# Patient Record
Sex: FEMALE | Race: WHITE | Employment: OTHER | URBAN - METROPOLITAN AREA
[De-identification: names, ages, dates, MRNs, and addresses within clinical notes are randomized per-mention and may not be internally consistent; named-entity substitution may affect disease eponyms.]

---

## 2017-01-12 ENCOUNTER — APPOINTMENT (OUTPATIENT)
Dept: GENERAL RADIOLOGY | Age: 82
DRG: 202 | End: 2017-01-12
Attending: EMERGENCY MEDICINE
Payer: MEDICARE

## 2017-01-12 ENCOUNTER — APPOINTMENT (OUTPATIENT)
Dept: CT IMAGING | Age: 82
DRG: 202 | End: 2017-01-12
Attending: INTERNAL MEDICINE
Payer: MEDICARE

## 2017-01-12 ENCOUNTER — HOSPITAL ENCOUNTER (INPATIENT)
Age: 82
LOS: 3 days | Discharge: HOME | DRG: 202 | End: 2017-01-16
Attending: EMERGENCY MEDICINE | Admitting: INTERNAL MEDICINE
Payer: MEDICARE

## 2017-01-12 DIAGNOSIS — R06.02 SOB (SHORTNESS OF BREATH): Primary | ICD-10-CM

## 2017-01-12 DIAGNOSIS — J98.01 ACUTE BRONCHOSPASM: ICD-10-CM

## 2017-01-12 PROBLEM — J20.9 ACUTE BRONCHITIS: Status: ACTIVE | Noted: 2017-01-12

## 2017-01-12 LAB
ALBUMIN SERPL BCP-MCNC: 3.2 G/DL (ref 3.5–5)
ALBUMIN/GLOB SERPL: 1 {RATIO} (ref 1.1–2.2)
ALP SERPL-CCNC: 90 U/L (ref 45–117)
ALT SERPL-CCNC: 33 U/L (ref 12–78)
ANION GAP BLD CALC-SCNC: 10 MMOL/L (ref 5–15)
AST SERPL W P-5'-P-CCNC: 20 U/L (ref 15–37)
BASOPHILS # BLD AUTO: 0 K/UL (ref 0–0.1)
BASOPHILS # BLD: 0 % (ref 0–1)
BILIRUB SERPL-MCNC: 0.7 MG/DL (ref 0.2–1)
BNP SERPL-MCNC: 85 PG/ML (ref 0–100)
BUN SERPL-MCNC: 26 MG/DL (ref 6–20)
BUN/CREAT SERPL: 23 (ref 12–20)
CALCIUM SERPL-MCNC: 10.1 MG/DL (ref 8.5–10.1)
CHLORIDE SERPL-SCNC: 102 MMOL/L (ref 97–108)
CO2 SERPL-SCNC: 30 MMOL/L (ref 21–32)
CREAT SERPL-MCNC: 1.14 MG/DL (ref 0.55–1.02)
EOSINOPHIL # BLD: 0 K/UL (ref 0–0.4)
EOSINOPHIL NFR BLD: 0 % (ref 0–7)
ERYTHROCYTE [DISTWIDTH] IN BLOOD BY AUTOMATED COUNT: 14.9 % (ref 11.5–14.5)
GLOBULIN SER CALC-MCNC: 3.3 G/DL (ref 2–4)
GLUCOSE SERPL-MCNC: 228 MG/DL (ref 65–100)
HCT VFR BLD AUTO: 39.4 % (ref 35–47)
HGB BLD-MCNC: 12.6 G/DL (ref 11.5–16)
LACTATE SERPL-SCNC: 3.3 MMOL/L (ref 0.4–2)
LYMPHOCYTES # BLD AUTO: 12 % (ref 12–49)
LYMPHOCYTES # BLD: 0.9 K/UL (ref 0.8–3.5)
MCH RBC QN AUTO: 30.1 PG (ref 26–34)
MCHC RBC AUTO-ENTMCNC: 32 G/DL (ref 30–36.5)
MCV RBC AUTO: 94 FL (ref 80–99)
MONOCYTES # BLD: 0.4 K/UL (ref 0–1)
MONOCYTES NFR BLD AUTO: 6 % (ref 5–13)
NEUTS SEG # BLD: 6.3 K/UL (ref 1.8–8)
NEUTS SEG NFR BLD AUTO: 82 % (ref 32–75)
PLATELET # BLD AUTO: 253 K/UL (ref 150–400)
POTASSIUM SERPL-SCNC: 3.5 MMOL/L (ref 3.5–5.1)
PROT SERPL-MCNC: 6.5 G/DL (ref 6.4–8.2)
RBC # BLD AUTO: 4.19 M/UL (ref 3.8–5.2)
SODIUM SERPL-SCNC: 142 MMOL/L (ref 136–145)
TROPONIN I SERPL-MCNC: <0.04 NG/ML
WBC # BLD AUTO: 7.7 K/UL (ref 3.6–11)

## 2017-01-12 PROCEDURE — 99218 HC RM OBSERVATION: CPT

## 2017-01-12 PROCEDURE — 74011636637 HC RX REV CODE- 636/637: Performed by: INTERNAL MEDICINE

## 2017-01-12 PROCEDURE — 84484 ASSAY OF TROPONIN QUANT: CPT | Performed by: EMERGENCY MEDICINE

## 2017-01-12 PROCEDURE — 71250 CT THORAX DX C-: CPT

## 2017-01-12 PROCEDURE — 71020 XR CHEST PA LAT: CPT

## 2017-01-12 PROCEDURE — 87040 BLOOD CULTURE FOR BACTERIA: CPT | Performed by: EMERGENCY MEDICINE

## 2017-01-12 PROCEDURE — 83880 ASSAY OF NATRIURETIC PEPTIDE: CPT | Performed by: EMERGENCY MEDICINE

## 2017-01-12 PROCEDURE — 83605 ASSAY OF LACTIC ACID: CPT | Performed by: EMERGENCY MEDICINE

## 2017-01-12 PROCEDURE — 80053 COMPREHEN METABOLIC PANEL: CPT | Performed by: EMERGENCY MEDICINE

## 2017-01-12 PROCEDURE — 94640 AIRWAY INHALATION TREATMENT: CPT

## 2017-01-12 PROCEDURE — 74011250636 HC RX REV CODE- 250/636: Performed by: EMERGENCY MEDICINE

## 2017-01-12 PROCEDURE — 99285 EMERGENCY DEPT VISIT HI MDM: CPT

## 2017-01-12 PROCEDURE — 77030029684 HC NEB SM VOL KT MONA -A

## 2017-01-12 PROCEDURE — A9270 NON-COVERED ITEM OR SERVICE: HCPCS | Performed by: INTERNAL MEDICINE

## 2017-01-12 PROCEDURE — 85025 COMPLETE CBC W/AUTO DIFF WBC: CPT | Performed by: EMERGENCY MEDICINE

## 2017-01-12 PROCEDURE — 93005 ELECTROCARDIOGRAM TRACING: CPT

## 2017-01-12 PROCEDURE — 74011000250 HC RX REV CODE- 250: Performed by: EMERGENCY MEDICINE

## 2017-01-12 PROCEDURE — 36415 COLL VENOUS BLD VENIPUNCTURE: CPT | Performed by: EMERGENCY MEDICINE

## 2017-01-12 PROCEDURE — 77010033678 HC OXYGEN DAILY

## 2017-01-12 RX ORDER — BISMUTH SUBSALICYLATE 262 MG
1 TABLET,CHEWABLE ORAL DAILY
COMMUNITY
End: 2020-01-01

## 2017-01-12 RX ORDER — ALENDRONATE SODIUM 70 MG/1
70 TABLET ORAL
COMMUNITY
End: 2020-01-01

## 2017-01-12 RX ORDER — ALBUTEROL SULFATE 0.83 MG/ML
2.5 SOLUTION RESPIRATORY (INHALATION)
Status: COMPLETED | OUTPATIENT
Start: 2017-01-12 | End: 2017-01-12

## 2017-01-12 RX ORDER — LEVOFLOXACIN 5 MG/ML
500 INJECTION, SOLUTION INTRAVENOUS
Status: COMPLETED | OUTPATIENT
Start: 2017-01-12 | End: 2017-01-12

## 2017-01-12 RX ORDER — CALCIUM CARBONATE 500(1250)
1 TABLET ORAL 2 TIMES DAILY
COMMUNITY
End: 2019-01-15 | Stop reason: SDUPTHER

## 2017-01-12 RX ORDER — ATORVASTATIN CALCIUM 20 MG/1
20 TABLET, FILM COATED ORAL DAILY
COMMUNITY
End: 2018-10-24

## 2017-01-12 RX ORDER — PREDNISONE 20 MG/1
40 TABLET ORAL
Status: DISCONTINUED | OUTPATIENT
Start: 2017-01-12 | End: 2017-01-15

## 2017-01-12 RX ORDER — ASPIRIN 81 MG/1
81 TABLET ORAL DAILY
COMMUNITY
End: 2018-10-24

## 2017-01-12 RX ADMIN — ALBUTEROL SULFATE 2.5 MG: 2.5 SOLUTION RESPIRATORY (INHALATION) at 19:44

## 2017-01-12 RX ADMIN — LEVOFLOXACIN 500 MG: 5 INJECTION, SOLUTION INTRAVENOUS at 21:07

## 2017-01-12 RX ADMIN — PREDNISONE 40 MG: 20 TABLET ORAL at 21:39

## 2017-01-12 RX ADMIN — SODIUM CHLORIDE 1000 ML: 900 INJECTION, SOLUTION INTRAVENOUS at 21:07

## 2017-01-12 NOTE — IP AVS SNAPSHOT
[address] [address] 
[phone] Patient: Denis Brian MRN: [id number] :1925 You are allergic to the following Allergen Reactions Hydrochlorothiazide Nausea and Vomiting Recent Documentation Height Weight Breastfeeding? BMI OB Status Smoking Status 1.575 m 53.1 kg No 21.4 kg/m2 Postmenopausal Former Smoker Unresulted Labs Order Current Status CULTURE, BLOOD, PAIRED Preliminary result Emergency Contacts Name Discharge Info Relation Home Work Mobile [address] CAREGIVER [3] Child [2] [phone] Rodger Reddy  Other Relative [6] [phone] About your hospitalization You were admitted on:  2017 [facility] on:  2017 Unit phone number:  [phone] Why you were hospitalized Your primary diagnosis was:  Acute Bronchitis Your diagnoses also included:  Bronchitis Providers Seen During Your Hospitalizations Provider Role Specialty Primary office phone Terence Arteaga MD Attending Provider Emergency Medicine [phone] Azul Busch MD Attending Provider Internal Medicine [phone] Jackson Bledsoe MD Attending Provider Internal Medicine [phone] Abbey Lo MD Attending Provider Internal Medicine [phone] Odilon Maharaj MD Attending Provider Internal Medicine [phone] Your Primary Care Physician (PCP) Primary Care Physician Office Phone Office Fax [address] [phone] Follow-up Information Follow up With Details Comments Contact Info Virgilio Reynoso MD In 2 weeks  [address] [address] [phone] Current Discharge Medication List  
  
START taking these medications Dose & Instructions Dispensing Information Comments Morning Noon Evening Bedtime  
 guaiFENesin 100 mg/5 mL liquid Commonly known as:  ROBITUSSIN Your next dose is: Today, Tomorrow Other:  _________ Dose:  200 mg Take 10 mL by mouth three (3) times daily as needed for Cough. Quantity:  1 Bottle Refills:  0  
     
   
   
   
  
 levoFLOXacin 250 mg tablet Commonly known as:  Patrizia [place] Your next dose is: Today, Tomorrow Other:  _________ Dose:  250 mg Take 1 Tab by mouth daily for 3 days. Quantity:  3 Tab Refills:  0  
     
   
   
   
  
 * predniSONE 10 mg tablet Commonly known as:  Johnny Lever Your next dose is: Today, Tomorrow Other:  _________ Dose:  30 mg Take 3 Tabs by mouth daily (with breakfast) for 1 day. Quantity:  3 Tab Refills:  0  
     
   
   
   
  
 * predniSONE 10 mg tablet Commonly known as:  Johnny Lever Your next dose is: Today, Tomorrow Other:  _________ Dose:  20 mg Take 2 Tabs by mouth daily (with breakfast) for 5 days. Quantity:  10 Tab Refills:  0  
     
   
   
   
  
 * Notice: This list has 2 medication(s) that are the same as other medications prescribed for you. Read the directions carefully, and ask your doctor or other care provider to review them with you. CONTINUE these medications which have CHANGED Dose & Instructions Dispensing Information Comments Morning Noon Evening Bedtime  
 verapamil  mg tablet Commonly known as:  CALAN-SR What changed:   
- medication strength 
- how much to take Your next dose is: Today, Tomorrow Other:  _________ Dose:  120 mg Take 1 Tab by mouth daily. Indications: Hypertension Quantity:  30 Tab Refills:  3 CONTINUE these medications which have NOT CHANGED Dose & Instructions Dispensing Information Comments Morning Noon Evening Bedtime AMLODIPINE PO Your next dose is: Today, Tomorrow Other:  _________ Dose:  5 mg Take 5 mg by mouth daily. Refills:  0  
     
   
   
   
  
 aspirin delayed-release 81 mg tablet Your next dose is: Today, Tomorrow Other:  _________ Dose:  81 mg Take 81 mg by mouth daily. Refills:  0  
     
   
   
   
  
 atorvastatin 20 mg tablet Commonly known as:  LIPITOR Your next dose is: Today, Tomorrow Other:  _________ Dose:  20 mg Take 20 mg by mouth daily. Refills:  0  
     
   
   
   
  
 calcium carbonate 500 mg calcium (1,250 mg) tablet Commonly known as:  OS-SIMONE Your next dose is: Today, Tomorrow Other:  _________ Dose:  1 Tab Take 1 Tab by mouth two (2) times a day. Refills:  0  
     
   
   
   
  
 FOSAMAX 70 mg tablet Generic drug:  alendronate Your next dose is: Today, Tomorrow Other:  _________ Dose:  70 mg Take 70 mg by mouth every seven (7) days. Refills:  0  
     
   
   
   
  
 furosemide 20 mg tablet Commonly known as:  LASIX Your next dose is: Today, Tomorrow Other:  _________ Dose:  20 mg Take 20 mg by mouth daily. Refills:  0  
     
   
   
   
  
 lisinopril 40 mg tablet Commonly known as:  Delsie Commander Your next dose is: Today, Tomorrow Other:  _________ Dose:  40 mg Take 40 mg by mouth daily. Refills:  0  
     
   
   
   
  
 multivitamin tablet Commonly known as:  ONE A DAY Your next dose is: Today, Tomorrow Other:  _________ Dose:  1 Tab Take 1 Tab by mouth daily. Refills:  0 Where to Get Your Medications Information on where to get these meds will be given to you by the nurse or doctor. ! Ask your nurse or doctor about these medications  
  guaiFENesin 100 mg/5 mL liquid levoFLOXacin 250 mg tablet  
 predniSONE 10 mg tablet  
 predniSONE 10 mg tablet  
 verapamil  mg tablet Discharge Instructions Discharge Instructions PATIENT ID: Rajni Charlton MRN: [id number] YOB: 1925 DATE OF ADMISSION: 1/12/2017  6:55 PM   
DATE OF DISCHARGE: 1/16/2017 PRIMARY CARE PROVIDER: Shawna Zarate MD  
 
 
DISCHARGING PHYSICIAN: Jesse Nixon MD   
To contact this individual call [phone] and ask the  to page. If unavailable ask to be transferred the Adult Hospitalist Department. DISCHARGE DIAGNOSES : Bronchitis, Acute hypoxic respiratory failure CONSULTATIONS: IP CONSULT TO HOSPITALIST 
 
PROCEDURES/SURGERIES: * No surgery found * PENDING TEST RESULTS:  
At the time of discharge the following test results are still pending: FOLLOW UP APPOINTMENTS:  
Follow-up Information Follow up With Details Comments Contact Info Michelle Matute MD   [address] [place] [address] [address] [address] [address] 
[phone] ADDITIONAL CARE RECOMMENDATIONS:  
 
DIET: Cardiac Diet ACTIVITY: Activity as tolerated WOUND CARE: None EQUIPMENT needed:  
 
 
 
 
DISPOSITION: 
  Home With: 
 OT  PT  Xavier Hartman RN  
  
 x SNF/Inpatient Rehab/LTAC Independent/assisted living Hospice Other: CDMP Checked:  
Yes x Signed:  
Victory Nissen, MD 
1/16/2017 
12:53 PM 
 
Discharge Orders None [employer] Announcement We are excited to announce that we are making your provider's discharge notes available to you in [employer]. You will see these notes when they are completed and signed by the physician that discharged you from your recent hospital stay. If you have any questions or concerns about any information you see in [employer], please call the Health Information Department where you were seen or reach out to your Primary Care Provider for more information about your plan of care. Introducing [facility]! Dionicio Carnes introduces [employer] patient portal. Now you can access parts of your medical record, email your doctor's office, and request medication refills online. 1. In your internet browser, go to [email or web address]. [employer] 2. Click on the First Time User? Click Here link in the Sign In box. You will see the New Member Sign Up page. 3. Enter your [employer] Access Code exactly as it appears below. You will not need to use this code after youve completed the sign-up process. If you do not sign up before the expiration date, you must request a new code. · [employer] Access Code: [email or web address] Expires: 2/28/2017  5:29 PM 
 
4. Enter the last four digits of your Social Security Number (xxxx) and Date of Birth (mm/dd/yyyy) as indicated and click Submit. You will be taken to the next sign-up page. 5. Create a [employer] ID. This will be your [employer] login ID and cannot be changed, so think of one that is secure and easy to remember. 6. Create a [employer] password. You can change your password at any time. 7. Enter your Password Reset Question and Answer. This can be used at a later time if you forget your password. 8. Enter your e-mail address. You will receive e-mail notification when new information is available in [address] 9. Click Sign Up. You can now view and download portions of your medical record. 10. Click the Download Summary menu link to download a portable copy of your medical information. If you have questions, please visit the Frequently Asked Questions section of the [employer] website. Remember, [employer] is NOT to be used for urgent needs. For medical emergencies, dial 911. Now available from your iPhone and Android! General Information Please provide this summary of care documentation to your next provider. Patient Signature:  ____________________________________________________________ Date:  ____________________________________________________________  
  
Laura Bryan Provider Signature:  ____________________________________________________________ Date:  ____________________________________________________________

## 2017-01-12 NOTE — ED TRIAGE NOTES
Triage:  Pt to the ED due to concerns over continued cough, periods of fatigue, and fever. Pt states she is concerned she has pneumonia. Pt denies any SOB or CP. Pt states just feels like she is \"icky poo\". Noted intermittent rattling cough in triage that is occasionally productive.

## 2017-01-12 NOTE — IP AVS SNAPSHOT
Current Discharge Medication List  
  
Take these medications at their scheduled times Dose & Instructions Dispensing Information Comments Morning Noon Evening Bedtime AMLODIPINE PO Your next dose is: Today, Tomorrow Other:  ____________ Dose:  5 mg Take 5 mg by mouth daily. Refills:  0  
     
   
   
   
  
 aspirin delayed-release 81 mg tablet Your next dose is: Today, Tomorrow Other:  ____________ Dose:  81 mg Take 81 mg by mouth daily. Refills:  0  
     
   
   
   
  
 atorvastatin 20 mg tablet Commonly known as:  LIPITOR Your next dose is: Today, Tomorrow Other:  ____________ Dose:  20 mg Take 20 mg by mouth daily. Refills:  0  
     
   
   
   
  
 calcium carbonate 500 mg calcium (1,250 mg) tablet Commonly known as:  OS-SIMONE Your next dose is: Today, Tomorrow Other:  ____________ Dose:  1 Tab Take 1 Tab by mouth two (2) times a day. Refills:  0  
     
   
   
   
  
 FOSAMAX 70 mg tablet Generic drug:  alendronate Your next dose is: Today, Tomorrow Other:  ____________ Dose:  70 mg Take 70 mg by mouth every seven (7) days. Refills:  0  
     
   
   
   
  
 furosemide 20 mg tablet Commonly known as:  LASIX Your next dose is: Today, Tomorrow Other:  ____________ Dose:  20 mg Take 20 mg by mouth daily. Refills:  0  
     
   
   
   
  
 levoFLOXacin 250 mg tablet Commonly known as:  Thersia Bring Your next dose is: Today, Tomorrow Other:  ____________ Dose:  250 mg Take 1 Tab by mouth daily for 3 days. Quantity:  3 Tab Refills:  0  
     
   
   
   
  
 lisinopril 40 mg tablet Commonly known as:  Nona Needy Your next dose is: Today, Tomorrow Other:  ____________ Dose:  40 mg Take 40 mg by mouth daily. Refills:  0 multivitamin tablet Commonly known as:  ONE A DAY Your next dose is: Today, Tomorrow Other:  ____________ Dose:  1 Tab Take 1 Tab by mouth daily. Refills:  0  
     
   
   
   
  
 * predniSONE 10 mg tablet Commonly known as:  Shade Ok Your next dose is: Today, Tomorrow Other:  ____________ Dose:  30 mg Take 3 Tabs by mouth daily (with breakfast) for 1 day. Quantity:  3 Tab Refills:  0  
     
   
   
   
  
 * predniSONE 10 mg tablet Commonly known as:  Shade Ok Your next dose is: Today, Tomorrow Other:  ____________ Dose:  20 mg Take 2 Tabs by mouth daily (with breakfast) for 5 days. Quantity:  10 Tab Refills:  0  
     
   
   
   
  
 verapamil  mg tablet Commonly known as:  CALAN-SR Your next dose is: Today, Tomorrow Other:  ____________ Dose:  120 mg Take 1 Tab by mouth daily. Indications: Hypertension Quantity:  30 Tab Refills:  3  
     
   
   
   
  
 * Notice: This list has 2 medication(s) that are the same as other medications prescribed for you. Read the directions carefully, and ask your doctor or other care provider to review them with you. Take these medications as needed Dose & Instructions Dispensing Information Comments Morning Noon Evening Bedtime  
 guaiFENesin 100 mg/5 mL liquid Commonly known as:  ROBITUSSIN Your next dose is: Today, Tomorrow Other:  ____________ Dose:  200 mg Take 10 mL by mouth three (3) times daily as needed for Cough. Quantity:  1 Bottle Refills:  0 Where to Get Your Medications Information about where to get these medications is not yet available ! Ask your nurse or doctor about these medications  
  guaiFENesin 100 mg/5 mL liquid  
 levoFLOXacin 250 mg tablet  
 predniSONE 10 mg tablet  
 predniSONE 10 mg tablet  
 verapamil  mg tablet

## 2017-01-13 PROBLEM — J40 BRONCHITIS: Status: ACTIVE | Noted: 2017-01-13

## 2017-01-13 LAB
ALBUMIN SERPL BCP-MCNC: 2.8 G/DL (ref 3.5–5)
ALBUMIN/GLOB SERPL: 0.8 {RATIO} (ref 1.1–2.2)
ALP SERPL-CCNC: 81 U/L (ref 45–117)
ALT SERPL-CCNC: 29 U/L (ref 12–78)
ANION GAP BLD CALC-SCNC: 9 MMOL/L (ref 5–15)
AST SERPL W P-5'-P-CCNC: 18 U/L (ref 15–37)
ATRIAL RATE: 70 BPM
BASOPHILS # BLD AUTO: 0 K/UL (ref 0–0.1)
BASOPHILS # BLD: 0 % (ref 0–1)
BILIRUB SERPL-MCNC: 0.5 MG/DL (ref 0.2–1)
BUN SERPL-MCNC: 25 MG/DL (ref 6–20)
BUN/CREAT SERPL: 29 (ref 12–20)
CALCIUM SERPL-MCNC: 9.4 MG/DL (ref 8.5–10.1)
CALCULATED P AXIS, ECG09: 3 DEGREES
CALCULATED R AXIS, ECG10: -7 DEGREES
CALCULATED T AXIS, ECG11: 58 DEGREES
CHLORIDE SERPL-SCNC: 103 MMOL/L (ref 97–108)
CO2 SERPL-SCNC: 28 MMOL/L (ref 21–32)
CREAT SERPL-MCNC: 0.87 MG/DL (ref 0.55–1.02)
DIAGNOSIS, 93000: NORMAL
DIFFERENTIAL METHOD BLD: ABNORMAL
EOSINOPHIL # BLD: 0 K/UL (ref 0–0.4)
EOSINOPHIL NFR BLD: 0 % (ref 0–7)
ERYTHROCYTE [DISTWIDTH] IN BLOOD BY AUTOMATED COUNT: 14.6 % (ref 11.5–14.5)
EST. AVERAGE GLUCOSE BLD GHB EST-MCNC: 128 MG/DL
GLOBULIN SER CALC-MCNC: 3.3 G/DL (ref 2–4)
GLUCOSE SERPL-MCNC: 175 MG/DL (ref 65–100)
HBA1C MFR BLD: 6.1 % (ref 4.2–6.3)
HCT VFR BLD AUTO: 35.9 % (ref 35–47)
HGB BLD-MCNC: 11.4 G/DL (ref 11.5–16)
LACTATE SERPL-SCNC: 1.4 MMOL/L (ref 0.4–2)
LYMPHOCYTES # BLD AUTO: 13 % (ref 12–49)
LYMPHOCYTES # BLD: 0.6 K/UL (ref 0.8–3.5)
MAGNESIUM SERPL-MCNC: 2.2 MG/DL (ref 1.6–2.4)
MCH RBC QN AUTO: 30.2 PG (ref 26–34)
MCHC RBC AUTO-ENTMCNC: 31.8 G/DL (ref 30–36.5)
MCV RBC AUTO: 95 FL (ref 80–99)
MONOCYTES # BLD: 0 K/UL (ref 0–1)
MONOCYTES NFR BLD AUTO: 0 % (ref 5–13)
NEUTS SEG # BLD: 4.3 K/UL (ref 1.8–8)
NEUTS SEG NFR BLD AUTO: 87 % (ref 32–75)
P-R INTERVAL, ECG05: 128 MS
PHOSPHATE SERPL-MCNC: 3.7 MG/DL (ref 2.6–4.7)
PLATELET # BLD AUTO: 219 K/UL (ref 150–400)
PLATELET COMMENTS,PCOM: ABNORMAL
POTASSIUM SERPL-SCNC: 3.4 MMOL/L (ref 3.5–5.1)
PROT SERPL-MCNC: 6.1 G/DL (ref 6.4–8.2)
Q-T INTERVAL, ECG07: 400 MS
QRS DURATION, ECG06: 88 MS
QTC CALCULATION (BEZET), ECG08: 432 MS
RBC # BLD AUTO: 3.78 M/UL (ref 3.8–5.2)
RBC MORPH BLD: ABNORMAL
RBC MORPH BLD: ABNORMAL
SODIUM SERPL-SCNC: 140 MMOL/L (ref 136–145)
VENTRICULAR RATE, ECG03: 70 BPM
WBC # BLD AUTO: 4.9 K/UL (ref 3.6–11)

## 2017-01-13 PROCEDURE — A9270 NON-COVERED ITEM OR SERVICE: HCPCS | Performed by: INTERNAL MEDICINE

## 2017-01-13 PROCEDURE — 85025 COMPLETE CBC W/AUTO DIFF WBC: CPT | Performed by: INTERNAL MEDICINE

## 2017-01-13 PROCEDURE — 74011250637 HC RX REV CODE- 250/637: Performed by: NURSE PRACTITIONER

## 2017-01-13 PROCEDURE — 99218 HC RM OBSERVATION: CPT

## 2017-01-13 PROCEDURE — 65660000000 HC RM CCU STEPDOWN

## 2017-01-13 PROCEDURE — 77030027138 HC INCENT SPIROMETER -A

## 2017-01-13 PROCEDURE — 74011250636 HC RX REV CODE- 250/636: Performed by: INTERNAL MEDICINE

## 2017-01-13 PROCEDURE — 94640 AIRWAY INHALATION TREATMENT: CPT

## 2017-01-13 PROCEDURE — 83735 ASSAY OF MAGNESIUM: CPT | Performed by: INTERNAL MEDICINE

## 2017-01-13 PROCEDURE — 80053 COMPREHEN METABOLIC PANEL: CPT | Performed by: INTERNAL MEDICINE

## 2017-01-13 PROCEDURE — 36415 COLL VENOUS BLD VENIPUNCTURE: CPT | Performed by: INTERNAL MEDICINE

## 2017-01-13 PROCEDURE — 74011000250 HC RX REV CODE- 250: Performed by: INTERNAL MEDICINE

## 2017-01-13 PROCEDURE — 74011250636 HC RX REV CODE- 250/636: Performed by: NURSE PRACTITIONER

## 2017-01-13 PROCEDURE — 74011636637 HC RX REV CODE- 636/637: Performed by: INTERNAL MEDICINE

## 2017-01-13 PROCEDURE — 74011250637 HC RX REV CODE- 250/637: Performed by: INTERNAL MEDICINE

## 2017-01-13 PROCEDURE — 83605 ASSAY OF LACTIC ACID: CPT | Performed by: INTERNAL MEDICINE

## 2017-01-13 PROCEDURE — 84100 ASSAY OF PHOSPHORUS: CPT | Performed by: INTERNAL MEDICINE

## 2017-01-13 PROCEDURE — 83036 HEMOGLOBIN GLYCOSYLATED A1C: CPT | Performed by: INTERNAL MEDICINE

## 2017-01-13 RX ORDER — LEVOFLOXACIN 5 MG/ML
250 INJECTION, SOLUTION INTRAVENOUS EVERY 24 HOURS
Status: DISCONTINUED | OUTPATIENT
Start: 2017-01-13 | End: 2017-01-16 | Stop reason: HOSPADM

## 2017-01-13 RX ORDER — LISINOPRIL 20 MG/1
40 TABLET ORAL DAILY
Status: DISCONTINUED | OUTPATIENT
Start: 2017-01-13 | End: 2017-01-16 | Stop reason: HOSPADM

## 2017-01-13 RX ORDER — AMLODIPINE BESYLATE 5 MG/1
5 TABLET ORAL DAILY
Status: DISCONTINUED | OUTPATIENT
Start: 2017-01-13 | End: 2017-01-16 | Stop reason: HOSPADM

## 2017-01-13 RX ORDER — SODIUM CHLORIDE 0.9 % (FLUSH) 0.9 %
5-10 SYRINGE (ML) INJECTION AS NEEDED
Status: DISCONTINUED | OUTPATIENT
Start: 2017-01-13 | End: 2017-01-16 | Stop reason: HOSPADM

## 2017-01-13 RX ORDER — VERAPAMIL HYDROCHLORIDE 180 MG/1
90 TABLET, EXTENDED RELEASE ORAL DAILY
Status: DISCONTINUED | OUTPATIENT
Start: 2017-01-14 | End: 2017-01-14

## 2017-01-13 RX ORDER — CALCIUM CARBONATE 500(1250)
500 TABLET ORAL 2 TIMES DAILY
Status: DISCONTINUED | OUTPATIENT
Start: 2017-01-13 | End: 2017-01-16 | Stop reason: HOSPADM

## 2017-01-13 RX ORDER — ATORVASTATIN CALCIUM 20 MG/1
20 TABLET, FILM COATED ORAL DAILY
Status: DISCONTINUED | OUTPATIENT
Start: 2017-01-13 | End: 2017-01-16 | Stop reason: HOSPADM

## 2017-01-13 RX ORDER — ASPIRIN 81 MG/1
81 TABLET ORAL DAILY
Status: DISCONTINUED | OUTPATIENT
Start: 2017-01-13 | End: 2017-01-16 | Stop reason: HOSPADM

## 2017-01-13 RX ORDER — LEVOFLOXACIN 5 MG/ML
500 INJECTION, SOLUTION INTRAVENOUS EVERY 24 HOURS
Status: DISCONTINUED | OUTPATIENT
Start: 2017-01-13 | End: 2017-01-13 | Stop reason: DRUGHIGH

## 2017-01-13 RX ORDER — POTASSIUM CHLORIDE 750 MG/1
20 TABLET, FILM COATED, EXTENDED RELEASE ORAL
Status: COMPLETED | OUTPATIENT
Start: 2017-01-13 | End: 2017-01-13

## 2017-01-13 RX ORDER — SODIUM CHLORIDE 9 MG/ML
50 INJECTION, SOLUTION INTRAVENOUS CONTINUOUS
Status: DISCONTINUED | OUTPATIENT
Start: 2017-01-13 | End: 2017-01-16 | Stop reason: HOSPADM

## 2017-01-13 RX ORDER — SODIUM CHLORIDE 0.9 % (FLUSH) 0.9 %
5-10 SYRINGE (ML) INJECTION EVERY 8 HOURS
Status: DISCONTINUED | OUTPATIENT
Start: 2017-01-13 | End: 2017-01-16 | Stop reason: HOSPADM

## 2017-01-13 RX ORDER — VERAPAMIL HYDROCHLORIDE 120 MG/1
180 TABLET, FILM COATED, EXTENDED RELEASE ORAL DAILY
Status: DISCONTINUED | OUTPATIENT
Start: 2017-01-13 | End: 2017-01-13

## 2017-01-13 RX ORDER — ENOXAPARIN SODIUM 100 MG/ML
30 INJECTION SUBCUTANEOUS EVERY 24 HOURS
Status: DISCONTINUED | OUTPATIENT
Start: 2017-01-13 | End: 2017-01-16 | Stop reason: HOSPADM

## 2017-01-13 RX ORDER — ALBUTEROL SULFATE 0.83 MG/ML
2.5 SOLUTION RESPIRATORY (INHALATION)
Status: DISCONTINUED | OUTPATIENT
Start: 2017-01-13 | End: 2017-01-16 | Stop reason: HOSPADM

## 2017-01-13 RX ORDER — FUROSEMIDE 20 MG/1
20 TABLET ORAL DAILY
Status: DISCONTINUED | OUTPATIENT
Start: 2017-01-13 | End: 2017-01-16 | Stop reason: HOSPADM

## 2017-01-13 RX ADMIN — CALCIUM CARBONATE 500 MG: 1250 TABLET ORAL at 08:46

## 2017-01-13 RX ADMIN — ATORVASTATIN CALCIUM 20 MG: 20 TABLET, FILM COATED ORAL at 08:46

## 2017-01-13 RX ADMIN — AMLODIPINE BESYLATE 5 MG: 5 TABLET ORAL at 08:47

## 2017-01-13 RX ADMIN — ALBUTEROL SULFATE 1 DOSE: 2.5 SOLUTION RESPIRATORY (INHALATION) at 19:49

## 2017-01-13 RX ADMIN — LEVOFLOXACIN 250 MG: 5 INJECTION, SOLUTION INTRAVENOUS at 20:40

## 2017-01-13 RX ADMIN — ASPIRIN 81 MG: 81 TABLET, COATED ORAL at 08:46

## 2017-01-13 RX ADMIN — PREDNISONE 40 MG: 20 TABLET ORAL at 08:47

## 2017-01-13 RX ADMIN — ALBUTEROL SULFATE 1 DOSE: 2.5 SOLUTION RESPIRATORY (INHALATION) at 08:10

## 2017-01-13 RX ADMIN — LISINOPRIL 40 MG: 20 TABLET ORAL at 08:47

## 2017-01-13 RX ADMIN — GUAIFENESIN 600 MG: 600 TABLET, EXTENDED RELEASE ORAL at 08:47

## 2017-01-13 RX ADMIN — CALCIUM CARBONATE 500 MG: 1250 TABLET ORAL at 18:53

## 2017-01-13 RX ADMIN — FUROSEMIDE 20 MG: 20 TABLET ORAL at 08:47

## 2017-01-13 RX ADMIN — ALBUTEROL SULFATE 1 DOSE: 2.5 SOLUTION RESPIRATORY (INHALATION) at 15:13

## 2017-01-13 RX ADMIN — ALBUTEROL SULFATE 1 DOSE: 2.5 SOLUTION RESPIRATORY (INHALATION) at 03:09

## 2017-01-13 RX ADMIN — ENOXAPARIN SODIUM 30 MG: 30 INJECTION SUBCUTANEOUS at 18:55

## 2017-01-13 RX ADMIN — Medication 10 ML: at 18:58

## 2017-01-13 RX ADMIN — VERAPAMIL HYDROCHLORIDE 180 MG: 120 TABLET, FILM COATED, EXTENDED RELEASE ORAL at 08:45

## 2017-01-13 RX ADMIN — GUAIFENESIN 600 MG: 600 TABLET, EXTENDED RELEASE ORAL at 18:53

## 2017-01-13 RX ADMIN — POTASSIUM CHLORIDE 20 MEQ: 750 TABLET, FILM COATED, EXTENDED RELEASE ORAL at 13:18

## 2017-01-13 RX ADMIN — ALBUTEROL SULFATE 1 DOSE: 2.5 SOLUTION RESPIRATORY (INHALATION) at 12:25

## 2017-01-13 NOTE — PROGRESS NOTES
Hospitalist Progress Note          Johan Shahid M.D. Cedric: [phone]  Call physician on-call through the  7pm-7am        Date of visit:  1/13/2017    Patient seen and examined by me independently. Patient reports she has had \"bronchitis\" for the past 3 weeks and \"have not been able to shake it off\". Patient has a wet cough, but unable to bring anything up. O2 on admission was 90%. Persistent low grade fever. Advanced age and increased risk of complications, such as pneumonia. Patient failed outpatient treatment and needs inpatient care, requiring hospital stay of 2 overnights or more. Change admission to Inpatient status. Discussed with patient and NP.        Johan Shahid MD

## 2017-01-13 NOTE — ROUTINE PROCESS
TRANSFER - OUT REPORT:    Verbal report given to JORGE Harmon(name) on [facility] Merritt  being transferred to [facility](unit) for routine progression of care       Report consisted of patients Situation, Background, Assessment and   Recommendations(SBAR). Information from the following report(s) SBAR, ED Summary, Intake/Output, MAR and Recent Results was reviewed with the receiving nurse. Lines:   Peripheral IV 01/12/17 Right Antecubital (Active)   Site Assessment Clean, dry, & intact 1/12/2017  7:00 PM   Phlebitis Assessment 0 1/12/2017  7:00 PM   Infiltration Assessment 0 1/12/2017  7:00 PM   Dressing Status Clean, dry, & intact 1/12/2017  7:00 PM   Dressing Type Transparent 1/12/2017  7:00 PM   Hub Color/Line Status Blue;Capped;Flushed;Patent 1/12/2017  7:00 PM   Action Taken Blood drawn 1/12/2017  7:00 PM       Peripheral IV 01/12/17 Left Antecubital (Active)   Site Assessment Clean, dry, & intact 1/12/2017  7:01 PM   Phlebitis Assessment 0 1/12/2017  7:01 PM   Infiltration Assessment 0 1/12/2017  7:01 PM   Dressing Status Clean, dry, & intact 1/12/2017  7:01 PM   Dressing Type Transparent 1/12/2017  7:01 PM   Hub Color/Line Status Pink;Capped;Flushed;Patent 1/12/2017  7:01 PM   Action Taken Blood drawn 1/12/2017  7:01 PM        Opportunity for questions and clarification was provided.       Patient transported with:   Bettery

## 2017-01-13 NOTE — PROGRESS NOTES
Primary Nurse Pool Jimenes RN and [facility] JORGE MARTÍNEZ performed a dual skin assessment on this patient No impairment noted  Mohit score is 22. PATIENT has blanchable area to sacrum poa  From sitting in chair too long ,protective cream in use instructed to adam from side to side.

## 2017-01-13 NOTE — ED NOTES
Pt resting on stretcher. Pt remains on monitor x3. VSS. Family at the bedside. Will continue to monitor closely.

## 2017-01-13 NOTE — PROGRESS NOTES
TRANSFER - IN REPORT:    Verbal report received from [facility] RN(name) on General Merritt  being received from ED(unit) for routine progression of care      Report consisted of patients Situation, Background, Assessment and   Recommendations(SBAR). Information from the following report(s) SBAR, Kardex, ED Summary, [facility] and Recent Results was reviewed with the receiving nurse. Opportunity for questions and clarification was provided. Assessment completed upon patients arrival to unit and care assumed.

## 2017-01-13 NOTE — PROGRESS NOTES
Day #1 of Levaquin  Indication:  bronchitis  Current regimen:  500 mg q24h  Abx regimen:  Levaquin  ID Following ?: NO  Frequency of BMP?: once  Recent Labs      17   1853   WBC  7.7   CREA  1.14*   BUN  26*     Est CrCl: 25 ml/min  Temp (24hrs), Av.5 °F (36.9 °C), Min:98.4 °F (36.9 °C), Max:98.6 °F (37 °C)    Cultures: blood      Plan: Change to Levaquin 500 mg x1 then 250 mg q24h

## 2017-01-13 NOTE — PROGRESS NOTES
Hospitalist Progress Note  Yoshi Burris NP  Office: [phone]  Cell: [phone]      Date of Service:  2017  NAME:  Vivienne Najera  :  1925  MRN:  [id number]      Admission Summary:   Ms. Mode Sands is a 79 yo female who was admitted with severe cough and SOB. Recent treatment for bronchitis as an OP with no improvement. Admitted for oxygen and IV antibiotics. Interval history / Subjective:   Still with very wet cough and low grade fever. She failed OP management. Oxygen saturations improving and feeling better but can't bring up anything. Called by RN regarding pauses on remote tele but she is asymptomatic. Assessment & Plan:     Bronchitis (POA)  - Failed OP oral antibiotics and responding to IV antibiotics  - Add Mucinex and nebs to loosen secretions  - Wean oxygen as tolerated  - Imaging negative for PNA, including CT scan   - Continue steroids    Pauses on telemetry  - Reviewed ECG readings throughout day and pauses from 1 - 1.6 seconds although patient has been asymptomatic from all of them  - Will decrease Verapamil in half and monitor throughout day. If still happening or becomes symptomatic will ask Cardiology to evaluate. Hypokalemia  - Replaced PO on     Elevated lactic acid  - Initially 3.3 -> down to 1.4 with IVF combined with improvement in renal function    Mild acute kidney injury (POA)  - Cr 1.14 on admit down to 0.87 with hydration  - Encourage PO intake    Hypertension  - Continue Amlodipine and Lisinopril    Code status: Full  DVT prophylaxis: Add Lovenox    Care Plan discussed with: Patient, RN, Dr. Trevor Perez on   Disposition: Lives at .  Hopefully home in 1-2 more days     Hospital Problems  Date Reviewed: 2017          Codes Class Noted POA    Bronchitis ICD-10-CM: J40  ICD-9-CM: 498  2017 Unknown        * (Principal)Acute bronchitis ICD-10-CM: J20.9  ICD-9-CM: 466.0 1/12/2017 Yes                Review of Systems:   Denied fevers/chills, SOB ok, no palpitations or CP, no abdominal pain       Vital Signs:    Last 24hrs VS reviewed since prior progress note. Most recent are:  Visit Vitals    /66 (BP 1 Location: Left arm, BP Patient Position: At rest;Sitting)    Pulse 78    Temp 98.2 °F (36.8 °C)    Resp 16    Ht 5' 2\" (1.575 m)    Wt 53.1 kg (117 lb)    SpO2 95%    Breastfeeding No    BMI 21.4 kg/m2       No intake or output data in the 24 hours ending 01/13/17 1813     Physical Examination:             Constitutional:  No acute distress, cooperative, pleasant    ENT:  Oral mucous moist, oropharynx benign. Neck supple   Resp:  Overall with some scattered rhonchi and decreased to bases but mostly with upper airway congestion. No accessory muscle use   CV:  Regular rhythm, normal rate, no murmurs, gallops, rubs. Pauses on telemetry noted as above    GI:  Soft, non distended, non tender. normoactive bowel sounds, no hepatosplenomegaly     Musculoskeletal:  No edema, warm, 2+ pulses throughout    Neurologic:  Moves all extremities. AAOx3, no focal deficits and clear speech     Psych:  Good insight, Not anxious nor agitated.   Skin:  Good turgor, no rashes or ulcers       Data Review:    Review and/or order of clinical lab test  Review and/or order of tests in the radiology section of CPT  Review and/or order of tests in the medicine section of CPT      Labs:     Recent Labs      01/13/17   0345 01/12/17   1853   WBC  4.9  7.7   HGB  11.4*  12.6   HCT  35.9  39.4   PLT  219  253     Recent Labs      01/13/17   0345  01/12/17   1853   NA  140  142   K  3.4*  3.5   CL  103  102   CO2  28  30   BUN  25*  26*   CREA  0.87  1.14*   GLU  175*  228*   CA  9.4  10.1   MG  2.2   --    PHOS  3.7   --      Recent Labs      01/13/17   0345  01/12/17   1853   SGOT  18  20   ALT  29  33   AP  81  90   TBILI  0.5  0.7   TP  6.1*  6.5   ALB  2.8*  3.2*   GLOB  3.3  3.3     No results for input(s): INR, PTP, APTT in the last 72 hours. No lab exists for component: INREXT   No results for input(s): FE, TIBC, PSAT, FERR in the last 72 hours. No results found for: FOL, RBCF   No results for input(s): PH, PCO2, PO2 in the last 72 hours.   Recent Labs      01/12/17   1853   TROIQ  <0.04     Lab Results   Component Value Date/Time    Cholesterol, total 168 05/21/2010 03:00 AM    HDL Cholesterol 53 05/21/2010 03:00 AM    LDL, calculated 95.4 05/21/2010 03:00 AM    Triglyceride 98 05/21/2010 03:00 AM    CHOL/HDL Ratio 3.2 05/21/2010 03:00 AM     Lab Results   Component Value Date/Time    Glucose (POC) 104 05/22/2010 08:06 AM    Glucose (POC) 120 05/21/2010 10:53 PM    Glucose (POC) 122 05/21/2010 05:05 PM     Lab Results   Component Value Date/Time    Color YELLOW/STRAW 12/03/2016 09:25 AM    Appearance CLEAR 12/03/2016 09:25 AM    Specific gravity 1.017 12/03/2016 09:25 AM    pH (UA) 7.0 12/03/2016 09:25 AM    Protein NEGATIVE  12/03/2016 09:25 AM    Glucose NEGATIVE  12/03/2016 09:25 AM    Ketone TRACE 12/03/2016 09:25 AM    Bilirubin NEGATIVE  12/03/2016 09:25 AM    Urobilinogen 1.0 12/03/2016 09:25 AM    Nitrites NEGATIVE  12/03/2016 09:25 AM    Leukocyte Esterase SMALL 12/03/2016 09:25 AM    Epithelial cells FEW 12/03/2016 09:25 AM    Bacteria NEGATIVE  12/03/2016 09:25 AM    WBC 5-10 12/03/2016 09:25 AM    RBC 0-5 12/03/2016 09:25 AM         Medications Reviewed:     Current Facility-Administered Medications   Medication Dose Route Frequency    amLODIPine (NORVASC) tablet 5 mg  5 mg Oral DAILY    aspirin delayed-release tablet 81 mg  81 mg Oral DAILY    atorvastatin (LIPITOR) tablet 20 mg  20 mg Oral DAILY    calcium carbonate (OS-SIMONE) tablet 500 mg [elemental]  500 mg Oral BID    furosemide (LASIX) tablet 20 mg  20 mg Oral DAILY    lisinopril (PRINIVIL, ZESTRIL) tablet 40 mg  40 mg Oral DAILY    guaiFENesin SR (MUCINEX) tablet 600 mg  600 mg Oral BID    sodium chloride (NS) flush 5-10 mL  5-10 mL IntraVENous Q8H    sodium chloride (NS) flush 5-10 mL  5-10 mL IntraVENous PRN    albuterol 5mg / ipratropium 0.5mg neb solution  1 Dose Nebulization Q4H RT    albuterol (PROVENTIL VENTOLIN) nebulizer solution 2.5 mg  2.5 mg Nebulization Q2H PRN    levoFLOXacin (LEVAQUIN) 250 mg in D5W IVPB  250 mg IntraVENous Q24H    0.9% sodium chloride infusion  50 mL/hr IntraVENous CONTINUOUS    [START ON 1/14/2017] verapamil ER (CALAN-SR) tablet 90 mg  90 mg Oral DAILY    predniSONE (DELTASONE) tablet 40 mg  40 mg Oral DAILY WITH BREAKFAST     ______________________________________________________________________  EXPECTED LENGTH OF STAY: - - -  ACTUAL LENGTH OF STAY:          0                 Anamika Abdalla NP

## 2017-01-13 NOTE — ED PROVIDER NOTES
HPI Comments: 80 y.o. female with past medical history significant for HTN, hypercholesterolemia, TIA and stroke who presents from [facility] for evaluation of cough. Pt states that she first developed a bronchitis about 3 weeks ago and was initially prescribed a 1 week course of a z-pack, in addition to depo-medrol. Pt says that despite this treatment, she is still continuing to experience a cough, fatigue and intermittent low grade fever. Pt was seen in the clinic earlier tonight and was diagnosed with pnuemonia and instructed to come to the [facility] [place] [facility]. There are no other acute medical concerns at this time. PCP: Christoph Booker MD    Note written by Michelle Hansen. Dany Montoya, as dictated by Cathy Hill MD 7:14 PM        The history is provided by the patient and a relative (daughter). Past Medical History:   Diagnosis Date    HTN (hypertension)     Hypercholesteremia     Osteoporosis     Stroke ([address]      CVA, TIA    TIA (transient ischemic attack)     Vertebral fracture        Past Surgical History:   Procedure Laterality Date    Hx hip replacement  2009     left    Hx orthopaedic           History reviewed. No pertinent family history. Social History     Social History    Marital status:      Spouse name: N/A    Number of children: N/A    Years of education: N/A     Occupational History    Not on file. Social History Main Topics    Smoking status: Former Smoker     Years: 40.00     Quit date: 11/7/1986    Smokeless tobacco: Not on file    Alcohol use Yes    Drug use: No    Sexual activity: Not on file     Other Topics Concern    Not on file     Social History Narrative         ALLERGIES: Hydrochlorothiazide    Review of Systems   Constitutional: Positive for fatigue and fever. Negative for appetite change and chills. HENT: Negative for rhinorrhea, sore throat and trouble swallowing. Eyes: Negative for photophobia.    Respiratory: Positive for cough. Negative for shortness of breath. Cardiovascular: Negative for chest pain and palpitations. Gastrointestinal: Negative for abdominal pain, nausea and vomiting. Genitourinary: Negative for dysuria, frequency and hematuria. Musculoskeletal: Negative for arthralgias. Neurological: Negative for dizziness, syncope and weakness. Psychiatric/Behavioral: Negative for behavioral problems. The patient is not nervous/anxious. All other systems reviewed and are negative. Vitals:    01/12/17 1829   BP: 138/70   Pulse: 68   Resp: 22   Temp: 98.6 °F (37 °C)   SpO2: 92%   Weight: 53.1 kg (117 lb)   Height: 5' 2\" (1.575 m)            Physical Exam   Constitutional: She appears well-developed and well-nourished. HENT:   Head: Normocephalic and atraumatic. Mouth/Throat: Oropharynx is clear and moist.   Eyes: EOM are normal. Pupils are equal, round, and reactive to light. Neck: Normal range of motion. Neck supple. Cardiovascular: Normal rate, regular rhythm, normal heart sounds and intact distal pulses. Exam reveals no gallop and no friction rub. No murmur heard. Pulmonary/Chest: She has wheezes (scattered expiratory). She has rales (basilar). Abdominal: Soft. There is no tenderness. There is no rebound. Musculoskeletal: Normal range of motion. She exhibits no tenderness. Kyphotic posture   Neurological: She is alert. No cranial nerve deficit. Motor; symmetric   Skin: No erythema. Psychiatric: She has a normal mood and affect. Her behavior is normal.   Nursing note and vitals reviewed. Note written by Willian Ruiz. Lynda Pringle, as dictated by Nilda Whatley MD 7:14 PM      [facility]  ED Course       Procedures    ED EKG interpretation:  Rhythm: normal sinus rhythm; and regular . Rate (approx.): 70; Axis: normal; P wave: normal; QRS interval: normal ; ST/T wave: normal; in  Lead: ; Other findings: . This EKG was interpreted by Nilda Whatley MD,ED Provider.  7:01 PM  .

## 2017-01-13 NOTE — H&P
Admission History and Physical      NAME:  Reny Granger   :   1925   MRN:  [id number]     PCP:  Julio Shields MD     Date/Time:  2017         Subjective:     CHIEF COMPLAINT: Cough and chest congestion for 2 weeks . HISTORY OF PRESENT ILLNESS:     Ms. Mile Whitman is a 80 y.o.  female who is admitted with acute bronchitis . Ms. Mile Whitman sent to the Emergency Department today by her PCP after she had  A CXR and was told she had pneumonia . Her repeat CXR here showed no acute finding . Per her daughter , she has been treated for bronchitis with ZPAK and Medrol Rusty and she finished them a week ago . She is not getting any better so she went and saw her PCP , her Oxygen level was 90% , she was placed on Oxygen via nasal canula . She complain of cough and chest congestion and not much coming out , and when she coughs it is yellowish color , associated symptoms is low grade fever of  . She denies SOB , denies wheezing , denies nausea or vomiting , denies CP or abdominal pain , and denies dysuria or frequency . She is a former smoker . she denies heart failure , and her daughter said she is on Lasix for HTN . Denies leg edema . In the ER , she was given a dose of Levaquin and nebulizer treatment . Also IVF as her lactic acid is 3.3 . Past Medical History   Diagnosis Date    HTN (hypertension)     Hypercholesteremia     Osteoporosis     Stroke ([facility] [address]      CVA, TIA    TIA (transient ischemic attack)     Vertebral fracture         Past Surgical History   Procedure Laterality Date    Hx hip replacement  2009     left    Hx orthopaedic         Social History   Substance Use Topics    Smoking status: Former Smoker     Years: 40.00     Quit date: 1986    Smokeless tobacco: Not on file    Alcohol use Yes        Family history : Brother had Cancer ?colon .     Allergies   Allergen Reactions    Hydrochlorothiazide Nausea and Vomiting        Prior to Admission medications Medication Sig Start Date End Date Taking? Authorizing Provider   calcium carbonate (OS-SIMONE) 500 mg calcium (1,250 mg) tablet Take 1 Tab by mouth two (2) times a day. Yes Historical Provider   alendronate (FOSAMAX) 70 mg tablet Take 70 mg by mouth every seven (7) days. Yes Historical Provider   atorvastatin (LIPITOR) 20 mg tablet Take 20 mg by mouth daily. Yes Historical Provider   aspirin delayed-release 81 mg tablet Take 81 mg by mouth daily. Yes Historical Provider   multivitamin (ONE A DAY) tablet Take 1 Tab by mouth daily. Yes Historical Provider   furosemide (LASIX) 20 mg tablet Take 20 mg by mouth daily. Yes Thee Caba MD   verapamil SR (CALAN-SR) 180 mg CR tablet Take 180 mg by mouth daily. Yes Thee Caba MD   lisinopril (PRINIVIL, ZESTRIL) 40 mg tablet Take 40 mg by mouth daily. Yes Thee Caba MD   AMLODIPINE BESYLATE (AMLODIPINE PO) Take 5 mg by mouth daily. Yes Thee Caba MD         Review of Systems:    Pertinent Review of Systems as mentioned in HPI, All other Systems reviewed and were Negative.             Objective:      VITALS:    Vital signs reviewed; most recent are:    Visit Vitals    /48    Pulse 68    Temp 98.6 °F (37 °C)    Resp 13    Ht 5' 2\" (1.575 m)    Wt 53.1 kg (117 lb)    SpO2 97%    BMI 21.4 kg/m2     SpO2 Readings from Last 6 Encounters:   01/12/17 97%   12/03/16 94%   11/30/16 94%   05/14/13 96%   11/07/11 94%    O2 Flow Rate (L/min): 2 l/min   No intake or output data in the 24 hours ending 01/12/17 2112         Exam:     Physical Exam:    Gen:  Well-developed, well-nourished, in no acute distress   HEENT:  Pink conjunctivae, PERRL, hearing intact to voice, moist mucous membranes  Neck:  Supple, No JVD  Resp:  No accessory muscle use,  Diffuse bilat insp  Rales and exp rhonchi   Card:  No murmurs, normal S1, S2 without thrills, bruits or peripheral edema  Abd:  Soft, non-tender, non-distended, normoactive bowel sounds are present  Musc:  No cyanosis or clubbing  Skin:  No rashes , skin turgor is good  Neuro:  Cranial nerves are grossly intact, no focal motor weakness, follows  Commands appropriately  Psych:  Good insight, oriented and alert       Labs:    Recent Labs      01/12/17   1853   WBC  7.7   HGB  12.6   HCT  39.4   PLT  253     Recent Labs      01/12/17   1853   NA  142   K  3.5   CL  102   CO2  30   GLU  228*   BUN  26*   CREA  1.14*   CA  10.1   ALB  3.2*   TBILI  0.7   SGOT  20   ALT  33     Lab Results   Component Value Date/Time    Glucose (POC) 104 05/22/2010 08:06 AM    Glucose (POC) 120 05/21/2010 10:53 PM     No results for input(s): PH, PCO2, PO2, HCO3, FIO2 in the last 72 hours. No results for input(s): INR in the last 72 hours. No lab exists for component: INREXT, INREXT           Assessment/Plan:       - Acute bronchitis :     -OBS bed      -PRN O2     -Scheduled Bronchodilators      -Levaquin      -PO Prednisone      -Mucinex      -CT Chest to r/o Pneumonia given the finding on exam and negative CXR    -Mild Hyperglycemia :No H/O DM     -Check A1C    -Elevated Lactic acid Level 3.3 :    -Repeat after the IV fluid given     -HTN :    -Continue home meds .  Monitor        Total time spent with patient: 48 [address] [facility] discussed with: Patient /Daughter     Discussed:  Care Plan      Probable Disposition:  Home w/Family           ___________________________________________________    Attending Physician: Jamie Baeza MD

## 2017-01-13 NOTE — PROGRESS NOTES
Admission Medication Reconciliation:    Information obtained from: Patient and daughter    Significant PMH/Disease States:   Past Medical History   Diagnosis Date    HTN (hypertension)     Hypercholesteremia     Osteoporosis     [address] [address]      CVA, TIA    TIA (transient ischemic attack)     Vertebral fracture        Chief Complaint for this Admission:  cough, fever    Allergies:  Hydrochlorothiazide    Prior to Admission Medications:   Prior to Admission Medications   Prescriptions Last Dose Informant Patient Reported? Taking? AMLODIPINE BESYLATE (AMLODIPINE PO) 1/12/2017 at Unknown time  Yes Yes   Sig: Take 5 mg by mouth daily. alendronate (FOSAMAX) 70 mg tablet 1/9/2017  Yes Yes   Sig: Take 70 mg by mouth every seven (7) days. aspirin delayed-release 81 mg tablet 1/12/2017 at Unknown time  Yes Yes   Sig: Take 81 mg by mouth daily. atorvastatin (LIPITOR) 20 mg tablet 1/12/2017 at Unknown time  Yes Yes   Sig: Take 20 mg by mouth daily. calcium carbonate (OS-SIMONE) 500 mg calcium (1,250 mg) tablet 1/12/2017 at Unknown time  Yes Yes   Sig: Take 1 Tab by mouth two (2) times a day. furosemide (LASIX) 20 mg tablet 1/12/2017 at Unknown time  Yes Yes   Sig: Take 20 mg by mouth daily. lisinopril (PRINIVIL, ZESTRIL) 40 mg tablet 1/12/2017 at Unknown time  Yes Yes   Sig: Take 40 mg by mouth daily. multivitamin (ONE A DAY) tablet 1/12/2017 at Unknown time  Yes Yes   Sig: Take 1 Tab by mouth daily. verapamil SR (CALAN-SR) 180 mg CR tablet 1/12/2017 at Unknown time  Yes Yes   Sig: Take 180 mg by mouth daily. Facility-Administered Medications: None         Comments/Recommendations: Reviewed medications with patient and daughter. Added: Atorvastatin, ASA, MVI, Calcium, Fosamax.   Removed: Percocet, Tramadol, Simvastatin, Lidocaine, Hydrocodone    Ivon Staley, EfraD

## 2017-01-14 LAB
ANION GAP BLD CALC-SCNC: 6 MMOL/L (ref 5–15)
BUN SERPL-MCNC: 30 MG/DL (ref 6–20)
BUN/CREAT SERPL: 38 (ref 12–20)
CALCIUM SERPL-MCNC: 8.6 MG/DL (ref 8.5–10.1)
CHLORIDE SERPL-SCNC: 112 MMOL/L (ref 97–108)
CO2 SERPL-SCNC: 29 MMOL/L (ref 21–32)
CREAT SERPL-MCNC: 0.78 MG/DL (ref 0.55–1.02)
GLUCOSE SERPL-MCNC: 135 MG/DL (ref 65–100)
MAGNESIUM SERPL-MCNC: 2.3 MG/DL (ref 1.6–2.4)
POTASSIUM SERPL-SCNC: 4.4 MMOL/L (ref 3.5–5.1)
SODIUM SERPL-SCNC: 147 MMOL/L (ref 136–145)

## 2017-01-14 PROCEDURE — 74011636637 HC RX REV CODE- 636/637: Performed by: INTERNAL MEDICINE

## 2017-01-14 PROCEDURE — 74011250636 HC RX REV CODE- 250/636: Performed by: INTERNAL MEDICINE

## 2017-01-14 PROCEDURE — 74011000250 HC RX REV CODE- 250: Performed by: INTERNAL MEDICINE

## 2017-01-14 PROCEDURE — 80048 BASIC METABOLIC PNL TOTAL CA: CPT | Performed by: NURSE PRACTITIONER

## 2017-01-14 PROCEDURE — 65660000000 HC RM CCU STEPDOWN

## 2017-01-14 PROCEDURE — 36415 COLL VENOUS BLD VENIPUNCTURE: CPT | Performed by: NURSE PRACTITIONER

## 2017-01-14 PROCEDURE — 94640 AIRWAY INHALATION TREATMENT: CPT

## 2017-01-14 PROCEDURE — 74011250637 HC RX REV CODE- 250/637: Performed by: INTERNAL MEDICINE

## 2017-01-14 PROCEDURE — 74011250636 HC RX REV CODE- 250/636: Performed by: NURSE PRACTITIONER

## 2017-01-14 PROCEDURE — 83735 ASSAY OF MAGNESIUM: CPT | Performed by: STUDENT IN AN ORGANIZED HEALTH CARE EDUCATION/TRAINING PROGRAM

## 2017-01-14 RX ADMIN — CALCIUM CARBONATE 500 MG: 1250 TABLET ORAL at 17:55

## 2017-01-14 RX ADMIN — ENOXAPARIN SODIUM 30 MG: 30 INJECTION SUBCUTANEOUS at 18:01

## 2017-01-14 RX ADMIN — ALBUTEROL SULFATE 1 DOSE: 2.5 SOLUTION RESPIRATORY (INHALATION) at 03:33

## 2017-01-14 RX ADMIN — ALBUTEROL SULFATE 1 DOSE: 2.5 SOLUTION RESPIRATORY (INHALATION) at 20:07

## 2017-01-14 RX ADMIN — GUAIFENESIN 600 MG: 600 TABLET, EXTENDED RELEASE ORAL at 10:30

## 2017-01-14 RX ADMIN — LISINOPRIL 40 MG: 20 TABLET ORAL at 10:29

## 2017-01-14 RX ADMIN — ASPIRIN 81 MG: 81 TABLET, COATED ORAL at 10:30

## 2017-01-14 RX ADMIN — LEVOFLOXACIN 250 MG: 5 INJECTION, SOLUTION INTRAVENOUS at 21:00

## 2017-01-14 RX ADMIN — ALBUTEROL SULFATE 1 DOSE: 2.5 SOLUTION RESPIRATORY (INHALATION) at 15:18

## 2017-01-14 RX ADMIN — CALCIUM CARBONATE 500 MG: 1250 TABLET ORAL at 10:30

## 2017-01-14 RX ADMIN — GUAIFENESIN 600 MG: 600 TABLET, EXTENDED RELEASE ORAL at 17:55

## 2017-01-14 RX ADMIN — Medication 10 ML: at 22:00

## 2017-01-14 RX ADMIN — PREDNISONE 40 MG: 20 TABLET ORAL at 10:35

## 2017-01-14 RX ADMIN — ALBUTEROL SULFATE 1 DOSE: 2.5 SOLUTION RESPIRATORY (INHALATION) at 09:20

## 2017-01-14 RX ADMIN — Medication 10 ML: at 14:00

## 2017-01-14 RX ADMIN — ATORVASTATIN CALCIUM 20 MG: 20 TABLET, FILM COATED ORAL at 10:30

## 2017-01-14 RX ADMIN — AMLODIPINE BESYLATE 5 MG: 5 TABLET ORAL at 10:29

## 2017-01-14 RX ADMIN — ALBUTEROL SULFATE 1 DOSE: 2.5 SOLUTION RESPIRATORY (INHALATION) at 11:01

## 2017-01-14 RX ADMIN — FUROSEMIDE 20 MG: 20 TABLET ORAL at 10:29

## 2017-01-14 RX ADMIN — SODIUM CHLORIDE 50 ML/HR: 900 INJECTION, SOLUTION INTRAVENOUS at 05:58

## 2017-01-14 NOTE — PROGRESS NOTES
Bedside shift change report given to Angelia Hutchinson RN (oncoming nurse) by Haven Wolf RN (offgoing nurse). Report included the following information SBAR, Kardex and Recent Results.

## 2017-01-14 NOTE — PROGRESS NOTES
Spoke with [facility], who reported that patient was having pauses in her HR, the longest being 1.62 sec. Informed Dr. Bar Anna.

## 2017-01-14 NOTE — PROGRESS NOTES
0900: RN and Dr. Jennifer Laureano look over tele script sent up reporting heart pauses. Dr. Jennifer Laureano D/C medication     940: Per verbal Dr. Jennifer Laureano order to stop the 1 L nasal and try room air. 1140: Reported via phone to Dr. Jennifer Laureano that pt SPO2  Has been at 91-92% room air. Pt does not look distress, RR 15-17, pt reports not feeling out of breath. Dr. Jennifer Laureano stated back that the pt O2 level was fine.

## 2017-01-14 NOTE — PROGRESS NOTES
Hospitalist Progress Note  Fariba Colon MD  Office: [phone]  Cell: [phone]      Date of Service:  2017  NAME:  Mariia Carreno  :  1925  MRN:  [id number]      Admission Summary:   Ms. Germaine Hoffmann is a 81 yo female who was admitted with severe cough and SOB. Recent treatment for bronchitis as an OP with no improvement. Admitted for oxygen and IV antibiotics. Interval history / Subjective:    She feels a little better with improvement in her breathing. She wants to get home in the next day or so. Her cough is still productive and she feels like her congestion is improving slowly. Assessment & Plan:     1. Acute hypoxic respiratory failure secondary to bronchitis (POA)   - seemingly failed outpatient antibiotic therapy, also possibly viral bronchitis, though seems to be improving on IV levofloxacin   - wean O2 as tolerated   - CXR / CT chest negative for pneumonia   - continue empiric coverage with levofloxacin   - supportive measures with guaifenesin    - start to taper prednisone    2. Sinus pause   - she remains asypmtomatic, though reported with occasional ~1.5 - 2 second pauses despite reducing dose of verapamil   - will discontinue verapamil    3. Hypokalemia   - Replaced PO on     4. Elevated lactic acid   - Initially 3.3, now normalized, suspect secondary to hypovolemia, possibly from bronchodilator therapy    5. ELIZABETH (POA)   - Cr 1.14 on admit down to 0.87 with hydration   - encourage PO intake    6. Hypertension   - continue Amlodipine and Lisinopril    Code status: FULL  DVT prophylaxis: Lovenox    Care Plan discussed with: Patient, RN  Disposition: Lives at [facility].  Hopefully home tomorrow     Hospital Problems  Date Reviewed: 2017          Codes Class Noted POA    Bronchitis ICD-10-CM: J40  ICD-9-CM: 769  2017 Unknown        * (Principal)Acute bronchitis ICD-10-CM: J20.9  ICD-9-CM: 466.0 1/12/2017 Yes                Review of Systems:   Denied fevers/chills, SOB ok, no palpitations or CP, no abdominal pain       Vital Signs:    Last 24hrs VS reviewed since prior progress note. Most recent are:  Visit Vitals    /86 (BP 1 Location: Right arm, BP Patient Position: At rest)    Pulse 85    Temp 98.1 °F (36.7 °C)    Resp 17    Ht 5' 2\" (1.575 m)    Wt 53.1 kg (117 lb)    SpO2 93%    Breastfeeding No    BMI 21.4 kg/m2       No intake or output data in the 24 hours ending 01/14/17 1427     Physical Examination:             Constitutional:  No acute distress, cooperative, pleasant    ENT:  Oral mucous moist, oropharynx benign. Neck supple   Resp: Diminished anteriorly with soft rhonchi in upper lobes, otherwise clear   CV:  Regular rhythm, normal rate, no murmurs, gallops, rubs. GI:  Soft, non distended, non tender. normoactive bowel sounds, no hepatosplenomegaly     Musculoskeletal:  No edema, warm, 2+ pulses throughout    Neurologic:  Moves all extremities. AAOx3, no focal deficits and clear speech     Psych:  Good insight, Not anxious nor agitated.   Skin:  Good turgor, no rashes or ulcers       Data Review:    Review and/or order of clinical lab test  Review and/or order of tests in the radiology section of CPT  Review and/or order of tests in the medicine section of CPT      Labs:     Recent Labs      01/13/17   0345 01/12/17   1853   WBC  4.9  7.7   HGB  11.4*  12.6   HCT  35.9  39.4   PLT  219  253     Recent Labs      01/14/17   0200  01/13/17   0345  01/12/17   1853   NA  147*  140  142   K  4.4  3.4*  3.5   CL  112*  103  102   CO2  29  28  30   BUN  30*  25*  26*   CREA  0.78  0.87  1.14*   GLU  135*  175*  228*   CA  8.6  9.4  10.1   MG  2.3  2.2   --    PHOS   --   3.7   --      Recent Labs      01/13/17   0345  01/12/17   1853   SGOT  18  20   ALT  29  33   AP  81  90   TBILI  0.5  0.7   TP  6.1*  6.5   ALB  2.8*  3.2*   GLOB  3.3  3.3     No results for input(s): INR, PTP, APTT in the last 72 hours. No lab exists for component: INREXT, INREXT   No results for input(s): FE, TIBC, PSAT, FERR in the last 72 hours. No results found for: FOL, RBCF   No results for input(s): PH, PCO2, PO2 in the last 72 hours.   Recent Labs      01/12/17   1853   TROIQ  <0.04     Lab Results   Component Value Date/Time    Cholesterol, total 168 05/21/2010 03:00 AM    HDL Cholesterol 53 05/21/2010 03:00 AM    LDL, calculated 95.4 05/21/2010 03:00 AM    Triglyceride 98 05/21/2010 03:00 AM    CHOL/HDL Ratio 3.2 05/21/2010 03:00 AM     Lab Results   Component Value Date/Time    Glucose (POC) 104 05/22/2010 08:06 AM    Glucose (POC) 120 05/21/2010 10:53 PM    Glucose (POC) 122 05/21/2010 05:05 PM     Lab Results   Component Value Date/Time    Color YELLOW/STRAW 12/03/2016 09:25 AM    Appearance CLEAR 12/03/2016 09:25 AM    Specific gravity 1.017 12/03/2016 09:25 AM    pH (UA) 7.0 12/03/2016 09:25 AM    Protein NEGATIVE  12/03/2016 09:25 AM    Glucose NEGATIVE  12/03/2016 09:25 AM    Ketone TRACE 12/03/2016 09:25 AM    Bilirubin NEGATIVE  12/03/2016 09:25 AM    Urobilinogen 1.0 12/03/2016 09:25 AM    Nitrites NEGATIVE  12/03/2016 09:25 AM    Leukocyte Esterase SMALL 12/03/2016 09:25 AM    Epithelial cells FEW 12/03/2016 09:25 AM    Bacteria NEGATIVE  12/03/2016 09:25 AM    WBC 5-10 12/03/2016 09:25 AM    RBC 0-5 12/03/2016 09:25 AM         Medications Reviewed:     Current Facility-Administered Medications   Medication Dose Route Frequency    amLODIPine (NORVASC) tablet 5 mg  5 mg Oral DAILY    aspirin delayed-release tablet 81 mg  81 mg Oral DAILY    atorvastatin (LIPITOR) tablet 20 mg  20 mg Oral DAILY    calcium carbonate (OS-SIMONE) tablet 500 mg [elemental]  500 mg Oral BID    furosemide (LASIX) tablet 20 mg  20 mg Oral DAILY    lisinopril (PRINIVIL, ZESTRIL) tablet 40 mg  40 mg Oral DAILY    guaiFENesin SR (MUCINEX) tablet 600 mg  600 mg Oral BID    sodium chloride (NS) flush 5-10 mL  5-10 mL IntraVENous Q8H    sodium chloride (NS) flush 5-10 mL  5-10 mL IntraVENous PRN    albuterol 5mg / ipratropium 0.5mg neb solution  1 Dose Nebulization Q4H RT    albuterol (PROVENTIL VENTOLIN) nebulizer solution 2.5 mg  2.5 mg Nebulization Q2H PRN    levoFLOXacin (LEVAQUIN) 250 mg in D5W IVPB  250 mg IntraVENous Q24H    0.9% sodium chloride infusion  50 mL/hr IntraVENous CONTINUOUS    enoxaparin (LOVENOX) injection 30 mg  30 mg SubCUTAneous Q24H    predniSONE (DELTASONE) tablet 40 mg  40 mg Oral DAILY WITH BREAKFAST     ______________________________________________________________________  EXPECTED LENGTH OF STAY: - - -  ACTUAL LENGTH OF STAY:          [address], [place]

## 2017-01-14 NOTE — PROGRESS NOTES
Bedside and Verbal shift change report given to [employer], RN (oncoming nurse) by Jania Lucero RN (offgoing nurse). Report included the following information SBAR, Kardex, Procedure Summary, Intake/Output, MAR and Recent Results.

## 2017-01-15 LAB
ANION GAP BLD CALC-SCNC: 7 MMOL/L (ref 5–15)
BASOPHILS # BLD AUTO: 0 K/UL (ref 0–0.1)
BASOPHILS # BLD: 0 % (ref 0–1)
BUN SERPL-MCNC: 22 MG/DL (ref 6–20)
BUN/CREAT SERPL: 30 (ref 12–20)
CALCIUM SERPL-MCNC: 8.3 MG/DL (ref 8.5–10.1)
CHLORIDE SERPL-SCNC: 110 MMOL/L (ref 97–108)
CO2 SERPL-SCNC: 28 MMOL/L (ref 21–32)
CREAT SERPL-MCNC: 0.73 MG/DL (ref 0.55–1.02)
EOSINOPHIL # BLD: 0 K/UL (ref 0–0.4)
EOSINOPHIL NFR BLD: 0 % (ref 0–7)
ERYTHROCYTE [DISTWIDTH] IN BLOOD BY AUTOMATED COUNT: 14.7 % (ref 11.5–14.5)
GLUCOSE SERPL-MCNC: 119 MG/DL (ref 65–100)
HCT VFR BLD AUTO: 34.3 % (ref 35–47)
HGB BLD-MCNC: 10.9 G/DL (ref 11.5–16)
LYMPHOCYTES # BLD AUTO: 11 % (ref 12–49)
LYMPHOCYTES # BLD: 1 K/UL (ref 0.8–3.5)
MCH RBC QN AUTO: 30.2 PG (ref 26–34)
MCHC RBC AUTO-ENTMCNC: 31.8 G/DL (ref 30–36.5)
MCV RBC AUTO: 95 FL (ref 80–99)
MONOCYTES # BLD: 0.4 K/UL (ref 0–1)
MONOCYTES NFR BLD AUTO: 5 % (ref 5–13)
NEUTS SEG # BLD: 8.2 K/UL (ref 1.8–8)
NEUTS SEG NFR BLD AUTO: 84 % (ref 32–75)
PLATELET # BLD AUTO: 229 K/UL (ref 150–400)
POTASSIUM SERPL-SCNC: 3.7 MMOL/L (ref 3.5–5.1)
RBC # BLD AUTO: 3.61 M/UL (ref 3.8–5.2)
SODIUM SERPL-SCNC: 145 MMOL/L (ref 136–145)
WBC # BLD AUTO: 9.6 K/UL (ref 3.6–11)

## 2017-01-15 PROCEDURE — 74011250636 HC RX REV CODE- 250/636: Performed by: INTERNAL MEDICINE

## 2017-01-15 PROCEDURE — 74011250636 HC RX REV CODE- 250/636: Performed by: NURSE PRACTITIONER

## 2017-01-15 PROCEDURE — 85025 COMPLETE CBC W/AUTO DIFF WBC: CPT | Performed by: STUDENT IN AN ORGANIZED HEALTH CARE EDUCATION/TRAINING PROGRAM

## 2017-01-15 PROCEDURE — 74011000250 HC RX REV CODE- 250: Performed by: INTERNAL MEDICINE

## 2017-01-15 PROCEDURE — 74011250637 HC RX REV CODE- 250/637: Performed by: INTERNAL MEDICINE

## 2017-01-15 PROCEDURE — 74011636637 HC RX REV CODE- 636/637: Performed by: INTERNAL MEDICINE

## 2017-01-15 PROCEDURE — 36415 COLL VENOUS BLD VENIPUNCTURE: CPT | Performed by: STUDENT IN AN ORGANIZED HEALTH CARE EDUCATION/TRAINING PROGRAM

## 2017-01-15 PROCEDURE — 94640 AIRWAY INHALATION TREATMENT: CPT

## 2017-01-15 PROCEDURE — 80048 BASIC METABOLIC PNL TOTAL CA: CPT | Performed by: STUDENT IN AN ORGANIZED HEALTH CARE EDUCATION/TRAINING PROGRAM

## 2017-01-15 PROCEDURE — 65660000000 HC RM CCU STEPDOWN

## 2017-01-15 RX ORDER — IPRATROPIUM BROMIDE AND ALBUTEROL SULFATE 2.5; .5 MG/3ML; MG/3ML
3 SOLUTION RESPIRATORY (INHALATION)
Status: DISCONTINUED | OUTPATIENT
Start: 2017-01-16 | End: 2017-01-16 | Stop reason: HOSPADM

## 2017-01-15 RX ADMIN — ALBUTEROL SULFATE 1 DOSE: 2.5 SOLUTION RESPIRATORY (INHALATION) at 09:25

## 2017-01-15 RX ADMIN — GUAIFENESIN 600 MG: 600 TABLET, EXTENDED RELEASE ORAL at 18:37

## 2017-01-15 RX ADMIN — ALBUTEROL SULFATE 1 DOSE: 2.5 SOLUTION RESPIRATORY (INHALATION) at 00:14

## 2017-01-15 RX ADMIN — Medication 10 ML: at 14:00

## 2017-01-15 RX ADMIN — ATORVASTATIN CALCIUM 20 MG: 20 TABLET, FILM COATED ORAL at 10:00

## 2017-01-15 RX ADMIN — Medication 10 ML: at 06:00

## 2017-01-15 RX ADMIN — LISINOPRIL 40 MG: 20 TABLET ORAL at 10:00

## 2017-01-15 RX ADMIN — Medication 10 ML: at 21:15

## 2017-01-15 RX ADMIN — ASPIRIN 81 MG: 81 TABLET, COATED ORAL at 10:00

## 2017-01-15 RX ADMIN — CALCIUM CARBONATE 500 MG: 1250 TABLET ORAL at 18:37

## 2017-01-15 RX ADMIN — PREDNISONE 40 MG: 20 TABLET ORAL at 10:00

## 2017-01-15 RX ADMIN — CALCIUM CARBONATE 500 MG: 1250 TABLET ORAL at 10:00

## 2017-01-15 RX ADMIN — AMLODIPINE BESYLATE 5 MG: 5 TABLET ORAL at 10:00

## 2017-01-15 RX ADMIN — ALBUTEROL SULFATE 1 DOSE: 2.5 SOLUTION RESPIRATORY (INHALATION) at 03:03

## 2017-01-15 RX ADMIN — LEVOFLOXACIN 250 MG: 5 INJECTION, SOLUTION INTRAVENOUS at 21:15

## 2017-01-15 RX ADMIN — SODIUM CHLORIDE 50 ML/HR: 900 INJECTION, SOLUTION INTRAVENOUS at 02:31

## 2017-01-15 RX ADMIN — ALBUTEROL SULFATE 1 DOSE: 2.5 SOLUTION RESPIRATORY (INHALATION) at 16:16

## 2017-01-15 RX ADMIN — FUROSEMIDE 20 MG: 20 TABLET ORAL at 10:01

## 2017-01-15 RX ADMIN — ALBUTEROL SULFATE 1 DOSE: 2.5 SOLUTION RESPIRATORY (INHALATION) at 19:58

## 2017-01-15 RX ADMIN — GUAIFENESIN 600 MG: 600 TABLET, EXTENDED RELEASE ORAL at 10:00

## 2017-01-15 RX ADMIN — SODIUM CHLORIDE 50 ML/HR: 900 INJECTION, SOLUTION INTRAVENOUS at 23:25

## 2017-01-15 RX ADMIN — ENOXAPARIN SODIUM 30 MG: 30 INJECTION SUBCUTANEOUS at 18:37

## 2017-01-15 NOTE — PROGRESS NOTES
Bedside shift change report given to [employer] (oncoming nurse) by Matilda Moore (offgoing nurse). Report included the following information SBAR, Kardex, Intake/Output, MAR and Recent Results.

## 2017-01-15 NOTE — PROGRESS NOTES
Hospitalist Progress Note  Leon Kat MD  Office: [phone]  Cell: [phone]      Date of Service:  1/15/2017  NAME:  Chirag Fisher  :  1925  MRN:  [id number]      Admission Summary:   Ms. Shaunna Mederos is a 79 yo female who was admitted with severe cough and SOB. Recent treatment for bronchitis as an OP with no improvement. Admitted for oxygen and IV antibiotics. Interval history / Subjective:   Breathing much improved. Barely having any cough. No chest pain. Assessment & Plan:     1. Acute hypoxic respiratory failure secondary to bronchitis (POA)   - seemingly failed outpatient antibiotic therapy, also possibly viral bronchitis, though seems to be improving on IV levofloxacin   - wean O2 as tolerated   - CXR / CT chest negative for pneumonia   - continue empiric coverage with levofloxacin   - supportive measures with guaifenesin    - start to taper prednisone, reduced to 30 mg daily now    2. Sinus pause   - she remains asypmtomatic, though reported with occasional ~1.5 - 2 second pauses despite reducing dose of verapamil   - discontinued verapamil    3. Hypokalemia   - Replaced PO on     4. Elevated lactic acid   - Initially 3.3, now normalized, suspect secondary to hypovolemia, possibly from bronchodilator therapy    5. ELIZABETH (POA)   - Cr 1.14 on admit down to 0.87 with hydration   - encourage PO intake    6. Hypertension   - continue Amlodipine and Lisinopril    Code status: FULL  DVT prophylaxis: Lovenox    Care Plan discussed with: Patient, RN  Disposition: Lives at [facility], likely discharge back Monday.      Hospital Problems  Date Reviewed: 2017          Codes Class Noted POA    Bronchitis ICD-10-CM: J40  ICD-9-CM: 978  2017 Unknown        * (Principal)Acute bronchitis ICD-10-CM: J20.9  ICD-9-CM: 466.0  2017 Yes                Review of Systems:   Denied fevers/chills, SOB ok, no palpitations or CP, no abdominal pain       Vital Signs:    Last 24hrs VS reviewed since prior progress note. Most recent are:  Visit Vitals    /70 (BP 1 Location: Left arm, BP Patient Position: At rest)    Pulse 85    Temp 98.3 °F (36.8 °C)    Resp 16    Ht 5' 2\" (1.575 m)    Wt 53.1 kg (117 lb)    SpO2 94%    Breastfeeding No    BMI 21.4 kg/m2       No intake or output data in the 24 hours ending 01/15/17 1435     Physical Examination:             Constitutional:  No acute distress, cooperative, pleasant    ENT:  Oral mucous moist, oropharynx benign. Neck supple   Resp: Still scattered rhonchi with soft transmitted expiratory wheeze likely from upper airways   CV:  Regular rhythm, normal rate, no murmurs, gallops, rubs. GI:  Soft, non distended, non tender. normoactive bowel sounds, no hepatosplenomegaly     Musculoskeletal:  No edema, warm, 2+ pulses throughout    Neurologic:  Moves all extremities. AAOx3, no focal deficits and clear speech     Psych:  Good insight, Not anxious nor agitated. Skin:  Good turgor, no rashes or ulcers       Data Review:    Review and/or order of clinical lab test  Review and/or order of tests in the radiology section of CPT  Review and/or order of tests in the medicine section of CPT      Labs:     Recent Labs      01/15/17   0235  01/13/17   0345   WBC  9.6  4.9   HGB  10.9*  11.4*   HCT  34.3*  35.9   PLT  229  219     Recent Labs      01/15/17   0235  01/14/17   0200  01/13/17   0345   NA  145  147*  140   K  3.7  4.4  3.4*   CL  110*  112*  103   CO2  28  29  28   BUN  22*  30*  25*   CREA  0.73  0.78  0.87   GLU  119*  135*  175*   CA  8.3*  8.6  9.4   MG   --   2.3  2.2   PHOS   --    --   3.7     Recent Labs      01/13/17   0345  01/12/17   1853   SGOT  18  20   ALT  29  33   AP  81  90   TBILI  0.5  0.7   TP  6.1*  6.5   ALB  2.8*  3.2*   GLOB  3.3  3.3     No results for input(s): INR, PTP, APTT in the last 72 hours.     No lab exists for component: INREXT, INREXT   No results for input(s): FE, TIBC, PSAT, FERR in the last 72 hours. No results found for: FOL, RBCF   No results for input(s): PH, PCO2, PO2 in the last 72 hours.   Recent Labs      01/12/17   1853   TROIQ  <0.04     Lab Results   Component Value Date/Time    Cholesterol, total 168 05/21/2010 03:00 AM    HDL Cholesterol 53 05/21/2010 03:00 AM    LDL, calculated 95.4 05/21/2010 03:00 AM    Triglyceride 98 05/21/2010 03:00 AM    CHOL/HDL Ratio 3.2 05/21/2010 03:00 AM     Lab Results   Component Value Date/Time    Glucose (POC) 104 05/22/2010 08:06 AM    Glucose (POC) 120 05/21/2010 10:53 PM    Glucose (POC) 122 05/21/2010 05:05 PM     Lab Results   Component Value Date/Time    Color YELLOW/STRAW 12/03/2016 09:25 AM    Appearance CLEAR 12/03/2016 09:25 AM    Specific gravity 1.017 12/03/2016 09:25 AM    pH (UA) 7.0 12/03/2016 09:25 AM    Protein NEGATIVE  12/03/2016 09:25 AM    Glucose NEGATIVE  12/03/2016 09:25 AM    Ketone TRACE 12/03/2016 09:25 AM    Bilirubin NEGATIVE  12/03/2016 09:25 AM    Urobilinogen 1.0 12/03/2016 09:25 AM    Nitrites NEGATIVE  12/03/2016 09:25 AM    Leukocyte Esterase SMALL 12/03/2016 09:25 AM    Epithelial cells FEW 12/03/2016 09:25 AM    Bacteria NEGATIVE  12/03/2016 09:25 AM    WBC 5-10 12/03/2016 09:25 AM    RBC 0-5 12/03/2016 09:25 AM         Medications Reviewed:     Current Facility-Administered Medications   Medication Dose Route Frequency    [START ON 1/16/2017] predniSONE (DELTASONE) tablet 30 mg  30 mg Oral DAILY WITH BREAKFAST    amLODIPine (NORVASC) tablet 5 mg  5 mg Oral DAILY    aspirin delayed-release tablet 81 mg  81 mg Oral DAILY    atorvastatin (LIPITOR) tablet 20 mg  20 mg Oral DAILY    calcium carbonate (OS-SIMONE) tablet 500 mg [elemental]  500 mg Oral BID    furosemide (LASIX) tablet 20 mg  20 mg Oral DAILY    lisinopril (PRINIVIL, ZESTRIL) tablet 40 mg  40 mg Oral DAILY    guaiFENesin SR (MUCINEX) tablet 600 mg  600 mg Oral BID    sodium chloride (NS) flush 5-10 mL 5-10 mL IntraVENous Q8H    sodium chloride (NS) flush 5-10 mL  5-10 mL IntraVENous PRN    albuterol 5mg / ipratropium 0.5mg neb solution  1 Dose Nebulization Q4H RT    albuterol (PROVENTIL VENTOLIN) nebulizer solution 2.5 mg  2.5 mg Nebulization Q2H PRN    levoFLOXacin (LEVAQUIN) 250 mg in D5W IVPB  250 mg IntraVENous Q24H    0.9% sodium chloride infusion  50 mL/hr IntraVENous CONTINUOUS    enoxaparin (LOVENOX) injection 30 mg  30 mg SubCUTAneous Q24H     ______________________________________________________________________  EXPECTED LENGTH OF STAY: - - -  ACTUAL LENGTH OF STAY:          2                 Geannie Gitelman, MD

## 2017-01-15 NOTE — PROGRESS NOTES
Bedside shift change report given to Levi Heath RN (oncoming nurse) by Arash Colindres RN (offgoing nurse). Report included the following information SBAR, Kardex and Recent Results.

## 2017-01-16 VITALS
HEIGHT: 62 IN | TEMPERATURE: 98.3 F | SYSTOLIC BLOOD PRESSURE: 166 MMHG | RESPIRATION RATE: 16 BRPM | HEART RATE: 92 BPM | OXYGEN SATURATION: 95 % | DIASTOLIC BLOOD PRESSURE: 83 MMHG | WEIGHT: 117 LBS | BODY MASS INDEX: 21.53 KG/M2

## 2017-01-16 PROCEDURE — 74011000250 HC RX REV CODE- 250: Performed by: INTERNAL MEDICINE

## 2017-01-16 PROCEDURE — 74011636637 HC RX REV CODE- 636/637: Performed by: STUDENT IN AN ORGANIZED HEALTH CARE EDUCATION/TRAINING PROGRAM

## 2017-01-16 PROCEDURE — 94640 AIRWAY INHALATION TREATMENT: CPT

## 2017-01-16 PROCEDURE — 74011250637 HC RX REV CODE- 250/637: Performed by: INTERNAL MEDICINE

## 2017-01-16 RX ORDER — PREDNISONE 10 MG/1
20 TABLET ORAL
Qty: 10 TAB | Refills: 0 | Status: SHIPPED | OUTPATIENT
Start: 2017-01-16 | End: 2017-01-21

## 2017-01-16 RX ORDER — PREDNISONE 10 MG/1
30 TABLET ORAL
Qty: 3 TAB | Refills: 0 | Status: SHIPPED | OUTPATIENT
Start: 2017-01-16 | End: 2017-01-17

## 2017-01-16 RX ORDER — GUAIFENESIN 100 MG/5ML
200 SOLUTION ORAL
Qty: 1 BOTTLE | Refills: 0 | Status: SHIPPED | OUTPATIENT
Start: 2017-01-16 | End: 2017-03-07

## 2017-01-16 RX ORDER — VERAPAMIL HYDROCHLORIDE 120 MG/1
120 TABLET, FILM COATED, EXTENDED RELEASE ORAL DAILY
Qty: 30 TAB | Refills: 3 | Status: SHIPPED | OUTPATIENT
Start: 2017-01-16 | End: 2017-03-12

## 2017-01-16 RX ORDER — LEVOFLOXACIN 250 MG/1
250 TABLET ORAL DAILY
Qty: 3 TAB | Refills: 0 | Status: SHIPPED | OUTPATIENT
Start: 2017-01-16 | End: 2017-01-19

## 2017-01-16 RX ORDER — VERAPAMIL HYDROCHLORIDE 180 MG/1
120 TABLET, EXTENDED RELEASE ORAL DAILY
Qty: 30 TAB | Refills: 3 | Status: SHIPPED | OUTPATIENT
Start: 2017-01-16 | End: 2017-01-16

## 2017-01-16 RX ADMIN — PREDNISONE 30 MG: 10 TABLET ORAL at 08:53

## 2017-01-16 RX ADMIN — CALCIUM CARBONATE 500 MG: 1250 TABLET ORAL at 08:53

## 2017-01-16 RX ADMIN — AMLODIPINE BESYLATE 5 MG: 5 TABLET ORAL at 08:53

## 2017-01-16 RX ADMIN — ATORVASTATIN CALCIUM 20 MG: 20 TABLET, FILM COATED ORAL at 08:53

## 2017-01-16 RX ADMIN — GUAIFENESIN 600 MG: 600 TABLET, EXTENDED RELEASE ORAL at 08:53

## 2017-01-16 RX ADMIN — Medication 10 ML: at 13:31

## 2017-01-16 RX ADMIN — ASPIRIN 81 MG: 81 TABLET, COATED ORAL at 08:53

## 2017-01-16 RX ADMIN — IPRATROPIUM BROMIDE AND ALBUTEROL SULFATE 3 ML: .5; 3 SOLUTION RESPIRATORY (INHALATION) at 01:58

## 2017-01-16 RX ADMIN — FUROSEMIDE 20 MG: 20 TABLET ORAL at 08:53

## 2017-01-16 RX ADMIN — LISINOPRIL 40 MG: 20 TABLET ORAL at 08:53

## 2017-01-16 RX ADMIN — IPRATROPIUM BROMIDE AND ALBUTEROL SULFATE 3 ML: .5; 3 SOLUTION RESPIRATORY (INHALATION) at 11:02

## 2017-01-16 NOTE — PROGRESS NOTES
Problem: Discharge Planning  Goal: *Discharge to safe environment  Outcome: Progressing Towards Goal  Return to [place]

## 2017-01-16 NOTE — PROGRESS NOTES
Care Management Interventions  PCP Verified by CM: Yes (Dr. Shaylee Andersen)  Palliative Care Consult (Criteria: CHF and RRAT>21): No  Reason for No Palliative Care Consult: Other (see comment) (no order/no needs)  Mode of Transport at Discharge: Other (see comment) (TBD)  Transition of Care Consult (CM Consult): Discharge Planning  Discharge Durable Medical Equipment: No  Physical Therapy Consult: No  Occupational Therapy Consult: No  Speech Therapy Consult: No  Current Support Network: Assisted Living ([address] [facility] [place])  Confirm Follow Up Transport: Family  Plan discussed with Pt/Family/Caregiver: Yes  Discharge Location  Discharge Placement: Assisted Living    Chart reviewed. The patient is from [place] [address] and will return when medically stable. CRM will follow.  HEIDY

## 2017-01-16 NOTE — PROGRESS NOTES
Bedside and Verbal shift change report given to Karri Rivera RN (oncoming nurse) by Nanci Cheatham RN (offgoing nurse). Report included the following information SBAR and Kardex.

## 2017-01-16 NOTE — DISCHARGE SUMMARY
Discharge Summary       PATIENT ID: Chema Bruner  MRN: [id number]   YOB: 1925    DATE OF ADMISSION: 1/12/2017  6:55 PM    DATE OF DISCHARGE: 1/16/2017   PRIMARY CARE PROVIDER: Josh Brito MD       DISCHARGING PHYSICIAN: Theodora Patel MD    To contact this individual call [phone] and ask the  to page. If unavailable ask to be transferred the Adult Hospitalist Department. CONSULTATIONS: IP CONSULT TO HOSPITALIST    PROCEDURES/SURGERIES: * No surgery found *    ADMITTING [address] [place] [address] COURSE:     Ms. Faraz Motley is a 81 yo female who was admitted with severe cough and SOB. Recent treatment for bronchitis as an OP with no improvement. Admitted for oxygen and IV antibiotics. 1/16/17    No acute overnight events. She says cough is improving. No fever or chills. No new complaints. DISCHARGE DIAGNOSES / PLAN:      1. Acute hypoxic respiratory failure secondary to bronchitis (POA)  - seemingly failed outpatient antibiotic therapy, also possibly viral bronchitis, though seems to be improving on IV levofloxacin  - wean O2 as tolerated  - CXR / CT chest negative for pneumonia  - continue empiric coverage with levofloxacin  - supportive measures with guaifenesin   - start to taper prednisone, reduced to 30 mg daily now     2. Sinus pause  - she remains asypmtomatic, though reported with occasional ~1.5 - 2 second pauses despite reducing dose of verapamil  - discontinued verapamil  - resolved. Resume home verapamil at a reduced dose. Follow up with PCP     3. Hypokalemia  - Replaced PO on 1/13   resolved    4. Elevated lactic acid  - Initially 3.3, now normalized, suspect secondary to hypovolemia, possibly from bronchodilator therapy     5. ELIZABETH (POA)  - Cr 1.14 on admit down to 0.87 with hydration  - encourage PO intake     6. Hypertension:   - On Lisinopril, amlodipine. BP still somewhat elevated. Resume home verapamil at 120 mg PO daily.   Follow up with PCP     Code status: FULL  DVT prophylaxis: Lovenox     Care Plan discussed with: Patient, RN  Disposition: Lives at [facility].             PENDING TEST RESULTS:   At the time of discharge the following test results are still pending:     FOLLOW UP APPOINTMENTS:    Follow-up Information     Follow up With Details Comments Elly Day,[address], [place]   [address] [place]  [phone]             ADDITIONAL CARE RECOMMENDATIONS:    DIET: Cardiac Diet    ACTIVITY: Activity as tolerated    WOUND CARE: none    EQUIPMENT needed:       DISCHARGE MEDICATIONS:  Current Discharge Medication List      START taking these medications    Details   levoFLOXacin (LEVAQUIN) 250 mg tablet Take 1 Tab by mouth daily for 3 days. Qty: 3 Tab, Refills: 0      !! predniSONE (DELTASONE) 10 mg tablet Take 3 Tabs by mouth daily (with breakfast) for 1 day. Qty: 3 Tab, Refills: 0      !! predniSONE (DELTASONE) 10 mg tablet Take 2 Tabs by mouth daily (with breakfast) for 5 days. Qty: 10 Tab, Refills: 0      guaiFENesin (ROBITUSSIN) 100 mg/5 mL liquid Take 10 mL by mouth three (3) times daily as needed for Cough. Qty: 1 Bottle, Refills: 0       !! - Potential duplicate medications found. Please discuss with provider. CONTINUE these medications which have CHANGED    Details   verapamil ER (CALAN-SR) 180 mg CR tablet Take 0.5 Tabs by mouth daily. Qty: 30 Tab, Refills: 3         CONTINUE these medications which have NOT CHANGED    Details   calcium carbonate (OS-SIMONE) 500 mg calcium (1,250 mg) tablet Take 1 Tab by mouth two (2) times a day. alendronate (FOSAMAX) 70 mg tablet Take 70 mg by mouth every seven (7) days. atorvastatin (LIPITOR) 20 mg tablet Take 20 mg by mouth daily. aspirin delayed-release 81 mg tablet Take 81 mg by mouth daily. multivitamin (ONE A DAY) tablet Take 1 Tab by mouth daily. furosemide (LASIX) 20 mg tablet Take 20 mg by mouth daily.       lisinopril (PRINIVIL, ZESTRIL) 40 mg tablet Take 40 mg by mouth daily. AMLODIPINE BESYLATE (AMLODIPINE PO) Take 5 mg by mouth daily. NOTIFY YOUR PHYSICIAN FOR ANY OF THE FOLLOWING:   Fever over 101 degrees for 24 hours. Chest pain, shortness of breath, fever, chills, nausea, vomiting, diarrhea, change in mentation, falling, weakness, bleeding. Severe pain or pain not relieved by medications. Or, any other signs or symptoms that you may have questions about.     DISPOSITION:  x  Home With:   OT  PT  HH  RN       Long term SNF/Inpatient Rehab   x Independent/assisted living    Hospice    Other:       PATIENT CONDITION AT DISCHARGE:     Functional status    Poor     Deconditioned    x Independent      Cognition   x  Lucid     Forgetful     Dementia      Catheters/lines (plus indication)    Horn     PICC     PEG    x None      Code status   x  Full code     DNR      PHYSICAL EXAMINATION AT DISCHARGE:   Refer to Progress Note      CHRONIC MEDICAL DIAGNOSES:  Problem List as of 1/16/2017  Date Reviewed: 1/12/2017          Codes Class Noted - Resolved    Bronchitis ICD-10-CM: J40  ICD-9-CM: 013  1/13/2017 - Present        * (Principal)Acute bronchitis ICD-10-CM: J20.9  ICD-9-CM: 466.0  1/12/2017 - Present              Greater than 30 minutes were spent with the patient on counseling and coordination of care    Signed:   Shoshana De Oliveira MD  1/16/2017  12:56 PM

## 2017-01-16 NOTE — PROGRESS NOTES
Bedside shift change report given to Sherman (oncoming nurse) by Shira Ledezma (offgoing nurse). Report included the following information SBAR, Kardex, Intake/Output, MAR and Recent Results.

## 2017-01-16 NOTE — PROGRESS NOTES
I have reviewed discharge instructions with the patient. The patient verbalized understanding. Patient leaving via car with daughter. Patient leaving with discharge instructions and scripts.     Patient leaving via wheelchair with volunteers

## 2017-01-16 NOTE — DISCHARGE INSTRUCTIONS
Discharge Instructions       PATIENT ID: Leilani Leiva  MRN: [id number]   YOB: 1925    DATE OF ADMISSION: 1/12/2017  6:55 PM    DATE OF DISCHARGE: 1/16/2017    PRIMARY CARE PROVIDER: Miguel Montero MD       DISCHARGING PHYSICIAN: Saeed Licona MD    To contact this individual call [phone] and ask the  to page. If unavailable ask to be transferred the Adult Hospitalist Department. DISCHARGE DIAGNOSES : Bronchitis, Acute hypoxic respiratory failure    CONSULTATIONS: IP CONSULT TO HOSPITALIST    PROCEDURES/SURGERIES: * No surgery found *    PENDING TEST RESULTS:   At the time of discharge the following test results are still pending:     FOLLOW UP APPOINTMENTS:   Follow-up Information     Follow up With Details Comments [address], [place]   [address] [place] [address] [address] [facility]  [phone]             ADDITIONAL CARE RECOMMENDATIONS:     DIET: Cardiac Diet    ACTIVITY: Activity as tolerated    WOUND CARE: None    EQUIPMENT needed:       DISCHARGE MEDICATIONS:   See Medication Reconciliation Form    · It is important that you take the medication exactly as they are prescribed. · Keep your medication in the bottles provided by the pharmacist and keep a list of the medication names, dosages, and times to be taken in your wallet. · Do not take other medications without consulting your doctor. NOTIFY YOUR PHYSICIAN FOR ANY OF THE FOLLOWING:   Fever over 101 degrees for 24 hours. Chest pain, shortness of breath, fever, chills, nausea, vomiting, diarrhea, change in mentation, falling, weakness, bleeding. Severe pain or pain not relieved by medications. Or, any other signs or symptoms that you may have questions about.       DISPOSITION:    Home With:   OT  PT  HH  RN      x SNF/Inpatient Rehab/LTAC    Independent/assisted living    Hospice    Other:     CDMP Checked:   Yes x       Signed:   Saeed Licona MD  1/16/2017  12:53 PM

## 2017-01-17 LAB
BACTERIA SPEC CULT: NORMAL
SERVICE CMNT-IMP: NORMAL

## 2017-03-07 ENCOUNTER — HOSPITAL ENCOUNTER (INPATIENT)
Age: 82
LOS: 5 days | Discharge: NURSING, REHAB OR LONG-TERM CARE | DRG: 175 | End: 2017-03-12
Attending: EMERGENCY MEDICINE | Admitting: STUDENT IN AN ORGANIZED HEALTH CARE EDUCATION/TRAINING PROGRAM
Payer: MEDICARE

## 2017-03-07 ENCOUNTER — APPOINTMENT (OUTPATIENT)
Dept: ULTRASOUND IMAGING | Age: 82
DRG: 175 | End: 2017-03-07
Attending: EMERGENCY MEDICINE
Payer: MEDICARE

## 2017-03-07 ENCOUNTER — APPOINTMENT (OUTPATIENT)
Dept: CT IMAGING | Age: 82
DRG: 175 | End: 2017-03-07
Attending: EMERGENCY MEDICINE
Payer: MEDICARE

## 2017-03-07 DIAGNOSIS — K83.8 DILATED CBD, ACQUIRED: ICD-10-CM

## 2017-03-07 DIAGNOSIS — R07.9 CHEST PAIN, UNSPECIFIED TYPE: Primary | ICD-10-CM

## 2017-03-07 DIAGNOSIS — I26.99 OTHER PULMONARY EMBOLISM WITHOUT ACUTE COR PULMONALE, UNSPECIFIED CHRONICITY (HCC): ICD-10-CM

## 2017-03-07 PROBLEM — N20.0 NEPHROLITHIASIS: Status: ACTIVE | Noted: 2017-03-07

## 2017-03-07 PROBLEM — K81.9 CHOLECYSTITIS: Status: ACTIVE | Noted: 2017-03-07

## 2017-03-07 LAB
ALBUMIN SERPL BCP-MCNC: 3.4 G/DL (ref 3.5–5)
ALBUMIN/GLOB SERPL: 0.9 {RATIO} (ref 1.1–2.2)
ALP SERPL-CCNC: 85 U/L (ref 45–117)
ALT SERPL-CCNC: 35 U/L (ref 12–78)
ANION GAP BLD CALC-SCNC: 8 MMOL/L (ref 5–15)
APPEARANCE UR: CLEAR
AST SERPL W P-5'-P-CCNC: 29 U/L (ref 15–37)
ATRIAL RATE: 80 BPM
BASOPHILS # BLD AUTO: 0 K/UL (ref 0–0.1)
BASOPHILS # BLD: 0 % (ref 0–1)
BILIRUB SERPL-MCNC: 0.5 MG/DL (ref 0.2–1)
BILIRUB UR QL: NEGATIVE
BUN SERPL-MCNC: 11 MG/DL (ref 6–20)
BUN/CREAT SERPL: 9 (ref 12–20)
CALCIUM SERPL-MCNC: 8.7 MG/DL (ref 8.5–10.1)
CALCULATED P AXIS, ECG09: 32 DEGREES
CALCULATED R AXIS, ECG10: -20 DEGREES
CALCULATED T AXIS, ECG11: 39 DEGREES
CHLORIDE SERPL-SCNC: 95 MMOL/L (ref 97–108)
CK MB CFR SERPL CALC: NORMAL % (ref 0–2.5)
CK MB SERPL-MCNC: <1 NG/ML (ref 5–25)
CK SERPL-CCNC: 41 U/L (ref 26–192)
CO2 SERPL-SCNC: 33 MMOL/L (ref 21–32)
COLOR UR: ABNORMAL
CREAT SERPL-MCNC: 1.2 MG/DL (ref 0.55–1.02)
D DIMER PPP FEU-MCNC: 2.58 MG/L FEU (ref 0–0.65)
DIAGNOSIS, 93000: NORMAL
DIFFERENTIAL METHOD BLD: ABNORMAL
EOSINOPHIL # BLD: 0 K/UL (ref 0–0.4)
EOSINOPHIL NFR BLD: 0 % (ref 0–7)
ERYTHROCYTE [DISTWIDTH] IN BLOOD BY AUTOMATED COUNT: 13.5 % (ref 11.5–14.5)
GLOBULIN SER CALC-MCNC: 3.6 G/DL (ref 2–4)
GLUCOSE SERPL-MCNC: 103 MG/DL (ref 65–100)
GLUCOSE UR STRIP.AUTO-MCNC: NEGATIVE MG/DL
HCT VFR BLD AUTO: 38.9 % (ref 35–47)
HGB BLD-MCNC: 12.5 G/DL (ref 11.5–16)
HGB UR QL STRIP: NEGATIVE
KETONES UR QL STRIP.AUTO: 15 MG/DL
LEUKOCYTE ESTERASE UR QL STRIP.AUTO: NEGATIVE
LIPASE SERPL-CCNC: 507 U/L (ref 73–393)
LYMPHOCYTES # BLD AUTO: 10 % (ref 12–49)
LYMPHOCYTES # BLD: 0.7 K/UL (ref 0.8–3.5)
MCH RBC QN AUTO: 30.4 PG (ref 26–34)
MCHC RBC AUTO-ENTMCNC: 32.1 G/DL (ref 30–36.5)
MCV RBC AUTO: 94.6 FL (ref 80–99)
MONOCYTES # BLD: 0.5 K/UL (ref 0–1)
MONOCYTES NFR BLD AUTO: 7 % (ref 5–13)
NEUTS SEG # BLD: 5.9 K/UL (ref 1.8–8)
NEUTS SEG NFR BLD AUTO: 83 % (ref 32–75)
NITRITE UR QL STRIP.AUTO: NEGATIVE
P-R INTERVAL, ECG05: 140 MS
PH UR STRIP: 7 [PH] (ref 5–8)
PLATELET # BLD AUTO: 266 K/UL (ref 150–400)
POTASSIUM SERPL-SCNC: 3.8 MMOL/L (ref 3.5–5.1)
PROT SERPL-MCNC: 7 G/DL (ref 6.4–8.2)
PROT UR STRIP-MCNC: NEGATIVE MG/DL
Q-T INTERVAL, ECG07: 362 MS
QRS DURATION, ECG06: 84 MS
QTC CALCULATION (BEZET), ECG08: 417 MS
RBC # BLD AUTO: 4.11 M/UL (ref 3.8–5.2)
RBC MORPH BLD: ABNORMAL
SODIUM SERPL-SCNC: 136 MMOL/L (ref 136–145)
SP GR UR REFRACTOMETRY: 1 (ref 1–1.03)
TROPONIN I SERPL-MCNC: <0.04 NG/ML
TROPONIN I SERPL-MCNC: <0.04 NG/ML
UROBILINOGEN UR QL STRIP.AUTO: 0.2 EU/DL (ref 0.2–1)
VENTRICULAR RATE, ECG03: 80 BPM
WBC # BLD AUTO: 7.1 K/UL (ref 3.6–11)

## 2017-03-07 PROCEDURE — 71275 CT ANGIOGRAPHY CHEST: CPT

## 2017-03-07 PROCEDURE — 93005 ELECTROCARDIOGRAM TRACING: CPT

## 2017-03-07 PROCEDURE — 80053 COMPREHEN METABOLIC PANEL: CPT | Performed by: EMERGENCY MEDICINE

## 2017-03-07 PROCEDURE — 85025 COMPLETE CBC W/AUTO DIFF WBC: CPT | Performed by: EMERGENCY MEDICINE

## 2017-03-07 PROCEDURE — 36415 COLL VENOUS BLD VENIPUNCTURE: CPT | Performed by: STUDENT IN AN ORGANIZED HEALTH CARE EDUCATION/TRAINING PROGRAM

## 2017-03-07 PROCEDURE — 74011636320 HC RX REV CODE- 636/320: Performed by: EMERGENCY MEDICINE

## 2017-03-07 PROCEDURE — 83690 ASSAY OF LIPASE: CPT | Performed by: EMERGENCY MEDICINE

## 2017-03-07 PROCEDURE — 82550 ASSAY OF CK (CPK): CPT | Performed by: EMERGENCY MEDICINE

## 2017-03-07 PROCEDURE — 85379 FIBRIN DEGRADATION QUANT: CPT | Performed by: EMERGENCY MEDICINE

## 2017-03-07 PROCEDURE — 84484 ASSAY OF TROPONIN QUANT: CPT | Performed by: EMERGENCY MEDICINE

## 2017-03-07 PROCEDURE — 74011250636 HC RX REV CODE- 250/636: Performed by: STUDENT IN AN ORGANIZED HEALTH CARE EDUCATION/TRAINING PROGRAM

## 2017-03-07 PROCEDURE — 65660000000 HC RM CCU STEPDOWN

## 2017-03-07 PROCEDURE — 74011000258 HC RX REV CODE- 258: Performed by: EMERGENCY MEDICINE

## 2017-03-07 PROCEDURE — 76700 US EXAM ABDOM COMPLETE: CPT

## 2017-03-07 PROCEDURE — 74177 CT ABD & PELVIS W/CONTRAST: CPT

## 2017-03-07 PROCEDURE — 99285 EMERGENCY DEPT VISIT HI MDM: CPT

## 2017-03-07 PROCEDURE — 81003 URINALYSIS AUTO W/O SCOPE: CPT | Performed by: EMERGENCY MEDICINE

## 2017-03-07 RX ORDER — SODIUM CHLORIDE 0.9 % (FLUSH) 0.9 %
5-10 SYRINGE (ML) INJECTION EVERY 8 HOURS
Status: DISCONTINUED | OUTPATIENT
Start: 2017-03-07 | End: 2017-03-12 | Stop reason: HOSPADM

## 2017-03-07 RX ORDER — AMLODIPINE BESYLATE 5 MG/1
5 TABLET ORAL DAILY
Status: DISCONTINUED | OUTPATIENT
Start: 2017-03-08 | End: 2017-03-09

## 2017-03-07 RX ORDER — SODIUM CHLORIDE 0.9 % (FLUSH) 0.9 %
10 SYRINGE (ML) INJECTION
Status: COMPLETED | OUTPATIENT
Start: 2017-03-07 | End: 2017-03-07

## 2017-03-07 RX ORDER — CALCIUM CARBONATE 500(1250)
500 TABLET ORAL 2 TIMES DAILY WITH MEALS
Status: DISCONTINUED | OUTPATIENT
Start: 2017-03-07 | End: 2017-03-12 | Stop reason: HOSPADM

## 2017-03-07 RX ORDER — VERAPAMIL HYDROCHLORIDE 120 MG/1
120 TABLET, FILM COATED, EXTENDED RELEASE ORAL DAILY
Status: CANCELLED | OUTPATIENT
Start: 2017-03-07

## 2017-03-07 RX ORDER — TAMSULOSIN HYDROCHLORIDE 0.4 MG/1
0.4 CAPSULE ORAL DAILY
Status: DISCONTINUED | OUTPATIENT
Start: 2017-03-08 | End: 2017-03-12 | Stop reason: HOSPADM

## 2017-03-07 RX ORDER — ENOXAPARIN SODIUM 100 MG/ML
50 INJECTION SUBCUTANEOUS EVERY 24 HOURS
Status: DISCONTINUED | OUTPATIENT
Start: 2017-03-08 | End: 2017-03-08

## 2017-03-07 RX ORDER — NITROGLYCERIN 0.4 MG/1
0.4 TABLET SUBLINGUAL
Status: DISCONTINUED | OUTPATIENT
Start: 2017-03-07 | End: 2017-03-12 | Stop reason: HOSPADM

## 2017-03-07 RX ORDER — SODIUM CHLORIDE 0.9 % (FLUSH) 0.9 %
5-10 SYRINGE (ML) INJECTION AS NEEDED
Status: DISCONTINUED | OUTPATIENT
Start: 2017-03-07 | End: 2017-03-12 | Stop reason: HOSPADM

## 2017-03-07 RX ORDER — ENOXAPARIN SODIUM 100 MG/ML
1 INJECTION SUBCUTANEOUS EVERY 12 HOURS
Status: DISCONTINUED | OUTPATIENT
Start: 2017-03-07 | End: 2017-03-07 | Stop reason: DRUGHIGH

## 2017-03-07 RX ORDER — MORPHINE SULFATE 2 MG/ML
2 INJECTION, SOLUTION INTRAMUSCULAR; INTRAVENOUS
Status: DISCONTINUED | OUTPATIENT
Start: 2017-03-07 | End: 2017-03-11

## 2017-03-07 RX ORDER — ENOXAPARIN SODIUM 100 MG/ML
40 INJECTION SUBCUTANEOUS EVERY 24 HOURS
Status: DISCONTINUED | OUTPATIENT
Start: 2017-03-07 | End: 2017-03-07 | Stop reason: DRUGHIGH

## 2017-03-07 RX ORDER — SODIUM CHLORIDE 9 MG/ML
50 INJECTION, SOLUTION INTRAVENOUS CONTINUOUS
Status: DISCONTINUED | OUTPATIENT
Start: 2017-03-07 | End: 2017-03-12

## 2017-03-07 RX ORDER — ONDANSETRON 2 MG/ML
4 INJECTION INTRAMUSCULAR; INTRAVENOUS
Status: DISCONTINUED | OUTPATIENT
Start: 2017-03-07 | End: 2017-03-12 | Stop reason: HOSPADM

## 2017-03-07 RX ORDER — ENOXAPARIN SODIUM 100 MG/ML
30 INJECTION SUBCUTANEOUS EVERY 24 HOURS
Status: DISCONTINUED | OUTPATIENT
Start: 2017-03-07 | End: 2017-03-07

## 2017-03-07 RX ORDER — HYDROCODONE BITARTRATE AND ACETAMINOPHEN 5; 325 MG/1; MG/1
1 TABLET ORAL
Status: DISCONTINUED | OUTPATIENT
Start: 2017-03-07 | End: 2017-03-12

## 2017-03-07 RX ORDER — ATORVASTATIN CALCIUM 20 MG/1
20 TABLET, FILM COATED ORAL DAILY
Status: DISCONTINUED | OUTPATIENT
Start: 2017-03-08 | End: 2017-03-12 | Stop reason: HOSPADM

## 2017-03-07 RX ORDER — ASPIRIN 81 MG/1
81 TABLET ORAL DAILY
Status: DISCONTINUED | OUTPATIENT
Start: 2017-03-08 | End: 2017-03-12 | Stop reason: HOSPADM

## 2017-03-07 RX ORDER — HYDROCODONE BITARTRATE AND ACETAMINOPHEN 5; 325 MG/1; MG/1
1 TABLET ORAL
COMMUNITY
End: 2017-03-12

## 2017-03-07 RX ORDER — CHLORZOXAZONE 500 MG/1
500 TABLET ORAL
COMMUNITY
End: 2018-10-24

## 2017-03-07 RX ADMIN — SODIUM CHLORIDE 100 ML/HR: 900 INJECTION, SOLUTION INTRAVENOUS at 23:09

## 2017-03-07 RX ADMIN — Medication 10 ML: at 11:35

## 2017-03-07 RX ADMIN — Medication 2 MG: at 12:51

## 2017-03-07 RX ADMIN — IOPAMIDOL 100 ML: 755 INJECTION, SOLUTION INTRAVENOUS at 11:35

## 2017-03-07 RX ADMIN — ENOXAPARIN SODIUM 30 MG: 30 INJECTION SUBCUTANEOUS at 12:55

## 2017-03-07 RX ADMIN — SODIUM CHLORIDE 100 ML: 900 INJECTION, SOLUTION INTRAVENOUS at 11:35

## 2017-03-07 RX ADMIN — SODIUM CHLORIDE 100 ML/HR: 900 INJECTION, SOLUTION INTRAVENOUS at 12:51

## 2017-03-07 RX ADMIN — Medication 10 ML: at 14:00

## 2017-03-07 NOTE — PROGRESS NOTES
Lovenox Daily Monitoring  Indication: DVT Prophylaxis  Recent Labs      03/07/17   0755  03/07/17   0711   HGB  12.5   --    PLT  266   --    CREA   --   1.20*   Date of last CBC: 3/07  Current Weight: 54.4 kg  Est. CrCl = 21.9 ml/min  Current Dose: 40 mg subcutaneously every 24 hours.   Plan: Change to 30 mg subcutaneously every 24hrs for CrCl <30 mL/min

## 2017-03-07 NOTE — CONSULTS
General Surgery ER Consultation    Admit Date: 3/7/2017  Reason for Consultation: abd pain    HPI:  Dorita Ferreira is a 80 y.o. female who presented the ED w/ c/o LUQ abd pain that started a few days ago. She had this same pain but in the RUQ in December which resolved. In January she was in the hospital w/ pneumonia and said she has some pain at that time. She denies n/v/d. Has a good appetite. No f/c/s. She is a fair historian and has her daughter at the bedside for further info. Ultrasound  IMPRESSION: Sludge in the gallbladder with nonspecific gallbladder wall  thickening. Common bile duct dilatation and intrahepatic biliary dilatation is  concerning for biliary obstruction. Numerous nonobstructing left renal stones  again demonstrated.         Patient Active Problem List    Diagnosis Date Noted    Chest pain 03/07/2017    Cholecystitis 03/07/2017    Nephrolithiasis 03/07/2017    Bronchitis 01/13/2017    Acute bronchitis 01/12/2017     Past Medical History:   Diagnosis Date    HTN (hypertension)     Hypercholesteremia     Osteoporosis     Stroke ([address]     CVA, TIA    TIA (transient ischemic attack)     Vertebral fracture       Past Surgical History:   Procedure Laterality Date    HX HIP REPLACEMENT  2009    left    HX ORTHOPAEDIC        Social History   Substance Use Topics    Smoking status: Former Smoker     Years: 40.00     Quit date: 11/7/1986    Smokeless tobacco: Not on file    Alcohol use Yes      History reviewed. No pertinent family history. Prior to Admission medications    Medication Sig Start Date End Date Taking? Authorizing Provider   HYDROcodone-acetaminophen (NORCO) 5-325 mg per tablet Take 1 Tab by mouth every four (4) hours as needed for Pain. Yes Historical Provider   chlorzoxazone (PARAFON FORTE) 500 mg tablet Take 500 mg by mouth three (3) times daily as needed for Muscle Spasm(s).    Yes Historical Provider   verapamil ER (CALAN-SR) 120 mg tablet Take 1 Tab by mouth daily. Indications: Hypertension 1/16/17  Yes Swati Atkins MD   calcium carbonate (OS-SIMONE) 500 mg calcium (1,250 mg) tablet Take 1 Tab by mouth two (2) times a day. Yes Historical Provider   atorvastatin (LIPITOR) 20 mg tablet Take 20 mg by mouth daily. Yes Historical Provider   aspirin delayed-release 81 mg tablet Take 81 mg by mouth daily. Yes Historical Provider   multivitamin (ONE A DAY) tablet Take 1 Tab by mouth daily. Yes Historical Provider   furosemide (LASIX) 20 mg tablet Take 20 mg by mouth daily. Yes Thee Caba MD   lisinopril (PRINIVIL, ZESTRIL) 40 mg tablet Take 40 mg by mouth daily. Yes Thee Caba MD   AMLODIPINE BESYLATE (AMLODIPINE PO) Take 5 mg by mouth daily. Yes Thee Caba MD   alendronate (FOSAMAX) 70 mg tablet Take 70 mg by mouth every seven (7) days. Historical Provider     Allergies   Allergen Reactions    Hydrochlorothiazide Nausea and Vomiting          Subjective:     Review of Systems:    A comprehensive review of systems was negative except for that written in the History of Present Illness. Objective:     Blood pressure 135/57, pulse 75, temperature 98.2 °F (36.8 °C), resp. rate 19, height 5' (1.524 m), weight 120 lb (54.4 kg), SpO2 98 %.   Recent Results (from the past 24 hour(s))   EKG, 12 LEAD, INITIAL    Collection Time: 03/07/17  7:05 AM   Result Value Ref Range    Ventricular Rate 80 BPM    Atrial Rate 80 BPM    P-R Interval 140 ms    QRS Duration 84 ms    Q-T Interval 362 ms    QTC Calculation (Bezet) 417 ms    Calculated P Axis 32 degrees    Calculated R Axis -20 degrees    Calculated T Axis 39 degrees    Diagnosis       Sinus rhythm with occasional premature ventricular complexes  When compared with ECG of 12-JAN-2017 18:42,  premature ventricular complexes are now present  Confirmed by Alida Velásquez M.D., Ralf Ford ([id number]) on 3/7/2017 4:29:88 AM     METABOLIC PANEL, COMPREHENSIVE    Collection Time: 03/07/17  7:11 AM   Result Value Ref Range    Sodium 136 136 - 145 mmol/L    Potassium 3.8 3.5 - 5.1 mmol/L    Chloride 95 (L) 97 - 108 mmol/L    CO2 33 (H) 21 - 32 mmol/L    Anion gap 8 5 - 15 mmol/L    Glucose 103 (H) 65 - 100 mg/dL    BUN 11 6 - 20 MG/DL    Creatinine 1.20 (H) 0.55 - 1.02 MG/DL    BUN/Creatinine ratio 9 (L) 12 - 20      GFR est AA 51 (L) >60 ml/min/1.73m2    GFR est non-AA 42 (L) >60 ml/min/1.73m2    Calcium 8.7 8.5 - 10.1 MG/DL    Bilirubin, total 0.5 0.2 - 1.0 MG/DL    ALT (SGPT) 35 12 - 78 U/L    AST (SGOT) 29 15 - 37 U/L    Alk. phosphatase 85 45 - 117 U/L    Protein, total 7.0 6.4 - 8.2 g/dL    Albumin 3.4 (L) 3.5 - 5.0 g/dL    Globulin 3.6 2.0 - 4.0 g/dL    A-G Ratio 0.9 (L) 1.1 - 2.2     TROPONIN I    Collection Time: 03/07/17  7:11 AM   Result Value Ref Range    Troponin-I, Qt. <0.04 <0.05 ng/mL   CK W/ CKMB & INDEX    Collection Time: 03/07/17  7:11 AM   Result Value Ref Range    CK 41 26 - 192 U/L    CK - MB <1.0 <3.6 NG/ML    CK-MB Index Cannot be calulated 0 - 2.5     D DIMER    Collection Time: 03/07/17  7:11 AM   Result Value Ref Range    D-dimer 2.58 (H) 0.00 - 0.65 mg/L FEU   LIPASE    Collection Time: 03/07/17  7:11 AM   Result Value Ref Range    Lipase 507 (H) 73 - 393 U/L   CBC WITH AUTOMATED DIFF    Collection Time: 03/07/17  7:55 AM   Result Value Ref Range    WBC 7.1 3.6 - 11.0 K/uL    RBC 4.11 3.80 - 5.20 M/uL    HGB 12.5 11.5 - 16.0 g/dL    HCT 38.9 35.0 - 47.0 %    MCV 94.6 80.0 - 99.0 FL    MCH 30.4 26.0 - 34.0 PG    MCHC 32.1 30.0 - 36.5 g/dL    RDW 13.5 11.5 - 14.5 %    PLATELET 298 049 - 237 K/uL    NEUTROPHILS 83 (H) 32 - 75 %    LYMPHOCYTES 10 (L) 12 - 49 %    MONOCYTES 7 5 - 13 %    EOSINOPHILS 0 0 - 7 %    BASOPHILS 0 0 - 1 %    ABS. NEUTROPHILS 5.9 1.8 - 8.0 K/UL    ABS. LYMPHOCYTES 0.7 (L) 0.8 - 3.5 K/UL    ABS. MONOCYTES 0.5 0.0 - 1.0 K/UL    ABS. EOSINOPHILS 0.0 0.0 - 0.4 K/UL    ABS.  BASOPHILS 0.0 0.0 - 0.1 K/UL    DF SMEAR SCANNED      RBC COMMENTS JIMBO CELLS  2+         _____________________  Physical Exam: General:  Alert, cooperative, no distress, appears stated age. Eyes:   Sclera clear. Throat: Lips, mucosa, and tongue normal.   Neck: Supple, symmetrical, trachea midline. Lungs:   Clear to auscultation bilaterally. Heart:  Regular rate and rhythm. Abdomen:   Soft, tender in LUQ, Bowel sounds normal. No masses,  No organomegaly. Extremities: Extremities normal, atraumatic, no cyanosis or edema. Skin: Skin color, texture, turgor normal. No rashes or lesions. Assessment:   Principal Problem:    Chest pain (3/7/2017)    Active Problems:    Cholecystitis (3/7/2017)      Nephrolithiasis (3/7/2017)            Plan:     Being admitted to medicine  GI to see re: MRCP  For CT to assess kidney stones, L kidney  Dr Jaki Johnston to see  No need for immediate cholecystectomy    Total time spent with patient: 25 minutes. Signed By: Manfred Severs) Stef Engle NP     March 7, 2017        Pt seen and examined   Agree with above  Has had previous episodes of right sided pain but today has LUQ pain. No right sided pain today  Usually able to tolerate a regular diet    Gen- Alert in NAD  Lungs- CTA  H- RRR   Abd- S/nt/ND mild Left abdominal pain no Right sided pain. Us noted    Given her exam I don't believe she has acute cholecystitis. Should undergo MRCP. May need ERCP and eventual lap Rachelle.

## 2017-03-07 NOTE — IP AVS SNAPSHOT
Current Discharge Medication List  
  
Take these medications at their scheduled times Dose & Instructions Dispensing Information Comments Morning Noon Evening Bedtime AMLODIPINE PO Your next dose is: Today, Tomorrow Other:  ____________ Dose:  5 mg Take 5 mg by mouth daily. Refills:  0  
     
   
   
   
  
 apixaban 5 mg tablet Commonly known as:  Elvie J Carlos Your next dose is: Today, Tomorrow Other:  ____________ Dose:  10 mg Take 2 Tabs by mouth every twelve (12) hours. 10 mg twice daily for 7 days and then 5 mg twice daily. Quantity:  14 Tab Refills:  0  
     
   
   
   
  
 aspirin delayed-release 81 mg tablet Your next dose is: Today, Tomorrow Other:  ____________ Dose:  81 mg Take 81 mg by mouth daily. Refills:  0  
     
   
   
   
  
 atorvastatin 20 mg tablet Commonly known as:  LIPITOR Your next dose is: Today, Tomorrow Other:  ____________ Dose:  20 mg Take 20 mg by mouth daily. Refills:  0  
     
   
   
   
  
 calcium carbonate 500 mg calcium (1,250 mg) tablet Commonly known as:  OS-SIMONE Your next dose is: Today, Tomorrow Other:  ____________ Dose:  1 Tab Take 1 Tab by mouth two (2) times a day. Refills:  0  
     
   
   
   
  
 FOSAMAX 70 mg tablet Generic drug:  alendronate Your next dose is: Today, Tomorrow Other:  ____________ Dose:  70 mg Take 70 mg by mouth every seven (7) days. Refills:  0  
     
   
   
   
  
 furosemide 20 mg tablet Commonly known as:  LASIX Your next dose is: Today, Tomorrow Other:  ____________ Dose:  20 mg Take 20 mg by mouth daily. Refills:  0  
     
   
   
   
  
 metoprolol tartrate 25 mg tablet Commonly known as:  LOPRESSOR Your next dose is: Today, Tomorrow Other:  ____________  Dose:  12.5 mg  
 Take 0.5 Tabs by mouth every twelve (12) hours for 60 doses. Quantity:  30 Tab Refills:  0  
     
   
   
   
  
 multivitamin tablet Commonly known as:  ONE A DAY Your next dose is: Today, Tomorrow Other:  ____________ Dose:  1 Tab Take 1 Tab by mouth daily. Refills:  0  
     
   
   
   
  
 tamsulosin 0.4 mg capsule Commonly known as:  FLOMAX Your next dose is: Today, Tomorrow Other:  ____________ Dose:  0.4 mg Take 1 Cap by mouth daily. Quantity:  60 Cap Refills:  0 Take these medications as needed Dose & Instructions Dispensing Information Comments Morning Noon Evening Bedtime  
 acetaminophen 325 mg tablet Commonly known as:  TYLENOL Your next dose is: Today, Tomorrow Other:  ____________ Dose:  650 mg Take 2 Tabs by mouth every six (6) hours as needed for up to 60 doses. Quantity:  100 Tab Refills:  0  
     
   
   
   
  
 chlorzoxazone 500 mg tablet Commonly known as:  Mitchel Bergman Your next dose is: Today, Tomorrow Other:  ____________ Dose:  500 mg Take 500 mg by mouth three (3) times daily as needed for Muscle Spasm(s). Refills:  0  
     
   
   
   
  
 oxyCODONE IR 5 mg immediate release tablet Commonly known as:  Arabella Lackey Your next dose is: Today, Tomorrow Other:  ____________ Dose:  2.5 mg Take 0.5 Tabs by mouth every four (4) hours as needed. Max Daily Amount: 15 mg. Quantity:  6 Tab Refills:  0 Where to Get Your Medications Information about where to get these medications is not yet available ! Ask your nurse or doctor about these medications  
  acetaminophen 325 mg tablet  
 apixaban 5 mg tablet  
 metoprolol tartrate 25 mg tablet  
 oxyCODONE IR 5 mg immediate release tablet  
 tamsulosin 0.4 mg capsule

## 2017-03-07 NOTE — PROGRESS NOTES
Admission Medication Reconciliation:    Information obtained from: Patient and daughter, medication list from home     Significant PMH/Disease States:   Past Medical History:   Diagnosis Date    HTN (hypertension)     Hypercholesteremia     Osteoporosis     Stroke ([address]     CVA, TIA    TIA (transient ischemic attack)     Vertebral fracture        Chief Complaint for this Admission:  Flank pain, abdominal pain     Allergies:  Hydrochlorothiazide    Prior to Admission Medications:   Prior to Admission Medications   Prescriptions Last Dose Informant Patient Reported? Taking? AMLODIPINE BESYLATE (AMLODIPINE PO) 3/6/2017 at Unknown time  Yes Yes   Sig: Take 5 mg by mouth daily. HYDROcodone-acetaminophen (NORCO) 5-325 mg per tablet 3/6/2017 at Unknown time  Yes Yes   Sig: Take 1 Tab by mouth every four (4) hours as needed for Pain. alendronate (FOSAMAX) 70 mg tablet 2/28/2017  Yes No   Sig: Take 70 mg by mouth every seven (7) days. aspirin delayed-release 81 mg tablet 3/6/2017 at Unknown time  Yes Yes   Sig: Take 81 mg by mouth daily. atorvastatin (LIPITOR) 20 mg tablet 3/6/2017 at Unknown time  Yes Yes   Sig: Take 20 mg by mouth daily. calcium carbonate (OS-SIMONE) 500 mg calcium (1,250 mg) tablet 3/6/2017 at Unknown time  Yes Yes   Sig: Take 1 Tab by mouth two (2) times a day. chlorzoxazone (PARAFON FORTE) 500 mg tablet 3/6/2017 at Unknown time  Yes Yes   Sig: Take 500 mg by mouth three (3) times daily as needed for Muscle Spasm(s). furosemide (LASIX) 20 mg tablet 3/6/2017 at Unknown time  Yes Yes   Sig: Take 20 mg by mouth daily. lisinopril (PRINIVIL, ZESTRIL) 40 mg tablet 3/6/2017 at Unknown time  Yes Yes   Sig: Take 40 mg by mouth daily. multivitamin (ONE A DAY) tablet 3/6/2017 at Unknown time  Yes Yes   Sig: Take 1 Tab by mouth daily. verapamil ER (CALAN-SR) 120 mg tablet 3/6/2017 at Unknown time  No Yes   Sig: Take 1 Tab by mouth daily.  Indications: Hypertension      Facility-Administered Medications: None         Comments/Recommendations: Reviewed medication list with patient and daughter. Medication list and medications brought from home.     Removed-guaifenesin    Added-Hydrocodone and Chlorzoxazone which has been taken recently prn for pain     Efra GundersonD

## 2017-03-07 NOTE — WOUND CARE
WOCN Note:   New consult placed by RN for sacral/coccyx assessment; Chart reviewed prior to assessment;     Assessment:   Patient is alert, verbal, patient experienced discomfort with repositioning; Patient repositioned with Lois Blankenship RN from suipne to bedside commode and back to supine on advance bed with heels floated; Pt tolerated assessment and procedure well. Able to palpate DP pulses bilaterally, feet warm, pink with good capillary refill;     Bilateral heel skin intact and without erythema. Wound Assessment: present on admission. 1. Sacral/bilateral buttock/gluteal cleft area 12cm x 7cm x 0.0cm that is blanchable pink;     Skin Care/Prevention Recommendations:  1. Continue continence checks;    2. Reposition patient approximately every 2 hours. 3. Assess/protect skin in contact with medical devices. Keep skin moisturized. 4. Float Heels with pillow under calves. 5. Continue on advance bed for pressure redistribution;   6.  Ensure that patient is repositioning in chair shifting weight approximately every 15 minutes and standing up every hour;       Discussed above assessment and recommendations with Lois Blankenship (JORGE) and Dr. Alpa Gilbert;    Georgena Ormond, JORGE, BSN, [facility]  Certified Wound, Ostomy, Continence Nurse  office [phone]  pager [phone]  Devan Ewing, [facility]

## 2017-03-07 NOTE — PROGRESS NOTES
CM met with patient and her daughter. Patient came to ER by car with her daughter due to the last 2 days patient has not been out of bed due to pain in her upper abdomin and back on left side. When test were run they saw a thickening of gallbladder with sludge and kidney stones. Patient is normally active at [facility] she goes down stairs from her 4th floor studio apartment for meals and activities and she gets assistance with a bath 3 days a week. She has not done any of this for the past 2 days. She has had her meals delivered to her room and has refused her baths. Staying in bed because not moving is the only time she isn't hurting. Patient has been at [facility] for past 4 yrs. She was at [facility] in Jan[facility]. She last saw her PCP 2-3 weeks ago. I called the office and spoke with Judi to make them aware of patients admission. Patient has not had any falls in the past 3 months. Patient has an advanced directive and POA which is her daughter Jin Gary [phone] Senia Carmichael, [phone] W [phone]. Daughter had copies with her and they were given to  to scan into chart. Henri Meade RN CRM  Care Management Interventions  PCP Verified by CM: Yes ([address] [place] [address])  Last Visit to PCP: 02/20/17  Mode of Transport at Discharge: Other (see comment) (Car)  Transition of Care Consult (CM Consult): Discharge Planning (Independent living [facility])  MyChart Signup: No  Discharge Durable Medical Equipment: No (Patient uses a walker)  Physical Therapy Consult: No  Occupational Therapy Consult: No  Speech Therapy Consult: No  Current Support Network:  Other (Independent living [facility] with bath assistance)  Confirm Follow Up Transport: Family (Daughter)  Plan discussed with Pt/Family/Caregiver: Yes (Patient and daughter)  Discharge Location  Discharge Placement: Home ([facility] living [facility])

## 2017-03-07 NOTE — DISCHARGE INSTRUCTIONS
Call and make follow-up appointment with Dr Howie Shipman in 6 months regarding kidney stones - [facility] [phone]      DISCHARGE SUMMARY from Nurse    The following personal items are in your possession at time of discharge:    Dental Appliances: Partials, With patient  Visual Aid: Glasses     Home Medications: None  Jewelry: Ring, Sent home (with daughter)  Clothing: Sent home  Other Valuables: None             PATIENT INSTRUCTIONS:    After general anesthesia or intravenous sedation, for 24 hours or while taking prescription Narcotics:  · Limit your activities  · Do not drive and operate hazardous machinery  · Do not make important personal or business decisions  · Do  not drink alcoholic beverages  · If you have not urinated within 8 hours after discharge, please contact your surgeon on call. Report the following to your surgeon:  · Excessive pain, swelling, redness or odor of or around the surgical area  · Temperature over 100.5  · Nausea and vomiting lasting longer than 4 hours or if unable to take medications  · Any signs of decreased circulation or nerve impairment to extremity: change in color, persistent  numbness, tingling, coldness or increase pain  · Any questions          These are general instructions for a healthy lifestyle:    No smoking/ No tobacco products/ Avoid exposure to second hand smoke    Surgeon General's Warning:  Quitting smoking now greatly reduces serious risk to your health. Obesity, smoking, and sedentary lifestyle greatly increases your risk for illness    A healthy diet, regular physical exercise & weight monitoring are important for maintaining a healthy lifestyle    You may be retaining fluid if you have a history of heart failure or if you experience any of the following symptoms:  Weight gain of 3 pounds or more overnight or 5 pounds in a week, increased swelling in our hands or feet or shortness of breath while lying flat in bed.   Please call your doctor as soon as you notice any of these symptoms; do not wait until your next office visit. Recognize signs and symptoms of STROKE:    F-face looks uneven    A-arms unable to move or move unevenly    S-speech slurred or non-existent    T-time-call 911 as soon as signs and symptoms begin-DO NOT go       Back to bed or wait to see if you get better-TIME IS BRAIN. Warning Signs of HEART ATTACK     Call 911 if you have these symptoms:   Chest discomfort. Most heart attacks involve discomfort in the center of the chest that lasts more than a few minutes, or that goes away and comes back. It can feel like uncomfortable pressure, squeezing, fullness, or pain.  Discomfort in other areas of the upper body. Symptoms can include pain or discomfort in one or both arms, the back, neck, jaw, or stomach.  Shortness of breath with or without chest discomfort.  Other signs may include breaking out in a cold sweat, nausea, or lightheadedness. Don't wait more than five minutes to call 911 - MINUTES MATTER! Fast action can save your life. Calling 911 is almost always the fastest way to get lifesaving treatment. Emergency Medical Services staff can begin treatment when they arrive -- up to an hour sooner than if someone gets to the hospital by car. The discharge information has been reviewed with the patient. The patient verbalized understanding. Discharge medications reviewed with the patient and appropriate educational materials and side effects teaching were provided. Cree Activation    Thank you for requesting access to Cree. Please follow the instructions below to securely access and download your online medical record. Cree allows you to send messages to your doctor, view your test results, renew your prescriptions, schedule appointments, and more. How Do I Sign Up? 1. In your internet browser, go to [email or web address]  2. Click on the First Time User? Click Here link in the Sign In box.  You will be redirect to the New Member Sign Up page. 3. Enter your SnipSnap Access Code exactly as it appears below. You will not need to use this code after youve completed the sign-up process. If you do not sign up before the expiration date, you must request a new code. SnipSnap Access Code: [email or web address]  Expires: 2017  8:36 AM (This is the date your SnipSnap access code will )    4. Enter the last four digits of your Social Security Number (xxxx) and Date of Birth (mm/dd/yyyy) as indicated and click Submit. You will be taken to the next sign-up page. 5. Create a [employer] ID. This will be your SnipSnap login ID and cannot be changed, so think of one that is secure and easy to remember. 6. Create a SnipSnap password. You can change your password at any time. 7. Enter your Password Reset Question and Answer. This can be used at a later time if you forget your password. 8. Enter your e-mail address. You will receive e-mail notification when new information is available in [address] 9. Click Sign Up. You can now view and download portions of your medical record. 10. Click the Download Summary menu link to download a portable copy of your medical information. Additional Information    If you have questions, please visit the Frequently Asked Questions section of the SnipSnap website at [email or web address] Remember, SnipSnap is NOT to be used for urgent needs. For medical emergencies, dial 911.

## 2017-03-07 NOTE — PROGRESS NOTES
Primary Nurse Kvng Stanford and Erlinda Rueda RN performed a dual skin assessment on this patient No impairment noted  Mohit score is 19

## 2017-03-07 NOTE — CONSULTS
Urology Consult    Patient: Katarina Maciel MRN: [id number]  SSN: [id number]    YOB: 1925  Age: 80 y.o. Sex: female          Date of Consultation:  March 7, 2017  Requesting Physician: Master Horn MD  Reason for Consultation:    Pre-existing [facility] Patient:   Physician:               History of Present Illness:  Patient is a 80 y.o. female admitted 3/7/2017 to the hospital for Chest pain. We are consulted due to kidney stones. Pt presents with 4 d c/o abdominal pain. Mostly LUQ with some in the epigastric region and RUQ. US showed multiple L kidney stones but no hydro. Now just s/p CT which I reviewed: multiple L kidney stones vs upper pole staghorn. Unchanged since oldest CT in system from 2010. No hydro or ureteral stone. Denies F/C. No voiding symptoms. Problem: kidney stones  Location: left kidney  Character: non-obstructing, asymptomatic  Duration: at least 7 years  Severity: moderate      Past Medical History: Allergies   Allergen Reactions    Hydrochlorothiazide Nausea and Vomiting      Prior to Admission medications    Medication Sig Start Date End Date Taking? Authorizing Provider   HYDROcodone-acetaminophen (NORCO) 5-325 mg per tablet Take 1 Tab by mouth every four (4) hours as needed for Pain. Yes Historical Provider   chlorzoxazone (PARAFON FORTE) 500 mg tablet Take 500 mg by mouth three (3) times daily as needed for Muscle Spasm(s). Yes Historical Provider   verapamil ER (CALAN-SR) 120 mg tablet Take 1 Tab by mouth daily. Indications: Hypertension 1/16/17  Yes Blanka Muse MD   calcium carbonate (OS-SIMONE) 500 mg calcium (1,250 mg) tablet Take 1 Tab by mouth two (2) times a day. Yes Historical Provider   atorvastatin (LIPITOR) 20 mg tablet Take 20 mg by mouth daily. Yes Historical Provider   aspirin delayed-release 81 mg tablet Take 81 mg by mouth daily. Yes Historical Provider   multivitamin (ONE A DAY) tablet Take 1 Tab by mouth daily.    Yes Historical Provider   furosemide (LASIX) 20 mg tablet Take 20 mg by mouth daily. Yes Phys Other, MD   lisinopril (PRINIVIL, ZESTRIL) 40 mg tablet Take 40 mg by mouth daily. Yes Phys Other, MD   AMLODIPINE BESYLATE (AMLODIPINE PO) Take 5 mg by mouth daily. Yes Phys Other, MD   alendronate (FOSAMAX) 70 mg tablet Take 70 mg by mouth every seven (7) days. Historical Provider      PMHx:  has a past medical history of HTN (hypertension); Hypercholesteremia; Osteoporosis; Stroke [facility]); TIA (transient ischemic attack); and Vertebral fracture. PSurgHx:  has a past surgical history that includes hip replacement () and orthopaedic. PSocHx:  reports that she quit smoking about 30 years ago. She quit after 40.00 years of use. She does not have any smokeless tobacco history on file. She reports that she drinks alcohol. She reports that she does not use illicit drugs. ROS:  Admission ROS by Hollie Collazo MD from 3/7/2017 were reviewed with the patient and changes (other than per HPI) include: none. All 12 systems were reviewed and were otherwise negative. Physical Exam:            Vitals[de-identified]    Temp (24hrs), Av.8 °F (36.6 °C), Min:97.4 °F (36.3 °C), Max:98.2 °F (36.8 °C)   Blood pressure 134/54, pulse 75, temperature 97.4 °F (36.3 °C), resp. rate 18, height 5' (1.524 m), weight 54.4 kg (120 lb), SpO2 98 %.              I&O's:        General Elderly, frail, NAD   Conjunctiva/Lids Normal without gross defects   Pupil/Iris Pupils equal, round, reactive   External Ears/Nose No lesions or deformities   Hearing  Grossly intact   Neck Supple without masses   Thyroid No nodules, masses, tenderness, or enlargement   Respiratory Effort Breathing easily   Chest Palpation No tactile fremitus   Abdominal Aorta No enlargement or bruits   Lower extremity No edema   Abdomen / Flank Soft, reports tenderness in the eipgastric region, no R/G, no masses, no CVA tenderness   Liver / Spleen No organomegaly   Hernia  No ventral hernia   Lymph No adenopathy   Digits/ Nails No clubbing, cyanosis, petechiae   Skin Inspection Warm and dry   Skin Palpation No subcutaneous nodularity   Senesation Grossly without deficits   Judgement / Insight intact   Mood / Affect normal     Lab Results   Component Value Date/Time    WBC 7.1 03/07/2017 07:55 AM    HCT 38.9 03/07/2017 07:55 AM    PLATELET 649 65/48/3290 07:55 AM    Sodium 136 03/07/2017 07:11 AM    Potassium 3.8 03/07/2017 07:11 AM    Chloride 95 03/07/2017 07:11 AM    CO2 33 03/07/2017 07:11 AM    BUN 11 03/07/2017 07:11 AM    Creatinine 1.20 03/07/2017 07:11 AM    Glucose 103 03/07/2017 07:11 AM    Calcium 8.7 03/07/2017 07:11 AM    Magnesium 2.3 01/14/2017 02:00 AM    INR 1.0 11/07/2011 08:05 AM       UA:   Lab Results   Component Value Date/Time    Color YELLOW/STRAW 12/03/2016 09:25 AM    Appearance CLEAR 12/03/2016 09:25 AM    Specific gravity 1.017 12/03/2016 09:25 AM    pH (UA) 7.0 12/03/2016 09:25 AM    Protein NEGATIVE  12/03/2016 09:25 AM    Glucose NEGATIVE  12/03/2016 09:25 AM    Ketone TRACE 12/03/2016 09:25 AM    Bilirubin NEGATIVE  12/03/2016 09:25 AM    Urobilinogen 1.0 12/03/2016 09:25 AM    Nitrites NEGATIVE  12/03/2016 09:25 AM    Leukocyte Esterase SMALL 12/03/2016 09:25 AM    Epithelial cells FEW 12/03/2016 09:25 AM    Bacteria NEGATIVE  12/03/2016 09:25 AM    WBC 5-10 12/03/2016 09:25 AM    RBC 0-5 12/03/2016 09:25 AM       Cultures:   Xrays:     Assessment/Plan:   Non-obstructing left renal stones. Stable since at least 2010. No evidence that the stones are playing any role in her pain. Unless things change I would not recommend any intervention for the stones. Follow-up with me as outpatient. Contact info added and discussed with her daughter.      Signed By: Dennis Lopez MD  - March 7, 2017

## 2017-03-07 NOTE — ED TRIAGE NOTES
TRIAGE NOTE: Patient arrived from home with daughter from [address] [place] [address] with c/o LEFT upper abdomen pain since saturday. Patient denies urinary symptoms. Denies N/V/D.

## 2017-03-07 NOTE — PROGRESS NOTES
Pharmacy renal dose adjustment:    Dose was changed to 1 mg/kg q12h for PE  Adjusted to 50 mg q24h for CrCl <30 ml/min per renal dose policy  Timed for 7am tomorrow as patient received dose this afternoon

## 2017-03-07 NOTE — ED PROVIDER NOTES
HPI Comments: 80 y.o. female with past medical history significant for hypertension, hypercholesteremia, CVA/TIA, and multiple compression fractions who presents, accompanied by daughter, with chief complaint of left breast pain. Patient was seen here for right flank/back pain a couple months ago and was admitted, treated for pneumonia. Last week patient experienced right-sided chest/breast pain, unrelieved by hydrocodone and muscle relaxants. Patient complains of left-sided breat pain for the past 3 days that is worse with movement. Patient states she came to ED today because pain was worse. Patient's pain is not reproducible with palpation. Daughter states she tried to take patient to PCP yesterday but patient refused to go. Patient denies nausea, vomiting, diarrhea, constpation, cough, congestion, urinary symptoms, back pain, abdominal pain. There are no other acute medical concerns at this time. Social hx: Former smoker  PCP: Gunnar Wooten MD    Note written by Nj Lackey. Renata Riccoon, as dictated by Armaan Zambrano MD 7:14 AM    7:31 AM  Armaan Zambrano MD reviewed electronic medical record system for any past medical records that were available that may contribute to the patient's current condition. 12/3/2016 patient was seen in ED for right flank/backpain. She was noted to have multiple thoracic compression deformities. Patient was admitted mid January for hypoxic respiratory failure secondary to bronchitis. CT of chest at that time was negative. History of sinus pause, hypokalemia. The history is provided by the patient and a relative. Past Medical History:   Diagnosis Date    HTN (hypertension)     Hypercholesteremia     Osteoporosis     Stroke ([address]     CVA, TIA    TIA (transient ischemic attack)     Vertebral fracture        Past Surgical History:   Procedure Laterality Date    HX HIP REPLACEMENT  2009    left    HX ORTHOPAEDIC           History reviewed.  No pertinent family history. Social History     Social History    Marital status:      Spouse name: N/A    Number of children: N/A    Years of education: N/A     Occupational History    Not on file. Social History Main Topics    Smoking status: Former Smoker     Years: 40.00     Quit date: 11/7/1986    Smokeless tobacco: Not on file    Alcohol use Yes    Drug use: No    Sexual activity: Not on file     Other Topics Concern    Not on file     Social History Narrative         ALLERGIES: Hydrochlorothiazide    Review of Systems   Constitutional: Negative for activity change, appetite change and fatigue. HENT: Negative for ear pain, facial swelling, sore throat and trouble swallowing. Eyes: Negative for pain, discharge and visual disturbance. Respiratory: Negative for chest tightness, shortness of breath and wheezing. Cardiovascular: Negative for chest pain and palpitations. Gastrointestinal: Negative for abdominal pain, blood in stool, nausea and vomiting. Genitourinary: Negative for difficulty urinating, flank pain and hematuria. Musculoskeletal: Negative for arthralgias, joint swelling, myalgias and neck pain. +left breast pain   Skin: Negative for color change and rash. Neurological: Negative for dizziness, weakness, numbness and headaches. Hematological: Negative for adenopathy. Does not bruise/bleed easily. Psychiatric/Behavioral: Negative for behavioral problems, confusion and sleep disturbance. All other systems reviewed and are negative. Vitals:    03/07/17 0655 03/07/17 0715   BP: 148/85 145/70   Pulse: 83 81   Resp: 20 26   Temp: 98.2 °F (36.8 °C)    SpO2: 93% 91%   Weight: 54.4 kg (120 lb)    Height: 5' (1.524 m)             Physical Exam   Constitutional: She is oriented to person, place, and time. She appears well-developed and well-nourished. No distress. Patient is in no distress as long as she is laying completely still.  With movement, she notices the pain, which today is in the left anterior lower chest and ? Upper abdomen. VS as noted   HENT:   Head: Normocephalic and atraumatic. Nose: Nose normal.   Mouth/Throat: Oropharynx is clear and moist.   Eyes: Conjunctivae and EOM are normal. Pupils are equal, round, and reactive to light. No scleral icterus. Neck: Normal range of motion. Neck supple. No JVD present. No tracheal deviation present. No thyromegaly present. No carotid bruits noted. Cardiovascular: Normal rate, regular rhythm, normal heart sounds and intact distal pulses. Exam reveals no gallop and no friction rub. No murmur heard. Pulmonary/Chest: Effort normal and breath sounds normal. No respiratory distress. She has no wheezes. She has no rales. She exhibits no tenderness. Abdominal: Soft. Bowel sounds are normal. She exhibits no distension and no mass. There is no tenderness. There is no rebound and no guarding. No guarding or masses are noted. There is no chest wall tenderness noted. The only reproduction of pain is with physical movement of the patient on the stretcher. Musculoskeletal: Normal range of motion. She exhibits no edema or tenderness. Lymphadenopathy:     She has no cervical adenopathy. Neurological: She is alert and oriented to person, place, and time. She has normal reflexes. No cranial nerve deficit. Coordination normal.   Skin: Skin is warm and dry. No rash noted. No erythema. Psychiatric: Her behavior is normal. Judgment and thought content normal.   Flat affect   Nursing note and vitals reviewed. Note written by Lorena Rodriguez.  Marielena Eaton, as dictated by Janice Saini MD 7:15 AM       MDM  Number of Diagnoses or Management Options     Amount and/or Complexity of Data Reviewed  Clinical lab tests: ordered and reviewed  Tests in the radiology section of CPT®: ordered and reviewed  Decide to obtain previous medical records or to obtain history from someone other than the patient: yes  Review and summarize past medical records: yes  Discuss the patient with other providers: yes  Independent visualization of images, tracings, or specimens: yes    Risk of Complications, Morbidity, and/or Mortality  Presenting problems: high  Diagnostic procedures: high  Management options: high    Patient Progress  Patient progress: stable    ED Course       Procedures      ED EKG interpretation:  Rhythm: normal sinus rhythm with occasional PVC; Rate (approx.): 80; Axis: left axis deviation; Normal intervals; Poor r wave progression; ST/T wave: non-specific changes; Note written by Chetna Jones. Christ Salgado, as dictated by Angelia Mi MD 7:15 AM    8:03 AM  Lipase 507 - will order US to evaluate for gallbladder disease, and to evaluate the upper abdomen, kidneys  . 9:54 AM  US shows sludge, CBD dilatation at 11.5 MM., and intrahepatic biliary dilatation, as well as numerous non-obstructing left renal stones. The CBD obstructive pattern tapers down at the area of the pancrease, which is not well visualized. Consults placed for hospitalist, general surgery, urology, and GI. It is noted by the tech that with US of the kidneys, there was definitive tenderness over the area of the left kidney which seemed to reproduce some of the patient's pain. A ct of the chest and abdomen are ordered. The patient's D Dimer is noted to be >2. The abdominal CT is to further evaluate the upper abdomen and kidneys'. The CT fails to demonstrate any significant acute intraabdominal findings. There are multiple large stones in the left kidney as previously noted without hydro or perinephric stranding noted. The chest CT demonstrates either ? Tiny pulmonary emboli. Some pleural effusion is also noted. CONSULT NOTE:  10:17 AM Angelia Mi MD spoke with Dr. Sayra Bran MD, Consult for Hospitalist.  Discussed available diagnostic tests and clinical findings. He is in agreement with care plans as outlined.   Dr. Celio Stewart will admit patient. CT abdomen/pelvix with contrast and CTA chest ordered. CONSULT NOTE:  10:30 AM Sandra Garces MD spoke with Dr. Mark Mccarty, Consult for Urology. Discussed available diagnostic tests and clinical findings. He is in agreement with care plans as outlined. CONSULT NOTE:  10:44 AM Sandra Garces MD spoke with Arthur Kelly NP, Consult for Surgery. Discussed available diagnostic tests and clinical findings. She is in agreement with care plans as outlined. Surgery will evaluate patient. CONSULT NOTE:  10:54 AM Sandra Garces MD spoke with Dr. Zachariah Lundy, Consult for Gastroenterology. Discussed available diagnostic tests and clinical findings. He is in agreement with care plans as outlined. Dr. Zachariah Lundy will see patient in consult. By the time the CT was completed, the patient had been seen by the hospitalist and was being admitted. He will manage the possible pulmonary emboli reported in the CT.

## 2017-03-07 NOTE — ROUTINE PROCESS
TRANSFER - OUT REPORT:    Verbal report given to Zaida Chu (name) on Irineo Dubose  being transferred to [facility] (unit) for routine progression of care       Report consisted of patients Situation, Background, Assessment and   Recommendations(SBAR). Information from the following report(s) ED Summary was reviewed with the receiving nurse. Lines:   Peripheral IV 03/07/17 Right Antecubital (Active)   Site Assessment Clean, dry, & intact 3/7/2017  7:15 AM   Phlebitis Assessment 0 3/7/2017  7:15 AM   Infiltration Assessment 0 3/7/2017  7:15 AM   Dressing Status Clean, dry, & intact 3/7/2017  7:15 AM   Dressing Type Transparent 3/7/2017  7:15 AM   Hub Color/Line Status Pink 3/7/2017  7:15 AM   Action Taken Catheter retaped 3/7/2017  7:15 AM        Opportunity for questions and clarification was provided.       Patient transported with:   [facility]

## 2017-03-07 NOTE — H&P
History & Physical    Date of admission: 3/7/2017    Patient name: Dilia Kaiser  MRN: [id number]  YOB: 1925  Age: 80 y.o. Primary care provider:  ABRIL Jade     Source of Information: patient, medical records and patient's daughter at bedside                                  Chief complaint: left chest pain x 2 -3 days    History of present illness  Dilia Kaiser is a 80 y.o. female with history of prior CVA/TIA, HTN, DLD, who presents with intermittent left chest pain x 2 - 3 days. Patient had recently been admitted from 1/12 - 1/16/17 with acute hypoxic respiratory failure secondary to acute bronchitis and had reported feeling well in her usual state of health. She lives at [facility] and states that she had developed sudden, sharp left chest pain ~ 3 days ago, which would last only a few seconds. It was worse when she get up and moved around and improved when she was laying completely still. She denies any associated shortness of breath, PND, orthopnea, weight changes with this pain. It does not particularly worsen when she takes a deep breath. She had otherwise been up and active most of the day, even attending chair exercise classes at her assisted living. No recent changes in medications. No reported dysuria or hematuria. She had reported prior to her admission in January that she had been seen in the ER a few times with right-sided upper quadrant abdominal pain / right chest pain, where work-up had largely been negative (she had not undergone abdominal ultrasound on any of those visits per her report). There had been some associated nausea, but no vomiting or diarrhea. She otherwise denies fevers, does report some occasional chills, no sick contacts. No other acute concerns raised.     Review of systems  A comprehensive review of systems was negative except for that written in the History of Present Illness. The remainder of the review of systems was reviewed and is noncontributory. Past Medical History:   Diagnosis Date    HTN (hypertension)     Hypercholesteremia     Osteoporosis     Stroke ([address]     CVA, TIA    TIA (transient ischemic attack)     Vertebral fracture       Past Surgical History:   Procedure Laterality Date    HX HIP REPLACEMENT  2009    left    HX ORTHOPAEDIC       Prior to Admission medications    Medication Sig Start Date End Date Taking? Authorizing Provider   HYDROcodone-acetaminophen (NORCO) 5-325 mg per tablet Take 1 Tab by mouth every four (4) hours as needed for Pain. Yes Historical Provider   chlorzoxazone (PARAFON FORTE) 500 mg tablet Take 500 mg by mouth three (3) times daily as needed for Muscle Spasm(s). Yes Historical Provider   verapamil ER (CALAN-SR) 120 mg tablet Take 1 Tab by mouth daily. Indications: Hypertension 1/16/17  Yes Reinaldo Boo MD   calcium carbonate (OS-SIMONE) 500 mg calcium (1,250 mg) tablet Take 1 Tab by mouth two (2) times a day. Yes Historical Provider   atorvastatin (LIPITOR) 20 mg tablet Take 20 mg by mouth daily. Yes Historical Provider   aspirin delayed-release 81 mg tablet Take 81 mg by mouth daily. Yes Historical Provider   multivitamin (ONE A DAY) tablet Take 1 Tab by mouth daily. Yes Historical Provider   furosemide (LASIX) 20 mg tablet Take 20 mg by mouth daily. Yes Thee Caba MD   lisinopril (PRINIVIL, ZESTRIL) 40 mg tablet Take 40 mg by mouth daily. Yes Thee Caba MD   AMLODIPINE BESYLATE (AMLODIPINE PO) Take 5 mg by mouth daily. Yes Thee Caba MD   alendronate (FOSAMAX) 70 mg tablet Take 70 mg by mouth every seven (7) days. Historical Provider     Allergies   Allergen Reactions    Hydrochlorothiazide Nausea and Vomiting      History reviewed. No pertinent family history. Family history reviewed and non-contributory.      Social history  Patient resides    Independently      With family care x  Assisted living      SNF    Ambulates    Independently      With cane     x  Assisted walker         Alcohol history   x  None     Social     Chronic   Smoking history    None   x  Former smoker     Current smoker     History   Smoking Status    Former Smoker    Years: 40.00    Quit date: 11/7/1986   Smokeless Tobacco    Not on file       Code status    Full code   x  DNR/DNI        Code status discussed with the patient/caregivers. DNR        Physical Examination   Visit Vitals    /87 (BP 1 Location: Left arm, BP Patient Position: At rest)    Pulse 76    Temp 98.2 °F (36.8 °C)    Resp 20    Ht 5' (1.524 m)    Wt 54.4 kg (120 lb)    SpO2 98%    BMI 23.44 kg/m2      O2 Flow Rate (L/min): 2 l/min   O2 Device: Nasal cannula    General:  Alert, cooperative, no distress   Head:  Normocephalic, without obvious abnormality, atraumatic   Eyes:  Conjunctivae/corneas clear. PERRL, EOMs intact   E/N/M/T: Nares normal. Septum midline.  No nasal drainage or sinus tenderness  Lips, mucosa, and tongue normal   Teeth and gums normal  Clear oropharynx   Neck: Normal appearance and movements, symmetrical, trachea midline  No palpable adenopathy  No thyroid enlargement, tenderness or nodules  No carotid bruit   Normal JVP   Lungs:   Symmetrical chest expansion and respiratory effort  Clear to auscultation bilaterally   Chest wall:  No tenderness or deformity   Heart:  Regular rhythm   Sounds normal; no murmur, click, rub or gallop   Abdomen:   Soft, mild left upper quadrant tenderness to palpation without rebound or guarding  Bowel sounds normal  No masses or hepatosplenomegaly  No hernias present   Back: Left flank tenderness to percussion   Extremities: Extremities normal, atraumatic  No cyanosis or edema  No DVT signs   Pulses 2+ and symmetric all extremities   Skin: No rashes or ulcers   Musculo-      skeletal: Gait not tested  Normal symmetry, ROM, strength and tone   Neuro: Normal cranial nerves  Normal reflexes and sensation   Psych: Alert, oriented x3  Normal affect, judgement and insight         Data Review    24 Hour Results:  Recent Results (from the past 24 hour(s))   EKG, 12 LEAD, INITIAL    Collection Time: 03/07/17  7:05 AM   Result Value Ref Range    Ventricular Rate 80 BPM    Atrial Rate 80 BPM    P-R Interval 140 ms    QRS Duration 84 ms    Q-T Interval 362 ms    QTC Calculation (Bezet) 417 ms    Calculated P Axis 32 degrees    Calculated R Axis -20 degrees    Calculated T Axis 39 degrees    Diagnosis       Sinus rhythm with occasional premature ventricular complexes  When compared with ECG of 12-JAN-2017 18:42,  premature ventricular complexes are now present  Confirmed by Tierney Bowens M.D., [facility] ([id number]) on 3/7/2017 8:17:62 AM     METABOLIC PANEL, COMPREHENSIVE    Collection Time: 03/07/17  7:11 AM   Result Value Ref Range    Sodium 136 136 - 145 mmol/L    Potassium 3.8 3.5 - 5.1 mmol/L    Chloride 95 (L) 97 - 108 mmol/L    CO2 33 (H) 21 - 32 mmol/L    Anion gap 8 5 - 15 mmol/L    Glucose 103 (H) 65 - 100 mg/dL    BUN 11 6 - 20 MG/DL    Creatinine 1.20 (H) 0.55 - 1.02 MG/DL    BUN/Creatinine ratio 9 (L) 12 - 20      GFR est AA 51 (L) >60 ml/min/1.73m2    GFR est non-AA 42 (L) >60 ml/min/1.73m2    Calcium 8.7 8.5 - 10.1 MG/DL    Bilirubin, total 0.5 0.2 - 1.0 MG/DL    ALT (SGPT) 35 12 - 78 U/L    AST (SGOT) 29 15 - 37 U/L    Alk.  phosphatase 85 45 - 117 U/L    Protein, total 7.0 6.4 - 8.2 g/dL    Albumin 3.4 (L) 3.5 - 5.0 g/dL    Globulin 3.6 2.0 - 4.0 g/dL    A-G Ratio 0.9 (L) 1.1 - 2.2     TROPONIN I    Collection Time: 03/07/17  7:11 AM   Result Value Ref Range    Troponin-I, Qt. <0.04 <0.05 ng/mL   CK W/ CKMB & INDEX    Collection Time: 03/07/17  7:11 AM   Result Value Ref Range    CK 41 26 - 192 U/L    CK - MB <1.0 <3.6 NG/ML    CK-MB Index Cannot be calulated 0 - 2.5     D DIMER    Collection Time: 03/07/17  7:11 AM   Result Value Ref Range    D-dimer 2.58 (H) 0.00 - 0.65 mg/L FEU   LIPASE    Collection Time: 03/07/17  7:11 AM   Result Value Ref Range    Lipase 507 (H) 73 - 393 U/L   CBC WITH AUTOMATED DIFF    Collection Time: 03/07/17  7:55 AM   Result Value Ref Range    WBC 7.1 3.6 - 11.0 K/uL    RBC 4.11 3.80 - 5.20 M/uL    HGB 12.5 11.5 - 16.0 g/dL    HCT 38.9 35.0 - 47.0 %    MCV 94.6 80.0 - 99.0 FL    MCH 30.4 26.0 - 34.0 PG    MCHC 32.1 30.0 - 36.5 g/dL    RDW 13.5 11.5 - 14.5 %    PLATELET 249 858 - 956 K/uL    NEUTROPHILS 83 (H) 32 - 75 %    LYMPHOCYTES 10 (L) 12 - 49 %    MONOCYTES 7 5 - 13 %    EOSINOPHILS 0 0 - 7 %    BASOPHILS 0 0 - 1 %    ABS. NEUTROPHILS 5.9 1.8 - 8.0 K/UL    ABS. LYMPHOCYTES 0.7 (L) 0.8 - 3.5 K/UL    ABS. MONOCYTES 0.5 0.0 - 1.0 K/UL    ABS. EOSINOPHILS 0.0 0.0 - 0.4 K/UL    ABS. BASOPHILS 0.0 0.0 - 0.1 K/UL    DF SMEAR SCANNED      RBC COMMENTS JIMBO CELLS  2+         Recent Labs      03/07/17   0755   WBC  7.1   HGB  12.5   HCT  38.9   PLT  266     Recent Labs      03/07/17   0711   NA  136   K  3.8   CL  95*   CO2  33*   GLU  103*   BUN  11   CREA  1.20*   CA  8.7   ALB  3.4*   SGOT  29   ALT  35       Imaging    CTA chest pending    CTA Abd/Pelvis pending          Abd U/S 3/7 - Sludge in the gallbladder with nonspecific gallbladder wall thickening. Common bile duct dilatation and intrahepatic biliary dilatation is concerning for biliary obstruction. Numerous nonobstructing left renal stones  again demonstrated. Assessment and Plan   Principal Problem:    Chest pain (3/7/2017)    Active Problems:    Cholecystitis (3/7/2017)      Nephrolithiasis (3/7/2017)        Assess: 80year old female with atypical left chest pain, possibly secondary to left nephrolithiasis (though non-obstructing) vs pulmonary embolus vs musculoskeletal / costochondritis    1.  Chest pain   - atypical, unlikely cardiac in etiology, possibly pulmonary embolism vs referred pain from left nephrolithiasis   - repeat troponins   - telemetry monitoring   - resume home aspirin, atorvastatin - she was previously on verapamil, but had sinus pauses on her last admission and this was discontinued   - holding home lisinopril in setting of ELIZABETH   - prn nitrostat   - awaiting CTA chest   - pain control with Norco, morphine     2. ELIZABETH   - baseline Cr is 0.8, her Cr is elevated to 1.2 today, likely hypovolemic   - IV fluid hydration     3. Left nephrolithiasis   - Abd U/S as above, non-obstructing nephrolithiasis   - CT Abd/Pelvis ordered   - IV fluids   - medical expulsive therapy with Flomax   - urology consulted   - strain urine     4. CBD dilatation, possible cholecystitis vs choledocholithiasis   - lipase mildly elevated to 507   - NPO x meds   - pain control as above   - GI / general surgery consulted   - ? MRCP     5. Acute hypoxic respiratory failure   - as above     6. HTN   - resume home amlodipine     7. Acute metabolic alkalosis   - suspect this is contraction alkalosis given low Cl-, treat with IV fluids, repeat BMP in AM    8. Prior TIA/CVA   - no apparent residual deficits, continue aspirin / atorvastatin    Diet: NPO, may advance to clear liquid, then cardiac diet if tolerated and no procedures planned today  Activity: out of bed with assistance  DVT prophylaxis: Lovenox  Isolation precautions: none  Consultations: GI, general surgery, urology, PT/OT  Anticipated disposition: requires inpatient care currently         Signed by: Abbey Lo MD     March 7, 2017 at 1:43 PM     Time spent on admission > 55 minutes    Addendum:   Reviewed CTA chest, possible small pulmonary emboli on left vs incomplete filling by contrast, given her clinical constellation of symptoms, would err towards treating pulmonary embolism.   Started patient on Lovenox for now, awaiting MRCP results, if she requires subsequent ERCP, this Lovenox may be held fransisco-procedurally and then she may be initiated on coumadin vs NOAC within appropriate interval.

## 2017-03-07 NOTE — PROGRESS NOTES
Full H&P to follow. Patient seen and examined at bedside in ED. She comes in with left chest pain x 2-3 days, intermittent, worse with movement, improved with lying still/flat. Noted slight hypoxia (O2 sat 88% on room air) with elevated D-dimer, incidentally discovered gallbladder sludge, CBD dilatation, elevated lipase and left nephrolithiasis. 1.  Chest pain   - atypical, unlikely cardiac in etiology, possibly pulmonary embolism vs referred pain from left nephrolithiasis   - repeat troponins   - telemetry monitoring   - resume home aspirin, atorvastatin - she was previously on verapamil, but had sinus pauses on her last admission and this was discontinued   - holding home lisinopril in setting of ELIZABETH   - prn nitrostat   - check CTA chest   - pain control with Norco, morphine    2. ELIZABETH   - baseline Cr is 0.8, her Cr is elevated to 1.2 today, likely hypovolemic   - IV fluid hydration    3. Left nephrolithiasis   - CT Abd/Pelvis ordered   - IV fluids   - medical expulsive therapy with Flomax   - urology consulted   - strain urine    4. CBD dilatation, possible cholecystitis vs choledocholithiasis   - NPO x meds   - pain control as above   - GI / general surgery consulted    5. Acute hypoxic respiratory failure   - as above    6. HTN   - resume home amlodipine    7.   Acute metabolic alkalosis   - suspect this is contraction alkalosis given low Cl-, treat with IV fluids, repeat BMP in AM

## 2017-03-07 NOTE — IP AVS SNAPSHOT
[address] 
[phone] Patient: Chema Bruner MRN: [id number] :[id number] You are allergic to the following Allergen Reactions Hydrochlorothiazide Nausea and Vomiting Recent Documentation Height Weight BMI OB Status Smoking Status 1.524 m 56.5 kg 24.33 kg/m2 Postmenopausal Former Smoker Emergency Contacts Name Discharge Info Relation Home Work Mobile [address] CAREGIVER [3] Child [2] [phone] Rodger Reddy  Other Relative [6] [phone] About your hospitalization You were admitted on:  2017 You last received care in the:  [place] [address] You were discharged on:  2017 Unit phone number:  [phone] Why you were hospitalized Your primary diagnosis was:  Chest Pain Your diagnoses also included:  Cholecystitis, Nephrolithiasis Providers Seen During Your Hospitalizations Provider Role Specialty Primary office phone Janice Saini MD Attending Provider Emergency Medicine [phone] Marlin Morin MD Attending Provider Internal Medicine [phone] Gwyn Ponce MD Attending Provider Internal Medicine [phone] Radha Simpson MD Attending Provider Internal Medicine [phone] Glendy White MD Attending Provider Internal Medicine [phone] Your Primary Care Physician (PCP) Primary Care Physician Office Phone Office Fax 1215 Shekhar Sharif, [address] [place] [address] [phone] Follow-up Information Follow up With Details Comments Contact Info ABRIL Vicente Call in 1 day call to schedule appointment for hospital discharge followup and blood thinner medication, apixaban [address] [place] [address] [phone]  Mariya Funes MD Call in 1 day Call to schedule appointment with  [facility] 
[phone] Current Discharge Medication List  
  
START taking these medications Dose & Instructions Dispensing Information Comments Morning Noon Evening Bedtime  
 acetaminophen 325 mg tablet Commonly known as:  TYLENOL Your next dose is: Today, Tomorrow Other:  _________ Dose:  650 mg Take 2 Tabs by mouth every six (6) hours as needed for up to 60 doses. Quantity:  100 Tab Refills:  0  
     
   
   
   
  
 apixaban 5 mg tablet Commonly known as:  James Melissa Your next dose is: Today, Tomorrow Other:  _________ Dose:  10 mg Take 2 Tabs by mouth every twelve (12) hours. 10 mg twice daily for 7 days and then 5 mg twice daily. Quantity:  14 Tab Refills:  0  
     
   
   
   
  
 metoprolol tartrate 25 mg tablet Commonly known as:  LOPRESSOR Your next dose is: Today, Tomorrow Other:  _________ Dose:  12.5 mg Take 0.5 Tabs by mouth every twelve (12) hours for 60 doses. Quantity:  30 Tab Refills:  0  
     
   
   
   
  
 oxyCODONE IR 5 mg immediate release tablet Commonly known as:  Boni Rajesh Your next dose is: Today, Tomorrow Other:  _________ Dose:  2.5 mg Take 0.5 Tabs by mouth every four (4) hours as needed. Max Daily Amount: 15 mg. Quantity:  6 Tab Refills:  0  
     
   
   
   
  
 tamsulosin 0.4 mg capsule Commonly known as:  FLOMAX Your next dose is: Today, Tomorrow Other:  _________ Dose:  0.4 mg Take 1 Cap by mouth daily. Quantity:  60 Cap Refills:  0 CONTINUE these medications which have NOT CHANGED Dose & Instructions Dispensing Information Comments Morning Noon Evening Bedtime AMLODIPINE PO Your next dose is: Today, Tomorrow Other:  _________ Dose:  5 mg Take 5 mg by mouth daily. Refills:  0  
     
   
   
   
  
 aspirin delayed-release 81 mg tablet Your next dose is: Today, Tomorrow Other:  _________ Dose:  81 mg Take 81 mg by mouth daily. Refills:  0  
     
   
   
   
  
 atorvastatin 20 mg tablet Commonly known as:  LIPITOR Your next dose is: Today, Tomorrow Other:  _________ Dose:  20 mg Take 20 mg by mouth daily. Refills:  0  
     
   
   
   
  
 calcium carbonate 500 mg calcium (1,250 mg) tablet Commonly known as:  OS-SIMONE Your next dose is: Today, Tomorrow Other:  _________ Dose:  1 Tab Take 1 Tab by mouth two (2) times a day. Refills:  0  
     
   
   
   
  
 chlorzoxazone 500 mg tablet Commonly known as:  Rensselaermichelle Arriaza Your next dose is: Today, Tomorrow Other:  _________ Dose:  500 mg Take 500 mg by mouth three (3) times daily as needed for Muscle Spasm(s). Refills:  0  
     
   
   
   
  
 FOSAMAX 70 mg tablet Generic drug:  alendronate Your next dose is: Today, Tomorrow Other:  _________ Dose:  70 mg Take 70 mg by mouth every seven (7) days. Refills:  0  
     
   
   
   
  
 furosemide 20 mg tablet Commonly known as:  LASIX Your next dose is: Today, Tomorrow Other:  _________ Dose:  20 mg Take 20 mg by mouth daily. Refills:  0  
     
   
   
   
  
 multivitamin tablet Commonly known as:  ONE A DAY Your next dose is: Today, Tomorrow Other:  _________ Dose:  1 Tab Take 1 Tab by mouth daily. Refills:  0 STOP taking these medications HYDROcodone-acetaminophen 5-325 mg per tablet Commonly known as:  NORCO  
   
  
 lisinopril 40 mg tablet Commonly known as:  PRINIVIL, ZESTRIL  
   
  
 verapamil  mg tablet Commonly known as:  CALAN-SR Where to Get Your Medications Information on where to get these meds will be given to you by the nurse or doctor. ! Ask your nurse or doctor about these medications  
  acetaminophen 325 mg tablet  
 apixaban 5 mg tablet  
 metoprolol tartrate 25 mg tablet  
 oxyCODONE IR 5 mg immediate release tablet  
 tamsulosin 0.4 mg capsule Discharge Instructions Call and make follow-up appointment with Dr Antonio Lopez in 6 months regarding kidney stones - [facility] [phone] DISCHARGE SUMMARY from Nurse The following personal items are in your possession at time of discharge: 
 
Dental Appliances: Partials, With patient Visual Aid: Glasses Home Medications: None Jewelry: Ring, Sent home (with daughter) Clothing: Sent home Other Valuables: None PATIENT INSTRUCTIONS: 
 
After general anesthesia or intravenous sedation, for 24 hours or while taking prescription Narcotics: · Limit your activities · Do not drive and operate hazardous machinery · Do not make important personal or business decisions · Do  not drink alcoholic beverages · If you have not urinated within 8 hours after discharge, please contact your surgeon on call. Report the following to your surgeon: 
· Excessive pain, swelling, redness or odor of or around the surgical area · Temperature over 100.5 · Nausea and vomiting lasting longer than 4 hours or if unable to take medications · Any signs of decreased circulation or nerve impairment to extremity: change in color, persistent  numbness, tingling, coldness or increase pain · Any questions These are general instructions for a healthy lifestyle: No smoking/ No tobacco products/ Avoid exposure to second hand smoke Surgeon General's Warning:  Quitting smoking now greatly reduces serious risk to your health. Obesity, smoking, and sedentary lifestyle greatly increases your risk for illness A healthy diet, regular physical exercise & weight monitoring are important for maintaining a healthy lifestyle You may be retaining fluid if you have a history of heart failure or if you experience any of the following symptoms:  Weight gain of 3 pounds or more overnight or 5 pounds in a week, increased swelling in our hands or feet or shortness of breath while lying flat in bed. Please call your doctor as soon as you notice any of these symptoms; do not wait until your next office visit. Recognize signs and symptoms of STROKE: 
 
F-face looks uneven A-arms unable to move or move unevenly S-speech slurred or non-existent T-time-call 911 as soon as signs and symptoms begin-DO NOT go Back to bed or wait to see if you get better-TIME IS BRAIN. Warning Signs of HEART ATTACK Call 911 if you have these symptoms: 
? Chest discomfort. Most heart attacks involve discomfort in the center of the chest that lasts more than a few minutes, or that goes away and comes back. It can feel like uncomfortable pressure, squeezing, fullness, or pain. ? Discomfort in other areas of the upper body. Symptoms can include pain or discomfort in one or both arms, the back, neck, jaw, or stomach. ? Shortness of breath with or without chest discomfort. ? Other signs may include breaking out in a cold sweat, nausea, or lightheadedness. Don't wait more than five minutes to call [address]! Fast action can save your life. Calling 911 is almost always the fastest way to get lifesaving treatment. Emergency Medical Services staff can begin treatment when they arrive  up to an hour sooner than if someone gets to the hospital by car. The discharge information has been reviewed with the patient. The patient verbalized understanding. Discharge medications reviewed with the patient and appropriate educational materials and side effects teaching were provided. MEDOPt Activation Thank you for requesting access to [employer].  Please follow the instructions below to securely access and download your online medical record. Mobi-Moto allows you to send messages to your doctor, view your test results, renew your prescriptions, schedule appointments, and more. How Do I Sign Up? 1. In your internet browser, go to [email or web address] 
2. Click on the First Time User? Click Here link in the Sign In box. You will be redirect to the New Member Sign Up page. 3. Enter your Mobi-Moto Access Code exactly as it appears below. You will not need to use this code after youve completed the sign-up process. If you do not sign up before the expiration date, you must request a new code. Mobi-Moto Access Code: [email or web address] Expires: 2017  8:36 AM (This is the date your Mobi-Moto access code will ) 4. Enter the last four digits of your Social Security Number (xxxx) and Date of Birth (mm/dd/yyyy) as indicated and click Submit. You will be taken to the next sign-up page. 5. Create a Mobi-Moto ID. This will be your Mobi-Moto login ID and cannot be changed, so think of one that is secure and easy to remember. 6. Create a Mobi-Moto password. You can change your password at any time. 7. Enter your Password Reset Question and Answer. This can be used at a later time if you forget your password. 8. Enter your e-mail address. You will receive e-mail notification when new information is available in [address] 9. Click Sign Up. You can now view and download portions of your medical record. 10. Click the Download Summary menu link to download a portable copy of your medical information. Additional Information If you have questions, please visit the Frequently Asked Questions section of the Mobi-Moto website at [email or web address] Remember, Mobi-Moto is NOT to be used for urgent needs. For medical emergencies, dial 911. Discharge Orders None [email or web address] Announcement We are excited to announce that we are making your provider's discharge notes available to you in SuperSonic Imagine. You will see these notes when they are completed and signed by the physician that discharged you from your recent hospital stay. If you have any questions or concerns about any information you see in SuperSonic Imagine, please call the Health Information Department where you were seen or reach out to your Primary Care Provider for more information about your plan of care. Introducing [facility]! Smith Pickard introduces SuperSonic Imagine patient portal. Now you can access parts of your medical record, email your doctor's office, and request medication refills online. 1. In your internet browser, go to [email or web address] 2. Click on the First Time User? Click Here link in the Sign In box. You will see the New Member Sign Up page. 3. Enter your SuperSonic Imagine Access Code exactly as it appears below. You will not need to use this code after youve completed the sign-up process. If you do not sign up before the expiration date, you must request a new code. · SuperSonic Imagine Access Code: [email or web address] Expires: 6/5/2017  8:36 AM 
 
4. Enter the last four digits of your Social Security Number (xxxx) and Date of Birth (mm/dd/yyyy) as indicated and click Submit. You will be taken to the next sign-up page. 5. Create a SuperSonic Imagine ID. This will be your SuperSonic Imagine login ID and cannot be changed, so think of one that is secure and easy to remember. 6. Create a SuperSonic Imagine password. You can change your password at any time. 7. Enter your Password Reset Question and Answer. This can be used at a later time if you forget your password. 8. Enter your e-mail address. You will receive e-mail notification when new information is available in [address] 9. Click Sign Up. You can now view and download portions of your medical record.  
10. Click the Download Summary menu link to download a portable copy of your medical information. If you have questions, please visit the Frequently Asked Questions section of the Budget[employer] website. Remember, MyChart is NOT to be used for urgent needs. For medical emergencies, dial 911. Now available from your iPhone and Android! General Information Please provide this summary of care documentation to your next provider. Patient Signature:  ____________________________________________________________ Date:  ____________________________________________________________  
  
Luquillo Shove Provider Signature:  ____________________________________________________________ Date:  ____________________________________________________________

## 2017-03-08 ENCOUNTER — APPOINTMENT (OUTPATIENT)
Dept: MRI IMAGING | Age: 82
DRG: 175 | End: 2017-03-08
Attending: STUDENT IN AN ORGANIZED HEALTH CARE EDUCATION/TRAINING PROGRAM
Payer: MEDICARE

## 2017-03-08 LAB
ALBUMIN SERPL BCP-MCNC: 3.1 G/DL (ref 3.5–5)
ALBUMIN/GLOB SERPL: 1.1 {RATIO} (ref 1.1–2.2)
ALP SERPL-CCNC: 75 U/L (ref 45–117)
ALT SERPL-CCNC: 17 U/L (ref 12–78)
ANION GAP BLD CALC-SCNC: 7 MMOL/L (ref 5–15)
AST SERPL W P-5'-P-CCNC: 14 U/L (ref 15–37)
ATRIAL RATE: 72 BPM
BASOPHILS # BLD AUTO: 0 K/UL (ref 0–0.1)
BASOPHILS # BLD: 0 % (ref 0–1)
BILIRUB SERPL-MCNC: 0.5 MG/DL (ref 0.2–1)
BUN SERPL-MCNC: 12 MG/DL (ref 6–20)
BUN/CREAT SERPL: 21 (ref 12–20)
CALCIUM SERPL-MCNC: 8.7 MG/DL (ref 8.5–10.1)
CALCULATED P AXIS, ECG09: 30 DEGREES
CALCULATED R AXIS, ECG10: -19 DEGREES
CALCULATED T AXIS, ECG11: 28 DEGREES
CHLORIDE SERPL-SCNC: 108 MMOL/L (ref 97–108)
CO2 SERPL-SCNC: 27 MMOL/L (ref 21–32)
CREAT SERPL-MCNC: 0.57 MG/DL (ref 0.55–1.02)
DIAGNOSIS, 93000: NORMAL
EOSINOPHIL # BLD: 0 K/UL (ref 0–0.4)
EOSINOPHIL NFR BLD: 0 % (ref 0–7)
ERYTHROCYTE [DISTWIDTH] IN BLOOD BY AUTOMATED COUNT: 13.5 % (ref 11.5–14.5)
GLOBULIN SER CALC-MCNC: 2.8 G/DL (ref 2–4)
GLUCOSE BLD STRIP.AUTO-MCNC: 81 MG/DL (ref 65–100)
GLUCOSE SERPL-MCNC: 90 MG/DL (ref 65–100)
HCT VFR BLD AUTO: 37 % (ref 35–47)
HGB BLD-MCNC: 12 G/DL (ref 11.5–16)
LIPASE SERPL-CCNC: 226 U/L (ref 73–393)
LYMPHOCYTES # BLD AUTO: 18 % (ref 12–49)
LYMPHOCYTES # BLD: 1 K/UL (ref 0.8–3.5)
MCH RBC QN AUTO: 29.9 PG (ref 26–34)
MCHC RBC AUTO-ENTMCNC: 32.4 G/DL (ref 30–36.5)
MCV RBC AUTO: 92 FL (ref 80–99)
MONOCYTES # BLD: 0.3 K/UL (ref 0–1)
MONOCYTES NFR BLD AUTO: 6 % (ref 5–13)
NEUTS SEG # BLD: 4.2 K/UL (ref 1.8–8)
NEUTS SEG NFR BLD AUTO: 76 % (ref 32–75)
P-R INTERVAL, ECG05: 148 MS
PLATELET # BLD AUTO: 267 K/UL (ref 150–400)
POTASSIUM SERPL-SCNC: 3.6 MMOL/L (ref 3.5–5.1)
PROT SERPL-MCNC: 5.9 G/DL (ref 6.4–8.2)
Q-T INTERVAL, ECG07: 376 MS
QRS DURATION, ECG06: 90 MS
QTC CALCULATION (BEZET), ECG08: 411 MS
RBC # BLD AUTO: 4.02 M/UL (ref 3.8–5.2)
SERVICE CMNT-IMP: NORMAL
SODIUM SERPL-SCNC: 142 MMOL/L (ref 136–145)
TROPONIN I SERPL-MCNC: <0.04 NG/ML
VENTRICULAR RATE, ECG03: 72 BPM
WBC # BLD AUTO: 5.6 K/UL (ref 3.6–11)

## 2017-03-08 PROCEDURE — 65660000000 HC RM CCU STEPDOWN

## 2017-03-08 PROCEDURE — 82962 GLUCOSE BLOOD TEST: CPT

## 2017-03-08 PROCEDURE — 85025 COMPLETE CBC W/AUTO DIFF WBC: CPT | Performed by: STUDENT IN AN ORGANIZED HEALTH CARE EDUCATION/TRAINING PROGRAM

## 2017-03-08 PROCEDURE — 84484 ASSAY OF TROPONIN QUANT: CPT | Performed by: STUDENT IN AN ORGANIZED HEALTH CARE EDUCATION/TRAINING PROGRAM

## 2017-03-08 PROCEDURE — 83690 ASSAY OF LIPASE: CPT | Performed by: STUDENT IN AN ORGANIZED HEALTH CARE EDUCATION/TRAINING PROGRAM

## 2017-03-08 PROCEDURE — 80053 COMPREHEN METABOLIC PANEL: CPT | Performed by: STUDENT IN AN ORGANIZED HEALTH CARE EDUCATION/TRAINING PROGRAM

## 2017-03-08 PROCEDURE — 74011250636 HC RX REV CODE- 250/636: Performed by: STUDENT IN AN ORGANIZED HEALTH CARE EDUCATION/TRAINING PROGRAM

## 2017-03-08 PROCEDURE — 77010033678 HC OXYGEN DAILY

## 2017-03-08 PROCEDURE — 74011250636 HC RX REV CODE- 250/636: Performed by: INTERNAL MEDICINE

## 2017-03-08 PROCEDURE — 74011250637 HC RX REV CODE- 250/637: Performed by: STUDENT IN AN ORGANIZED HEALTH CARE EDUCATION/TRAINING PROGRAM

## 2017-03-08 PROCEDURE — 36415 COLL VENOUS BLD VENIPUNCTURE: CPT | Performed by: STUDENT IN AN ORGANIZED HEALTH CARE EDUCATION/TRAINING PROGRAM

## 2017-03-08 RX ORDER — LEVOFLOXACIN 5 MG/ML
250 INJECTION, SOLUTION INTRAVENOUS EVERY 24 HOURS
Status: DISCONTINUED | OUTPATIENT
Start: 2017-03-09 | End: 2017-03-08

## 2017-03-08 RX ORDER — ENOXAPARIN SODIUM 100 MG/ML
50 INJECTION SUBCUTANEOUS EVERY 12 HOURS
Status: DISCONTINUED | OUTPATIENT
Start: 2017-03-08 | End: 2017-03-12

## 2017-03-08 RX ORDER — LEVOFLOXACIN 5 MG/ML
500 INJECTION, SOLUTION INTRAVENOUS
Status: COMPLETED | OUTPATIENT
Start: 2017-03-08 | End: 2017-03-08

## 2017-03-08 RX ADMIN — LEVOFLOXACIN 500 MG: 5 INJECTION, SOLUTION INTRAVENOUS at 13:06

## 2017-03-08 RX ADMIN — Medication 10 ML: at 14:00

## 2017-03-08 RX ADMIN — Medication 2 MG: at 03:42

## 2017-03-08 RX ADMIN — ENOXAPARIN SODIUM 50 MG: 60 INJECTION SUBCUTANEOUS at 22:16

## 2017-03-08 RX ADMIN — ASPIRIN 81 MG: 81 TABLET, COATED ORAL at 10:15

## 2017-03-08 RX ADMIN — CALCIUM CARBONATE 500 MG: 1250 TABLET ORAL at 10:15

## 2017-03-08 RX ADMIN — AMLODIPINE BESYLATE 5 MG: 5 TABLET ORAL at 10:15

## 2017-03-08 RX ADMIN — CALCIUM CARBONATE 500 MG: 1250 TABLET ORAL at 17:02

## 2017-03-08 RX ADMIN — TAMSULOSIN HYDROCHLORIDE 0.4 MG: 0.4 CAPSULE ORAL at 10:15

## 2017-03-08 RX ADMIN — ATORVASTATIN CALCIUM 20 MG: 20 TABLET, FILM COATED ORAL at 10:15

## 2017-03-08 RX ADMIN — ENOXAPARIN SODIUM 50 MG: 60 INJECTION SUBCUTANEOUS at 10:15

## 2017-03-08 RX ADMIN — Medication 2 MG: at 11:59

## 2017-03-08 NOTE — PROGRESS NOTES
Day #1 of Levaquin   Indication:  pneumonia (CAP)  Current regimen: 500 mg IV q24 hr     ID Following ?: NO  Frequency of BMP?: last BMP 3/8  Recent Labs      17   0349  17   0755  17   0711   WBC  5.6  7.1   --    CREA  0.57   --   1.20*   BUN  12   --   11     Est CrCl: <50  ml/min;     Temp (24hrs), Av.2 °F (36.8 °C), Min:98.1 °F (36.7 °C), Max:98.4 °F (36.9 °C)    Cultures:   none    Plan: Change to levaquin 500 mg day 1, then 250 mg IV q24 hr

## 2017-03-08 NOTE — PROGRESS NOTES
[address]  [address], [address]    GI PROGRESS NOTE    NAME: Zak Osborne   :  1925   MRN:  [id number]       Subjective:   Ongoing pain on left side. LFT's normal. Awaiting MRCP. Review of Systems    Constitutional: negative fever, negative chills, negative weight loss  Eyes:   negative visual changes  ENT:   negative sore throat, tongue or lip swelling  Respiratory:  negative cough, negative dyspnea  Cards:  negative for chest pain, palpitations, lower extremity edema  GI:   See HPI  :  negative for frequency, dysuria  Integument:  negative for rash and pruritus  Heme:  negative for easy bruising and gum/nose bleeding  Musculoskel: negative for myalgias,  back pain and muscle weakness  Neuro: negative for headaches, dizziness, vertigo  Psych:  negative for feelings of anxiety, depression         Objective:     VITALS:   Last 24hrs VS reviewed since prior progress note. Most recent are:  Visit Vitals    /81 (BP 1 Location: Right arm, BP Patient Position: At rest)    Pulse 72    Temp 98.4 °F (36.9 °C)    Resp 18    Ht 5' (1.524 m)    Wt 54.4 kg (120 lb)    SpO2 100%    BMI 23.44 kg/m2       Intake/Output Summary (Last 24 hours) at 17 1157  Last data filed at 17 0445   Gross per 24 hour   Intake              930 ml   Output             1000 ml   Net              -70 ml     PHYSICAL EXAM:  General: WD, WN. Alert, cooperative, no acute distress    HEENT: NC, Atraumatic. Anicteric sclerae.   Abdomen: Soft, Non distended, Non tender.  +Bowel sounds, no HSM    Lab Data Reviewed:   Recent Labs      17   0349  17   0755   WBC  5.6  7.1   HGB  12.0  12.5   HCT  37.0  38.9   PLT  267  266     Recent Labs      17   0349  17   0711   NA  142  136   K  3.6  3.8   CL  108  95*   CO2  27  33*   BUN  12  11   CREA  0.57  1.20*   GLU  90  103*   CA  8.7  8.7     Recent Labs      17   0349  17   0711   SGOT  14*  29 AP  75  85   TP  5.9*  7.0   ALB  3.1*  3.4*   GLOB  2.8  3.6   LPSE  226  507*       ________________________________________________________________________       Assessment:   · Dilated CBD  · Distended gallbladder  · Normal LFT's  · Lipase elevation, but less than 3 X ULN  · Left-sided abdominal pain  · Based on radiology review, these appear to be chronic findings     Plan:   · Follow up MRCP result     Signed By: Aashish Cramer.  Fabiola Varma MD     3/8/2017  11:57 AM

## 2017-03-08 NOTE — PROGRESS NOTES
Reviewed medical chart; met with the patient at the bedside along with the patient's daughter, Venkat Zuñiga. Note prior assessment by colleague Anamika Cleveland RN/CRM. Patient lives at [facility] ([address], [place], [address] [place] [address], [address][phone]). She uses a walker to ambulate, but has recently been experiencing pain upon moving. Patient has had home health at [facility] in the past for physical therapy. Patient is not permitted to work with therapy today, but this  will await PT/OT recommendations once she is stable to ambulate. Care Management will continue to follow her disposition.    ARNULFO Claros

## 2017-03-08 NOTE — CONSULTS
[address] [facility], [address] [facility]       GASTROENTEROLOGY CONSULTATION NOTE        NAME:  Vivienne Najera   :   1925   MRN:   [id number]           Consult Date: 3/7/2017 9:53 PM      History of Present Illness:  Patient is a 80 y.o. who is seen in consultation at the request of Dr. Lawanda Cao for dilated CBD. She has multiple medical problems including prior stroke and hypertension. She presents with a 2-3 day history of chest pain. She was recently admitted for acute respiratory failure secondary to acute bronchitis. Three days ago, she developed acute onset left-sided abdominal and chest pain at her residence. She reports pain with any movement. She previously had RUQ abdominal pain in January, for which she was evaluated in ED. Abd U/S 3/7 - Sludge in the gallbladder with nonspecific gallbladder wall thickening. Common bile duct dilatation and intrahepatic biliary dilatation is concerning for biliary obstruction. Numerous nonobstructing left renal stones  again demonstrated. CTA chest and abdomen 3/7 shows small PE's (possible) and dilated CBD at 12 mm. GB distended. Surgery consulted. PMH:  Past Medical History:   Diagnosis Date    HTN (hypertension)     Hypercholesteremia     Osteoporosis     Stroke ([address]     CVA, TIA    TIA (transient ischemic attack)     Vertebral fracture        PSH:  Past Surgical History:   Procedure Laterality Date    HX HIP REPLACEMENT  2009    left    HX ORTHOPAEDIC         Allergies: Allergies   Allergen Reactions    Hydrochlorothiazide Nausea and Vomiting       Home Medications:  Prior to Admission Medications   Prescriptions Last Dose Informant Patient Reported? Taking? AMLODIPINE BESYLATE (AMLODIPINE PO) 3/6/2017 at Unknown time  Yes Yes   Sig: Take 5 mg by mouth daily.    HYDROcodone-acetaminophen (NORCO) 5-325 mg per tablet 3/6/2017 at Unknown time  Yes Yes   Sig: Take 1 Tab by mouth every four (4) hours as needed for Pain. alendronate (FOSAMAX) 70 mg tablet 2/28/2017  Yes No   Sig: Take 70 mg by mouth every seven (7) days. aspirin delayed-release 81 mg tablet 3/6/2017 at Unknown time  Yes Yes   Sig: Take 81 mg by mouth daily. atorvastatin (LIPITOR) 20 mg tablet 3/6/2017 at Unknown time  Yes Yes   Sig: Take 20 mg by mouth daily. calcium carbonate (OS-SIMONE) 500 mg calcium (1,250 mg) tablet 3/6/2017 at Unknown time  Yes Yes   Sig: Take 1 Tab by mouth two (2) times a day. chlorzoxazone (PARAFON FORTE) 500 mg tablet 3/6/2017 at Unknown time  Yes Yes   Sig: Take 500 mg by mouth three (3) times daily as needed for Muscle Spasm(s). furosemide (LASIX) 20 mg tablet 3/6/2017 at Unknown time  Yes Yes   Sig: Take 20 mg by mouth daily. lisinopril (PRINIVIL, ZESTRIL) 40 mg tablet 3/6/2017 at Unknown time  Yes Yes   Sig: Take 40 mg by mouth daily. multivitamin (ONE A DAY) tablet 3/6/2017 at Unknown time  Yes Yes   Sig: Take 1 Tab by mouth daily. verapamil ER (CALAN-SR) 120 mg tablet 3/6/2017 at Unknown time  No Yes   Sig: Take 1 Tab by mouth daily.  Indications: Hypertension      Facility-Administered Medications: None       Hospital Medications:  Current Facility-Administered Medications   Medication Dose Route Frequency    [START ON 3/8/2017] amLODIPine (NORVASC) tablet 5 mg  5 mg Oral DAILY    [START ON 3/8/2017] aspirin delayed-release tablet 81 mg  81 mg Oral DAILY    [START ON 3/8/2017] atorvastatin (LIPITOR) tablet 20 mg  20 mg Oral DAILY    calcium carbonate (OS-SIMONE) tablet 500 mg [elemental]  500 mg Oral BID WITH MEALS    HYDROcodone-acetaminophen (NORCO) 5-325 mg per tablet 1 Tab  1 Tab Oral Q4H PRN    sodium chloride (NS) flush 5-10 mL  5-10 mL IntraVENous Q8H    sodium chloride (NS) flush 5-10 mL  5-10 mL IntraVENous PRN    nitroglycerin (NITROSTAT) tablet 0.4 mg  0.4 mg SubLINGual Q5MIN PRN    morphine injection 2 mg  2 mg IntraVENous Q4H PRN    ondansetron (ZOFRAN) injection 4 mg  4 mg IntraVENous Q4H PRN    0.9% sodium chloride infusion  100 mL/hr IntraVENous CONTINUOUS    [START ON 3/8/2017] tamsulosin (FLOMAX) capsule 0.4 mg  0.4 mg Oral DAILY    [START ON 3/8/2017] enoxaparin (LOVENOX) injection 50 mg  50 mg SubCUTAneous Q24H       Social History:  Social History   Substance Use Topics    Smoking status: Former Smoker     Years: 40.00     Quit date: 11/7/1986    Smokeless tobacco: Not on file    Alcohol use Yes       Family History:  History reviewed. No pertinent family history. Review of Systems:  Constitutional: negative fever, negative chills, negative weight loss  Eyes:   negative visual changes  ENT:   negative sore throat, tongue or lip swelling  Respiratory:  negative cough, negative dyspnea  Cards:  negative for chest pain, palpitations, lower extremity edema  GI:   See HPI  :  negative for frequency, dysuria  Integument:  negative for rash and pruritus  Heme:  negative for easy bruising and gum/nose bleeding  Musculoskel: negative for myalgias,  back pain and muscle weakness  Neuro: negative for headaches, dizziness, vertigo  Psych:  negative for feelings of anxiety, depression     Objective:   Patient Vitals for the past 8 hrs:   BP Temp Pulse Resp SpO2   03/07/17 2055 153/84 98.2 °F (36.8 °C) 68 18 98 %   03/07/17 1511 146/71 98.1 °F (36.7 °C) 76 18 98 %        03/06 0701 - 03/07 1900  In: 581.7 [I.V.:581.7]  Out: -     PHYSICAL EXAM:  General: WD, WN. Alert, cooperative, no acute distress    HEENT: NC, Atraumatic. PERRLA, EOMI. Anicteric sclerae. Lungs:  CTA Bilaterally. No Wheezing/Rhonchi/Rales. Heart:  Regular  rhythm,  No murmur (), No Rubs, No Gallops  Abdomen: Soft, Non distended, TTP left side  +Bowel sounds, no HSM  Extremities: No c/c/e  Neurologic:  CN 2-12 gi, Alert and oriented X 3. No acute neurological distress   Psych:   Good insight. Not anxious nor agitated.      Data Review     Recent Labs      03/07/17   0755   WBC  7.1   HGB  12.5   HCT  38.9 PLT  266     Recent Labs      03/07/17 0711   NA  136   K  3.8   CL  95*   CO2  33*   BUN  11   CREA  1.20*   GLU  103*   CA  8.7     Recent Labs      03/07/17   0711   SGOT  29   AP  85   TP  7.0   ALB  3.4*   GLOB  3.6   LPSE  507*     No results for input(s): INR, PTP, APTT in the last 72 hours. No lab exists for component: INREXT    Radiology Review    As above       Assessment:   · Dilated CBD  · Distended gallbladder  · Normal LFT's  · Lipase elevation, but less than 3 X ULN  · Left-sided abdominal pain  · Based on radiology review, these appear to be chronic findings     Plan:   · No acute indication for ERCP  · Monitor LFT's  · MRCP  · Will follow     Signed By: Judson Newton.  Rohini Neff MD     3/7/2017  9:53 PM

## 2017-03-08 NOTE — PROGRESS NOTES
Spiritual Care Partner Volunteer visited patient in [place] on 3.8. 17. Documented by:  LAURA Tate

## 2017-03-08 NOTE — PROGRESS NOTES
Physical Therapy Note  3/8/2017    Orders acknowledged, chart reviewed. Noted pt admitted with small PEs, Lovenox dosage given this morning at 1015. Spoke with MD, Dr. Gurpreet Gomez, who reports to hold therapy evaluation for 24 hours. Will hold and follow-up tomorrow as medically stable.     Arnulfo Lima, PT, DPT

## 2017-03-08 NOTE — PROGRESS NOTES
Subjective  Hungry  Still complaining of LUQ pain  No RUQ pain  She is awaiting MRI     Temp:  [97.4 °F (36.3 °C)-98.4 °F (36.9 °C)]   Pulse (Heart Rate):  [68-83]   BP: (121-195)/(54-87)   Resp Rate:  [16-26]   O2 Sat (%):  [88 %-100 %]   Weight:  [120 lb (54.4 kg)]      03/06 1901 - 03/08 0700  In: 930 [I.V.:930]  Out: 1000 [Urine:1000]      Objective  Gen- alert in NAD  Lungs-CTA  H- RRR  Abd-s/nt/nd    Recent Results (from the past 24 hour(s))   TROPONIN I    Collection Time: 03/07/17  3:30 PM   Result Value Ref Range    Troponin-I, Qt. <0.04 <0.05 ng/mL   EKG, 12 LEAD, INITIAL    Collection Time: 03/07/17  3:50 PM   Result Value Ref Range    Ventricular Rate 72 BPM    Atrial Rate 72 BPM    P-R Interval 148 ms    QRS Duration 90 ms    Q-T Interval 376 ms    QTC Calculation (Bezet) 411 ms    Calculated P Axis 30 degrees    Calculated R Axis -19 degrees    Calculated T Axis 28 degrees    Diagnosis       Normal sinus rhythm  Normal ECG  When compared with ECG of 07-MAR-2017 07:05,  premature ventricular complexes are no longer present  Confirmed by Cherelle Arita M.D., [facility] ([id number]) on 3/8/2017 6:47:56 AM     TROPONIN I    Collection Time: 03/08/17  3:49 AM   Result Value Ref Range    Troponin-I, Qt. <0.04 <0.05 ng/mL   CBC WITH AUTOMATED DIFF    Collection Time: 03/08/17  3:49 AM   Result Value Ref Range    WBC 5.6 3.6 - 11.0 K/uL    RBC 4.02 3.80 - 5.20 M/uL    HGB 12.0 11.5 - 16.0 g/dL    HCT 37.0 35.0 - 47.0 %    MCV 92.0 80.0 - 99.0 FL    MCH 29.9 26.0 - 34.0 PG    MCHC 32.4 30.0 - 36.5 g/dL    RDW 13.5 11.5 - 14.5 %    PLATELET 653 694 - 891 K/uL    NEUTROPHILS 76 (H) 32 - 75 %    LYMPHOCYTES 18 12 - 49 %    MONOCYTES 6 5 - 13 %    EOSINOPHILS 0 0 - 7 %    BASOPHILS 0 0 - 1 %    ABS. NEUTROPHILS 4.2 1.8 - 8.0 K/UL    ABS. LYMPHOCYTES 1.0 0.8 - 3.5 K/UL    ABS. MONOCYTES 0.3 0.0 - 1.0 K/UL    ABS. EOSINOPHILS 0.0 0.0 - 0.4 K/UL    ABS.  BASOPHILS 0.0 0.0 - 0.1 K/UL   LIPASE    Collection Time: 03/08/17  3:49 AM Result Value Ref Range    Lipase 226 73 - 043 U/L   METABOLIC PANEL, COMPREHENSIVE    Collection Time: 03/08/17  3:49 AM   Result Value Ref Range    Sodium 142 136 - 145 mmol/L    Potassium 3.6 3.5 - 5.1 mmol/L    Chloride 108 97 - 108 mmol/L    CO2 27 21 - 32 mmol/L    Anion gap 7 5 - 15 mmol/L    Glucose 90 65 - 100 mg/dL    BUN 12 6 - 20 MG/DL    Creatinine 0.57 0.55 - 1.02 MG/DL    BUN/Creatinine ratio 21 (H) 12 - 20      GFR est AA >60 >60 ml/min/1.73m2    GFR est non-AA >60 >60 ml/min/1.73m2    Calcium 8.7 8.5 - 10.1 MG/DL    Bilirubin, total 0.5 0.2 - 1.0 MG/DL    ALT (SGPT) 17 12 - 78 U/L    AST (SGOT) 14 (L) 15 - 37 U/L    Alk. phosphatase 75 45 - 117 U/L    Protein, total 5.9 (L) 6.4 - 8.2 g/dL    Albumin 3.1 (L) 3.5 - 5.0 g/dL    Globulin 2.8 2.0 - 4.0 g/dL    A-G Ratio 1.1 1.1 - 2.2           Principal Problem:    Chest pain (3/7/2017)    Active Problems:    Cholecystitis (3/7/2017)      Nephrolithiasis (3/7/2017)          Assessment & Plan  Awaiting MRCP   Her exam and symptoms  Are  not consistent  With acute cholecystitis. I suspect her GB thickening may be old from her \"attack \" in December.

## 2017-03-08 NOTE — PROGRESS NOTES
Occupational Therapy Note  3/8/2017    Orders acknowledged, chart reviewed. Noted pt admitted with small PEs, Lovenox dosage given this morning at 1015. PT spoke with MD, Dr. Aysha Sepulveda, who reports to hold therapy evaluation for 24 hours. Will hold and follow-up tomorrow as medically stable.     Hazel Greenwood, OTR/L

## 2017-03-08 NOTE — PROGRESS NOTES
Hospitalist Progress Note  Mitzi Briggs MD  Office: [phone]        Date of Service:  3/8/2017  NAME:  Juana Solis  :  1925  MRN:  [id number]      Admission Summary:   Juana Solis is a 80 y.o. female with history of prior CVA/TIA, HTN, DLD, who presents with intermittent left chest pain x 2 - 3 days. Patient had recently been admitted from  - 17 with acute hypoxic respiratory failure secondary to acute bronchitis and had reported feeling well in her usual state of health. She lives at [facility] and states that she had developed sudden, sharp left chest pain ~ 3 days ago, which would last only a few seconds. It was worse when she get up and moved around and improved when she was laying completely still. She denies any associated shortness of breath, PND, orthopnea, weight changes with this pain. It does not particularly worsen when she takes a deep breath. She had otherwise been up and active most of the day, even attending chair exercise classes at her assisted living. No recent changes in medications. No reported dysuria or hematuria.     She had reported prior to her admission in January that she had been seen in the ER a few times with right-sided upper quadrant abdominal pain / right chest pain, where work-up had largely been negative (she had not undergone abdominal ultrasound on any of those visits per her report). There had been some associated nausea, but no vomiting or diarrhea.     She otherwise denies fevers, does report some occasional chills, no sick contacts.       Interval history / Subjective:   Patient resting today, sleeping a lot, pain is better managed. Discussed at length with daughter at bedside, who is a nurse. Assessment & Plan:     1.  Chest pain  - atypical, unlikely cardiac in etiology, possibly pulmonary embolism vs referred pain from left nephrolithiasis  - repeat troponins  - telemetry monitoring  - resume home aspirin, atorvastatin - she was previously on verapamil, but had sinus pauses on her last admission and this was discontinued  - holding home lisinopril in setting of ELIZABETH  - prn nitrostat  - CTA chest:   · Tiny acute pulmonary emboli versus technically limited opacification of a  couple left lung segmental pulmonary arteries (series 3, image 66), 47). No acute large central pulmonary embolus. · Right lower lobe airspace disease with possible underlying consolidation  versus atelectasis. Left lower lobe subsegmental atelectasis. No other  significant lung abnormality. The central airways are patent. - pain control with Norco, morphine  - continue Lovenox;  - hold off on ABx, as patient is afebrile, has no leukocytosis and recently admitted for bronchitis/pneumonia and completed the course of Levaquin.      2. ELIZABETH  - baseline Cr is 0.8, her Cr is elevated to 1.2 today, likely hypovolemic  - IV fluid hydration      3. Left nephrolithiasis  - Abd U/S as above, non-obstructing nephrolithiasis  - CT Abd/Pelvis ordered  - IV fluids  - medical expulsive therapy with Flomax  - urology consulted  - strain urine      4. CBD dilatation, possible cholecystitis vs choledocholithiasis  - lipase mildly elevated to 507  - NPO x meds  - pain control as above  - GI / general surgery consulted  - 3/8: MRCP today, pending.      5. Acute hypoxic respiratory failure: resolved. - as above      6. HTN  - resume home amlodipine      7. Acute metabolic alkalosis  - suspect this is contraction alkalosis given low Cl-, treat with IV fluids, repeat BMP in AM     8.  Prior TIA/CVA  - no apparent residual deficits, continue aspirin / atorvastatin     Code status: DNR  DVT prophylaxis: Lovenox    Care Plan discussed with: Patient/Family and Nurse  Disposition: TBD     Hospital Problems  Date Reviewed: 3/7/2017          Codes Class Noted POA    * (Principal)Chest pain ICD-10-CM: R07.9  ICD-9-CM: 786.50  3/7/2017 Yes        Cholecystitis ICD-10-CM: K81.9  ICD-9-CM: 575.10  3/7/2017 Yes        Nephrolithiasis ICD-10-CM: N20.0  ICD-9-CM: 592.0  3/7/2017 Yes                Review of Systems:   Review of systems not obtained due to patient factors. Vital Signs:    Last 24hrs VS reviewed since prior progress note. Most recent are:  Visit Vitals    /81 (BP 1 Location: Right arm, BP Patient Position: At rest)    Pulse 72    Temp 98.4 °F (36.9 °C)    Resp 18    Ht 5' (1.524 m)    Wt 54.4 kg (120 lb)    SpO2 100%    BMI 23.44 kg/m2         Intake/Output Summary (Last 24 hours) at 03/08/17 1446  Last data filed at 03/08/17 0445   Gross per 24 hour   Intake              930 ml   Output             1000 ml   Net              -70 ml        Physical Examination:             Constitutional:  No acute distress, cooperative, pleasant    ENT:  Oral mucous moist, oropharynx benign. Neck supple,    Resp:  CTA bilaterally. Coarse breath sounds; No wheezing/rhonchi/rales. No accessory muscle use   CV:  Regular rhythm, normal rate, no murmurs, gallops, rubs    GI:  Soft, non distended, non tender. normoactive bowel sounds     Musculoskeletal:  No edema, warm;    Neurologic:  Moves all extremities.   AAOx3;           Data Review:    Review and/or order of clinical lab test  Review and/or order of tests in the radiology section of CPT  Review and/or order of tests in the medicine section of CPT      Labs:     Recent Labs      03/08/17   0349  03/07/17   0755   WBC  5.6  7.1   HGB  12.0  12.5   HCT  37.0  38.9   PLT  267  266     Recent Labs      03/08/17   0349  03/07/17   0711   NA  142  136   K  3.6  3.8   CL  108  95*   CO2  27  33*   BUN  12  11   CREA  0.57  1.20*   GLU  90  103*   CA  8.7  8.7     Recent Labs      03/08/17   0349  03/07/17   0711   SGOT  14*  29   ALT  17  35   AP  75  85   TBILI  0.5  0.5   TP  5.9*  7.0   ALB  3.1*  3.4*   GLOB  2.8  3.6   LPSE  226  507*     No results for input(s): INR, PTP, APTT in the last 72 hours. No lab exists for component: INREXT   No results for input(s): FE, TIBC, PSAT, FERR in the last 72 hours. No results found for: FOL, RBCF   No results for input(s): PH, PCO2, PO2 in the last 72 hours.   Recent Labs      03/08/17   0349  03/07/17   1530  03/07/17   0711   CPK   --    --   41   CKNDX   --    --   Cannot be calulated   TROIQ  <0.04  <0.04  <0.04     Lab Results   Component Value Date/Time    Cholesterol, total 168 05/21/2010 03:00 AM    HDL Cholesterol 53 05/21/2010 03:00 AM    LDL, calculated 95.4 05/21/2010 03:00 AM    Triglyceride 98 05/21/2010 03:00 AM    CHOL/HDL Ratio 3.2 05/21/2010 03:00 AM     Lab Results   Component Value Date/Time    Glucose (POC) 104 05/22/2010 08:06 AM    Glucose (POC) 120 05/21/2010 10:53 PM    Glucose (POC) 122 05/21/2010 05:05 PM     Lab Results   Component Value Date/Time    Color YELLOW/STRAW 03/07/2017 01:54 PM    Appearance CLEAR 03/07/2017 01:54 PM    Specific gravity 1.005 03/07/2017 01:54 PM    Specific gravity 1.017 12/03/2016 09:25 AM    pH (UA) 7.0 03/07/2017 01:54 PM    Protein NEGATIVE  03/07/2017 01:54 PM    Glucose NEGATIVE  03/07/2017 01:54 PM    Ketone 15 03/07/2017 01:54 PM    Bilirubin NEGATIVE  03/07/2017 01:54 PM    Urobilinogen 0.2 03/07/2017 01:54 PM    Nitrites NEGATIVE  03/07/2017 01:54 PM    Leukocyte Esterase NEGATIVE  03/07/2017 01:54 PM    Epithelial cells FEW 12/03/2016 09:25 AM    Bacteria NEGATIVE  12/03/2016 09:25 AM    WBC 5-10 12/03/2016 09:25 AM    RBC 0-5 12/03/2016 09:25 AM         Medications Reviewed:     Current Facility-Administered Medications   Medication Dose Route Frequency    enoxaparin (LOVENOX) injection 50 mg  50 mg SubCUTAneous Q12H    amLODIPine (NORVASC) tablet 5 mg  5 mg Oral DAILY    aspirin delayed-release tablet 81 mg  81 mg Oral DAILY    atorvastatin (LIPITOR) tablet 20 mg  20 mg Oral DAILY    calcium carbonate (OS-SIMONE) tablet 500 mg [elemental]  500 mg Oral BID WITH MEALS    HYDROcodone-acetaminophen (NORCO) 5-325 mg per tablet 1 Tab  1 Tab Oral Q4H PRN    sodium chloride (NS) flush 5-10 mL  5-10 mL IntraVENous Q8H    sodium chloride (NS) flush 5-10 mL  5-10 mL IntraVENous PRN    nitroglycerin (NITROSTAT) tablet 0.4 mg  0.4 mg SubLINGual Q5MIN PRN    morphine injection 2 mg  2 mg IntraVENous Q4H PRN    ondansetron (ZOFRAN) injection 4 mg  4 mg IntraVENous Q4H PRN    0.9% sodium chloride infusion  100 mL/hr IntraVENous CONTINUOUS    tamsulosin (FLOMAX) capsule 0.4 mg  0.4 mg Oral DAILY     ______________________________________________________________________  EXPECTED LENGTH OF STAY: 3d 0h  ACTUAL LENGTH OF STAY:          1                 Mai Lopez MD

## 2017-03-08 NOTE — PROGRESS NOTES
Lovenox Daily Monitoring  Indication: PE  Recent Labs      03/08/17   0349  03/07/17   0755  03/07/17   0711   HGB  12.0  12.5   --    PLT  267  266   --    CREA  0.57   --   1.20*   Date of last CBC: 3/08  Current Weight: 54.4 kg  Est. CrCl = 39 ml/min (using SCr 0.8 mg/dL 2/2 age)  Current Dose: 50 mg subcutaneously every 24 hours. Plan: Renal function improved.   Change to 50 mg subcutaneously Q 12hrs for CrCl >30 mL/min

## 2017-03-09 LAB
ALBUMIN SERPL BCP-MCNC: 3.1 G/DL (ref 3.5–5)
ALBUMIN/GLOB SERPL: 1.1 {RATIO} (ref 1.1–2.2)
ALP SERPL-CCNC: 81 U/L (ref 45–117)
ALT SERPL-CCNC: 17 U/L (ref 12–78)
ANION GAP BLD CALC-SCNC: 6 MMOL/L (ref 5–15)
AST SERPL W P-5'-P-CCNC: 16 U/L (ref 15–37)
BASOPHILS # BLD AUTO: 0 K/UL (ref 0–0.1)
BASOPHILS # BLD: 0 % (ref 0–1)
BILIRUB DIRECT SERPL-MCNC: 0.2 MG/DL (ref 0–0.2)
BILIRUB SERPL-MCNC: 0.7 MG/DL (ref 0.2–1)
BUN SERPL-MCNC: 9 MG/DL (ref 6–20)
BUN/CREAT SERPL: 18 (ref 12–20)
CALCIUM SERPL-MCNC: 8.4 MG/DL (ref 8.5–10.1)
CHLORIDE SERPL-SCNC: 107 MMOL/L (ref 97–108)
CO2 SERPL-SCNC: 27 MMOL/L (ref 21–32)
CREAT SERPL-MCNC: 0.49 MG/DL (ref 0.55–1.02)
EOSINOPHIL # BLD: 0 K/UL (ref 0–0.4)
EOSINOPHIL NFR BLD: 1 % (ref 0–7)
ERYTHROCYTE [DISTWIDTH] IN BLOOD BY AUTOMATED COUNT: 13.1 % (ref 11.5–14.5)
GLOBULIN SER CALC-MCNC: 2.7 G/DL (ref 2–4)
GLUCOSE SERPL-MCNC: 82 MG/DL (ref 65–100)
HCT VFR BLD AUTO: 36.3 % (ref 35–47)
HGB BLD-MCNC: 12 G/DL (ref 11.5–16)
LYMPHOCYTES # BLD AUTO: 15 % (ref 12–49)
LYMPHOCYTES # BLD: 0.8 K/UL (ref 0.8–3.5)
MAGNESIUM SERPL-MCNC: 1.8 MG/DL (ref 1.6–2.4)
MCH RBC QN AUTO: 30 PG (ref 26–34)
MCHC RBC AUTO-ENTMCNC: 33.1 G/DL (ref 30–36.5)
MCV RBC AUTO: 90.8 FL (ref 80–99)
MONOCYTES # BLD: 0.5 K/UL (ref 0–1)
MONOCYTES NFR BLD AUTO: 9 % (ref 5–13)
NEUTS SEG # BLD: 3.9 K/UL (ref 1.8–8)
NEUTS SEG NFR BLD AUTO: 75 % (ref 32–75)
PHOSPHATE SERPL-MCNC: 2.3 MG/DL (ref 2.6–4.7)
PLATELET # BLD AUTO: 249 K/UL (ref 150–400)
POTASSIUM SERPL-SCNC: 3.4 MMOL/L (ref 3.5–5.1)
PROT SERPL-MCNC: 5.8 G/DL (ref 6.4–8.2)
RBC # BLD AUTO: 4 M/UL (ref 3.8–5.2)
SODIUM SERPL-SCNC: 140 MMOL/L (ref 136–145)
WBC # BLD AUTO: 5.2 K/UL (ref 3.6–11)

## 2017-03-09 PROCEDURE — 80076 HEPATIC FUNCTION PANEL: CPT | Performed by: INTERNAL MEDICINE

## 2017-03-09 PROCEDURE — 74011250636 HC RX REV CODE- 250/636: Performed by: HOSPITALIST

## 2017-03-09 PROCEDURE — 74011250636 HC RX REV CODE- 250/636: Performed by: STUDENT IN AN ORGANIZED HEALTH CARE EDUCATION/TRAINING PROGRAM

## 2017-03-09 PROCEDURE — 74011250637 HC RX REV CODE- 250/637: Performed by: HOSPITALIST

## 2017-03-09 PROCEDURE — 83735 ASSAY OF MAGNESIUM: CPT | Performed by: INTERNAL MEDICINE

## 2017-03-09 PROCEDURE — 84100 ASSAY OF PHOSPHORUS: CPT | Performed by: INTERNAL MEDICINE

## 2017-03-09 PROCEDURE — 36415 COLL VENOUS BLD VENIPUNCTURE: CPT | Performed by: INTERNAL MEDICINE

## 2017-03-09 PROCEDURE — 80048 BASIC METABOLIC PNL TOTAL CA: CPT | Performed by: INTERNAL MEDICINE

## 2017-03-09 PROCEDURE — 74011000250 HC RX REV CODE- 250: Performed by: HOSPITALIST

## 2017-03-09 PROCEDURE — 85025 COMPLETE CBC W/AUTO DIFF WBC: CPT | Performed by: INTERNAL MEDICINE

## 2017-03-09 PROCEDURE — 74011250636 HC RX REV CODE- 250/636: Performed by: INTERNAL MEDICINE

## 2017-03-09 PROCEDURE — 74011250637 HC RX REV CODE- 250/637: Performed by: STUDENT IN AN ORGANIZED HEALTH CARE EDUCATION/TRAINING PROGRAM

## 2017-03-09 PROCEDURE — 65660000000 HC RM CCU STEPDOWN

## 2017-03-09 RX ORDER — AMLODIPINE BESYLATE 5 MG/1
5 TABLET ORAL ONCE
Status: COMPLETED | OUTPATIENT
Start: 2017-03-09 | End: 2017-03-09

## 2017-03-09 RX ORDER — AMLODIPINE BESYLATE 5 MG/1
10 TABLET ORAL DAILY
Status: DISCONTINUED | OUTPATIENT
Start: 2017-03-10 | End: 2017-03-12 | Stop reason: HOSPADM

## 2017-03-09 RX ORDER — CLONIDINE HYDROCHLORIDE 0.1 MG/1
0.1 TABLET ORAL
Status: DISCONTINUED | OUTPATIENT
Start: 2017-03-09 | End: 2017-03-12 | Stop reason: HOSPADM

## 2017-03-09 RX ORDER — HALOPERIDOL 5 MG/ML
2 INJECTION INTRAMUSCULAR
Status: COMPLETED | OUTPATIENT
Start: 2017-03-09 | End: 2017-03-10

## 2017-03-09 RX ADMIN — NITROGLYCERIN 1 INCH: 20 OINTMENT TOPICAL at 11:00

## 2017-03-09 RX ADMIN — Medication 10 ML: at 21:48

## 2017-03-09 RX ADMIN — ENOXAPARIN SODIUM 50 MG: 60 INJECTION SUBCUTANEOUS at 21:47

## 2017-03-09 RX ADMIN — AMLODIPINE BESYLATE 5 MG: 5 TABLET ORAL at 11:00

## 2017-03-09 RX ADMIN — SODIUM CHLORIDE 50 ML/HR: 900 INJECTION, SOLUTION INTRAVENOUS at 06:37

## 2017-03-09 RX ADMIN — TAMSULOSIN HYDROCHLORIDE 0.4 MG: 0.4 CAPSULE ORAL at 08:35

## 2017-03-09 RX ADMIN — ENOXAPARIN SODIUM 50 MG: 60 INJECTION SUBCUTANEOUS at 10:00

## 2017-03-09 RX ADMIN — AMLODIPINE BESYLATE 5 MG: 5 TABLET ORAL at 08:36

## 2017-03-09 RX ADMIN — ATORVASTATIN CALCIUM 20 MG: 20 TABLET, FILM COATED ORAL at 08:35

## 2017-03-09 RX ADMIN — CALCIUM CARBONATE 500 MG: 1250 TABLET ORAL at 17:57

## 2017-03-09 RX ADMIN — POTASSIUM PHOSPHATE, MONOBASIC AND POTASSIUM PHOSPHATE, DIBASIC: 224; 236 INJECTION, SOLUTION INTRAVENOUS at 08:36

## 2017-03-09 RX ADMIN — CALCIUM CARBONATE 500 MG: 1250 TABLET ORAL at 08:35

## 2017-03-09 RX ADMIN — ASPIRIN 81 MG: 81 TABLET, COATED ORAL at 08:36

## 2017-03-09 NOTE — PROGRESS NOTES
Occupational Therapy Note  3/9/2017    Chart reviewed. Noted pt with small PEs and last BP in vitals floowsheet 201/79. RN reported BP medication administered and paged attending MD. Will hold OT evaluation until cleared by attending MD for activity today in setting of HTN and PEs. Will follow-up later as able and appropriate.     Hazel Pitt, OTR/L

## 2017-03-09 NOTE — PROGRESS NOTES
Hospitalist Progress Note  Alejandrina Nolasco MD  Office: [phone]        Date of Service:  3/9/2017  NAME:  Jana Neville  :  1925  MRN:  [id number]      Admission Summary:   Jana Neville is a 80 y.o. female with history of prior CVA/TIA, HTN, DLD, who presents with intermittent left chest pain x 2 - 3 days and right-sided upper quadrant abdominal pain / right chest pain.     Interval history / Subjective:   Ms. Ellie Lu is feeling fine. She still has left sided chest pain with deep breathing. No RUQ pain, nausea. Assessment & Plan:     Chest pain (POA) - atypical, likely pulmonary embolism. Cardiac, GI and nephro seem less likely   - CTA chest 3/7 with tiny acute pulmonary emboli versus technically limited opacification of a  couple left lung segmental pulmonary arteries. Left lower lobe atelectasis vs consolidation   - ECG with normal sinus rhythm  - Troponin negative  - Resumed home aspirin, atorvastatin  - prn nitrostat  - pain control with [place], morphine    Pulmonary embolism (POA) - stable  - CTA as above  - Continue Lovenox; discharge home on eliquis for 3-6 months      CBD dilatation, possible cholecystitis vs choledocholithiasis  - US abdomen 3/7 with sludge and GB wall thickening, CBD distended to 11.5mm, pancrease normal  - CT abdomen/pelvis 3/7 with mild intrahepatic biliary ductal dilatation with CBD 12 mm proximally then tapers to relatively normal caliber towards the ampulla. Unchanged from . No calcified gallstone.  Slightly prominent pancreatic duct  - MRCP pending  - Lipase mildly elevated to 507  - NPO  - IV fluids  - Pain control and antiemetics  - GI / general surgery consulted    HTN (chronic) - BP elevated, likely being driven by pain  - Increase home amlodipine  - Nitropaste PRN  - Pain control    Left nephrolithiasis (chronic) - this is unlikely to be causing any problem  - Abd U/S 3/7 shows numerous nonobstructing left renal stones  - CT abdomen/pelvis 3/7 with several nonobstructing left renal calculi with the largest measuring approximately 11 mm. No hydronephrosis or hydroureter. No perinephric inflammatory change. - IV fluids  - Continue Flomax  - Urology consulted     Prior TIA/CVA - no apparent residual deficits, continue aspirin / atorvastatin    ELIZABETH (POA) - pre-renal, resolved with IV fluids    Acute metabolic alkalosis (POA) - suspect this is contraction alkalosis given low Cl-, treat with IV fluids and resolved    Code status: DNR  DVT prophylaxis: Lovenox  Lived at : [facility] discussed with: Patient/Family and Nurse  Disposition: TBD     Hospital Problems  Date Reviewed: 3/7/2017          Codes Class Noted POA    * (Principal)Chest pain ICD-10-CM: R07.9  ICD-9-CM: 786.50  3/7/2017 Yes        Cholecystitis ICD-10-CM: K81.9  ICD-9-CM: 575.10  3/7/2017 Yes        Nephrolithiasis ICD-10-CM: N20.0  ICD-9-CM: 592.0  3/7/2017 Yes                Review of Systems:   Review of systems not obtained due to patient factors. Vital Signs:    Last 24hrs VS reviewed since prior progress note. Most recent are:  Visit Vitals    BP (!) 201/79 (BP 1 Location: Right arm, BP Patient Position: At rest)    Pulse 79    Temp 98.8 °F (37.1 °C)    Resp 18    Ht 5' (1.524 m)    Wt 54.4 kg (120 lb)    SpO2 93%    BMI 23.44 kg/m2         Intake/Output Summary (Last 24 hours) at 03/09/17 0947  Last data filed at 03/09/17 1990   Gross per 24 hour   Intake                0 ml   Output             1500 ml   Net            -1500 ml        Physical Examination:             Constitutional:  No acute distress, cooperative, pleasant    ENT:  Oral mucous moist, oropharynx benign. Neck supple,    Resp:  CTA bilaterally. Coarse breath sounds; No wheezing/rhonchi/rales. No accessory muscle use   CV:  Regular rhythm, normal rate, no murmurs, gallops, rubs    GI:  Soft, non distended, non tender. normoactive bowel sounds. NO RUQ pain    Musculoskeletal:  :  No edema, warm; No CVA tenderness    Neurologic:  Moves all extremities. AAOx2;           Data Review:    Review and/or order of clinical lab test  Review and/or order of tests in the radiology section of CPT  Review and/or order of tests in the medicine section of [facility]      Labs:     Recent Labs      03/09/17 0349 03/08/17   0349   WBC  5.2  5.6   HGB  12.0  12.0   HCT  36.3  37.0   PLT  249  267     Recent Labs      03/09/17 0349 03/08/17 0349 03/07/17   0711   NA  140  142  136   K  3.4*  3.6  3.8   CL  107  108  95*   CO2  27  27  33*   BUN  9  12  11   CREA  0.49*  0.57  1.20*   GLU  82  90  103*   CA  8.4*  8.7  8.7   MG  1.8   --    --    PHOS  2.3*   --    --      Recent Labs      03/08/17 0349 03/07/17   0711   SGOT  14*  29   ALT  17  35   AP  75  85   TBILI  0.5  0.5   TP  5.9*  7.0   ALB  3.1*  3.4*   GLOB  2.8  3.6   LPSE  226  507*     No results for input(s): INR, PTP, APTT in the last 72 hours. No lab exists for component: INREXT, INREXT   No results for input(s): FE, TIBC, PSAT, FERR in the last 72 hours. No results found for: FOL, RBCF   No results for input(s): PH, PCO2, PO2 in the last 72 hours.   Recent Labs      03/08/17   0349 03/07/17   1530  03/07/17   0711   CPK   --    --   39   CKNDX   --    --   Cannot be calulated   TROIQ  <0.04  <0.04  <0.04     Lab Results   Component Value Date/Time    Cholesterol, total 168 05/21/2010 03:00 AM    HDL Cholesterol 53 05/21/2010 03:00 AM    LDL, calculated 95.4 05/21/2010 03:00 AM    Triglyceride 98 05/21/2010 03:00 AM    CHOL/HDL Ratio 3.2 05/21/2010 03:00 AM     Lab Results   Component Value Date/Time    Glucose (POC) 81 03/08/2017 09:18 PM    Glucose (POC) 104 05/22/2010 08:06 AM    Glucose (POC) 120 05/21/2010 10:53 PM    Glucose (POC) 122 05/21/2010 05:05 PM     Lab Results   Component Value Date/Time    Color YELLOW/STRAW 03/07/2017 01:54 PM    Appearance CLEAR 03/07/2017 01:54 PM    Specific gravity 1.005 03/07/2017 01:54 PM    Specific gravity 1.017 12/03/2016 09:25 AM    pH (UA) 7.0 03/07/2017 01:54 PM    Protein NEGATIVE  03/07/2017 01:54 PM    Glucose NEGATIVE  03/07/2017 01:54 PM    Ketone 15 03/07/2017 01:54 PM    Bilirubin NEGATIVE  03/07/2017 01:54 PM    Urobilinogen 0.2 03/07/2017 01:54 PM    Nitrites NEGATIVE  03/07/2017 01:54 PM    Leukocyte Esterase NEGATIVE  03/07/2017 01:54 PM    Epithelial cells FEW 12/03/2016 09:25 AM    Bacteria NEGATIVE  12/03/2016 09:25 AM    WBC 5-10 12/03/2016 09:25 AM    RBC 0-5 12/03/2016 09:25 AM         Medications Reviewed:     Current Facility-Administered Medications   Medication Dose Route Frequency    potassium phosphate 30 mmol in 0.9% sodium chloride 500 mL infusion   IntraVENous ONCE    enoxaparin (LOVENOX) injection 50 mg  50 mg SubCUTAneous Q12H    amLODIPine (NORVASC) tablet 5 mg  5 mg Oral DAILY    aspirin delayed-release tablet 81 mg  81 mg Oral DAILY    atorvastatin (LIPITOR) tablet 20 mg  20 mg Oral DAILY    calcium carbonate (OS-SIMONE) tablet 500 mg [elemental]  500 mg Oral BID WITH MEALS    HYDROcodone-acetaminophen (NORCO) 5-325 mg per tablet 1 Tab  1 Tab Oral Q4H PRN    sodium chloride (NS) flush 5-10 mL  5-10 mL IntraVENous Q8H    sodium chloride (NS) flush 5-10 mL  5-10 mL IntraVENous PRN    nitroglycerin (NITROSTAT) tablet 0.4 mg  0.4 mg SubLINGual Q5MIN PRN    morphine injection 2 mg  2 mg IntraVENous Q4H PRN    ondansetron (ZOFRAN) injection 4 mg  4 mg IntraVENous Q4H PRN    0.9% sodium chloride infusion  50 mL/hr IntraVENous CONTINUOUS    tamsulosin (FLOMAX) capsule 0.4 mg  0.4 mg Oral DAILY     ______________________________________________________________________  EXPECTED LENGTH OF STAY: 3d 0h  ACTUAL LENGTH OF STAY:          2                 Cristina Love MD

## 2017-03-09 NOTE — PROGRESS NOTES
Physical Therapy  3.9.17    Chart reviewed. Note patient with small PEs and last BP in vitals flowsheet 201/79. RN reported she had administered BP medication and paged attending MD. Will hold PT evaluation until cleared by attending MD for activity today in setting of HTN and PEs. F/u later as able/appropriate for PT evaluation. Thank you.     Azra Rust, PT, DPT

## 2017-03-09 NOTE — PROGRESS NOTES
Subjective  Pt refused MRCP last night as it was going to be done at 2130. Tolerating clears  Still has LUQ pain   No RUQ pain    Temp:  [97.4 °F (36.3 °C)-99 °F (37.2 °C)]   Pulse (Heart Rate):  [68-83]   BP: (121-201)/(54-87)   Resp Rate:  [16-26]   O2 Sat (%):  [88 %-100 %]   Weight:  [120 lb (54.4 kg)]      03/07 1901 - 03/09 0700  In: 348.3 [I.V.:348.3]  Out: 2900 [Urine:2900]      Objective  Gen- Alert in NAD  Lungs- CTA  H-RRR  Abd- S/ mild LUQ abdominal pain. Recent Results (from the past 24 hour(s))   GLUCOSE, POC    Collection Time: 03/08/17  9:18 PM   Result Value Ref Range    Glucose (POC) 81 65 - 100 mg/dL    Performed by Nicolas Hayden    MAGNESIUM    Collection Time: 03/09/17  3:49 AM   Result Value Ref Range    Magnesium 1.8 1.6 - 2.4 mg/dL   PHOSPHORUS    Collection Time: 03/09/17  3:49 AM   Result Value Ref Range    Phosphorus 2.3 (L) 2.6 - 4.7 MG/DL   METABOLIC PANEL, BASIC    Collection Time: 03/09/17  3:49 AM   Result Value Ref Range    Sodium 140 136 - 145 mmol/L    Potassium 3.4 (L) 3.5 - 5.1 mmol/L    Chloride 107 97 - 108 mmol/L    CO2 27 21 - 32 mmol/L    Anion gap 6 5 - 15 mmol/L    Glucose 82 65 - 100 mg/dL    BUN 9 6 - 20 MG/DL    Creatinine 0.49 (L) 0.55 - 1.02 MG/DL    BUN/Creatinine ratio 18 12 - 20      GFR est AA >60 >60 ml/min/1.73m2    GFR est non-AA >60 >60 ml/min/1.73m2    Calcium 8.4 (L) 8.5 - 10.1 MG/DL   CBC WITH AUTOMATED DIFF    Collection Time: 03/09/17  3:49 AM   Result Value Ref Range    WBC 5.2 3.6 - 11.0 K/uL    RBC 4.00 3.80 - 5.20 M/uL    HGB 12.0 11.5 - 16.0 g/dL    HCT 36.3 35.0 - 47.0 %    MCV 90.8 80.0 - 99.0 FL    MCH 30.0 26.0 - 34.0 PG    MCHC 33.1 30.0 - 36.5 g/dL    RDW 13.1 11.5 - 14.5 %    PLATELET 343 631 - 760 K/uL    NEUTROPHILS 75 32 - 75 %    LYMPHOCYTES 15 12 - 49 %    MONOCYTES 9 5 - 13 %    EOSINOPHILS 1 0 - 7 %    BASOPHILS 0 0 - 1 %    ABS. NEUTROPHILS 3.9 1.8 - 8.0 K/UL    ABS. LYMPHOCYTES 0.8 0.8 - 3.5 K/UL    ABS.  MONOCYTES 0.5 0.0 - 1.0 K/UL    ABS. EOSINOPHILS 0.0 0.0 - 0.4 K/UL    ABS. BASOPHILS 0.0 0.0 - 0.1 K/UL   HEPATIC FUNCTION PANEL    Collection Time: 03/09/17 11:06 AM   Result Value Ref Range    Protein, total 5.8 (L) 6.4 - 8.2 g/dL    Albumin 3.1 (L) 3.5 - 5.0 g/dL    Globulin 2.7 2.0 - 4.0 g/dL    A-G Ratio 1.1 1.1 - 2.2      Bilirubin, total 0.7 0.2 - 1.0 MG/DL    Bilirubin, direct 0.2 0.0 - 0.2 MG/DL    Alk. phosphatase 81 45 - 117 U/L    AST (SGOT) 16 15 - 37 U/L    ALT (SGPT) 17 12 - 78 U/L         Principal Problem:    Chest pain (3/7/2017)    Active Problems:    Cholecystitis (3/7/2017)      Nephrolithiasis (3/7/2017)          Assessment & Plan  Still awaiting MRCP  Continue to doubt cholecystitis as source.   Will follow

## 2017-03-09 NOTE — PROGRESS NOTES
Duyen Thrasher 272  174 [facility], [address]       GI PROGRESS NOTE  Deniz Maher, [facility]  [phone] office  [phone] NP in-hospital cell phone M-F until 4:30  After 5pm or on weekends, please call  for physician on call      NAME: Denis Brian   :  1925   MRN:  [id number]       Subjective:     Still with left upper quadrant pain. Pt refused MRCP last night because of the time. Objective:     VITALS:   Last 24hrs VS reviewed since prior progress note. Most recent are:  Visit Vitals    /64    Pulse 75    Temp 98.4 °F (36.9 °C)    Resp 16    Ht 5' (1.524 m)    Wt 54.4 kg (120 lb)    SpO2 93%    BMI 23.44 kg/m2       PHYSICAL EXAM:  General: Cooperative, no acute distress, daughter at bedside   Neurologic:  Alert and oriented X 3. HEENT: EOMI. Lungs:  CTA bilaterally. No wheezing  Heart:  S1 S2, regular rhythm, no murmur   Abdomen: Soft, non distended, non tender. +Bowel sounds  Extremities: No edema  Psych:   Not anxious or agitated.     Lab Data Reviewed:     Recent Results (from the past 24 hour(s))   GLUCOSE, POC    Collection Time: 17  9:18 PM   Result Value Ref Range    Glucose (POC) 81 65 - 100 mg/dL    Performed by Shwetha Rome    MAGNESIUM    Collection Time: 17  3:49 AM   Result Value Ref Range    Magnesium 1.8 1.6 - 2.4 mg/dL   PHOSPHORUS    Collection Time: 17  3:49 AM   Result Value Ref Range    Phosphorus 2.3 (L) 2.6 - 4.7 MG/DL   METABOLIC PANEL, BASIC    Collection Time: 17  3:49 AM   Result Value Ref Range    Sodium 140 136 - 145 mmol/L    Potassium 3.4 (L) 3.5 - 5.1 mmol/L    Chloride 107 97 - 108 mmol/L    CO2 27 21 - 32 mmol/L    Anion gap 6 5 - 15 mmol/L    Glucose 82 65 - 100 mg/dL    BUN 9 6 - 20 MG/DL    Creatinine 0.49 (L) 0.55 - 1.02 MG/DL    BUN/Creatinine ratio 18 12 - 20      GFR est AA >60 >60 ml/min/1.73m2    GFR est non-AA >60 >60 ml/min/1.73m2    Calcium 8.4 (L) 8.5 - 10.1 MG/DL   CBC WITH AUTOMATED DIFF    Collection Time: 03/09/17  3:49 AM   Result Value Ref Range    WBC 5.2 3.6 - 11.0 K/uL    RBC 4.00 3.80 - 5.20 M/uL    HGB 12.0 11.5 - 16.0 g/dL    HCT 36.3 35.0 - 47.0 %    MCV 90.8 80.0 - 99.0 FL    MCH 30.0 26.0 - 34.0 PG    MCHC 33.1 30.0 - 36.5 g/dL    RDW 13.1 11.5 - 14.5 %    PLATELET 311 587 - 442 K/uL    NEUTROPHILS 75 32 - 75 %    LYMPHOCYTES 15 12 - 49 %    MONOCYTES 9 5 - 13 %    EOSINOPHILS 1 0 - 7 %    BASOPHILS 0 0 - 1 %    ABS. NEUTROPHILS 3.9 1.8 - 8.0 K/UL    ABS. LYMPHOCYTES 0.8 0.8 - 3.5 K/UL    ABS. MONOCYTES 0.5 0.0 - 1.0 K/UL    ABS. EOSINOPHILS 0.0 0.0 - 0.4 K/UL    ABS. BASOPHILS 0.0 0.0 - 0.1 K/UL   HEPATIC FUNCTION PANEL    Collection Time: 03/09/17 11:06 AM   Result Value Ref Range    Protein, total 5.8 (L) 6.4 - 8.2 g/dL    Albumin 3.1 (L) 3.5 - 5.0 g/dL    Globulin 2.7 2.0 - 4.0 g/dL    A-G Ratio 1.1 1.1 - 2.2      Bilirubin, total 0.7 0.2 - 1.0 MG/DL    Bilirubin, direct 0.2 0.0 - 0.2 MG/DL    Alk. phosphatase 81 45 - 117 U/L    AST (SGOT) 16 15 - 37 U/L    ALT (SGPT) 17 12 - 78 U/L            Assessment:   · Left-sided abdominal pain: pt refused MRCP last night because it was 21:15. Educated pt this is not abnormal in inpatient setting and this is the next step. Patient Active Problem List   Diagnosis Code    Acute bronchitis J20.9    Bronchitis J40    Chest pain R07.9    Cholecystitis K81.9    Nephrolithiasis N20.0     Plan:   · Await MRCP results. Pt states she will go next availability. · Repeat LFTs and lipase in AM     Signed By: Dio Guzman NP     3/9/2017  2:53 PM                 GI Attending: Agree with above. MRCP pending. I will be out of town tomorrow. My colleagues will follow in my absence. Please call with questions. Chidi Salguero MD

## 2017-03-09 NOTE — ROUTINE PROCESS
Bedside and Verbal shift change report given to [facility] (oncoming nurse) by Александр Valverde RN (offgoing nurse). Report included the following information SBAR, Kardex, Intake/Output, MAR, Accordion and Recent Results.

## 2017-03-10 ENCOUNTER — APPOINTMENT (OUTPATIENT)
Dept: MRI IMAGING | Age: 82
DRG: 175 | End: 2017-03-10
Attending: STUDENT IN AN ORGANIZED HEALTH CARE EDUCATION/TRAINING PROGRAM
Payer: MEDICARE

## 2017-03-10 LAB
ALBUMIN SERPL BCP-MCNC: 2.8 G/DL (ref 3.5–5)
ALBUMIN/GLOB SERPL: 1.1 {RATIO} (ref 1.1–2.2)
ALP SERPL-CCNC: 75 U/L (ref 45–117)
ALT SERPL-CCNC: 14 U/L (ref 12–78)
ANION GAP BLD CALC-SCNC: 6 MMOL/L (ref 5–15)
AST SERPL W P-5'-P-CCNC: 12 U/L (ref 15–37)
BILIRUB SERPL-MCNC: 0.6 MG/DL (ref 0.2–1)
BUN SERPL-MCNC: 5 MG/DL (ref 6–20)
BUN/CREAT SERPL: 9 (ref 12–20)
CALCIUM SERPL-MCNC: 8.9 MG/DL (ref 8.5–10.1)
CHLORIDE SERPL-SCNC: 108 MMOL/L (ref 97–108)
CO2 SERPL-SCNC: 27 MMOL/L (ref 21–32)
CREAT SERPL-MCNC: 0.57 MG/DL (ref 0.55–1.02)
ERYTHROCYTE [DISTWIDTH] IN BLOOD BY AUTOMATED COUNT: 13.3 % (ref 11.5–14.5)
GLOBULIN SER CALC-MCNC: 2.6 G/DL (ref 2–4)
GLUCOSE SERPL-MCNC: 99 MG/DL (ref 65–100)
HCT VFR BLD AUTO: 36.1 % (ref 35–47)
HGB BLD-MCNC: 11.8 G/DL (ref 11.5–16)
MCH RBC QN AUTO: 29.6 PG (ref 26–34)
MCHC RBC AUTO-ENTMCNC: 32.7 G/DL (ref 30–36.5)
MCV RBC AUTO: 90.5 FL (ref 80–99)
PLATELET # BLD AUTO: 236 K/UL (ref 150–400)
POTASSIUM SERPL-SCNC: 3.3 MMOL/L (ref 3.5–5.1)
PROT SERPL-MCNC: 5.4 G/DL (ref 6.4–8.2)
RBC # BLD AUTO: 3.99 M/UL (ref 3.8–5.2)
SODIUM SERPL-SCNC: 141 MMOL/L (ref 136–145)
WBC # BLD AUTO: 4.1 K/UL (ref 3.6–11)

## 2017-03-10 PROCEDURE — 74011250636 HC RX REV CODE- 250/636: Performed by: INTERNAL MEDICINE

## 2017-03-10 PROCEDURE — 36415 COLL VENOUS BLD VENIPUNCTURE: CPT | Performed by: HOSPITALIST

## 2017-03-10 PROCEDURE — G8978 MOBILITY CURRENT STATUS: HCPCS

## 2017-03-10 PROCEDURE — G8987 SELF CARE CURRENT STATUS: HCPCS

## 2017-03-10 PROCEDURE — 74011250636 HC RX REV CODE- 250/636: Performed by: HOSPITALIST

## 2017-03-10 PROCEDURE — G8979 MOBILITY GOAL STATUS: HCPCS

## 2017-03-10 PROCEDURE — 80053 COMPREHEN METABOLIC PANEL: CPT | Performed by: HOSPITALIST

## 2017-03-10 PROCEDURE — 74011250636 HC RX REV CODE- 250/636: Performed by: STUDENT IN AN ORGANIZED HEALTH CARE EDUCATION/TRAINING PROGRAM

## 2017-03-10 PROCEDURE — 97165 OT EVAL LOW COMPLEX 30 MIN: CPT

## 2017-03-10 PROCEDURE — 74181 MRI ABDOMEN W/O CONTRAST: CPT

## 2017-03-10 PROCEDURE — G8988 SELF CARE GOAL STATUS: HCPCS

## 2017-03-10 PROCEDURE — 97161 PT EVAL LOW COMPLEX 20 MIN: CPT

## 2017-03-10 PROCEDURE — 74011250637 HC RX REV CODE- 250/637: Performed by: HOSPITALIST

## 2017-03-10 PROCEDURE — 74011250637 HC RX REV CODE- 250/637: Performed by: STUDENT IN AN ORGANIZED HEALTH CARE EDUCATION/TRAINING PROGRAM

## 2017-03-10 PROCEDURE — 65660000000 HC RM CCU STEPDOWN

## 2017-03-10 PROCEDURE — 85027 COMPLETE CBC AUTOMATED: CPT | Performed by: HOSPITALIST

## 2017-03-10 RX ORDER — KETOROLAC TROMETHAMINE 30 MG/ML
15 INJECTION, SOLUTION INTRAMUSCULAR; INTRAVENOUS
Status: DISCONTINUED | OUTPATIENT
Start: 2017-03-10 | End: 2017-03-12

## 2017-03-10 RX ORDER — POTASSIUM CHLORIDE 750 MG/1
40 TABLET, FILM COATED, EXTENDED RELEASE ORAL EVERY 4 HOURS
Status: COMPLETED | OUTPATIENT
Start: 2017-03-10 | End: 2017-03-10

## 2017-03-10 RX ADMIN — HALOPERIDOL LACTATE 2 MG: 5 INJECTION, SOLUTION INTRAMUSCULAR at 07:20

## 2017-03-10 RX ADMIN — KETOROLAC TROMETHAMINE 15 MG: 30 INJECTION, SOLUTION INTRAMUSCULAR at 11:53

## 2017-03-10 RX ADMIN — Medication 10 ML: at 22:36

## 2017-03-10 RX ADMIN — ENOXAPARIN SODIUM 50 MG: 60 INJECTION SUBCUTANEOUS at 09:24

## 2017-03-10 RX ADMIN — ASPIRIN 81 MG: 81 TABLET, COATED ORAL at 09:24

## 2017-03-10 RX ADMIN — Medication 10 ML: at 06:00

## 2017-03-10 RX ADMIN — ENOXAPARIN SODIUM 50 MG: 60 INJECTION SUBCUTANEOUS at 22:36

## 2017-03-10 RX ADMIN — CALCIUM CARBONATE 500 MG: 1250 TABLET ORAL at 09:24

## 2017-03-10 RX ADMIN — AMLODIPINE BESYLATE 10 MG: 5 TABLET ORAL at 09:23

## 2017-03-10 RX ADMIN — ATORVASTATIN CALCIUM 20 MG: 20 TABLET, FILM COATED ORAL at 09:24

## 2017-03-10 RX ADMIN — TAMSULOSIN HYDROCHLORIDE 0.4 MG: 0.4 CAPSULE ORAL at 09:24

## 2017-03-10 RX ADMIN — Medication 5 ML: at 14:00

## 2017-03-10 RX ADMIN — POTASSIUM CHLORIDE 40 MEQ: 750 TABLET, FILM COATED, EXTENDED RELEASE ORAL at 09:23

## 2017-03-10 RX ADMIN — CALCIUM CARBONATE 500 MG: 1250 TABLET ORAL at 17:36

## 2017-03-10 RX ADMIN — POTASSIUM CHLORIDE 40 MEQ: 750 TABLET, FILM COATED, EXTENDED RELEASE ORAL at 11:53

## 2017-03-10 RX ADMIN — SODIUM CHLORIDE 50 ML/HR: 900 INJECTION, SOLUTION INTRAVENOUS at 14:32

## 2017-03-10 RX ADMIN — HYDROCODONE BITARTRATE AND ACETAMINOPHEN 1 TABLET: 5; 325 TABLET ORAL at 17:36

## 2017-03-10 RX ADMIN — HYDROCODONE BITARTRATE AND ACETAMINOPHEN 1 TABLET: 5; 325 TABLET ORAL at 09:24

## 2017-03-10 NOTE — PROGRESS NOTES
[address]  [address], [address]       GI PROGRESS NOTE  Anabell Queen, [facility]  [phone] office  [phone] NP in-hospital cell phone M-F until 4:30  After 5pm or on weekends, please call  for physician on call      NAME: Trevon De La Fuente   :  1925   MRN:  [id number]       Subjective:     Pain under left chest/LUQ. \"It's fine if I'm still. \" Advancing diet today. Objective:     VITALS:   Last 24hrs VS reviewed since prior progress note. Most recent are:  Visit Vitals    /69 (BP 1 Location: Right arm, BP Patient Position: At rest)    Pulse 71    Temp 97.1 °F (36.2 °C)    Resp 16    Ht 5' (1.524 m)    Wt 54.4 kg (120 lb)    SpO2 92%    BMI 23.44 kg/m2       PHYSICAL EXAM:  General: Cooperative, no acute distress, daughter at bedside   Neurologic:  Alert and oriented X 3. HEENT: EOMI. Lungs:  CTA bilaterally. No wheezing  Heart:  S1 S2, regular rhythm, no murmur   Abdomen: Soft, non distended, non tender. +Bowel sounds  Extremities: No edema  Psych:   Not anxious or agitated.     Lab Data Reviewed:     Recent Results (from the past 24 hour(s))   CBC W/O DIFF    Collection Time: 03/10/17  5:15 AM   Result Value Ref Range    WBC 4.1 3.6 - 11.0 K/uL    RBC 3.99 3.80 - 5.20 M/uL    HGB 11.8 11.5 - 16.0 g/dL    HCT 36.1 35.0 - 47.0 %    MCV 90.5 80.0 - 99.0 FL    MCH 29.6 26.0 - 34.0 PG    MCHC 32.7 30.0 - 36.5 g/dL    RDW 13.3 11.5 - 14.5 %    PLATELET 219 510 - 417 K/uL   METABOLIC PANEL, COMPREHENSIVE    Collection Time: 03/10/17  5:15 AM   Result Value Ref Range    Sodium 141 136 - 145 mmol/L    Potassium 3.3 (L) 3.5 - 5.1 mmol/L    Chloride 108 97 - 108 mmol/L    CO2 27 21 - 32 mmol/L    Anion gap 6 5 - 15 mmol/L    Glucose 99 65 - 100 mg/dL    BUN 5 (L) 6 - 20 MG/DL    Creatinine 0.57 0.55 - 1.02 MG/DL    BUN/Creatinine ratio 9 (L) 12 - 20      GFR est AA >60 >60 ml/min/1.73m2    GFR est non-AA >60 >60 ml/min/1.73m2    Calcium 8.9 8.5 - 10.1 MG/DL    Bilirubin, total 0.6 0.2 - 1.0 MG/DL    ALT (SGPT) 14 12 - 78 U/L    AST (SGOT) 12 (L) 15 - 37 U/L    Alk. phosphatase 75 45 - 117 U/L    Protein, total 5.4 (L) 6.4 - 8.2 g/dL    Albumin 2.8 (L) 3.5 - 5.0 g/dL    Globulin 2.6 2.0 - 4.0 g/dL    A-G Ratio 1.1 1.1 - 2.2              Assessment:   · Left-sided abdominal pain: MRCP this AM at 8:15. LFTs remain unremarkable     Patient Active Problem List   Diagnosis Code    Acute bronchitis J20.9    Bronchitis J40    Chest pain R07.9    Cholecystitis K81.9    Nephrolithiasis N20.0     Plan:   · Await MRCP results. · Following     Signed By: Bertin Hathaway. Catarina Murray NP     3/10/2017  2:53 PM           I have examined the patient. I have reviewed the chart and agree with the documentation recorded by the NP, including the assessment, treatment plan, and disposition.       Reviewed MRCP with radiologist, it was normal  Suspect muscular pain  Will see as needed this weekend    Jesenia Root MD

## 2017-03-10 NOTE — PROGRESS NOTES
Problem: Mobility Impaired (Adult and Pediatric)  Goal: *Acute Goals and Plan of Care (Insert Text)  Physical Therapy Goals  Initiated 3/10/2017  1. Patient will move from supine to sit and sit to supine , scoot up and down and roll side to side in bed with modified independence within 7 day(s). 2. Patient will transfer from bed to chair and chair to bed with modified independence using the least restrictive device within 7 day(s). 3. Patient will perform sit to stand with modified independence within 7 day(s). 4. Patient will ambulate with modified independence for 150 feet with the least restrictive device within 7 day(s). PHYSICAL THERAPY EVALUATION  Patient: Sabine Charlton (44 y.o. female)  Date: 3/10/2017  Primary Diagnosis: Chest pain        Precautions:          ASSESSMENT :  Based on the objective data described below, the patient presents with decreased LE strength and activity tolerance compared to baseline following admission for chest pain. PTA patient lived in [address] [place] [address] at [facility]. Patient ambulated with RW and has no history of falls. Overall requires CGA/min A for all aspects of mobility including ambulation. 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 Family and patient feel she is close to baseline. Will follow 3 x week. Recommend SNF at discharge     Over the weekend recommend patient OOB for all meals, ambulate to restroom with RW and nursing assistance. Patient and family aware and in agreement. Will follow up next week. Patient will benefit from skilled intervention to address the above impairments.   Patients rehabilitation potential is considered to be Good  Factors which may influence rehabilitation potential include:   [X]         None noted  [ ]         Mental ability/status  [ ]         Medical condition  [ ]         Home/family situation and support systems  [ ]         Safety awareness  [ ]         Pain tolerance/management  [ ]         Other: PLAN :  Recommendations and Planned Interventions:  [X]           Bed Mobility Training             [X]    Neuromuscular Re-Education  [X]           Transfer Training                   [ ]    Orthotic/Prosthetic Training  [X]           Gait Training                         [ ]    Modalities  [X]           Therapeutic Exercises           [ ]    Edema Management/Control  [X]           Therapeutic Activities            [X]    Patient and Family Training/Education  [ ]           Other (comment):     Frequency/Duration: Patient will be followed by physical therapy  3 times a week to address goals. Discharge Recommendations: SNF  Further Equipment Recommendations for Discharge: Owns all       SUBJECTIVE:   Patient stated I don't want to get up but I know you want to see me walk.       OBJECTIVE DATA SUMMARY:   HISTORY:    Past Medical History:   Diagnosis Date    HTN (hypertension)      Hypercholesteremia      Osteoporosis      Stroke ([facility] [address]       CVA, TIA    TIA (transient ischemic attack)      Vertebral fracture       Past Surgical History:   Procedure Laterality Date    HX HIP REPLACEMENT   2009     left    HX ORTHOPAEDIC         Prior Level of Function/Home Situation: mod I with RW, no falls  Personal factors and/or comorbidities impacting plan of care:      Home Situation  Home Environment: [place] [address] Name: Kim James (help with shower 3 x week)  # Steps to Enter: 0  One/Two Story Residence: One story (facility has elevator)  Living Alone: Yes (at assisted living facility)  Support Systems: Assisted living, Child(sanya), Family member(s)  Patient Expects to be Discharged to[de-identified] Assisted living  Current DME Used/Available at Home: Bernice Price     EXAMINATION/PRESENTATION/DECISION MAKING:   Critical Behavior:  Neurologic State: Alert  Orientation Level: Oriented X4        Hearing:   Auditory  Auditory Impairment: None  Skin:    Edema:   Range Of Motion:  AROM: Within functional limits PROM: Within functional limits           Strength:    Strength: Generally decreased, functional                    Tone & Sensation:                                  Coordination:     Vision:      Functional Mobility:  Bed Mobility:     Supine to Sit: Contact guard assistance  Sit to Supine: Minimum assistance     Transfers:  Sit to Stand: Contact guard assistance  Stand to Sit: Contact guard assistance  Stand Pivot Transfers: Contact guard assistance                    Balance:   Sitting: Intact  Standing: Intact; With support  Ambulation/Gait Training:  Distance (ft): 50 Feet (ft)  Assistive Device: Walker, rolling  Ambulation - Level of Assistance: Contact guard assistance        Gait Abnormalities: Decreased step clearance (R toe drag from previous CVA)           Stance: Left decreased  Speed/Nyasia: Pace decreased (<100 feet/min)  Step Length: Left shortened;Right shortened                                           Stairs: Therapeutic Exercises:         Functional Measure:  Tinetti test:      Sitting Balance: 1  Arises: 1  Attempts to Rise: 2  Immediate Standing Balance: 1  Standing Balance: 1  Nudged: 0  Eyes Closed: 0  Turn 360 Degrees - Continuous/Discontinuous: 0  Turn 360 Degrees - Steady/Unsteady: 0  Sitting Down: 1  Balance Score: 7  Indication of Gait: 1  R Step Length/Height: 1  L Step Length/Height: 1  R Foot Clearance: 1  L Foot Clearance: 1  Step Symmetry: 1  Step Continuity: 1  Path: 1  Trunk: 1  Walking Time: 0  Gait Score: 9  Total Score: 16         Tinetti Test and G-code impairment scale:  Percentage of Impairment CH     0%    CI     1-19% CJ     20-39% CK     40-59% CL     60-79% CM     80-99% CN      100%   Tinetti  Score 0-28 28 23-27 17-22 12-16 6-11 1-5 0          Tinetti Tool Score Risk of Falls  <19 = High Fall Risk  19-24 = Moderate Fall Risk  25-28 = Low Fall Risk  Tinetti ME. Performance-Oriented Assessment of Mobility Problems in Elderly Patients. [facility]; [id number]. (Scoring Description: PT Bulletin Feb. 10, 1993)     Older adults: Linda Mayra et al, 2009; n = 1000 [facility] elderly evaluated with ABC, DALE, ADL, and IADL)  · Mean DALE score for males aged 69-68 years = 26.21(3.40)  · Mean DALE score for females age 69-68 years = 25.16(4.30)  · Mean DALE score for males over 80 years = 23.29(6.02)  · Mean DALE score for females over 80 years = 17.20(8.32)            G codes: In compliance with CMSs Claims Based Outcome Reporting, the following G-code set was chosen for this patient based on their primary functional limitation being treated: The outcome measure chosen to determine the severity of the functional limitation was the Tinetti with a score of 16/28 which was correlated with the impairment scale. · Mobility - Walking and Moving Around:               - CURRENT STATUS:    CK - 40%-59% impaired, limited or restricted               - GOAL STATUS:           CJ - 20%-39% impaired, limited or restricted               - D/C STATUS:                       ---------------To be determined---------------      Physical Therapy Evaluation Charge Determination   History Examination Presentation Decision-Making   MEDIUM  Complexity : 1-2 comorbidities / personal factors will impact the outcome/ POC  LOW Complexity : 1-2 Standardized tests and measures addressing body structure, function, activity limitation and / or participation in recreation  LOW Complexity : Stable, uncomplicated  Other outcome measures Tinetti  HIGH       Based on the above components, the patient evaluation is determined to be of the following complexity level: LOW      Pain:  Pain Scale 1: Numeric (0 - 10)  Pain Intensity 1: 8  Pain Location 1: Abdomen  Pain Orientation 1: Left  Pain Description 1: Aching; Sharp  Pain Intervention(s) 1: Medication (see MAR)  Activity Tolerance:   Good  Please refer to the flowsheet for vital signs taken during this treatment.   After treatment: [ ]         Patient left in no apparent distress sitting up in chair  [X]         Patient left in no apparent distress in bed  [X]         Call bell left within reach  [X]         Nursing notified  [X]         Caregiver present  [ ]         Bed alarm activated      COMMUNICATION/EDUCATION:   The patients plan of care was discussed with: Registered Nurse.  [X]         Fall prevention education was provided and the patient/caregiver indicated understanding. [X]         Patient/family have participated as able in goal setting and plan of care. [X]         Patient/family agree to work toward stated goals and plan of care. [ ]         Patient understands intent and goals of therapy, but is neutral about his/her participation. [ ]         Patient is unable to participate in goal setting and plan of care.      Thank you for this referral.  Rosalina Santillan, PT   Time Calculation: 12 mins

## 2017-03-10 NOTE — PROGRESS NOTES
Bedside shift change report given to [facility] SHANNON (oncoming nurse) by Poly Dennis (offgoing nurse). Report included the following information SBAR.

## 2017-03-10 NOTE — PROGRESS NOTES
Daily Progress Note  [facility] at [address] Date: 3/7/2017    Subjective:     Last 24 hrs: c/o mild pain below left breast.  Denies RUQ pain. Had MRI this morning, radiology read pending. She is excited about eating solid food today. Objective:     Blood pressure 160/80, pulse 84, temperature 98.3 °F (36.8 °C), resp. rate 18, height 5' (1.524 m), weight 54.4 kg (120 lb), SpO2 92 %. Temp (24hrs), Av.4 °F (36.9 °C), Min:98.3 °F (36.8 °C), Max:98.4 °F (36.9 °C)      _____________________  Physical Exam:     Alert and Oriented, lying in bed, in no acute distress. Cardiovascular: RRR  Lungs:CTAB   Abdomen: + BS, soft, NT.        Assessment:   Principal Problem:    Chest pain (3/7/2017)    Active Problems:    Cholecystitis (3/7/2017)      Nephrolithiasis (3/7/2017)            Plan:     F/U abdominal MRI results  Following        [facility], [facility] at [place] [address], [address] [place] [address]  [phone]    Data Review:    Recent Labs      03/10/17   0515  17   0349  17   0349   WBC  4.1  5.2  5.6   HGB  11.8  12.0  12.0   HCT  36.1  36.3  37.0   PLT  236  249  267     Recent Labs      03/10/17   0515  17   1106  17   0349  17   0349   NA  141   --   140  142   K  3.3*   --   3.4*  3.6   CL  108   --   107  108   CO2  27   --   27  27   GLU  99   --   82  90   BUN  5*   --   9  12   CREA  0.57   --   0.49*  0.57   CA  8.9   --   8.4*  8.7   MG   --    --   1.8   --    PHOS   --    --   2.3*   --    ALB  2.8*  3.1*   --   3.1*   TBILI  0.6  0.7   --   0.5   SGOT  12*  16   --   14*   ALT  14  17   --   17     Recent Labs      17   0349   LPSE  226           ______________________  Medications:    Current Facility-Administered Medications   Medication Dose Route Frequency    potassium chloride SR (KLOR-CON 10) tablet 40 mEq  40 mEq Oral Q4H    ketorolac (TORADOL) injection 15 mg  15 mg IntraVENous Q6H PRN    amLODIPine (NORVASC) tablet 10 mg  10 mg Oral DAILY    nitroglycerin (NITROBID) 2 % ointment 1 Inch  1 Inch Topical Q6H PRN    cloNIDine HCl (CATAPRES) tablet 0.1 mg  0.1 mg Oral Q6H PRN    enoxaparin (LOVENOX) injection 50 mg  50 mg SubCUTAneous Q12H    aspirin delayed-release tablet 81 mg  81 mg Oral DAILY    atorvastatin (LIPITOR) tablet 20 mg  20 mg Oral DAILY    calcium carbonate (OS-SIMONE) tablet 500 mg [elemental]  500 mg Oral BID WITH MEALS    HYDROcodone-acetaminophen (NORCO) 5-325 mg per tablet 1 Tab  1 Tab Oral Q4H PRN    sodium chloride (NS) flush 5-10 mL  5-10 mL IntraVENous Q8H    sodium chloride (NS) flush 5-10 mL  5-10 mL IntraVENous PRN    nitroglycerin (NITROSTAT) tablet 0.4 mg  0.4 mg SubLINGual Q5MIN PRN    morphine injection 2 mg  2 mg IntraVENous Q4H PRN    ondansetron (ZOFRAN) injection 4 mg  4 mg IntraVENous Q4H PRN    0.9% sodium chloride infusion  50 mL/hr IntraVENous CONTINUOUS    tamsulosin (FLOMAX) capsule 0.4 mg  0.4 mg Oral DAILY

## 2017-03-10 NOTE — ROUTINE PROCESS
Bedside and Verbal shift change report given to Jhony (oncoming nurse) by Jocy Garcia (offgoing nurse). Report included the following information SBAR, Kardex, Intake/Output, MAR, Accordion and Recent Results.

## 2017-03-10 NOTE — PROGRESS NOTES
3/10/17, 3:02PM - Reviewed medical chart; met with the patient at the bedside. Patient and her daughter asked this  if a rehabilitation facility would be a possibility. Explained the options - inpatient rehabilitation hospital vs. skilled nursing facility and provided lists. They chose to send referrals to [facility]. Referrals sent via [facility] - awaiting responses. Care Management will continue to follow her disposition. ARNULFO Darling    3/10/17, 4:08PM [facility] The [address] [place] [address] can also accept the patient into care this weekend (into a private room). Spoke with Bhavesh Carolina at [facility] who indicated that they do not accept weekend admissions. The patient's daughter plans to visit [facility]. The patient's daughter, Yannick August, explained that she is tied up tomorrow with a family event. She further explained that the patient may not be able to get in and out of her SUV; it was decided that she would benefit from a wheelchair van. They have no preference of wheelchair [employer]. The patient's granddaughter, Rosa Huff - LEXY#[phone] will be available on Saturday to assist with plans as the patient's daughter is not available on Saturday. Care Management will continue to follow his disposition.    ARNULFO Darling

## 2017-03-10 NOTE — PROGRESS NOTES
Hospitalist Progress Note  Wei Son MD  Office: [phone]        Date of Service:  3/10/2017  NAME:  Chirag Fisher  :  1925  MRN:  [id number]      Admission Summary:   Chirag Fisher is a 80 y.o. female with history of prior CVA/TIA, HTN, DLD, who presents with intermittent left chest pain x 2 - 3 days and right-sided upper quadrant abdominal pain / right chest pain.     Interval history / Subjective:   Ms. Shaunna Mederos is still having pain in her lower left chest with movement and deep breathing. No RUQ pain, nausea. Assessment & Plan:     Chest pain (POA) - atypical, left-sided, likely pulmonary embolism. Cardiac, GI and nephro seem less likely   - CTA chest 3/7 with tiny acute pulmonary emboli versus technically limited opacification of a  couple left lung segmental pulmonary arteries. Left lower lobe atelectasis vs consolidation   - ECG with normal sinus rhythm  - Troponin negative  - Resumed home aspirin, atorvastatin  - prn nitrostat  - pain control with Norco, morphine, toradol    Pulmonary embolism (POA) - stable  - CTA as above  - Continue Lovenox; discharge home on eliquis for 3-6 months      CBD dilatation, possible cholecystitis vs choledocholithiasis  - US abdomen 3/7 with sludge and GB wall thickening, CBD distended to 11.5mm, pancrease normal  - CT abdomen/pelvis 3/7 with mild intrahepatic biliary ductal dilatation with CBD 12 mm proximally then tapers to relatively normal caliber towards the ampulla. Unchanged from . No calcified gallstone.  Slightly prominent pancreatic duct  - MRCP done, read pending  - Lipase mildly elevated to 507, normalized  - NPO  - IV fluids  - Pain control and antiemetics  - GI / general surgery consulted    HTN (chronic) - BP better controlled but stillelevated, likely being driven by pain  - Increased home amlodipine  - Nitropaste, clonidine PRN  - Pain control    Left nephrolithiasis (chronic) - this is unlikely to be causing any problem  - Abd U/S 3/7 shows numerous nonobstructing left renal stones  - CT abdomen/pelvis 3/7 with several nonobstructing left renal calculi with the largest measuring approximately 11 mm. No hydronephrosis or hydroureter. No perinephric inflammatory change. - IV fluids  - Continue Flomax  - Urology consulted     Prior TIA/CVA - no apparent residual deficits, continue aspirin / atorvastatin    ELIZABETH (POA) - pre-renal, resolved with IV fluids    Acute metabolic alkalosis (POA) - suspect this is contraction alkalosis given low Cl-, treat with IV fluids and resolved    Code status: DNR  DVT prophylaxis: Lovenox  Lived at : [facility] discussed with: Patient/Family and Nurse  Disposition: TBD     Hospital Problems  Date Reviewed: 3/7/2017          Codes Class Noted POA    * (Principal)Chest pain ICD-10-CM: R07.9  ICD-9-CM: 786.50  3/7/2017 Yes        Cholecystitis ICD-10-CM: K81.9  ICD-9-CM: 575.10  3/7/2017 Yes        Nephrolithiasis ICD-10-CM: N20.0  ICD-9-CM: 592.0  3/7/2017 Yes                Review of Systems:   Review of systems not obtained due to patient factors. Vital Signs:    Last 24hrs VS reviewed since prior progress note. Most recent are:  Visit Vitals    /80 (BP 1 Location: Right arm, BP Patient Position: At rest)    Pulse 84    Temp 98.3 °F (36.8 °C)    Resp 18    Ht 5' (1.524 m)    Wt 54.4 kg (120 lb)    SpO2 92%    BMI 23.44 kg/m2         Intake/Output Summary (Last 24 hours) at 03/10/17 0930  Last data filed at 03/10/17 0832   Gross per 24 hour   Intake                0 ml   Output             1901 ml   Net            -1901 ml        Physical Examination:             Constitutional:  No acute distress, cooperative, pleasant    ENT:  Oral mucous moist, oropharynx benign. Neck supple,    Resp:  CTA bilaterally. Coarse breath sounds; No wheezing/rhonchi/rales.  No accessory muscle use   CV:  Regular rhythm, normal rate, no murmurs, gallops, rubs    GI:  Soft, non distended, non tender. normoactive bowel sounds. NO RUQ pain    Musculoskeletal:  :  No edema, warm; No CVA tenderness    Neurologic:  Moves all extremities. AAOx2;           Data Review:    Review and/or order of clinical lab test  Review and/or order of tests in the radiology section of CPT  Review and/or order of tests in the medicine section of [facility]      Labs:     Recent Labs      03/10/17   0515  03/09/17   0349   WBC  4.1  5.2   HGB  11.8  12.0   HCT  36.1  36.3   PLT  236  249     Recent Labs      03/10/17   0515  03/09/17   0349  03/08/17   0349   NA  141  140  142   K  3.3*  3.4*  3.6   CL  108  107  108   CO2  27  27  27   BUN  5*  9  12   CREA  0.57  0.49*  0.57   GLU  99  82  90   CA  8.9  8.4*  8.7   MG   --   1.8   --    PHOS   --   2.3*   --      Recent Labs      03/10/17   0515  03/09/17   1106  03/08/17   0349   SGOT  12*  16  14*   ALT  14  17  17   AP  75  81  75   TBILI  0.6  0.7  0.5   TP  5.4*  5.8*  5.9*   ALB  2.8*  3.1*  3.1*   GLOB  2.6  2.7  2.8   LPSE   --    --   226     No results for input(s): INR, PTP, APTT in the last 72 hours. No lab exists for component: INREXT, INREXT   No results for input(s): FE, TIBC, PSAT, FERR in the last 72 hours. No results found for: FOL, RBCF   No results for input(s): PH, PCO2, PO2 in the last 72 hours.   Recent Labs      03/08/17   0349  03/07/17   1530   TROIQ  <0.04  <0.04     Lab Results   Component Value Date/Time    Cholesterol, total 168 05/21/2010 03:00 AM    HDL Cholesterol 53 05/21/2010 03:00 AM    LDL, calculated 95.4 05/21/2010 03:00 AM    Triglyceride 98 05/21/2010 03:00 AM    CHOL/HDL Ratio 3.2 05/21/2010 03:00 AM     Lab Results   Component Value Date/Time    Glucose (POC) 81 03/08/2017 09:18 PM    Glucose (POC) 104 05/22/2010 08:06 AM    Glucose (POC) 120 05/21/2010 10:53 PM    Glucose (POC) 122 05/21/2010 05:05 PM     Lab Results   Component Value Date/Time    Color YELLOW/STRAW 03/07/2017 01:54 PM    Appearance CLEAR 03/07/2017 01:54 PM    Specific gravity 1.005 03/07/2017 01:54 PM    Specific gravity 1.017 12/03/2016 09:25 AM    pH (UA) 7.0 03/07/2017 01:54 PM    Protein NEGATIVE  03/07/2017 01:54 PM    Glucose NEGATIVE  03/07/2017 01:54 PM    Ketone 15 03/07/2017 01:54 PM    Bilirubin NEGATIVE  03/07/2017 01:54 PM    Urobilinogen 0.2 03/07/2017 01:54 PM    Nitrites NEGATIVE  03/07/2017 01:54 PM    Leukocyte Esterase NEGATIVE  03/07/2017 01:54 PM    Epithelial cells FEW 12/03/2016 09:25 AM    Bacteria NEGATIVE  12/03/2016 09:25 AM    WBC 5-10 12/03/2016 09:25 AM    RBC 0-5 12/03/2016 09:25 AM         Medications Reviewed:     Current Facility-Administered Medications   Medication Dose Route Frequency    potassium chloride SR (KLOR-CON 10) tablet 40 mEq  40 mEq Oral Q4H    ketorolac (TORADOL) injection 15 mg  15 mg IntraVENous Q6H PRN    amLODIPine (NORVASC) tablet 10 mg  10 mg Oral DAILY    nitroglycerin (NITROBID) 2 % ointment 1 Inch  1 Inch Topical Q6H PRN    cloNIDine HCl (CATAPRES) tablet 0.1 mg  0.1 mg Oral Q6H PRN    enoxaparin (LOVENOX) injection 50 mg  50 mg SubCUTAneous Q12H    aspirin delayed-release tablet 81 mg  81 mg Oral DAILY    atorvastatin (LIPITOR) tablet 20 mg  20 mg Oral DAILY    calcium carbonate (OS-SIMONE) tablet 500 mg [elemental]  500 mg Oral BID WITH MEALS    HYDROcodone-acetaminophen (NORCO) 5-325 mg per tablet 1 Tab  1 Tab Oral Q4H PRN    sodium chloride (NS) flush 5-10 mL  5-10 mL IntraVENous Q8H    sodium chloride (NS) flush 5-10 mL  5-10 mL IntraVENous PRN    nitroglycerin (NITROSTAT) tablet 0.4 mg  0.4 mg SubLINGual Q5MIN PRN    morphine injection 2 mg  2 mg IntraVENous Q4H PRN    ondansetron (ZOFRAN) injection 4 mg  4 mg IntraVENous Q4H PRN    0.9% sodium chloride infusion  50 mL/hr IntraVENous CONTINUOUS    tamsulosin (FLOMAX) capsule 0.4 mg  0.4 mg Oral DAILY ______________________________________________________________________  EXPECTED LENGTH OF STAY: 3d 0h  ACTUAL LENGTH OF STAY:          3                 Jaylin Mendoza MD

## 2017-03-10 NOTE — PROGRESS NOTES
PT Note:    Orders received and acknowledged. Chart reviewed and initiated evaluation and mobility with patient. RN entered room and needing to place IV and provide pain medication. Requested PT return to complete evaluation. Social history obtained as follows: At baseline patient ambulates with RW and no history of falls. Lives at [facility], she requires assistance with showering only. Will continue to follow.     Time Spent:  8 minutes

## 2017-03-11 PROCEDURE — 74011250636 HC RX REV CODE- 250/636: Performed by: HOSPITALIST

## 2017-03-11 PROCEDURE — 74011250637 HC RX REV CODE- 250/637: Performed by: STUDENT IN AN ORGANIZED HEALTH CARE EDUCATION/TRAINING PROGRAM

## 2017-03-11 PROCEDURE — 74011250637 HC RX REV CODE- 250/637: Performed by: HOSPITALIST

## 2017-03-11 PROCEDURE — 65660000000 HC RM CCU STEPDOWN

## 2017-03-11 PROCEDURE — 74011250636 HC RX REV CODE- 250/636: Performed by: STUDENT IN AN ORGANIZED HEALTH CARE EDUCATION/TRAINING PROGRAM

## 2017-03-11 PROCEDURE — 74011250637 HC RX REV CODE- 250/637: Performed by: INTERNAL MEDICINE

## 2017-03-11 PROCEDURE — 74011250636 HC RX REV CODE- 250/636: Performed by: INTERNAL MEDICINE

## 2017-03-11 RX ORDER — POTASSIUM CHLORIDE 750 MG/1
20 TABLET, FILM COATED, EXTENDED RELEASE ORAL
Status: COMPLETED | OUTPATIENT
Start: 2017-03-11 | End: 2017-03-11

## 2017-03-11 RX ORDER — OXYCODONE HYDROCHLORIDE 5 MG/1
2.5 TABLET ORAL
Status: DISCONTINUED | OUTPATIENT
Start: 2017-03-11 | End: 2017-03-12

## 2017-03-11 RX ORDER — METOPROLOL TARTRATE 25 MG/1
12.5 TABLET, FILM COATED ORAL EVERY 12 HOURS
Status: DISCONTINUED | OUTPATIENT
Start: 2017-03-11 | End: 2017-03-12 | Stop reason: HOSPADM

## 2017-03-11 RX ADMIN — KETOROLAC TROMETHAMINE 15 MG: 30 INJECTION, SOLUTION INTRAMUSCULAR at 12:15

## 2017-03-11 RX ADMIN — Medication 10 ML: at 11:59

## 2017-03-11 RX ADMIN — POTASSIUM CHLORIDE 20 MEQ: 750 TABLET, FILM COATED, EXTENDED RELEASE ORAL at 11:59

## 2017-03-11 RX ADMIN — METOPROLOL TARTRATE 12.5 MG: 25 TABLET ORAL at 21:43

## 2017-03-11 RX ADMIN — ENOXAPARIN SODIUM 50 MG: 60 INJECTION SUBCUTANEOUS at 21:42

## 2017-03-11 RX ADMIN — ENOXAPARIN SODIUM 50 MG: 60 INJECTION SUBCUTANEOUS at 09:19

## 2017-03-11 RX ADMIN — ATORVASTATIN CALCIUM 20 MG: 20 TABLET, FILM COATED ORAL at 09:18

## 2017-03-11 RX ADMIN — AMLODIPINE BESYLATE 10 MG: 5 TABLET ORAL at 09:19

## 2017-03-11 RX ADMIN — METOPROLOL TARTRATE 12.5 MG: 25 TABLET ORAL at 11:59

## 2017-03-11 RX ADMIN — HYDROCODONE BITARTRATE AND ACETAMINOPHEN 1 TABLET: 5; 325 TABLET ORAL at 09:19

## 2017-03-11 RX ADMIN — TAMSULOSIN HYDROCHLORIDE 0.4 MG: 0.4 CAPSULE ORAL at 09:18

## 2017-03-11 RX ADMIN — CALCIUM CARBONATE 500 MG: 1250 TABLET ORAL at 09:18

## 2017-03-11 RX ADMIN — SODIUM CHLORIDE 50 ML/HR: 900 INJECTION, SOLUTION INTRAVENOUS at 09:30

## 2017-03-11 RX ADMIN — CALCIUM CARBONATE 500 MG: 1250 TABLET ORAL at 17:07

## 2017-03-11 RX ADMIN — Medication 10 ML: at 06:00

## 2017-03-11 RX ADMIN — ASPIRIN 81 MG: 81 TABLET, COATED ORAL at 09:19

## 2017-03-11 NOTE — PROGRESS NOTES
Hospitalist Progress Note          Date of Service:  3/11/2017  NAME:  Nicol Rivers  :  1925  MRN:  [id number]      Admission Summary:   Nicol Rivers is a 80 y.o. female with history of prior CVA/TIA, HTN, DLD, who presents with intermittent left chest pain x 2 - 3 days and right-sided upper quadrant abdominal pain / right chest pain.     Interval history / Subjective:   No complaints today, still waiting on daughter regarding where she wants pt to be placed     Assessment & Plan:     Chest pain (POA) - atypical, left-sided, likely pulmonary embolism. Cardiac, GI and nephro seem less likely   - CTA chest 3/7 with tiny acute pulmonary emboli versus technically limited opacification of a  couple left lung segmental pulmonary arteries. Left lower lobe atelectasis vs consolidation   - ECG with normal sinus rhythm  - Troponin negative  - Resumed home aspirin, atorvastatin  - prn nitrostat  - pain control      Pulmonary embolism (POA) - stable  CTA as above  Continue Lovenox; discharge home on eliquis for 3-6 months      CBD dilatation, possible cholecystitis vs choledocholithiasis  US abdomen 3/7 with sludge and GB wall thickening, CBD distended to 11.5mm, pancrease normal  CT abdomen/pelvis 3/7 with mild intrahepatic biliary ductal dilatation with CBD 12 mm proximally then tapers to relatively normal caliber towards the ampulla. Unchanged from . No calcified gallstone. Slightly prominent pancreatic duct  MRCP done   Lipase mildly elevated to 507, normalized  GI / general surgery consulted    HTN (chronic)   Adjusted meds as needed during hospital stay. Manage pain control first before adjustments of bp meds.     Left nephrolithiasis (chronic) - this is unlikely to be causing any problem  Abd U/S 3/7 shows numerous nonobstructing left renal stones  CT abdomen/pelvis 3/7 with several nonobstructing left renal calculi with the largest measuring approximately 11 mm. No hydronephrosis or hydroureter. No perinephric inflammatory change.   -Continue Flomax  Urology consulted     Prior TIA/CVA - no apparent residual deficits, continue aspirin / atorvastatin    ELIZABETH (POA) - pre-renal, resolved with IV fluids    Acute metabolic alkalosis (POA) - suspect this is contraction alkalosis given low Cl-, treat with IV fluids and resolved    Code status: DNR  DVT prophylaxis: Lovenox  L[facility] at : [facility]:  to followup with patient's daughter where she wants the patient to go       Hospital Problems  Date Reviewed: 3/7/2017          Codes Class Noted POA    * (Principal)Chest pain ICD-10-CM: R07.9  ICD-9-CM: 786.50  3/7/2017 Yes        Cholecystitis ICD-10-CM: K81.9  ICD-9-CM: 575.10  3/7/2017 Yes        Nephrolithiasis ICD-10-CM: N20.0  ICD-9-CM: 592.0  3/7/2017 Yes                Review of Systems:   Review of systems not obtained due to patient factors. Vital Signs:    Last 24hrs VS reviewed since prior progress note. Most recent are:  Visit Vitals    /77 (BP 1 Location: Left arm, BP Patient Position: At rest)    Pulse 87    Temp 98.5 °F (36.9 °C)    Resp 18    Ht 5' (1.524 m)    Wt 54.4 kg (120 lb)    SpO2 93%    BMI 23.44 kg/m2         Intake/Output Summary (Last 24 hours) at 03/11/17 1138  Last data filed at 03/11/17 1136   Gross per 24 hour   Intake           5767.5 ml   Output             2051 ml   Net           3716.5 ml        Physical Examination:             Constitutional:  No acute distress, cooperative, pleasant    ENT:  Oral mucous moist, oropharynx benign. Neck supple,    Resp:  CTA bilaterally. Coarse breath sounds; No wheezing/rhonchi/rales. No accessory muscle use   CV:  Regular rhythm, normal rate, no murmurs, gallops, rubs    GI:  Soft, non distended, non tender. normoactive bowel sounds. Musculoskeletal:  :  No edema, warm; No CVA tenderness    Neurologic:  Moves all extremities.   Not oriented           Data Review:       Labs:     Recent Labs      03/10/17   0515  03/09/17   0349   WBC  4.1  5.2   HGB  11.8  12.0   HCT  36.1  36.3   PLT  236  249     Recent Labs      03/10/17   0515  03/09/17   0349   NA  141  140   K  3.3*  3.4*   CL  108  107   CO2  27  27   BUN  5*  9   CREA  0.57  0.49*   GLU  99  82   CA  8.9  8.4*   MG   --   1.8   PHOS   --   2.3*     Recent Labs      03/10/17   0515  03/09/17   1106   SGOT  12*  16   ALT  14  17   AP  75  81   TBILI  0.6  0.7   TP  5.4*  5.8*   ALB  2.8*  3.1*   GLOB  2.6  2.7       Lab Results   Component Value Date/Time    Cholesterol, total 168 05/21/2010 03:00 AM    HDL Cholesterol 53 05/21/2010 03:00 AM    LDL, calculated 95.4 05/21/2010 03:00 AM    Triglyceride 98 05/21/2010 03:00 AM    CHOL/HDL Ratio 3.2 05/21/2010 03:00 AM     Lab Results   Component Value Date/Time    Glucose (POC) 81 03/08/2017 09:18 PM    Glucose (POC) 104 05/22/2010 08:06 AM    Glucose (POC) 120 05/21/2010 10:53 PM    Glucose (POC) 122 05/21/2010 05:05 PM     Lab Results   Component Value Date/Time    Color YELLOW/STRAW 03/07/2017 01:54 PM    Appearance CLEAR 03/07/2017 01:54 PM    Specific gravity 1.005 03/07/2017 01:54 PM    Specific gravity 1.017 12/03/2016 09:25 AM    pH (UA) 7.0 03/07/2017 01:54 PM    Protein NEGATIVE  03/07/2017 01:54 PM    Glucose NEGATIVE  03/07/2017 01:54 PM    Ketone 15 03/07/2017 01:54 PM    Bilirubin NEGATIVE  03/07/2017 01:54 PM    Urobilinogen 0.2 03/07/2017 01:54 PM    Nitrites NEGATIVE  03/07/2017 01:54 PM    Leukocyte Esterase NEGATIVE  03/07/2017 01:54 PM    Epithelial cells FEW 12/03/2016 09:25 AM    Bacteria NEGATIVE  12/03/2016 09:25 AM    WBC 5-10 12/03/2016 09:25 AM    RBC 0-5 12/03/2016 09:25 AM                    Keenan Muñiz MD

## 2017-03-11 NOTE — PROGRESS NOTES
General Surgery Daily Progress Note    Admit Date: 3/7/2017  Post-Operative Day: * No surgery found * from * No surgery found *     Subjective:     Last 24 hrs: Pt cont to c/o LUQ pain right below breast -   MRCP results noted - Mild extrahepatic biliary ductal dilation. No choledocholithiasis  LFTs and t bili remain nl    Objective:     Blood pressure 154/69, pulse 76, temperature 97.9 °F (36.6 °C), resp. rate 20, height 5' (1.524 m), weight 120 lb (54.4 kg), SpO2 91 %. Temp (24hrs), Av.9 °F (36.6 °C), Min:97.1 °F (36.2 °C), Max:98.4 °F (36.9 °C)      _____________________  Physical Exam:     Alert and Oriented, x3, in no acute distress. Cardiovascular: RRR, no peripheral edema  Lungs:CTAB   Abdomen: soft, nl BS, pain below L breast - ? Rib pain      Assessment:   Principal Problem:    Chest pain (3/7/2017)    Active Problems:    Cholecystitis (3/7/2017)      Nephrolithiasis (3/7/2017)            Plan:     Nl MRCP   ? Rib or muscular pain  No surgical needs  Will sign off - available if needed    Data Review:    Recent Labs      03/10/17   0515  17   0349   WBC  4.1  5.2   HGB  11.8  12.0   HCT  36.1  36.3   PLT  236  249     Recent Labs      03/10/17   0515  17   1106  17   0349   NA  141   --   140   K  3.3*   --   3.4*   CL  108   --   107   CO2  27   --   27   GLU  99   --   82   BUN  5*   --   9   CREA  0.57   --   0.49*   CA  8.9   --   8.4*   MG   --    --   1.8   PHOS   --    --   2.3*   ALB  2.8*  3.1*   --    SGOT  12*  16   --    ALT  14  17   --      No results for input(s): AML, LPSE in the last 72 hours.         ______________________  Medications:    Current Facility-Administered Medications   Medication Dose Route Frequency    ketorolac (TORADOL) injection 15 mg  15 mg IntraVENous Q6H PRN    amLODIPine (NORVASC) tablet 10 mg  10 mg Oral DAILY    nitroglycerin (NITROBID) 2 % ointment 1 Inch  1 Inch Topical Q6H PRN    cloNIDine HCl (CATAPRES) tablet 0.1 mg  0.1 mg Oral Q6H PRN    enoxaparin (LOVENOX) injection 50 mg  50 mg SubCUTAneous Q12H    aspirin delayed-release tablet 81 mg  81 mg Oral DAILY    atorvastatin (LIPITOR) tablet 20 mg  20 mg Oral DAILY    calcium carbonate (OS-SIMONE) tablet 500 mg [elemental]  500 mg Oral BID WITH MEALS    HYDROcodone-acetaminophen (NORCO) 5-325 mg per tablet 1 Tab  1 Tab Oral Q4H PRN    sodium chloride (NS) flush 5-10 mL  5-10 mL IntraVENous Q8H    sodium chloride (NS) flush 5-10 mL  5-10 mL IntraVENous PRN    nitroglycerin (NITROSTAT) tablet 0.4 mg  0.4 mg SubLINGual Q5MIN PRN    morphine injection 2 mg  2 mg IntraVENous Q4H PRN    ondansetron (ZOFRAN) injection 4 mg  4 mg IntraVENous Q4H PRN    0.9% sodium chloride infusion  50 mL/hr IntraVENous CONTINUOUS    tamsulosin (FLOMAX) capsule 0.4 mg  0.4 mg Oral DAILY

## 2017-03-11 NOTE — PROGRESS NOTES
Bedside and Verbal shift change report given to Dean Edward (oncoming nurse) by Earlene Zimmerman (offgoing nurse). Report included the following information SBAR, Kardex, ED Summary, MAR, Recent Results and Cardiac Rhythm NSR.

## 2017-03-12 VITALS
RESPIRATION RATE: 20 BRPM | TEMPERATURE: 98.4 F | WEIGHT: 124.56 LBS | OXYGEN SATURATION: 93 % | HEIGHT: 60 IN | SYSTOLIC BLOOD PRESSURE: 135 MMHG | DIASTOLIC BLOOD PRESSURE: 64 MMHG | HEART RATE: 60 BPM | BODY MASS INDEX: 24.45 KG/M2

## 2017-03-12 PROBLEM — N20.0 NEPHROLITHIASIS: Status: RESOLVED | Noted: 2017-03-07 | Resolved: 2017-03-12

## 2017-03-12 PROBLEM — R07.9 CHEST PAIN: Status: RESOLVED | Noted: 2017-03-07 | Resolved: 2017-03-12

## 2017-03-12 PROBLEM — K81.9 CHOLECYSTITIS: Status: RESOLVED | Noted: 2017-03-07 | Resolved: 2017-03-12

## 2017-03-12 PROCEDURE — 74011250637 HC RX REV CODE- 250/637: Performed by: INTERNAL MEDICINE

## 2017-03-12 PROCEDURE — 74011250636 HC RX REV CODE- 250/636: Performed by: STUDENT IN AN ORGANIZED HEALTH CARE EDUCATION/TRAINING PROGRAM

## 2017-03-12 PROCEDURE — 74011250636 HC RX REV CODE- 250/636: Performed by: INTERNAL MEDICINE

## 2017-03-12 PROCEDURE — 74011250637 HC RX REV CODE- 250/637: Performed by: HOSPITALIST

## 2017-03-12 PROCEDURE — 74011250637 HC RX REV CODE- 250/637: Performed by: STUDENT IN AN ORGANIZED HEALTH CARE EDUCATION/TRAINING PROGRAM

## 2017-03-12 RX ORDER — TAMSULOSIN HYDROCHLORIDE 0.4 MG/1
0.4 CAPSULE ORAL DAILY
Qty: 60 CAP | Refills: 0 | Status: SHIPPED | OUTPATIENT
Start: 2017-03-12 | End: 2018-10-24

## 2017-03-12 RX ORDER — METOPROLOL TARTRATE 25 MG/1
12.5 TABLET, FILM COATED ORAL EVERY 12 HOURS
Qty: 30 TAB | Refills: 0 | Status: SHIPPED | OUTPATIENT
Start: 2017-03-12 | End: 2017-04-11

## 2017-03-12 RX ORDER — ACETAMINOPHEN 325 MG/1
650 TABLET ORAL
Qty: 100 TAB | Refills: 0 | Status: SHIPPED | OUTPATIENT
Start: 2017-03-12 | End: 2018-10-24

## 2017-03-12 RX ORDER — OXYCODONE HYDROCHLORIDE 5 MG/1
2.5 TABLET ORAL
Status: DISCONTINUED | OUTPATIENT
Start: 2017-03-12 | End: 2017-03-12 | Stop reason: HOSPADM

## 2017-03-12 RX ORDER — OXYCODONE HYDROCHLORIDE 5 MG/1
2.5 TABLET ORAL
Qty: 6 TAB | Refills: 0 | Status: SHIPPED | OUTPATIENT
Start: 2017-03-12 | End: 2018-10-24

## 2017-03-12 RX ORDER — OXYCODONE AND ACETAMINOPHEN 5; 325 MG/1; MG/1
1 TABLET ORAL
Status: DISCONTINUED | OUTPATIENT
Start: 2017-03-12 | End: 2017-03-12

## 2017-03-12 RX ORDER — ACETAMINOPHEN 325 MG/1
650 TABLET ORAL
Status: DISCONTINUED | OUTPATIENT
Start: 2017-03-12 | End: 2017-03-12 | Stop reason: HOSPADM

## 2017-03-12 RX ORDER — HYDRALAZINE HYDROCHLORIDE 25 MG/1
25 TABLET, FILM COATED ORAL
Status: DISCONTINUED | OUTPATIENT
Start: 2017-03-12 | End: 2017-03-12 | Stop reason: HOSPADM

## 2017-03-12 RX ADMIN — Medication 10 ML: at 14:00

## 2017-03-12 RX ADMIN — ASPIRIN 81 MG: 81 TABLET, COATED ORAL at 08:15

## 2017-03-12 RX ADMIN — SODIUM CHLORIDE 50 ML/HR: 900 INJECTION, SOLUTION INTRAVENOUS at 01:32

## 2017-03-12 RX ADMIN — CALCIUM CARBONATE 500 MG: 1250 TABLET ORAL at 08:15

## 2017-03-12 RX ADMIN — ENOXAPARIN SODIUM 50 MG: 60 INJECTION SUBCUTANEOUS at 09:57

## 2017-03-12 RX ADMIN — OXYCODONE HYDROCHLORIDE 2.5 MG: 5 TABLET ORAL at 12:58

## 2017-03-12 RX ADMIN — AMLODIPINE BESYLATE 10 MG: 5 TABLET ORAL at 08:15

## 2017-03-12 RX ADMIN — METOPROLOL TARTRATE 12.5 MG: 25 TABLET ORAL at 08:15

## 2017-03-12 RX ADMIN — ATORVASTATIN CALCIUM 20 MG: 20 TABLET, FILM COATED ORAL at 08:15

## 2017-03-12 RX ADMIN — TAMSULOSIN HYDROCHLORIDE 0.4 MG: 0.4 CAPSULE ORAL at 08:15

## 2017-03-12 RX ADMIN — HYDROCODONE BITARTRATE AND ACETAMINOPHEN 1 TABLET: 5; 325 TABLET ORAL at 08:04

## 2017-03-12 NOTE — PROGRESS NOTES
CM called by Maryann Musa RN on floor to say that patient is being discharged today. She also, stated that family wanted  [facility] services for transport. First I called [facility] and spoke with Lilian Cisneros [phone] and he stated they are expecting patient today. He would like for nurse to fax [phone] discharge instructions, Mar, and Kardex. Then call report to [phone] and ask for nurse who will be getting Ms. Jama Myers. I called Ms. Reddy and talked with her about  [facility] and when I called they did not have any wheel chair van services on Sunday. I could get stretcher transport for $240. We discussed this and I told her that [facility] would assist patient to her [facility] and then the facility will assist her getting patient into their facility. Maryann Musa RN will let them know of change in transportation when she calls report.   Ms. Emily Green will be here to  patient and take her to [facility] between 2-3pm.  Lupe Tim RN CRM

## 2017-03-12 NOTE — PROGRESS NOTES
Hospital to SNF SBAR Handoff - Brunildarickey Cortesprem                                                                        80 y.o.   female    [address]   Room: [address] [place] [address]    [facility] [address] [id number]  Unit Phone# :  [phone]      [address]az[address] [facility] [address]  Dept: [phone]  Loc: [phone]                    SITUATION     Admitted:  3/7/2017         Attending Provider:  Ave Orellana MD       Consultations:  IP CONSULT TO HOSPITALIST  IP CONSULT TO GENERAL SURGERY  IP CONSULT TO UROLOGY  IP CONSULT TO Nicki [phone]    PCP:  Kaitlin Santos, [address] [place] [address]    Treatment Team: Attending Provider: Ave Orellana MD; Consulting Provider: Meron Hilton MD; Consulting Provider: Héctor Borges MD; Consulting Provider: Laura [facility] Fazal Maguire NP; Consulting Provider: Yuliana Copeland MD; Consulting Provider: Lena Ly MD; Care Manager: ARNULFO Escobar; Utilization Review: Susanne Amaya RN; Nurse Practitioner: Delfino eLe.  Audra Madera NP; Consulting Provider: Sarina Eisenmenger, MD; Consulting Provider: Christin Wood MD    Admitting Dx:  Chest pain       Principal Problem: Chest pain    * No surgery found * of      BY: * Surgery not found *             ON: * No surgery found *                  Code Status: DNR                Advance Directives:   Advance Care Planning 3/7/2017   Patient's Healthcare Decision Maker is: Legal Next of Cassius 69   Primary Decision Maker Name (No Data)   Primary Decision Maker Phone Number (No Data)   Primary Decision Maker Relationship to Patient -   Confirm Advance Directive Yes, on file   Does the patient have other document types Power of     (Send w/patient)   Verified       Isolation:  There are currently no Active Isolations       MDRO: No current active infections    Pain Medications given:  oxycodone 2.5 mg    Last dose: 3/12/2017 at  1258    Special Equipment needed: no  Type of equipment:    (Not currently on dialysis)       BACKGROUND     Allergies: Allergies   Allergen Reactions    Hydrochlorothiazide Nausea and Vomiting       Past Medical History:   Diagnosis Date    HTN (hypertension)     Hypercholesteremia     Osteoporosis     Stroke ([address]     CVA, TIA    TIA (transient ischemic attack)     Vertebral fracture        Past Surgical History:   Procedure Laterality Date    HX HIP REPLACEMENT  2009    left    HX ORTHOPAEDIC         Prescriptions Prior to Admission   Medication Sig    HYDROcodone-acetaminophen (NORCO) 5-325 mg per tablet Take 1 Tab by mouth every four (4) hours as needed for Pain.  chlorzoxazone (PARAFON FORTE) 500 mg tablet Take 500 mg by mouth three (3) times daily as needed for Muscle Spasm(s).  verapamil ER (CALAN-SR) 120 mg tablet Take 1 Tab by mouth daily. Indications: Hypertension    calcium carbonate (OS-SIMONE) 500 mg calcium (1,250 mg) tablet Take 1 Tab by mouth two (2) times a day.  atorvastatin (LIPITOR) 20 mg tablet Take 20 mg by mouth daily.  aspirin delayed-release 81 mg tablet Take 81 mg by mouth daily.  multivitamin (ONE A DAY) tablet Take 1 Tab by mouth daily.  furosemide (LASIX) 20 mg tablet Take 20 mg by mouth daily.  lisinopril (PRINIVIL, ZESTRIL) 40 mg tablet Take 40 mg by mouth daily.  AMLODIPINE BESYLATE (AMLODIPINE PO) Take 5 mg by mouth daily.  alendronate (FOSAMAX) 70 mg tablet Take 70 mg by mouth every seven (7) days. Hard scripts included in transfer packet yes    Vaccinations: There is no immunization history on file for this patient. Readmission Risks:    Known Risks: n/a        The Charlson CoMorbitiy Index tool is an evidenced based tool that has more automatic generated information. The tool looks at many different items such as the age of the patient, how many times they were admitted in the last calendar year, current length of stay in the hospital and their diagnosis.  All of these items are pulled automatically from information documented in the chart from various places and will generate a score that predicts whether a patient is at low (less than 13), medium (13-20) or high (21 or greater) risk of being readmitted.         ASSESSMENT                Temp: 98.4 °F (36.9 °C) (03/12/17 1215) Pulse (Heart Rate): 60 (03/12/17 1215)     Resp Rate: 20 (03/12/17 1215)           BP: 135/64 (03/12/17 1215)     O2 Sat (%): 93 % (03/12/17 1215)     Weight: 56.5 kg (124 lb 9 oz)    Height: 5' (152.4 cm) (03/07/17 0655)       If above not within 1 hour of discharge:    BP:_____  P:____  R:____ T:_____ O2 Sat: ___%  O2: ______    Active Orders   Diet    DIET GI LITE (POST SURGICAL)         Orientation: oriented to time, place, person and situation     Active Behaviors: None                                   Active Lines/Drains:  (Peg Tube / Horn / CL or S/L?): no    Urinary Status: Voiding     Last BM: Last Bowel Movement Date: 03/11/17     Skin Integrity: Intact             Mobility: Slightly limited   Weight Bearing Status: WBAT (Weight Bearing as Tolerated)      Gait Training  Assistive Device: Walker, rolling  Ambulation - Level of Assistance: Contact guard assistance  Distance (ft): 50 Feet (ft)         Lab Results   Component Value Date/Time    Glucose 99 03/10/2017 05:15 AM    Hemoglobin A1c 6.1 01/13/2017 03:45 AM    INR 1.0 11/07/2011 08:05 AM    HGB 11.8 03/10/2017 05:15 AM    HGB 12.0 03/09/2017 03:49 AM        RECOMMENDATION     See After Visit Summary (AVS) for:  · Discharge instructions  · After [address]   · Special equipment needed (entered pre-discharge by Care Management)  · Medication Reconciliation    · Follow up Appointment(s)         Report given/sent by:  Cezar Banegas                    Verbal report given to: Trini Marvin  [facility]XED to:  [facility]         Estimated discharge time:  3/12/2017 at [phone]

## 2017-03-12 NOTE — DISCHARGE SUMMARY
Discharge Summary       PATIENT ID: Trevon De La Fuente  MRN: [id number]   YOB: 1925    DATE OF ADMISSION: 3/7/2017  6:50 AM    DATE OF DISCHARGE: 3/12/2017  PRIMARY CARE PROVIDER: ABRIL Fox     ATTENDING PHYSICIAN: Kj Santillan MD  DISCHARGING PROVIDER: Sharon Narvaez MD    To contact this individual call [phone] and ask the  to page. If unavailable ask to be transferred the Adult Hospitalist Department. CONSULTATIONS: IP CONSULT TO HOSPITALIST  IP CONSULT TO GENERAL SURGERY  IP CONSULT TO UROLOGY  IP CONSULT TO GASTROENTEROLOGY    ADMITTING [address] [facility] COURSE:   Admission Summary:   Trevon De La Fuente is a 80 y.o. female with history of prior CVA/TIA, HTN, DLD, who presents with intermittent left chest pain x 2 - 3 days and right-sided upper quadrant abdominal pain / right chest pain.          Assessment & Plan:      Atypical chest pain (POA) - atypical, left-sided, likely pulmonary embolism. Cardiac, GI and nephro seem less likely, CTA report noted and may contribute/cause the symptoms as well as musculoskeletal.  - CTA chest 3/7 with tiny acute pulmonary emboli versus technically limited opacification of a  couple left lung segmental pulmonary arteries. Left lower lobe atelectasis vs consolidation.  -Was recommended   - ECG with normal sinus rhythm  - Troponin negative  - Resumed home aspirin, atorvastatin     Pulmonary embolism (POA) - stable  CTA as above  Given lovenox in hospital and discharge on eliquis for 3-6 months. Recommend repeat CT as outpatient due to patients new apixaban medication.      CBD dilatation  US abdomen 3/7 with sludge and GB wall thickening, CBD distended to 11.5mm, pancrease normal  CT abdomen/pelvis 3/7 with mild intrahepatic biliary ductal dilatation with CBD 12 mm proximally then tapers to relatively normal caliber towards the ampulla. Unchanged from 1/12. No calcified gallstone.  Slightly prominent pancreatic duct  MRCP done Lipase mildly elevated to 507, normalized  GI / general surgery consulted--no intervention warranted     HTN (chronic)   Adjusted meds as needed during hospital stay. Manage pain control first before adjustments of bp meds.     Left nephrolithiasis (chronic) - this is unlikely to be causing any problem  Abd U/S 3/7 shows numerous nonobstructing left renal stones  CT abdomen/pelvis 3/7 with several nonobstructing left renal calculi with the largest measuring approximately 11 mm. No hydronephrosis or hydroureter. No perinephric inflammatory change.   -Continue Flomax  Urology consulted with recs to followup with Dr. Delaney Sunshine at [facility] as outpatient      Prior TIA/CVA - no apparent residual deficits, continue aspirin / atorvastatin     ELIZABETH (POA) - pre-renal, resolved with IV fluids     Acute metabolic alkalosis (POA) - suspect this is contraction alkalosis given low Cl-, treat with IV fluids and resolved              FOLLOW UP APPOINTMENTS:    Follow-up Information     Follow up With Details Comments Contact Info    ABRIL Fernandez Call in 1 day call to schedule appointment for hospital discharge followup and blood thinner medication, apixaban [address]  [place] [address]      Calvin Shook MD Call in 1 day Call to schedule appointment with Dr. Reyes [place] [phone]               DIET: low fat, heart healthy    ACTIVITY: as per rehab         DISCHARGE MEDICATIONS:      Current Discharge Medication List      START taking these medications    Details   acetaminophen (TYLENOL) 325 mg tablet Take 2 Tabs by mouth every six (6) hours as needed for up to 60 doses. Qty: 100 Tab, Refills: 0      metoprolol tartrate (LOPRESSOR) 25 mg tablet Take 0.5 Tabs by mouth every twelve (12) hours for 60 doses. Qty: 30 Tab, Refills: 0      oxyCODONE IR (ROXICODONE) 5 mg immediate release tablet Take 0.5 Tabs by mouth every four (4) hours as needed. Max Daily Amount: 15 mg.  Qty: 6 Tab, Refills: 0      tamsulosin (FLOMAX) 0.4 mg capsule Take 1 Cap by mouth daily. Qty: 60 Cap, Refills: 0      apixaban (ELIQUIS) 5 mg tablet Take 2 Tabs by mouth every twelve (12) hours. 10 mg twice daily for 7 days and then 5 mg twice daily. Qty: 14 Tab, Refills: 0         CONTINUE these medications which have NOT CHANGED    Details   chlorzoxazone (PARAFON FORTE) 500 mg tablet Take 500 mg by mouth three (3) times daily as needed for Muscle Spasm(s). calcium carbonate (OS-SIMONE) 500 mg calcium (1,250 mg) tablet Take 1 Tab by mouth two (2) times a day. atorvastatin (LIPITOR) 20 mg tablet Take 20 mg by mouth daily. aspirin delayed-release 81 mg tablet Take 81 mg by mouth daily. multivitamin (ONE A DAY) tablet Take 1 Tab by mouth daily. furosemide (LASIX) 20 mg tablet Take 20 mg by mouth daily. AMLODIPINE BESYLATE (AMLODIPINE PO) Take 5 mg by mouth daily. alendronate (FOSAMAX) 70 mg tablet Take 70 mg by mouth every seven (7) days. STOP taking these medications       HYDROcodone-acetaminophen (NORCO) 5-325 mg per tablet Comments:   Reason for Stopping:         verapamil ER (CALAN-SR) 120 mg tablet Comments:   Reason for Stopping:         lisinopril (PRINIVIL, ZESTRIL) 40 mg tablet Comments:   Reason for Stopping:                 NOTIFY YOUR PHYSICIAN FOR ANY OF THE FOLLOWING:   Fever over 101 degrees for 24 hours. Chest pain, shortness of breath, fever, chills, nausea, vomiting, diarrhea, change in mentation, falling, weakness, bleeding. Severe pain or pain not relieved by medications. Or, any other signs or symptoms that you may have questions about.     DISPOSITION:    Home With:   OT  PT  HH  RN      x Long term SNF/Inpatient Rehab    Independent/assisted living    Hospice    Other:       PATIENT CONDITION AT DISCHARGE:     Functional status    Poor    x Deconditioned     Independent      Catheters/lines (plus indication)    Horn PICC     PEG    x None      Code status     Full code    x DNR      PHYSICAL EXAMINATION AT DISCHARGE:  Refer to progress note      CHRONIC MEDICAL DIAGNOSES:  Nephrolithiasis  Bronchitis  Atypical chest pain          Signed:   Rogerio Copeland MD  3/12/2017  12:24 PM

## 2018-06-13 ENCOUNTER — HOSPITAL ENCOUNTER (OUTPATIENT)
Dept: MAMMOGRAPHY | Age: 83
Discharge: HOME | End: 2018-06-13

## 2018-06-13 ENCOUNTER — HOSPITAL ENCOUNTER (OUTPATIENT)
Dept: ULTRASOUND IMAGING | Age: 83
Discharge: HOME | End: 2018-06-13
Payer: COMMERCIAL

## 2018-06-13 ENCOUNTER — HOSPITAL ENCOUNTER (OUTPATIENT)
Dept: MAMMOGRAPHY | Age: 83
Discharge: HOME | End: 2018-06-13
Payer: COMMERCIAL

## 2018-06-13 DIAGNOSIS — N63.10 LUMP OF RIGHT BREAST: ICD-10-CM

## 2018-06-13 DIAGNOSIS — Z12.31 VISIT FOR SCREENING MAMMOGRAM: ICD-10-CM

## 2018-06-13 PROCEDURE — 77066 DX MAMMO INCL CAD BI: CPT

## 2018-06-13 PROCEDURE — 76642 ULTRASOUND BREAST LIMITED: CPT

## 2018-10-19 ENCOUNTER — APPOINTMENT (OUTPATIENT)
Dept: GENERAL RADIOLOGY | Age: 83
End: 2018-10-19
Attending: EMERGENCY MEDICINE
Payer: MEDICARE

## 2018-10-19 ENCOUNTER — APPOINTMENT (OUTPATIENT)
Dept: CT IMAGING | Age: 83
End: 2018-10-19
Attending: EMERGENCY MEDICINE
Payer: MEDICARE

## 2018-10-19 ENCOUNTER — HOSPITAL ENCOUNTER (EMERGENCY)
Age: 83
Discharge: HOME | End: 2018-10-19
Attending: EMERGENCY MEDICINE
Payer: MEDICARE

## 2018-10-19 VITALS
RESPIRATION RATE: 16 BRPM | SYSTOLIC BLOOD PRESSURE: 159 MMHG | OXYGEN SATURATION: 93 % | WEIGHT: 124 LBS | HEART RATE: 70 BPM | HEIGHT: 60 IN | BODY MASS INDEX: 24.35 KG/M2 | DIASTOLIC BLOOD PRESSURE: 60 MMHG | TEMPERATURE: 98.3 F

## 2018-10-19 DIAGNOSIS — S00.03XA CONTUSION OF SCALP, INITIAL ENCOUNTER: ICD-10-CM

## 2018-10-19 DIAGNOSIS — S39.012A STRAIN OF LUMBAR REGION, INITIAL ENCOUNTER: ICD-10-CM
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
## 2018-10-19 PROCEDURE — 70450 CT HEAD/BRAIN W/O DYE: CPT

## 2018-10-19 PROCEDURE — 74011250637 HC RX REV CODE- 250/637: Performed by: EMERGENCY MEDICINE

## 2018-10-19 PROCEDURE — 72100 X-RAY EXAM L-S SPINE 2/3 VWS: CPT

## 2018-10-19 PROCEDURE — 99284 EMERGENCY DEPT VISIT MOD MDM: CPT

## 2018-10-19 RX ORDER — ACETAMINOPHEN 325 MG/1
650 TABLET ORAL
Status: COMPLETED | OUTPATIENT
Start: 2018-10-19 | End: 2018-10-19

## 2018-10-19 RX ADMIN — ACETAMINOPHEN 650 MG: 325 TABLET ORAL at 17:46

## 2018-10-19 NOTE — DISCHARGE INSTRUCTIONS
Back Strain: Care Instructions  Overview    A back strain happens when you overstretch, or pull, a muscle in your back. You may hurt your back in an accident or when you exercise or lift something. Sometimes you may not know how you hurt your back. Most back pain will get better with rest and time. You can take care of yourself at home to help your back heal.  Follow-up care is a key part of your treatment and safety. Be sure to make and go to all appointments, and call your doctor if you are having problems. It's also a good idea to know your test results and keep a list of the medicines you take. How can you care for yourself at home? · Try to stay as active as you can, but stop or reduce any activity that causes pain. · Put ice or a cold pack on the sore muscle for 10 to 20 minutes at a time to stop swelling. Try this every 1 to 2 hours for 3 days (when you are awake) or until the swelling goes down. Put a thin cloth between the ice pack and your skin. · After 2 or 3 days, apply a heating pad on low or a warm cloth to your back. Some doctors suggest that you go back and forth between hot and cold treatments. · Take pain medicines exactly as directed. ? If the doctor gave you a prescription medicine for pain, take it as prescribed. ? If you are not taking a prescription pain medicine, ask your doctor if you can take an over-the-counter medicine. · Try sleeping on your side with a pillow between your legs. Or put a pillow under your knees when you lie on your back. These measures can ease pain in your lower back. · Return to your usual level of activity slowly. When should you call for help? Call 911 anytime you think you may need emergency care. For example, call if:    · You are unable to move a leg at all.   [facility] your doctor now or seek immediate medical care if:    · You have new or worse symptoms in your legs, belly, or buttocks.  Symptoms may include:  ? Numbness or tingling. ? Weakness. ? Pain.     · You lose bladder or bowel control.    Watch closely for changes in your health, and be sure to contact your doctor if:    · You have a fever, lose weight, or don't feel well.     · You are not getting better as expected. Where can you learn more? Go to http://sena-bindu.info/. Enter E685 in the search box to learn more about \"Back Strain: Care Instructions. \"  Current as of: November 29, 2017  Content Version: 11.8  © 6283-9876 [employer]. Care instructions adapted under license by [employer] (which disclaims liability or warranty for this information). If you have questions about a medical condition or this instruction, always ask your healthcare professional. [address] any warranty or liability for your use of this information. Bruises: Care Instructions  Your Care Instructions    Bruises occur when small blood vessels under the skin tear or rupture, most often from a twist, bump, or fall. Blood leaks into tissues under the skin and causes a black-and-blue spot that often turns colors, including purplish black, reddish blue, or yellowish green, as the bruise heals. Bruises hurt, but most are not serious and will go away on their own within 2 to 4 weeks. Sometimes, gravity causes them to spread down the body. A leg bruise usually will take longer to heal than a bruise on the face or arms. Follow-up care is a key part of your treatment and safety. Be sure to make and go to all appointments, and call your doctor if you are having problems. It's also a good idea to know your test results and keep a list of the medicines you take. How can you care for yourself at home? · Take pain medicines exactly as directed. ? If the doctor gave you a prescription medicine for pain, take it as prescribed.   ? If you are not taking a prescription pain medicine, ask your doctor if you can take an over-the-counter medicine. · Put ice or a cold pack on the area for 10 to 20 minutes at a time. Put a thin cloth between the ice and your skin. · If you can, prop up the bruised area on pillows as much as possible for the next few days. Try to keep the bruise above the level of your heart. When should you call for help? Call your doctor now or seek immediate medical care if:    · You have signs of infection, such as:  ? Increased pain, swelling, warmth, or redness. ? Red streaks leading from the bruise. ? Pus draining from the bruise. ? A fever.     · You have a bruise on your leg and signs of a blood clot, such as:  ? Increasing redness and swelling along with warmth, tenderness, and pain in the bruised area. ? Pain in your calf, back of the knee, thigh, or groin. ? Redness and swelling in your leg or groin.     · Your pain gets worse.    Watch closely for changes in your health, and be sure to contact your doctor if:    · You do not get better as expected. Where can you learn more? Go to http://sena-bindu.info/. Enter ([phone] 323-612 in the search box to learn more about \"Bruises: Care Instructions. \"  Current as of: November 20, 2017  Content Version: 11.8  © 4808-8876 [employer]. Care instructions adapted under license by [employer] (which disclaims liability or warranty for this information). If you have questions about a medical condition or this instruction, always ask your healthcare professional. [place] [address] any warranty or liability for your use of this information.

## 2018-10-19 NOTE — ED NOTES
Patient discharged by MD. Results and discharge instructions reviewed with the patient by MD. Patient verbalizes understanding. Patient discharged home, stable, ambulatory, in no acute distress. Patient wheeled to car with family. Family given paperwork. Verbalized understanding

## 2018-10-19 NOTE — ED TRIAGE NOTES
TRIAGE NOTE: Arrives via EMS from [facility]. Walking into an elevator she bumped heads with another resident and fell backwards to floor. Pt denies LOC. Complains of posterior head pain and low mid back pain.

## 2018-10-19 NOTE — ED PROVIDER NOTES
80-year-old white female presents to the emergency department after a fall. Patient bumped into another resident at the assisted living. She fell backwards and hit the back of her head on the floor. She is currently complaining of a headache. The headache is posterior and mild. No neck pain. No abdominal pain. No vomiting. Patient also complains of lower back pain. Nothing makes this pain better or worse. Pain is located in the lower lumbar region. She hit her back when she fell. No loss of consciousness. No chest pain. Mild headache. Patient came via EMS. Patient is not on blood thinners. Patient denies tobacco or alcohol use. Past Medical History:  
Diagnosis Date  Breast lump 2018  
 right breast lump x a few days  HTN (hypertension)  Hypercholesteremia  Osteoporosis  Stroke ([facility] [address] CVA, TIA  TIA (transient ischemic attack)  Vertebral fracture Past Surgical History:  
Procedure Laterality Date  HX HIP REPLACEMENT  2009  
 left  HX ORTHOPAEDIC History reviewed. No pertinent family history. Social History Socioeconomic History  Marital status:  Spouse name: Not on file  Number of children: Not on file  Years of education: Not on file  Highest education level: Not on file Social Needs  Financial resource strain: Not on file  Food insecurity - worry: Not on file  Food insecurity - inability: Not on file  Transportation needs - medical: Not on file  Transportation needs - non-medical: Not on file Occupational History  Not on file Tobacco Use  Smoking status: Former Smoker Years: 40.00 Last attempt to quit: 1986 Years since quittin.9  Smokeless tobacco: Never Used Substance and Sexual Activity  Alcohol use: Yes  Drug use: No  
 Sexual activity: Not on file Other Topics Concern  Not on file Social History Narrative  Not on file ALLERGIES: Hydrochlorothiazide Review of Systems Constitutional: Negative for fever. HENT: Negative for facial swelling. Eyes: Negative for pain. Respiratory: Negative for cough, chest tightness and shortness of breath. Cardiovascular: Negative for chest pain and leg swelling. Gastrointestinal: Negative for abdominal distention and vomiting. Endocrine: Negative for polyuria. Genitourinary: Negative for difficulty urinating and flank pain. Musculoskeletal: Positive for back pain. Negative for arthralgias. Skin: Negative for color change. Allergic/Immunologic: Negative for immunocompromised state. Neurological: Positive for headaches. Negative for dizziness. Hematological: Does not bruise/bleed easily. Psychiatric/Behavioral: Negative for agitation. All other systems reviewed and are negative. Vitals:  
 10/19/18 1740 BP: 176/68 Pulse: 65 Resp: 20 Temp: 98.4 °F (36.9 °C) SpO2: 91% Physical Exam  
Constitutional: She is oriented to person, place, and time. She appears well-developed and well-nourished. No distress. HENT:  
Head: Normocephalic. Right Ear: External ear normal.  
Left Ear: External ear normal.  
Mild tenderness over posterior scalp, no lacerations Eyes: Conjunctivae and EOM are normal.  
Neck: Normal range of motion. Neck supple. No tracheal deviation present. No thyromegaly present. No tenderness Cardiovascular: Normal rate, regular rhythm, normal heart sounds and intact distal pulses. Exam reveals no gallop and no friction rub. No murmur heard. Pulmonary/Chest: Effort normal and breath sounds normal. No respiratory distress. She has no wheezes. She has no rales. She exhibits no tenderness. Abdominal: Soft. Bowel sounds are normal. She exhibits no distension. There is no tenderness. Musculoskeletal: Normal range of motion. She exhibits tenderness. She exhibits no edema or deformity. Mild pain in lower lumbar area to palpation Good ROM of arms and legs w/o pain Neurological: She is alert and oriented to person, place, and time. She displays normal reflexes. No cranial nerve deficit. She exhibits normal muscle tone. Coordination normal.  
Skin: Skin is warm and dry. No rash noted. She is not diaphoretic. No erythema. Psychiatric: She has a normal mood and affect. Her behavior is normal. Judgment and thought content normal.  
  
 
MDM Number of Diagnoses or Management Options Diagnosis management comments: Patient looks well and nontoxic. She has a headache and low back pain. We'll check a head CT as well as back x-rays. We'll reassess when testing is completed. Amount and/or Complexity of Data Reviewed Tests in the radiology section of CPT®: ordered and reviewed Decide to obtain previous medical records or to obtain history from someone other than the patient: yes Review and summarize past medical records: yes Independent visualization of images, tracings, or specimens: yes Procedures CT head shows no acute process Lumbar xray: No fracture Home with PCP f/u and return precautions

## 2018-10-24 ENCOUNTER — HOSPITAL ENCOUNTER (INPATIENT)
Age: 83
LOS: 7 days | Discharge: NURSING, REHAB OR LONG-TERM CARE | DRG: 871 | End: 2018-10-31
Attending: EMERGENCY MEDICINE | Admitting: INTERNAL MEDICINE
Payer: MEDICARE

## 2018-10-24 ENCOUNTER — APPOINTMENT (OUTPATIENT)
Dept: GENERAL RADIOLOGY | Age: 83
DRG: 871 | End: 2018-10-24
Attending: EMERGENCY MEDICINE
Payer: MEDICARE

## 2018-10-24 ENCOUNTER — APPOINTMENT (OUTPATIENT)
Dept: CT IMAGING | Age: 83
DRG: 871 | End: 2018-10-24
Attending: INTERNAL MEDICINE
Payer: MEDICARE

## 2018-10-24 ENCOUNTER — APPOINTMENT (OUTPATIENT)
Dept: GENERAL RADIOLOGY | Age: 83
DRG: 871 | End: 2018-10-24
Attending: INTERNAL MEDICINE
Payer: MEDICARE

## 2018-10-24 ENCOUNTER — APPOINTMENT (OUTPATIENT)
Dept: CT IMAGING | Age: 83
DRG: 871 | End: 2018-10-24
Attending: EMERGENCY MEDICINE
Payer: MEDICARE

## 2018-10-24 DIAGNOSIS — M48.56XG NON-TRAUMATIC COMPRESSION FRACTURE OF L2 LUMBAR VERTEBRA WITH DELAYED HEALING, SUBSEQUENT ENCOUNTER: Primary | ICD-10-CM

## 2018-10-24 PROBLEM — M54.9 INTRACTABLE BACK PAIN: Status: ACTIVE | Noted: 2018-10-24

## 2018-10-24 PROBLEM — R09.02 HYPOXIA: Status: ACTIVE | Noted: 2018-10-24

## 2018-10-24 PROBLEM — J18.9 CAP (COMMUNITY ACQUIRED PNEUMONIA): Status: ACTIVE | Noted: 2018-10-24

## 2018-10-24 PROBLEM — E87.6 HYPOKALEMIA: Status: ACTIVE | Noted: 2018-10-24

## 2018-10-24 LAB
ALBUMIN SERPL-MCNC: 3.1 G/DL (ref 3.5–5)
ALBUMIN/GLOB SERPL: 0.8 {RATIO} (ref 1.1–2.2)
ALP SERPL-CCNC: 77 U/L (ref 45–117)
ALT SERPL-CCNC: 26 U/L (ref 12–78)
ANION GAP SERPL CALC-SCNC: 8 MMOL/L (ref 5–15)
APPEARANCE UR: ABNORMAL
AST SERPL-CCNC: 31 U/L (ref 15–37)
ATRIAL RATE: 80 BPM
BACTERIA URNS QL MICRO: ABNORMAL /HPF
BASOPHILS # BLD: 0 K/UL (ref 0–0.1)
BASOPHILS NFR BLD: 0 % (ref 0–1)
BILIRUB SERPL-MCNC: 0.9 MG/DL (ref 0.2–1)
BILIRUB UR QL: NEGATIVE
BNP SERPL-MCNC: 6264 PG/ML (ref 0–450)
BUN SERPL-MCNC: 28 MG/DL (ref 6–20)
BUN/CREAT SERPL: 29 (ref 12–20)
CALCIUM SERPL-MCNC: 9.4 MG/DL (ref 8.5–10.1)
CALCULATED P AXIS, ECG09: 54 DEGREES
CALCULATED R AXIS, ECG10: -19 DEGREES
CALCULATED T AXIS, ECG11: 71 DEGREES
CHLORIDE SERPL-SCNC: 105 MMOL/L (ref 97–108)
CO2 SERPL-SCNC: 35 MMOL/L (ref 21–32)
COLOR UR: ABNORMAL
COMMENT, HOLDF: NORMAL
CREAT SERPL-MCNC: 0.97 MG/DL (ref 0.55–1.02)
DIAGNOSIS, 93000: NORMAL
DIFFERENTIAL METHOD BLD: ABNORMAL
EOSINOPHIL # BLD: 0 K/UL (ref 0–0.4)
EOSINOPHIL NFR BLD: 0 % (ref 0–7)
EPITH CASTS URNS QL MICRO: ABNORMAL /LPF
ERYTHROCYTE [DISTWIDTH] IN BLOOD BY AUTOMATED COUNT: 13.5 % (ref 11.5–14.5)
FLUAV AG NPH QL IA: NEGATIVE
FLUBV AG NOSE QL IA: NEGATIVE
GLOBULIN SER CALC-MCNC: 3.8 G/DL (ref 2–4)
GLUCOSE SERPL-MCNC: 120 MG/DL (ref 65–100)
GLUCOSE UR STRIP.AUTO-MCNC: NEGATIVE MG/DL
HCT VFR BLD AUTO: 41.5 % (ref 35–47)
HGB BLD-MCNC: 13 G/DL (ref 11.5–16)
HGB UR QL STRIP: ABNORMAL
HYALINE CASTS URNS QL MICRO: ABNORMAL /LPF (ref 0–5)
IMM GRANULOCYTES # BLD: 0.1 K/UL (ref 0–0.04)
IMM GRANULOCYTES NFR BLD AUTO: 1 % (ref 0–0.5)
KETONES UR QL STRIP.AUTO: ABNORMAL MG/DL
LACTATE SERPL-SCNC: 1.6 MMOL/L (ref 0.4–2)
LEUKOCYTE ESTERASE UR QL STRIP.AUTO: ABNORMAL
LYMPHOCYTES # BLD: 0.4 K/UL (ref 0.8–3.5)
LYMPHOCYTES NFR BLD: 4 % (ref 12–49)
MCH RBC QN AUTO: 30.4 PG (ref 26–34)
MCHC RBC AUTO-ENTMCNC: 31.3 G/DL (ref 30–36.5)
MCV RBC AUTO: 97 FL (ref 80–99)
MONOCYTES # BLD: 0.8 K/UL (ref 0–1)
MONOCYTES NFR BLD: 8 % (ref 5–13)
NEUTS SEG # BLD: 8.2 K/UL (ref 1.8–8)
NEUTS SEG NFR BLD: 87 % (ref 32–75)
NITRITE UR QL STRIP.AUTO: NEGATIVE
NRBC # BLD: 0 K/UL (ref 0–0.01)
NRBC BLD-RTO: 0 PER 100 WBC
P-R INTERVAL, ECG05: 124 MS
PH UR STRIP: 5.5 [PH] (ref 5–8)
PLATELET # BLD AUTO: 286 K/UL (ref 150–400)
PMV BLD AUTO: 10.5 FL (ref 8.9–12.9)
POTASSIUM SERPL-SCNC: 3.4 MMOL/L (ref 3.5–5.1)
PROT SERPL-MCNC: 6.9 G/DL (ref 6.4–8.2)
PROT UR STRIP-MCNC: 30 MG/DL
Q-T INTERVAL, ECG07: 360 MS
QRS DURATION, ECG06: 86 MS
QTC CALCULATION (BEZET), ECG08: 415 MS
RBC # BLD AUTO: 4.28 M/UL (ref 3.8–5.2)
RBC #/AREA URNS HPF: ABNORMAL /HPF (ref 0–5)
RBC MORPH BLD: ABNORMAL
SAMPLES BEING HELD,HOLD: NORMAL
SODIUM SERPL-SCNC: 148 MMOL/L (ref 136–145)
SP GR UR REFRACTOMETRY: 1.01 (ref 1–1.03)
TROPONIN I SERPL-MCNC: 0.12 NG/ML
UA: UC IF INDICATED,UAUC: ABNORMAL
UROBILINOGEN UR QL STRIP.AUTO: 0.2 EU/DL (ref 0.2–1)
VENTRICULAR RATE, ECG03: 80 BPM
WBC # BLD AUTO: 9.5 K/UL (ref 3.6–11)
WBC URNS QL MICRO: ABNORMAL /HPF (ref 0–4)

## 2018-10-24 PROCEDURE — 87186 SC STD MICRODIL/AGAR DIL: CPT | Performed by: EMERGENCY MEDICINE

## 2018-10-24 PROCEDURE — 71045 X-RAY EXAM CHEST 1 VIEW: CPT

## 2018-10-24 PROCEDURE — 72170 X-RAY EXAM OF PELVIS: CPT

## 2018-10-24 PROCEDURE — 83605 ASSAY OF LACTIC ACID: CPT | Performed by: EMERGENCY MEDICINE

## 2018-10-24 PROCEDURE — 74176 CT ABD & PELVIS W/O CONTRAST: CPT

## 2018-10-24 PROCEDURE — 65660000000 HC RM CCU STEPDOWN

## 2018-10-24 PROCEDURE — 81001 URINALYSIS AUTO W/SCOPE: CPT | Performed by: EMERGENCY MEDICINE

## 2018-10-24 PROCEDURE — 74011250636 HC RX REV CODE- 250/636: Performed by: INTERNAL MEDICINE

## 2018-10-24 PROCEDURE — 83880 ASSAY OF NATRIURETIC PEPTIDE: CPT | Performed by: EMERGENCY MEDICINE

## 2018-10-24 PROCEDURE — 80053 COMPREHEN METABOLIC PANEL: CPT | Performed by: EMERGENCY MEDICINE

## 2018-10-24 PROCEDURE — 71275 CT ANGIOGRAPHY CHEST: CPT

## 2018-10-24 PROCEDURE — 93005 ELECTROCARDIOGRAM TRACING: CPT

## 2018-10-24 PROCEDURE — 77030005563 HC CATH URETH INT MMGH -A

## 2018-10-24 PROCEDURE — 87040 BLOOD CULTURE FOR BACTERIA: CPT | Performed by: EMERGENCY MEDICINE

## 2018-10-24 PROCEDURE — 36415 COLL VENOUS BLD VENIPUNCTURE: CPT | Performed by: EMERGENCY MEDICINE

## 2018-10-24 PROCEDURE — 87804 INFLUENZA ASSAY W/OPTIC: CPT | Performed by: INTERNAL MEDICINE

## 2018-10-24 PROCEDURE — 87086 URINE CULTURE/COLONY COUNT: CPT | Performed by: EMERGENCY MEDICINE

## 2018-10-24 PROCEDURE — 87077 CULTURE AEROBIC IDENTIFY: CPT | Performed by: EMERGENCY MEDICINE

## 2018-10-24 PROCEDURE — 51701 INSERT BLADDER CATHETER: CPT

## 2018-10-24 PROCEDURE — 85025 COMPLETE CBC W/AUTO DIFF WBC: CPT | Performed by: EMERGENCY MEDICINE

## 2018-10-24 PROCEDURE — 74011000258 HC RX REV CODE- 258: Performed by: EMERGENCY MEDICINE

## 2018-10-24 PROCEDURE — 74011000258 HC RX REV CODE- 258: Performed by: INTERNAL MEDICINE

## 2018-10-24 PROCEDURE — 74011636320 HC RX REV CODE- 636/320: Performed by: EMERGENCY MEDICINE

## 2018-10-24 PROCEDURE — 99285 EMERGENCY DEPT VISIT HI MDM: CPT

## 2018-10-24 PROCEDURE — 74011250637 HC RX REV CODE- 250/637: Performed by: INTERNAL MEDICINE

## 2018-10-24 PROCEDURE — 73552 X-RAY EXAM OF FEMUR 2/>: CPT

## 2018-10-24 PROCEDURE — 74011250637 HC RX REV CODE- 250/637: Performed by: EMERGENCY MEDICINE

## 2018-10-24 PROCEDURE — 77030011256 HC DRSG MEPILEX <16IN NO BORD MOLN -A

## 2018-10-24 PROCEDURE — 84484 ASSAY OF TROPONIN QUANT: CPT | Performed by: EMERGENCY MEDICINE

## 2018-10-24 RX ORDER — METOPROLOL TARTRATE 25 MG/1
12.5 TABLET, FILM COATED ORAL 2 TIMES DAILY
Status: DISCONTINUED | OUTPATIENT
Start: 2018-10-24 | End: 2018-10-31 | Stop reason: HOSPADM

## 2018-10-24 RX ORDER — SODIUM CHLORIDE 0.9 % (FLUSH) 0.9 %
10 SYRINGE (ML) INJECTION
Status: COMPLETED | OUTPATIENT
Start: 2018-10-24 | End: 2018-10-24

## 2018-10-24 RX ORDER — DEXTROSE MONOHYDRATE 50 MG/ML
50 INJECTION, SOLUTION INTRAVENOUS CONTINUOUS
Status: DISPENSED | OUTPATIENT
Start: 2018-10-24 | End: 2018-10-25

## 2018-10-24 RX ORDER — ONDANSETRON 2 MG/ML
4 INJECTION INTRAMUSCULAR; INTRAVENOUS
Status: DISCONTINUED | OUTPATIENT
Start: 2018-10-24 | End: 2018-10-31 | Stop reason: HOSPADM

## 2018-10-24 RX ORDER — CALCIUM CARBONATE 500(1250)
500 TABLET ORAL 2 TIMES DAILY
Status: DISCONTINUED | OUTPATIENT
Start: 2018-10-24 | End: 2018-10-31 | Stop reason: HOSPADM

## 2018-10-24 RX ORDER — AMLODIPINE BESYLATE 5 MG/1
5 TABLET ORAL DAILY
Status: DISCONTINUED | OUTPATIENT
Start: 2018-10-25 | End: 2018-10-31 | Stop reason: HOSPADM

## 2018-10-24 RX ORDER — OXYCODONE HYDROCHLORIDE 5 MG/1
5 TABLET ORAL
Status: DISCONTINUED | OUTPATIENT
Start: 2018-10-24 | End: 2018-10-27

## 2018-10-24 RX ORDER — SODIUM CHLORIDE 0.9 % (FLUSH) 0.9 %
5-10 SYRINGE (ML) INJECTION AS NEEDED
Status: DISCONTINUED | OUTPATIENT
Start: 2018-10-24 | End: 2018-10-31 | Stop reason: HOSPADM

## 2018-10-24 RX ORDER — SODIUM CHLORIDE 0.9 % (FLUSH) 0.9 %
5-10 SYRINGE (ML) INJECTION EVERY 8 HOURS
Status: DISCONTINUED | OUTPATIENT
Start: 2018-10-24 | End: 2018-10-31 | Stop reason: HOSPADM

## 2018-10-24 RX ORDER — POTASSIUM CHLORIDE 750 MG/1
40 TABLET, FILM COATED, EXTENDED RELEASE ORAL ONCE
Status: COMPLETED | OUTPATIENT
Start: 2018-10-24 | End: 2018-10-24

## 2018-10-24 RX ORDER — METOPROLOL TARTRATE 25 MG/1
12.5 TABLET, FILM COATED ORAL 2 TIMES DAILY
COMMUNITY
End: 2020-01-01

## 2018-10-24 RX ORDER — THERA TABS 400 MCG
1 TAB ORAL DAILY
Status: DISCONTINUED | OUTPATIENT
Start: 2018-10-25 | End: 2018-10-31 | Stop reason: HOSPADM

## 2018-10-24 RX ORDER — ACETAMINOPHEN 500 MG
1000 TABLET ORAL EVERY 8 HOURS
Status: DISCONTINUED | OUTPATIENT
Start: 2018-10-24 | End: 2018-10-31 | Stop reason: HOSPADM

## 2018-10-24 RX ORDER — ACETAMINOPHEN 325 MG/1
650 TABLET ORAL
COMMUNITY

## 2018-10-24 RX ORDER — DEXTROSE, SODIUM CHLORIDE, SODIUM LACTATE, POTASSIUM CHLORIDE, AND CALCIUM CHLORIDE 5; .6; .31; .03; .02 G/100ML; G/100ML; G/100ML; G/100ML; G/100ML
50 INJECTION, SOLUTION INTRAVENOUS CONTINUOUS
Status: DISCONTINUED | OUTPATIENT
Start: 2018-10-24 | End: 2018-10-24

## 2018-10-24 RX ORDER — LIDOCAINE 50 MG/G
1 PATCH TOPICAL EVERY 24 HOURS
Status: DISCONTINUED | OUTPATIENT
Start: 2018-10-24 | End: 2018-10-28

## 2018-10-24 RX ORDER — ACETAMINOPHEN 325 MG/1
650 TABLET ORAL
Status: COMPLETED | OUTPATIENT
Start: 2018-10-24 | End: 2018-10-24

## 2018-10-24 RX ADMIN — SODIUM CHLORIDE 100 ML: 900 INJECTION, SOLUTION INTRAVENOUS at 18:04

## 2018-10-24 RX ADMIN — METOPROLOL TARTRATE 12.5 MG: 25 TABLET ORAL at 22:09

## 2018-10-24 RX ADMIN — DEXTROSE MONOHYDRATE 50 ML/HR: 5 INJECTION, SOLUTION INTRAVENOUS at 21:53

## 2018-10-24 RX ADMIN — IOPAMIDOL 70 ML: 755 INJECTION, SOLUTION INTRAVENOUS at 18:04

## 2018-10-24 RX ADMIN — AZITHROMYCIN MONOHYDRATE 500 MG: 500 INJECTION, POWDER, LYOPHILIZED, FOR SOLUTION INTRAVENOUS at 21:52

## 2018-10-24 RX ADMIN — Medication 10 ML: at 18:04

## 2018-10-24 RX ADMIN — Medication 10 ML: at 21:54

## 2018-10-24 RX ADMIN — ACETAMINOPHEN 1000 MG: 500 TABLET, FILM COATED ORAL at 22:09

## 2018-10-24 RX ADMIN — POTASSIUM CHLORIDE 40 MEQ: 750 TABLET, FILM COATED, EXTENDED RELEASE ORAL at 18:38

## 2018-10-24 RX ADMIN — ACETAMINOPHEN 650 MG: 325 TABLET, FILM COATED ORAL at 15:41

## 2018-10-24 RX ADMIN — CALCIUM 500 MG: 500 TABLET ORAL at 22:09

## 2018-10-24 RX ADMIN — CEFTRIAXONE 1 G: 1 INJECTION, POWDER, FOR SOLUTION INTRAMUSCULAR; INTRAVENOUS at 18:38

## 2018-10-24 NOTE — PROGRESS NOTES
Admission Medication Reconciliation: 
 
Information obtained from:  Medication List from [facility] Comments/Recommendations: All medications/allergies have been reviewed and updated; last medication administration times reviewed and recorded. Medications were reviewed from medication list faxed from [facility], [address]. Changes made to Prior to Admission (PTA) Medication List: ? Medications Added:  
- metoprolol tartrate ? Medications Changed:  
- Changed frequency of APAP from 650 mg Q6H PRN to Q4H PRN 
- Added Friday as day of week for Fosamax Medications Removed: - Eliquis 5 mg BID 
- aspirin 81 mg daily 
- atorvastatin 20 mg daily 
- chlorzoxazone 500 mg TID PRN 
- oxycodone 5 mg Q4H PRN Significant PMH/Disease States:  
Past Medical History:  
Diagnosis Date  Breast lump 06/13/2018  
 right breast lump x a few days  HTN (hypertension)  Hypercholesteremia  Osteoporosis  Stroke ([address] CVA, TIA  TIA (transient ischemic attack)  Vertebral fracture Chief Complaint for this Admission: Chief Complaint Patient presents with  Back Pain  Fever Allergies:  Hydrochlorothiazide Prior to Admission Medications:  
Prior to Admission Medications Prescriptions Last Dose Informant Patient Reported? Taking? AMLODIPINE BESYLATE (AMLODIPINE PO)   Yes No  
Sig: Take 5 mg by mouth daily. acetaminophen (TYLENOL) 325 mg tablet   Yes Yes Sig: Take 650 mg by mouth every four (4) hours as needed (back pain). alendronate (FOSAMAX) 70 mg tablet   Yes No  
Sig: Take 70 mg by mouth Every Friday. calcium carbonate (OS-SIMONE) 500 mg calcium (1,250 mg) tablet   Yes No  
Sig: Take 1 Tab by mouth two (2) times a day. furosemide (LASIX) 20 mg tablet   Yes No  
Sig: Take 20 mg by mouth daily. metoprolol tartrate (LOPRESSOR) 25 mg tablet   Yes Yes Sig: Take 12.5 mg by mouth two (2) times a day.   
multivitamin (ONE A DAY) tablet   Yes No  
 Sig: Take 1 Tab by mouth daily. Facility-Administered Medications: None Thank you for allowing pharmacy to participate in the coordination of this patient's care. If you have any other questions, please contact the medication reconciliation pharmacist at [phone]. lCarence Reeves, Pharm. D., BCPS

## 2018-10-24 NOTE — H&P
History & Physical 
Pj Dietz MD, S Date of admission: 10/24/2018 Patient name: Joon Harley MRN: [id number] YOB: 1925 Age: 80 y.o. Primary care provider:  Josafat Small Source of Information: patient, medical records, daughters at bedside Chief complaint: back pain History of present illness Joon Harley is a 80 y.o. female with arthritis, h/o PE, h/o stroke/TIA with residual right hemiparesis, chronic vertebral compression fractures, HTN, HLD, osteoporosis, h/o left hip replacement who was BIBEMS to the ED from [facility] ([facility] with hypoxia and HTN per home PT. Patient's SpO2 was reportedly in the 70s, RR 50s, hypotensive and pt was panting and anxious due to the pain. Pt was at the [facility] waiting for the elevator on 10/19/18 when she was pushed, fell, hit the back of her head on the wall, and hurt her back. She was seen at [facility] but sent home when back XR showed unchanged multiple compression fractures. Daughters report that since the fall, pt has been much less mobile, not bedbound, and eating and drinking much less than usual.  
Patient reports low-grade fevers. She denies constipation, diarrhea, c/n/v, incontinence, numbness, tingling. She was chronic weakness of the RUE and RLE from previous stroke. ED triage VS were temp 100.3F, HR 79, /70, RR 30, SpO2 77% on RA. She was placed on 4L NC. In the ED, she received APAP 650mg po x1. Past Medical History:  
Diagnosis Date  Breast lump 06/13/2018  
 right breast lump x a few days  HTN (hypertension)  Hypercholesteremia  Osteoporosis  Stroke ([facility] [address] CVA, TIA  TIA (transient ischemic attack)  Vertebral fracture Past Surgical History:  
Procedure Laterality Date  HX HIP REPLACEMENT  2009  
 left  HX ORTHOPAEDIC Prior to Admission medications Medication Sig Start Date End Date Taking? Authorizing Provider  
acetaminophen (TYLENOL) 325 mg tablet Take 2 Tabs by mouth every six (6) hours as needed for up to 60 doses. 3/12/17   Jelly Shore MD  
oxyCODONE IR (ROXICODONE) 5 mg immediate release tablet Take 0.5 Tabs by mouth every four (4) hours as needed. Max Daily Amount: 15 mg. 3/12/17   Jelly Shore MD  
tamsulosin [facility]) 0.4 mg capsule Take 1 Cap by mouth daily. 3/12/17   Jelly Shore MD  
apixaban (ELIQUIS) 5 mg tablet Take 2 Tabs by mouth every twelve (12) hours. 10 mg twice daily for 7 days and then 5 mg twice daily. 3/12/17   Jelly Shore MD  
chlorzoxazone (PARAFON FORTE) 500 mg tablet Take 500 mg by mouth three (3) times daily as needed for Muscle Spasm(s). Provider, Historical  
calcium carbonate (OS-SIMONE) 500 mg calcium (1,250 mg) tablet Take 1 Tab by mouth two (2) times a day. Provider, Historical  
alendronate (FOSAMAX) 70 mg tablet Take 70 mg by mouth every seven (7) days. Provider, Historical  
atorvastatin (LIPITOR) 20 mg tablet Take 20 mg by mouth daily. Provider, Historical  
aspirin delayed-release 81 mg tablet Take 81 mg by mouth daily. Provider, Historical  
multivitamin (ONE A DAY) tablet Take 1 Tab by mouth daily. Provider, Historical  
furosemide (LASIX) 20 mg tablet Take 20 mg by mouth daily. Other, MD Thee  
AMLODIPINE BESYLATE (AMLODIPINE PO) Take 5 mg by mouth daily. Thee Caba MD  
 
Allergies Allergen Reactions  Hydrochlorothiazide Nausea and Vomiting History reviewed. No pertinent family history. Social history Patient resides Independently With [facility]   
x  [facility] Ambulates Independently With cane    
x  Assisted walker Alcohol history  
x  None Social  
  Chronic Smoking history None  
x  Former smoker: quit 25-30 years ago Current smoker Denies illicit drug use. Social History Tobacco Use Smoking Status Former Smoker  Years: 40.00  Last attempt to quit: 1986  Years since quittin.9 Smokeless Tobacco Never Used Code status Full code  
x  DNR/DNI Code status discussed with the patient and daughters/mPOAs, and they report pt is a DNR. Review of systems A comprehensive review of systems was negative except for that written in the History of Present Illness. Physical Examination Visit Vitals /54 Pulse 76 Temp 100.3 °F (37.9 °C) Resp (!) 37 Ht 5' (1.524 m) Wt 56.2 kg (124 lb) SpO2 97% BMI 24.22 kg/m² O2 Flow Rate (L/min): 4 l/min O2 Device: Nasal cannula Gen: awake, NAD, cooperative, pleasant, elderly, NC in place Head: NCAT 
EENT: PERRL, EOMI, MMM, oropharynx benign Neck: supple, no masses Lungs: tachypnic, CTAB, no w/r/r 
CVS: regular rhythm, normal rate, S1S2, no m/r/g appreciated, no peripheral edema, +pulses Abd: +BS, soft, NT, ND 
MSK: severely reduced ROM b/l LE Neuro: AOx3, responds appropriately to questions and commands Skin: warm, dry; no rashes, lesions, ulcers noted Data Review 24 Hour Results: 
Recent Results (from the past 24 hour(s)) EKG, 12 LEAD, INITIAL Collection Time: 10/24/18  1:10 PM  
Result Value Ref Range Ventricular Rate 80 BPM  
 Atrial Rate 80 BPM  
 P-R Interval 124 ms QRS Duration 86 ms  
 Q-T Interval 360 ms QTC Calculation (Bezet) 415 ms Calculated P Axis 54 degrees Calculated R Axis -19 degrees Calculated T Axis 71 degrees Diagnosis Sinus rhythm with premature atrial complexes Nonspecific ST and T wave abnormality When compared with ECG of 07-MAR-2017 15:50, 
premature atrial complexes are now present METABOLIC PANEL, COMPREHENSIVE Collection Time: 10/24/18  1:22 PM  
Result Value Ref Range Sodium 148 (H) 136 - 145 mmol/L Potassium 3.4 (L) 3.5 - 5.1 mmol/L  Chloride 105 97 - 108 mmol/L  
 CO2 35 (H) 21 - 32 mmol/L Anion gap 8 5 - 15 mmol/L Glucose 120 (H) 65 - 100 mg/dL BUN 28 (H) 6 - 20 MG/DL Creatinine 0.97 0.55 - 1.02 MG/DL  
 BUN/Creatinine ratio 29 (H) 12 - 20 GFR est AA >60 >60 ml/min/1.73m2 GFR est non-AA 54 (L) >60 ml/min/1.73m2 Calcium 9.4 8.5 - 10.1 MG/DL Bilirubin, total 0.9 0.2 - 1.0 MG/DL  
 ALT (SGPT) 26 12 - 78 U/L  
 AST (SGOT) 31 15 - 37 U/L Alk. phosphatase 77 45 - 117 U/L Protein, total 6.9 6.4 - 8.2 g/dL Albumin 3.1 (L) 3.5 - 5.0 g/dL Globulin 3.8 2.0 - 4.0 g/dL A-G Ratio 0.8 (L) 1.1 - 2.2    
CBC WITH AUTOMATED DIFF Collection Time: 10/24/18  1:22 PM  
Result Value Ref Range WBC 9.5 3.6 - 11.0 K/uL  
 RBC 4.28 3.80 - 5.20 M/uL  
 HGB 13.0 11.5 - 16.0 g/dL HCT 41.5 35.0 - 47.0 % MCV 97.0 80.0 - 99.0 FL  
 MCH 30.4 26.0 - 34.0 PG  
 MCHC 31.3 30.0 - 36.5 g/dL  
 RDW 13.5 11.5 - 14.5 % PLATELET 778 990 - 881 K/uL MPV 10.5 8.9 - 12.9 FL  
 NRBC 0.0 0  WBC ABSOLUTE NRBC 0.00 0.00 - 0.01 K/uL NEUTROPHILS 87 (H) 32 - 75 % LYMPHOCYTES 4 (L) 12 - 49 % MONOCYTES 8 5 - 13 % EOSINOPHILS 0 0 - 7 % BASOPHILS 0 0 - 1 % IMMATURE GRANULOCYTES 1 (H) 0.0 - 0.5 % ABS. NEUTROPHILS 8.2 (H) 1.8 - 8.0 K/UL  
 ABS. LYMPHOCYTES 0.4 (L) 0.8 - 3.5 K/UL  
 ABS. MONOCYTES 0.8 0.0 - 1.0 K/UL  
 ABS. EOSINOPHILS 0.0 0.0 - 0.4 K/UL  
 ABS. BASOPHILS 0.0 0.0 - 0.1 K/UL  
 ABS. IMM. GRANS. 0.1 (H) 0.00 - 0.04 K/UL  
 DF SMEAR SCANNED    
 RBC COMMENTS NORMOCYTIC, NORMOCHROMIC    
SAMPLES BEING HELD Collection Time: 10/24/18  1:22 PM  
Result Value Ref Range SAMPLES BEING HELD 1RED,1BLUE   
 COMMENT Add-on orders for these samples will be processed based on acceptable specimen integrity and analyte stability, which may vary by analyte. LACTIC ACID Collection Time: 10/24/18  1:22 PM  
Result Value Ref Range Lactic acid 1.6 0.4 - 2.0 MMOL/L  
NT-PRO BNP  Collection Time: 10/24/18  1:22 PM  
 Result Value Ref Range NT pro-BNP 6,264 (H) 0 - 450 PG/ML  
TROPONIN I Collection Time: 10/24/18  1:22 PM  
Result Value Ref Range Troponin-I, Qt. 0.12 (H) <0.05 ng/mL URINALYSIS W/ REFLEX CULTURE Collection Time: 10/24/18  2:52 PM  
Result Value Ref Range Color YELLOW/STRAW Appearance CLOUDY (A) CLEAR Specific gravity 1.015 1.003 - 1.030    
 pH (UA) 5.5 5.0 - 8.0 Protein 30 (A) NEG mg/dL Glucose NEGATIVE  NEG mg/dL Ketone TRACE (A) NEG mg/dL Bilirubin NEGATIVE  NEG Blood TRACE (A) NEG Urobilinogen 0.2 0.2 - 1.0 EU/dL Nitrites NEGATIVE  NEG Leukocyte Esterase MODERATE (A) NEG    
 WBC PENDING /hpf  
 RBC PENDING /hpf Epithelial cells PENDING /lpf Bacteria PENDING /hpf  
 UA:UC IF INDICATED PENDING Recent Labs 10/24/18 
1322 WBC 9.5 HGB 13.0 HCT 41.5  Recent Labs 10/24/18 
1322 * K 3.4*  
 CO2 35* * BUN 28* CREA 0.97 CA 9.4 ALB 3.1*  
SGOT 31 ALT 26 Imaging CXR portable Cardiomediastinal silhouette is stable. There is very mild bibasilar 
atelectasis. No pleural fluid is visualized. There is no pneumothorax. Osseous 
structures are diffusely demineralized, but intact. 
  
IMPRESSION IMPRESSION: Very mild bibasilar atelectasis. CT abd/pelvis wo contrast 
IMPRESSION:  
1. No bowel obstruction, ileus or perforation. 2. Right basilar patchy airspace consolidation. 
  
 
Assessment and Plan CAP with sepsis (POA) - admit inpatient remote telemetry 
- tachycardia, tachypnea on admission 
- lactic acid WNL 
- BCx 10/24 pending 
- check influenza swab (pt received vaccine 1 week ago) - start empiric ceftriaxone, azithromycin x5 days; de-escalate to po abx as indicated - check CTA chest due to h/o PE Intractable back pain - likely fall exacerbated chronic multiple compression fractures - CT a/p showing possible left hip hardware complication; d/w Ortho and recommended XR pelvis and left femur - pain control Acute hypoxic respiratory failure - SpO2 70s per home PT and was 77% on RA in ED 
- may be due to CAP 
- wean O2 as tolerated - check CTA chest r/o PE given h/o PE and recent decreased mobility Multiple vertebral compression fractures (POA) - XR 10/19 and CT a/p 10/24 unchanged from March 2017 
- PT/OT evals Hypokalemia (POA) - replete prn Hypernatremia (POA) - likely due to dehydration from decreased fluid intake 
- start IV D5 x24h 
- check labs in AM 
 
Code: DNR Diet: cardiac Activity: ambulate with assistance DVT prophylaxis: SCDs Isolation precautions: none Consultations: possibly Ortho Anticipated disposition: TBD. Came from [facility] with home PT but may need SNF on discharge.   
 
 
Signed by: Brandon Carranza MD  
 October 24, 2018 at 4:09 PM

## 2018-10-24 NOTE — ED PROVIDER NOTES
80 y.o. female with past medical history significant for HTN, TIA, and CVA who presents from assisted living via EMS with chief complaint of back pain. Per EMS, the patient was being seen by physical therapy when her O2 sats were noted to be in the 70s on RA, and was hypotensive. On arrival to the ED, the pt's O2 sats increase to normal limits with 4L NC, and the patient is no longer hypotensive. The patient's only complaint is low back pain after a GLF 5 days ago. Pt states at that time, she hit her head and was evaluated at the ED, normal spine X-Rays and head CT. Pt reports having a \"low grade fever\" for the last month. Pt denies any anticoagulant use. Pt denies any DONOHUE, abdominal pain, chest pain, or SOB. There are no other acute medical concerns at this time. Social hx: Former smoker ([address]); Current EtOH use PCP: ABRIL Hutchinson Note written by Miriam Waller, as dictated by Rohini Prado MD 1:15 PM 
 
 
The history is provided by the patient, the EMS personnel and medical records. No  was used. Past Medical History:  
Diagnosis Date  Breast lump 06/13/2018  
 right breast lump x a few days  HTN (hypertension)  Hypercholesteremia  Osteoporosis  Stroke ([address] CVA, TIA  TIA (transient ischemic attack)  Vertebral fracture Past Surgical History:  
Procedure Laterality Date  HX HIP REPLACEMENT  2009  
 left  HX ORTHOPAEDIC History reviewed. No pertinent family history. Social History Socioeconomic History  Marital status:  Spouse name: Not on file  Number of children: Not on file  Years of education: Not on file  Highest education level: Not on file Social Needs  Financial resource strain: Not on file  Food insecurity - worry: Not on file  Food insecurity - inability: Not on file  Transportation needs - medical: Not on file  Transportation needs - non-medical: Not on file Occupational History  Not on file Tobacco Use  Smoking status: Former Smoker Years: 40.00 Last attempt to quit: 1986 Years since quittin.9  Smokeless tobacco: Never Used Substance and Sexual Activity  Alcohol use: Yes  Drug use: No  
 Sexual activity: Not on file Other Topics Concern  Not on file Social History Narrative  Not on file ALLERGIES: Hydrochlorothiazide Review of Systems Constitutional: Positive for fever. Respiratory: Negative for shortness of breath. Cardiovascular: Negative for chest pain. Gastrointestinal: Negative for abdominal pain. Musculoskeletal: Positive for back pain. Neurological: Negative for headaches. All other systems reviewed and are negative. Vitals:  
 10/24/18 1301 10/24/18 1309 BP: 159/70 Pulse: 79 Resp: 30 Temp: 100.3 °F (37.9 °C) SpO2: (!) 77% 93% Weight: 56.2 kg (124 lb) Height: 5' (1.524 m) Physical Exam  
Constitutional: She is oriented to person, place, and time. She appears well-developed and well-nourished. She appears ill. No distress. Elderly, chronically ill, and debilitated appearing. HENT:  
Head: Normocephalic and atraumatic. Eyes: Conjunctivae are normal. No scleral icterus. Neck: Neck supple. No tracheal deviation present. Cardiovascular: Normal rate, regular rhythm, normal heart sounds and intact distal pulses. Exam reveals no gallop and no friction rub. No murmur heard. Pulmonary/Chest: Effort normal and breath sounds normal. She has no wheezes. She has no rales. Abdominal: Soft. She exhibits no distension. There is no tenderness. There is no rebound and no guarding. Musculoskeletal: She exhibits no edema. Lumbar back: She exhibits tenderness and pain. She exhibits no deformity. Very tender over lower lumbar spine. Neurological: She is alert and oriented to person, place, and time. Skin: Skin is warm and dry. No rash noted. Psychiatric: She has a normal mood and affect. Nursing note and vitals reviewed. Note written by Miriam Azul, as dictated by Fina Lucas MD 1:15 PM 
 
MDM Number of Diagnoses or Management Options Non-traumatic compression fracture of L2 lumbar vertebra with delayed healing, subsequent encounter:  
  
Amount and/or Complexity of Data Reviewed Clinical lab tests: ordered and reviewed Tests in the radiology section of CPT®: ordered and reviewed Tests in the medicine section of CPT®: ordered and reviewed Discussion of test results with the performing providers: yes Obtain history from someone other than the patient: yes Discuss the patient with other providers: yes Procedures ED EKG interpretation: 
Rhythm: Sinus rhythm with PAC's; and regular . Rate (approx.): 80 bpm; ST/T wave: non-specific changes. Note written by Miriam Azul, as dictated by Fina Lucas MD 2:15 PM 
 
2:53 PM  
Daughter is now at bedside, who is an RN. She states the patient has not been doing well since the fall 5 days ago with persistent lower back pain and difficulty ambulating. She denies any h/o MI, but reports the pt has a h/o PEs, no longer on Eliquis. 3:14 PM 
Pt with low grade fever, has had back pain since her fall, unable to ambulate. X-Rays from previous visit showed multiple old compression fractures. Will CT abdomen/pelvic to further evaluate. Needs admission for further work up. CONSULT NOTE: 
3:30 PM Fina Lucas MD communicated with Hospitalist, Consult for Hospitalist via [facility]. Discussed available diagnostic tests and clinical findings. He will see and admit the patient.

## 2018-10-24 NOTE — ROUTINE PROCESS
TRANSFER - OUT REPORT: 
 
Verbal report given to Toan Campos RN(name) on Timothy Alfred  being transferred to Room 543 (unit) for routine progression of care Report consisted of patients Situation, Background, Assessment and  
Recommendations(SBAR). Information from the following report(s) SBAR, Kardex, ED Summary and MAR was reviewed with the receiving nurse. Lines:  
Peripheral IV 10/24/18 Left Arm (Active) Site Assessment Clean, dry, & intact 10/24/2018  1:28 PM  
Phlebitis Assessment 0 10/24/2018  1:28 PM  
Infiltration Assessment 0 10/24/2018  1:28 PM  
Dressing Status Clean, dry, & intact 10/24/2018  1:28 PM  
Dressing Type Transparent 10/24/2018  1:28 PM  
Hub Color/Line Status Pink 10/24/2018  1:28 PM  
Action Taken Blood drawn 10/24/2018  1:28 PM  
Alcohol Cap Used No 10/24/2018  1:28 PM  
  
 
Opportunity for questions and clarification was provided. Patient transported with: 
 [employer]

## 2018-10-24 NOTE — ED TRIAGE NOTES
Patient comes to the ER via EMS from assisted living. Patient had a fall x2 days ago. When being evaluated by PT, patient found to have low oxygen saturations of 70% RA and hypotension 70/50. Patients oxygen increased on 3L NC. Patient reports having a low grade fever for the past month.

## 2018-10-25 ENCOUNTER — APPOINTMENT (OUTPATIENT)
Dept: GENERAL RADIOLOGY | Age: 83
DRG: 871 | End: 2018-10-25
Attending: FAMILY MEDICINE
Payer: MEDICARE

## 2018-10-25 LAB
25(OH)D3 SERPL-MCNC: 23.2 NG/ML (ref 30–100)
ANION GAP SERPL CALC-SCNC: 8 MMOL/L (ref 5–15)
ARTERIAL PATENCY WRIST A: YES
BASE EXCESS BLD CALC-SCNC: 8 MMOL/L
BDY SITE: ABNORMAL
BUN SERPL-MCNC: 30 MG/DL (ref 6–20)
BUN/CREAT SERPL: 33 (ref 12–20)
CALCIUM SERPL-MCNC: 8.4 MG/DL (ref 8.5–10.1)
CHLORIDE SERPL-SCNC: 111 MMOL/L (ref 97–108)
CO2 SERPL-SCNC: 27 MMOL/L (ref 21–32)
CREAT SERPL-MCNC: 0.91 MG/DL (ref 0.55–1.02)
ERYTHROCYTE [DISTWIDTH] IN BLOOD BY AUTOMATED COUNT: 13.6 % (ref 11.5–14.5)
GAS FLOW.O2 O2 DELIVERY SYS: ABNORMAL L/MIN
GAS FLOW.O2 SETTING OXYMISER: 3 L/M
GLUCOSE SERPL-MCNC: 143 MG/DL (ref 65–100)
HCO3 BLD-SCNC: 33.7 MMOL/L (ref 22–26)
HCT VFR BLD AUTO: 37.2 % (ref 35–47)
HGB BLD-MCNC: 11.7 G/DL (ref 11.5–16)
MCH RBC QN AUTO: 30.6 PG (ref 26–34)
MCHC RBC AUTO-ENTMCNC: 31.5 G/DL (ref 30–36.5)
MCV RBC AUTO: 97.4 FL (ref 80–99)
NRBC # BLD: 0 K/UL (ref 0–0.01)
NRBC BLD-RTO: 0 PER 100 WBC
PCO2 BLD: 60.1 MMHG (ref 35–45)
PH BLD: 7.36 [PH] (ref 7.35–7.45)
PLATELET # BLD AUTO: 251 K/UL (ref 150–400)
PMV BLD AUTO: 10.4 FL (ref 8.9–12.9)
PO2 BLD: 82 MMHG (ref 80–100)
POTASSIUM SERPL-SCNC: 4.2 MMOL/L (ref 3.5–5.1)
RBC # BLD AUTO: 3.82 M/UL (ref 3.8–5.2)
SAO2 % BLD: 95 % (ref 92–97)
SODIUM SERPL-SCNC: 146 MMOL/L (ref 136–145)
SPECIMEN TYPE: ABNORMAL
TOTAL RESP. RATE, ITRR: 22
WBC # BLD AUTO: 8.3 K/UL (ref 3.6–11)

## 2018-10-25 PROCEDURE — 74011000250 HC RX REV CODE- 250: Performed by: INTERNAL MEDICINE

## 2018-10-25 PROCEDURE — 65660000000 HC RM CCU STEPDOWN

## 2018-10-25 PROCEDURE — G8987 SELF CARE CURRENT STATUS: HCPCS

## 2018-10-25 PROCEDURE — 82803 BLOOD GASES ANY COMBINATION: CPT

## 2018-10-25 PROCEDURE — 74011250636 HC RX REV CODE- 250/636: Performed by: INTERNAL MEDICINE

## 2018-10-25 PROCEDURE — 97530 THERAPEUTIC ACTIVITIES: CPT

## 2018-10-25 PROCEDURE — 97535 SELF CARE MNGMENT TRAINING: CPT

## 2018-10-25 PROCEDURE — 74011250637 HC RX REV CODE- 250/637: Performed by: INTERNAL MEDICINE

## 2018-10-25 PROCEDURE — 94640 AIRWAY INHALATION TREATMENT: CPT

## 2018-10-25 PROCEDURE — G8979 MOBILITY GOAL STATUS: HCPCS

## 2018-10-25 PROCEDURE — 71045 X-RAY EXAM CHEST 1 VIEW: CPT

## 2018-10-25 PROCEDURE — 97161 PT EVAL LOW COMPLEX 20 MIN: CPT

## 2018-10-25 PROCEDURE — 85027 COMPLETE CBC AUTOMATED: CPT | Performed by: FAMILY MEDICINE

## 2018-10-25 PROCEDURE — 74011000258 HC RX REV CODE- 258: Performed by: INTERNAL MEDICINE

## 2018-10-25 PROCEDURE — 82306 VITAMIN D 25 HYDROXY: CPT | Performed by: INTERNAL MEDICINE

## 2018-10-25 PROCEDURE — 80048 BASIC METABOLIC PNL TOTAL CA: CPT | Performed by: INTERNAL MEDICINE

## 2018-10-25 PROCEDURE — G8978 MOBILITY CURRENT STATUS: HCPCS

## 2018-10-25 PROCEDURE — 36415 COLL VENOUS BLD VENIPUNCTURE: CPT | Performed by: INTERNAL MEDICINE

## 2018-10-25 PROCEDURE — 36600 WITHDRAWAL OF ARTERIAL BLOOD: CPT

## 2018-10-25 PROCEDURE — 92610 EVALUATE SWALLOWING FUNCTION: CPT | Performed by: SPEECH-LANGUAGE PATHOLOGIST

## 2018-10-25 PROCEDURE — 97165 OT EVAL LOW COMPLEX 30 MIN: CPT

## 2018-10-25 PROCEDURE — 74011000250 HC RX REV CODE- 250: Performed by: FAMILY MEDICINE

## 2018-10-25 PROCEDURE — 74011250636 HC RX REV CODE- 250/636: Performed by: FAMILY MEDICINE

## 2018-10-25 PROCEDURE — 77010033678 HC OXYGEN DAILY

## 2018-10-25 PROCEDURE — 94760 N-INVAS EAR/PLS OXIMETRY 1: CPT

## 2018-10-25 PROCEDURE — G8988 SELF CARE GOAL STATUS: HCPCS

## 2018-10-25 RX ORDER — MELATONIN
1000 DAILY
Status: DISCONTINUED | OUTPATIENT
Start: 2018-10-25 | End: 2018-10-31 | Stop reason: HOSPADM

## 2018-10-25 RX ORDER — FUROSEMIDE 10 MG/ML
40 INJECTION INTRAMUSCULAR; INTRAVENOUS ONCE
Status: COMPLETED | OUTPATIENT
Start: 2018-10-26 | End: 2018-10-25

## 2018-10-25 RX ORDER — IPRATROPIUM BROMIDE AND ALBUTEROL SULFATE 2.5; .5 MG/3ML; MG/3ML
3 SOLUTION RESPIRATORY (INHALATION)
Status: DISCONTINUED | OUTPATIENT
Start: 2018-10-25 | End: 2018-10-31 | Stop reason: HOSPADM

## 2018-10-25 RX ADMIN — IPRATROPIUM BROMIDE AND ALBUTEROL SULFATE 3 ML: .5; 3 SOLUTION RESPIRATORY (INHALATION) at 23:29

## 2018-10-25 RX ADMIN — THERA TABS 1 TABLET: TAB at 18:23

## 2018-10-25 RX ADMIN — CEFTRIAXONE 1 G: 1 INJECTION, POWDER, FOR SOLUTION INTRAMUSCULAR; INTRAVENOUS at 19:50

## 2018-10-25 RX ADMIN — METOPROLOL TARTRATE 12.5 MG: 25 TABLET ORAL at 09:55

## 2018-10-25 RX ADMIN — ACETAMINOPHEN 1000 MG: 500 TABLET, FILM COATED ORAL at 18:23

## 2018-10-25 RX ADMIN — FUROSEMIDE 40 MG: 10 INJECTION, SOLUTION INTRAMUSCULAR; INTRAVENOUS at 23:36

## 2018-10-25 RX ADMIN — Medication 10 ML: at 23:43

## 2018-10-25 RX ADMIN — ACETAMINOPHEN 1000 MG: 500 TABLET, FILM COATED ORAL at 09:54

## 2018-10-25 RX ADMIN — CASTOR OIL AND BALSAM, PERU: 788; 87 OINTMENT TOPICAL at 18:34

## 2018-10-25 RX ADMIN — VITAMIN D, TAB 1000IU (100/BT) 1000 UNITS: 25 TAB at 09:57

## 2018-10-25 RX ADMIN — AMLODIPINE BESYLATE 5 MG: 5 TABLET ORAL at 09:55

## 2018-10-25 RX ADMIN — METOPROLOL TARTRATE 12.5 MG: 25 TABLET ORAL at 18:21

## 2018-10-25 RX ADMIN — CALCIUM 500 MG: 500 TABLET ORAL at 18:23

## 2018-10-25 RX ADMIN — AZITHROMYCIN MONOHYDRATE 500 MG: 500 INJECTION, POWDER, LYOPHILIZED, FOR SOLUTION INTRAVENOUS at 19:54

## 2018-10-25 RX ADMIN — CALCIUM 500 MG: 500 TABLET ORAL at 09:57

## 2018-10-25 NOTE — PROGRESS NOTES
Problem: Dysphagia (Adult) Goal: *Acute Goals and Plan of Care (Insert Text) Speech therapy goals Initiated 10/25/2018 1. Patient will tolerate a regular/thin liquid diet without s/s of aspiration within 7 days Speech LAnguage Pathology bedside swallow evaluation Patient: Dina Thorpe [de-identified]80 y.o. female) Date: 10/25/2018 Primary Diagnosis: Intractable back pain Precautions:  Fall ASSESSMENT : 
Based on the objective data described below, the patient presents with oropharyngeal swallow functional for age. Oral phase characterized by functional bolus control, timely mastication, and timely propulsion. Pharyngeal phase characterized by suspected timely swallow initiation and functional hyolaryngeal elevation/excursion via palpation. Frequent belching noted with thin liquids suspect possible esophageal dysphagia. Weak cough x1 also noted with successive straw sips of thin liquid after repeated belching. Patient appeared to benefit from single, cup sips with no overt s/s of aspiration. No additional s/s of aspiration observed across trials. Patient will benefit from skilled intervention to address the above impairments. Patients rehabilitation potential is considered to be Good Factors which may influence rehabilitation potential include:  
[]            None noted []            Mental ability/status [x]            Medical condition []            Home/family situation and support systems 
[]            Safety awareness 
[]            Pain tolerance/management []            Other: PLAN : 
Recommendations and Planned Interventions: 
--regular/thin liquid diet 
--sitting straight up with PO 
--no straws and meds whole in applesauce 
--slp to follow at least x1 for diet tolerance and to ensure use of safe swallow strategies 
--would consider GI workup if symptoms persist to assess for possible esophageal dysphagia given frequent/repeated belching with intake Frequency/Duration: Patient will be followed by speech-language pathology 1 time a week to address goals. Discharge Recommendations: To Be Determined SUBJECTIVE:  
Patient stated I don't have any trouble swallowing. OBJECTIVE:  
 
Past Medical History:  
Diagnosis Date  Breast lump 06/13/2018  
 right breast lump x a few days  HTN (hypertension)  Hypercholesteremia  Osteoporosis  Stroke ([facility] [address] CVA, TIA  TIA (transient ischemic attack)  Vertebral fracture Past Surgical History:  
Procedure Laterality Date  HX HIP REPLACEMENT  2009  
 left  HX ORTHOPAEDIC Prior Level of Function/Home Situation:  
Home Situation Home Environment: Assisted living Care [facility] Name: [facility] One/Two Story Residence: One story Living Alone: Yes Support Systems: Child(sanya), Assisted living Current DME Used/Available at Home: Walker, rolling, Grab bars, Shower chair Tub or Shower Type: Shower Diet prior to admission: regular Current Diet:  Regular/thin liquid Cognitive and Communication Status: 
Neurologic State: Alert Orientation Level: Disoriented X4 Cognition: Appropriate for age attention/concentration, Follows commands Perception: Appears intact Perseveration: No perseveration noted Safety/Judgement: Not assessed Oral Assessment: 
Oral Assessment Labial: No impairment Oral Hygiene: moist mucosa Lingual: No impairment Velum: Unable to visualize Mandible: No impairment P.O. Trials: 
Patient Position: upright in bed Vocal quality prior to P.O.: No impairment Consistency Presented: Thin liquid;Puree; Solid How Presented: Self-fed/presented;Cup/sip; Successive swallows;Straw;Spoon Bolus Acceptance: No impairment Bolus Formation/Control: No impairment Propulsion: No impairment Oral Residue: None Initiation of Swallow: No impairment Laryngeal Elevation: Functional;Other (comment)(functional for age) Aspiration Signs/Symptoms: Weak cough; Other (comment)(and belching suspected esophageal related) Pharyngeal Phase Characteristics: No impairment, issues, or problems Effective Modifications: Small sips and bites Cues for Modifications: Minimal 
  
Oral Phase Severity: No impairment Pharyngeal Phase Severity : No impairment G Codes: In compliance with CMSs Claims Based Outcome Reporting, the following G-code set was chosen for this patient based the use of the NOMS functional outcome to quantify this patient's level of swallowing impairment. Using the NOMS, the patient was determined to be at level 6 for swallow function which correlates with the CI= 1-19% level of severity. Based on the objective assessment provided within this note, the current, goal, and discharge g-codes are as follows: 
 
Swallow  Swallowing: 
 Swallow Current Status CI= 1-19%  Swallow Goal Status CI= 1-19% NOMS Swallowing Levels: 
Level 1 (CN): NPO Level 2 (CM): NPO but takes consistency in therapy Level 3 (CL): Takes less than 50% of nutrition p.o. and continues with nonoral feedings; and/or safe with mod cues; and/or max diet restriction Level 4 (CK): Safe swallow but needs mod cues; and/or mod diet restriction; and/or still requires some nonoral feeding/supplements Level 5 (CJ): Safe swallow with min diet restriction; and/or needs min cues Level 6 (CI): Independent with p.o.; rare cues; usually self cues; may need to avoid some foods or needs extra time Level 7 (CH): Independent for all p.o. NELI. (2003). National Outcomes Measurement System (NOMS): Adult Speech-Language Pathology User's Guide. Pain: After treatment:  
[]            Patient left in no apparent distress sitting up in chair 
[x]            Patient left in no apparent distress in bed 
[x]            Call bell left within reach [x]            Nursing notified 
[]            Caregiver present []            Bed alarm activated COMMUNICATION/EDUCATION:  
The patients plan of care including recommendations, planned interventions, and recommended diet changes were discussed with: Registered Nurse. [x]            Posted safety precautions in patient's room. [x]            Patient/family have participated as able in goal setting and plan of care. [x]            Patient/family agree to work toward stated goals and plan of care. []            Patient understands intent and goals of therapy, but is neutral about his/her participation. []            Patient is unable to participate in goal setting and plan of care. Thank you for this referral. 
Elias Mora, SLP Regarding student involvement in patient care: A student participated in this treatment session. Per CMS Medicare statements and  guidelines I certify that the following was true: 1. I was present and directly observed the entire session. 2. I made all skilled judgments and clinical decisions regarding care. 3. I am the practitioner responsible for assessment, treatment, and documentation.

## 2018-10-25 NOTE — PROGRESS NOTES
Problem: Mobility Impaired (Adult and Pediatric) Goal: *Acute Goals and Plan of Care (Insert Text) Physical Therapy Goals Initiated 10/25/2018 1. Patient will move from supine to sit and sit to supine , scoot up and down and roll side to side in bed with minimal assistance/contact guard assist within 7 day(s). 2.  Patient will transfer from bed to chair and chair to bed with minimal assistance/contact guard assist using the least restrictive device within 7 day(s). 3.  Patient will perform sit to stand with minimal assistance/contact guard assist within 7 day(s). 4.  Patient will ambulate with minimal assistance/contact guard assist for 20 feet with the least restrictive device within 7 day(s). physical Therapy EVALUATION Patient: Kendy Baker [de-identified]80 y.o. female) Date: 10/25/2018 Primary Diagnosis: Intractable back pain Precautions:   Fall ASSESSMENT : 
Based on the objective data described below, the patient presents with impaired mobility compared to baseline function s/p fall with intractable back pain. Chart reviewed and RN cleared patient for mobility assessment at this time. Patient demonstrated impaired balance, decreased strength, decreased activity tolerance, and pain limiting her functional mobility. Patient received lying in bed. Transferred supine<>sitting EOB with maxA. Sit<>stand transfer with maxA and stand pivot transfer bed to chair with maxA x2. Session ended with patient sitting up in the chair with all needs met. Patient is from an assisted living facility and reports she requires assistance for bathing, but was independent with all other ADLs and ambulated using a RW. Patient would benefit from SNF placement after d/c to maximize safety and independence with mobility at patient is below her functional baseline. Patient will benefit from skilled intervention to address the above impairments. Patients rehabilitation potential is considered to be Good Factors which may influence rehabilitation potential include:  
[]         None noted 
[]         Mental ability/status [x]         Medical condition 
[]         Home/family situation and support systems 
[]         Safety awareness [x]         Pain tolerance/management 
[]         Other: PLAN : 
Recommendations and Planned Interventions: 
[x]           Bed Mobility Training             []    Neuromuscular Re-Education 
[x]           Transfer Training                   []    Orthotic/Prosthetic Training 
[x]           Gait Training                         [x]    Modalities [x]           Therapeutic Exercises           []    Edema Management/Control 
[x]           Therapeutic Activities            [x]    Patient and Family Training/Education 
[]           Other (comment): Frequency/Duration: Patient will be followed by physical therapy  5 times a week to address goals. Discharge Recommendations: Alejandro Fregoso Further Equipment Recommendations for Discharge: TBD SUBJECTIVE:  
Patient stated I was moving better than this yesterday.  OBJECTIVE DATA SUMMARY:  
HISTORY:   
Past Medical History:  
Diagnosis Date  Breast lump 06/13/2018  
 right breast lump x a few days  HTN (hypertension)  Hypercholesteremia  Osteoporosis  Stroke ([address] CVA, TIA  TIA (transient ischemic attack)  Vertebral fracture Past Surgical History:  
Procedure Laterality Date  HX HIP REPLACEMENT  2009  
 left  HX ORTHOPAEDIC Prior Level of Function/Home Situation: Patient lives in an [facility]. She requires some help with ADLs and reports ambulating Armand using RW Personal factors and/or comorbidities impacting plan of care: Pain, PMH Home Situation Home Environment: Assisted living Care [facility]: [facility] One/Two Story Residence: One story Living Alone: Yes Support Systems: Child(sanya), Assisted living Current DME Used/Available at Home: Walker, rolling, Grab bars, Shower chair Tub or Shower Type: Shower EXAMINATION/PRESENTATION/DECISION MAKING: Critical Behavior: 
Neurologic State: Alert Orientation Level: Oriented to person, Oriented to place, Oriented to situation Cognition: Appropriate for age attention/concentration, Follows commands, Memory loss Safety/Judgement: Awareness of environment, Decreased awareness of need for assistance, Decreased awareness of need for safety, Decreased insight into deficits Hearing: Auditory Auditory Impairment: NoneSkin:   
Edema:  
Range Of Motion: 
AROM: Generally decreased, functional 
  
  
  
  
  
  
  
Strength:   
Strength: Generally decreased, functional 
  
  
  
  
  
  
Tone & Sensation:  
  
  
  
  
  
  
  
  
  
   
Coordination: 
Coordination: Generally decreased, functional 
Vision:  
Tracking: Able to track stimulus in all quadrants w/o difficulty Acuity: Impaired near vision Corrective Lenses: Reading glasses Functional Mobility: 
Bed Mobility: 
Rolling: Moderate assistance Supine to Sit: Maximum assistance Sit to Supine: Total assistance Scooting: Total assistance;Assist x2 Transfers: 
Sit to Stand: Maximum assistance Stand to Sit: Maximum assistance Stand Pivot Transfers: Maximum assistance Bed to Chair: Assist x2 Balance:  
Sitting: Intact Standing: Impaired; With support Standing - Static: Poor Standing - Dynamic : PoorAmbulation/Gait Training:  
  
  
  
  
  
  
  
  
  
  
  
  
  
  
  
   
 Stairs: Therapeutic Exercises:  
 
 
Functional Measure: 
Barthel Index: 
 
Bathin Bladder: 0 Bowels: 5 Groomin Dressin Feeding: 10 Mobility: 0 Stairs: 0 Toilet Use: 0 Transfer (Bed to Chair and Back): 5 Total: 30 Barthel and G-code impairment scale: 
Percentage of impairment CH 
0% CI 
1-19% CJ 
20-39% CK 
40-59% CL 
60-79% CM 
80-99% CN 
100% Barthel Score 0-100 100 99-80 79-60 59-40 20-39 1-19 
 0 Barthel Score 0-20 20 17-19 13-16 9-12 5-8 1-4 0 The Barthel ADL Index: Guidelines 1. The index should be used as a record of what a patient does, not as a record of what a patient could do. 2. The main aim is to establish degree of independence from any help, physical or verbal, however minor and for whatever reason. 3. The need for supervision renders the patient not independent. 4. A patient's performance should be established using the best available evidence. Asking the patient, friends/relatives and nurses are the usual sources, but direct observation and common sense are also important. However direct testing is not needed. 5. Usually the patient's performance over the preceding 24-48 hours is important, but occasionally longer periods will be relevant. 6. Middle categories imply that the patient supplies over 50 per cent of the effort. 7. Use of aids to be independent is allowed. Daron Beard., Barthel, D.W. ([id number]). Functional evaluation: the Barthel Index. [address] [facility] (14)2. KAYLIN BaigF, Kortney Braxton., Bunny Crews., [place], [address] [place] [address] (1999). Measuring the change indisability after inpatient rehabilitation; comparison of the responsiveness of the Barthel Index and Functional [place] Measure. Journal of Neurology, Neurosurgery, and Psychiatry, 66(4), 507-014. Izabela Park, N.J.A, Fausto Argueta  W.J.KOLTON, & Greg Fleming, M.A. (2004.) Assessment of post-stroke quality of life in cost-effectiveness studies: The usefulness of the Barthel Index and the EuroQoL-5D. [facility], 45, 762-82 G codes: In compliance with CMSs Claims Based Outcome Reporting, the following G-code set was chosen for this patient based on their primary functional limitation being treated:  
 
The outcome measure chosen to determine the severity of the functional limitation was the Barthel with a score of 30/100 which was correlated with the impairment scale. ? Mobility - Walking and Moving Around:  
  - CURRENT STATUS: CJ - 20%-39% impaired, limited or restricted  - GOAL STATUS: CI - 1%-19% impaired, limited or restricted  - D/C STATUS:  ---------------To be determined--------------- Physical Therapy Evaluation Charge Determination History Examination Presentation Decision-Making HIGH Complexity :3+ comorbidities / personal factors will impact the outcome/ POC  HIGH Complexity : 4+ Standardized tests and measures addressing body structure, function, activity limitation and / or participation in recreation  LOW Complexity : Stable, uncomplicated  LOW Complexity : FOTO score of  Based on the above components, the patient evaluation is determined to be of the following complexity level: LOW Pain: 
Pain Scale 1: Numeric (0 - 10) Pain Intensity 1: 0 Activity Tolerance:  
VSS, NAD. O2 sats 99% on 4L O2 NC Please refer to the flowsheet for vital signs taken during this treatment. After treatment:  
[x]         Patient left in no apparent distress sitting up in chair 
[]         Patient left in no apparent distress in bed 
[x]         Call bell left within reach 
[]         Nursing notified 
[]         Caregiver present 
[]         Bed alarm activated COMMUNICATION/EDUCATION:  
The patients plan of care was discussed with: Occupational Therapist and Registered Nurse. [x]         Fall prevention education was provided and the patient/caregiver indicated understanding. [x]         Patient/family have participated as able in goal setting and plan of care. [x]         Patient/family agree to work toward stated goals and plan of care. []         Patient understands intent and goals of therapy, but is neutral about his/her participation. []         Patient is unable to participate in goal setting and plan of care.  
 
Thank you for this referral. 
Jyoti Madison, PT, DPT 
 Time Calculation: 20 mins

## 2018-10-25 NOTE — WOUND CARE
WOCN Note:  
 
New consult placed by RN for skin breakdown. Chart shows: 
Admitted for back pain post fall; history of PE, CVA, left hip replacement, compression fractures Admitted from [facility] Assessment:  
Patient is communicative, and assists in repositioning. Patient wearing briefs for incontinence and has a Pure Wick. Bed: advance Patient reports generalized back pain that is relieved with repositioning. Bilateral heel skin intact and without erythema. Bilateral buttocks and sacrum with VERY slow-to-becki erythema. No open areas noted. Patient repositioned on right side with pillow between the knees. Recommendations:   
Venelex twice daily to sacrum and buttock. Minimize layers of linen/pads under patient to optimize support surface. Turn/reposition approximately every 2 hours and offload heels. Transition of Care: Plan to follow weekly and as needed while admitted to hospital.  
 
EMILY Conklin, RN, [facility] Certified Wound, Ostomy, Continence Nurse 
office [phone] 
pager [phone] or call  to page

## 2018-10-25 NOTE — PROGRESS NOTES
Problem: Self Care Deficits Care Plan (Adult) Goal: *Acute Goals and Plan of Care (Insert Text) Occupational Therapy Goals Initiated 10/25/2018 1. Patient will perform lower body dressing with moderate assistance  within 7 day(s). 2.  Patient will perform upper body dressing with modified independence within 7 day(s). 3.  Patient will perform standing grooming tasks x2 minutes with minimal assistance/contact guard assist within 7 day(s). 4.  Patient will perform toilet transfers with minimal assistance/contact guard assist within 7 day(s). 5.  Patient will perform all aspects of toileting with moderate assistance  within 7 day(s). 6.  Patient will participate in upper extremity therapeutic exercise/activities with supervision/set-up for 5 minutes within 7 day(s). 7.  Patient will utilize energy conservation techniques during functional activities with verbal cues within 7 day(s). Occupational Therapy EVALUATION Patient: Juanito Carolina [de-identified]80 y.o. female) Date: 10/25/2018 Primary Diagnosis: Intractable back pain Precautions: Fall ASSESSMENT : 
Based on the objective data described below, the patient presents with SBA-Min A for upper body ADLs, Max-Total A for lower body ADLs, and Mod-Total A for functional mobility s/p admission for back pain. Patient with recent fall, living at [facility], Mod I-SPV for ADLs with assist from staff for bathing, ambulates with a SPC, baseline R sided weakness from previous CVA. Now presents with global deconditioning, back pain, and decreased activity tolerance, balance & mobility, BUE/BLE ROM & reach to feet, cognition (memory, problem-solving), and anxiety with activity. Received in supine, agreeable to therapy, requiring Mod-Max A for bed mobility with education provided on LRT d/t back pain. Able to stand with Max A x1 & HHA however anteriorly flexed posture, unable to tolerate >5 seconds.  Returned to EOB, set up for grooming in unsupported sitting x5 minutes, becoming very anxious reporting \"I can't do anything,\" Total A for return to supine, Total A x2 for scooting up in bed, RN aware, all needs met. Recommend SNF rehab to maximize safety, functional independence, & mobility as patient is far below her baseline. Patient will benefit from skilled intervention to address the above impairments. Patients rehabilitation potential is considered to be Good Factors which may influence rehabilitation potential include:  
[]             None noted []             Mental ability/status []             Medical condition []             Home/family situation and support systems []             Safety awareness []             Pain tolerance/management 
[]             Other: PLAN : 
Recommendations and Planned Interventions: 
[x]               Self Care Training                  [x]        Therapeutic Activities [x]               Functional Mobility Training    []        Cognitive Retraining 
[x]               Therapeutic Exercises           [x]        Endurance Activities [x]               Balance Training                   []        Neuromuscular Re-Education []               Visual/Perceptual Training     [x]   Home Safety Training 
[x]               Patient Education                 [x]        Family Training/Education []               Other (comment): Frequency/Duration: Patient will be followed by occupational therapy 5 times a week to address goals. Discharge Recommendations: Alejandro Fregoso Further Equipment Recommendations for Discharge: TBD by rehab SUBJECTIVE:  
Patient stated I can't do this, I can't do anything.  OBJECTIVE DATA SUMMARY:  
HISTORY:  
Past Medical History:  
Diagnosis Date  Breast lump 06/13/2018  
 right breast lump x a few days  HTN (hypertension)  Hypercholesteremia  Osteoporosis  Stroke ([facility] [address]  CVA, TIA  
  TIA (transient ischemic attack)  Vertebral fracture Past Surgical History:  
Procedure Laterality Date  HX HIP REPLACEMENT  2009  
 left  HX ORTHOPAEDIC Prior Level of Function/Environment/Context: Lives at [place], [place] for ADLs & mobility with RW, assist for bathing from [facility] staff. Noted in chart GLF 2 days ago, baseline R sided weakness from previous CVA. Home Situation Home Environment: Assisted living Care [facility]: [facility] One[facility]Two Story Residence: One story Living Alone: Yes Support Systems: Child(sanya), Assisted living Current DME Used/Available at Home: Walker, rolling, Grab bars, Shower chair Tub or Shower Type: Shower Hand dominance: RightEXAMINATION OF PERFORMANCE DEFICITS: 
Cognitive/Behavioral Status: 
Neurologic State: Alert Orientation Level: Oriented to person;Oriented to place;Oriented to situation Cognition: Appropriate for age attention/concentration; Follows commands;Memory loss Perception: Appears intact Perseveration: No perseveration noted Safety/Judgement: Awareness of environment;Decreased awareness of need for assistance;Decreased awareness of need for safety;Decreased insight into deficitsSkin: Appears intact Edema: None noted in BUEs Hearing: Auditory Auditory Impairment: NoneVision/Perceptual:   
Tracking: Able to track stimulus in all quadrants w/o difficulty Acuity: Impaired near vision Corrective Lenses: Reading glasses Range of Motion: [place] [address] AROM: Generally decreased, functional(RUE decreased from previous CVA) Strength: [place] [address] Strength: Generally decreased, functional(RUE decreased from previous CVA) Coordination: 
Coordination: Generally decreased, functional(RUE decreased from previous CVA) Fine Motor Skills-Upper: Left Intact; Right Intact(RUE weakness from previous CVA) Gross Motor Skills-Upper: Left Intact; Right Intact(RUE weakness from previous CVA) Tone & Sensation: [place] [address] Balance: 
Sitting: Intact Standing: Impaired; With support(HHA) Standing - Static: Poor(noted knee flexion, anteriorly flexed  posture) Standing - Dynamic : PoorFunctional Mobility and Transfers for ADLs:Bed Mobility: 
Rolling: Moderate assistance Supine to Sit: Maximum assistance Sit to Supine: Total assistance Scooting: Total assistance;Assist x2 Transfers: 
Sit to Stand: Maximum assistance;Assist x1 Stand to Sit: Maximum assistance;Assist x1 Bed to Chair: (unable to siedstep or laterally scoot) Toilet Transfer : Maximum assistance;Assist x2(infer to [facility] per functional mobility & stand bal) ADL Assessment: 
Feeding: Independent Oral Facial Hygiene/Grooming: Stand-by assistance(seated EOB, limited sitting tolerance 2* pain & anxiety) Bathing: Maximum assistance Upper Body Dressing: Minimum assistance(inferred per RUE weakness & ROM for around the back) Lower Body Dressing: Maximum assistance Toileting: Maximum assistance ADL Intervention and task modifications: 
  
 
Grooming Grooming Assistance: Stand-by assistance(seated EOB ~3 min, incr in pain & anxiety) Brushing Teeth: Stand-by assistance Cues: Verbal cues provided Lower Body Dressing Assistance Dressing Assistance: Total assistance(dependent) Socks: Total assistance (dependent) Leg Crossed Method Used: No 
Position Performed: Seated edge of bed Cues: Verbal cues provided;Visual cues provided;Physical assistance Toileting Toileting Assistance: Total assistance(dependent) Bladder Hygiene: Total assistance (dependent)(hightower) Cognitive Retraining Safety/Judgement: Awareness of environment;Decreased awareness of need for assistance;Decreased awareness of need for safety;Decreased insight into deficits Therapeutic Exercise: 
Sit<>stand x2 with Max A, unable to tolerate, sitting unsupported EOB x5 minutes Functional Measure: Barthel Index: 
 
Bathin Bladder: 0 Bowels: 5 Groomin Dressin Feeding: 10 Mobility: 0 Stairs: 0 Toilet Use: 0 Transfer (Bed to Chair and Back): 5 Total: 30 Barthel and G-code impairment scale: 
Percentage of impairment CH 
0% CI 
1-19% CJ 
20-39% CK 
40-59% CL 
60-79% CM 
80-99% CN 
100% Barthel Score 0-100 100 99-80 79-60 59-40 20-39 1-19 
 0 Barthel Score 0-20 20 17-19 13-16 9-12 5-8 1-4 0 The Barthel ADL Index: Guidelines 1. The index should be used as a record of what a patient does, not as a record of what a patient could do. 2. The main aim is to establish degree of independence from any help, physical or verbal, however minor and for whatever reason. 3. The need for supervision renders the patient not independent. 4. A patient's performance should be established using the best available evidence. Asking the patient, friends/relatives and nurses are the usual sources, but direct observation and common sense are also important. However direct testing is not needed. 5. Usually the patient's performance over the preceding 24-48 hours is important, but occasionally longer periods will be relevant. 6. Middle categories imply that the patient supplies over 50 per cent of the effort. 7. Use of aids to be independent is allowed. Christopher Bolaños., Barthel, D.W. (7887). Functional evaluation: the Barthel Index. [address] [facility] (14)2. CARLOS ALBERTO Washington, Jan Stewart., Lacie Park., [place], [address] [place] [address] (). Measuring the change indisability after inpatient rehabilitation; comparison of the responsiveness of the Barthel Index and Functional [place] Measure. Journal of Neurology, Neurosurgery, and Psychiatry, 66(4), 023-721. Kendra Ryan, N.J.A, KAMERON Galvan, & Theodore Myers M.A. (2004.) Assessment of post-stroke quality of life in cost-effectiveness studies: The usefulness of the Barthel Index and the EuroQoL-5D. [facility] [place] [facility], 13, 331-98 G codes: In compliance with CMSs Claims Based Outcome Reporting, the following G-code set was chosen for this patient based on their primary functional limitation being treated: The outcome measure chosen to determine the severity of the functional limitation was the Barthel Index with a score of 30/100 which was correlated with the impairment scale. ? Self Care:  
  - CURRENT STATUS: CL - 60%-79% impaired, limited or restricted  - GOAL STATUS: CK - 40%-59% impaired, limited or restricted  - D/C STATUS:  ---------------To be determined--------------- Occupational Therapy Evaluation Charge Determination History Examination Decision-Making LOW Complexity : Brief history review  MEDIUM Complexity : 3-5 performance deficits relating to physical, cognitive , or psychosocial skils that result in activity limitations and / or participation restrictions LOW Complexity : No comorbidities that affect functional and no verbal or physical assistance needed to complete eval tasks Based on the above components, the patient evaluation is determined to be of the following complexity level: LOW Pain: 
Pain Scale 1: Numeric (0 - 10) Pain Intensity 1: 0 Activity Tolerance:  
Fair Please refer to the flowsheet for vital signs taken during this treatment. After treatment:  
[] Patient left in no apparent distress sitting up in chair 
[x] Patient left in no apparent distress in bed 
[x] Call bell left within reach [x] Nursing notified 
[] Caregiver present 
[] Bed alarm activated COMMUNICATION/EDUCATION:  
The patients plan of care was discussed with: Physical Therapist and Registered Nurse. [x] Home safety education was provided and the patient/caregiver indicated understanding. [x] Patient/family have participated as able in goal setting and plan of care. [x] Patient/family agree to work toward stated goals and plan of care. [] Patient understands intent and goals of therapy, but is neutral about his/her participation. [] Patient is unable to participate in goal setting and plan of care. This patients plan of care is appropriate for delegation to [facility]. Thank you for this referral. 
Pilar Clemens OT Time Calculation: 34 mins

## 2018-10-25 NOTE — PROGRESS NOTES
Hospitalist Progress Note Pedro Hickey MD 
Answering service: [phone] OR [phone] from in house phone Date of Service:  10/25/2018 NAME:  Willem Cheung :  1925 MRN:  [id number] Admission Summary:  
79 yo woman with arthritis, h/o PE, h/o stroke/TIA with residual right hemiparesis, chronic vertebral compression fractures, HTN, HLD, osteoporosis, h/o left hip replacement who was BIBEMS to the ED from [facility] ([facility]) on 10/24/18 with hypoxia and HTN per home PT. Patient's SpO2 was reportedly in the 70s, RR 50s, hypotensive and pt was panting and anxious due to the pain. Patient was admitted with CAP with sepsis, intractable back pain, acute hypoxic respiratory failure, hypokalemia, hypernatremia. Interval history / Subjective:  
Pt c/o right hip pain but no CP or SOB; d/w nurse, no overnight events, Tmax 100.3F, 4L NC Assessment & Plan:  
 
CAP vs aspiration PNA with sepsis (POA) - tachycardia, tachypnea on admission 
- lactic acid WNL 
- BCx 10/24 NGTD 
- influenza swab (pt received vaccine 1 week ago) negative 
- started empiric ceftriaxone, azithromycin x5 days; de-escalate to po abx as indicated - CTA chest 10/24 RLL PNA 
- consult SLP 
 
GNR UTI (POA) - UCx 10/24 GNR 
- empiric ceftriaxone started 10/24 
  
Intractable back pain - likely fall exacerbated chronic multiple compression fractures - pain control 
- all imaging negative for acute fx, acute process 
- PT/OT recommending SNF Vitamin D insufficiency - start supplement 
  
Acute hypoxic respiratory failure - SpO2 70s per home PT and was 77% on RA in ED 
- may be due to CAP 
- wean O2 as tolerated - CTA chest 10/24 no PE 
  
Multiple vertebral compression fractures (POA) - XR 10/19 and CT a/p 10/24 unchanged from 2017 
- PT/OT evals 
  
Hypokalemia (POA) - replete prn 
  
 Hypernatremia (POA) - likely due to dehydration from decreased fluid intake 
- improving with IV D5 x24h Code status: DNR 
DVT prophylaxis: SCDs Care Plan discussed with: Patient/Family, Nurse and . D/w daughter Aida Keita by phone on 10/25. Disposition: TBD. Came from [facility] but will need SNF Hospital Problems  Date Reviewed: 10/24/2018 Codes Class Noted POA * (Principal) Intractable back pain ICD-10-CM: M54.9 ICD-9-CM: 724.5  10/24/2018 Yes Hypoxia ICD-10-CM: R09.02 
ICD-9-CM: 799.02  10/24/2018 Yes Vertebral fracture (Chronic) ICD-10-CM: [id number] ICD-9-CM: 805.8  10/24/2018 Yes Hypokalemia ICD-10-CM: E87.6 ICD-9-CM: 276.8  10/24/2018 Unknown CAP (community acquired pneumonia) ICD-10-CM: J18.9 ICD-9-CM: 090  10/24/2018 Yes Review of Systems:  
Pertinent items are noted in HPI. Vital Signs:  
 Last 24hrs VS reviewed since prior progress note. Most recent are: 
Visit Vitals /72 Pulse 72 Temp 98.2 °F (36.8 °C) Resp 24 Ht 5' (1.524 m) Wt 56.2 kg (124 lb) SpO2 95% BMI 24.22 kg/m² No intake or output data in the 24 hours ending 10/25/18 1709 Physical Examination:  
 
Gen: awake, NAD, cooperative, pleasant, elderly, NC in place Head: NCAT 
EENT: PERRL, EOMI, MMM, oropharynx benign Neck: supple, no masses Lungs: tachypnic, CTAB, no w/r/r 
CVS: regular rhythm, normal rate, S1S2, no m/r/g appreciated, no peripheral edema, +pulses Abd: +BS, soft, NT, ND 
MSK: severely reduced ROM b/l LE Neuro: AOx3, responds appropriately to questions and commands Skin: warm, dry; no rashes, lesions, ulcers noted Data Review:  
 Review and/or order of clinical lab test 
Review and/or order of tests in the radiology section of CPT Review and/or order of tests in the medicine section of CPT Labs:  
 
Recent Labs 10/25/18 
0650 10/24/18 
1322 WBC 8.3 9.5 HGB 11.7 13.0 HCT 37.2 41.5  286 Recent Labs 10/25/18 
0529 10/24/18 
1322 * 148* K 4.2 3.4*  
* 105 CO2 27 35* BUN 30* 28* CREA 0.91 0.97 * 120* CA 8.4* 9.4 Recent Labs 10/24/18 
1322 SGOT 31 ALT 26 AP 77 TBILI 0.9 TP 6.9 ALB 3.1*  
GLOB 3.8 No results for input(s): INR, PTP, APTT in the last 72 hours. No lab exists for component: INREXT, INREXT No results for input(s): FE, TIBC, PSAT, FERR in the last 72 hours. No results found for: FOL, RBCF No results for input(s): PH, PCO2, PO2 in the last 72 hours. Recent Labs 10/24/18 
1322 TROIQ 0.12* Lab Results Component Value Date/Time Cholesterol, total 168 05/21/2010 03:00 AM  
 HDL Cholesterol 53 05/21/2010 03:00 AM  
 LDL, calculated 95.4 05/21/2010 03:00 AM  
 Triglyceride 98 05/21/2010 03:00 AM  
 CHOL/HDL Ratio 3.2 05/21/2010 03:00 AM  
 
Lab Results Component Value Date/Time Glucose (POC) 81 03/08/2017 09:18 PM  
 Glucose (POC) 104 05/22/2010 08:06 AM  
 Glucose (POC) 120 (H) 05/21/2010 10:53 PM  
 Glucose (POC) 122 (H) 05/21/2010 05:05 PM  
 
Lab Results Component Value Date/Time Color YELLOW/STRAW 10/24/2018 02:52 PM  
 Appearance CLOUDY (A) 10/24/2018 02:52 PM  
 Specific gravity 1.015 10/24/2018 02:52 PM  
 Specific gravity 1.005 03/07/2017 01:54 PM  
 pH (UA) 5.5 10/24/2018 02:52 PM  
 Protein 30 (A) 10/24/2018 02:52 PM  
 Glucose NEGATIVE  10/24/2018 02:52 PM  
 Ketone TRACE (A) 10/24/2018 02:52 PM  
 Bilirubin NEGATIVE  10/24/2018 02:52 PM  
 Urobilinogen 0.2 10/24/2018 02:52 PM  
 Nitrites NEGATIVE  10/24/2018 02:52 PM  
 Leukocyte Esterase MODERATE (A) 10/24/2018 02:52 PM  
 Epithelial cells FEW 10/24/2018 02:52 PM  
 Bacteria 4+ (A) 10/24/2018 02:52 PM  
 WBC  10/24/2018 02:52 PM  
 RBC 0-5 10/24/2018 02:52 PM  
 
 
 
Medications Reviewed:  
 
Current Facility-Administered Medications Medication Dose Route Frequency  cholecalciferol (VITAMIN D3) tablet 1,000 Units  1,000 Units Oral DAILY  balsam peru-castor oil (VENELEX) [id number] mg/gram ointment   Topical BID  sodium chloride (NS) flush 5-10 mL  5-10 mL IntraVENous Q8H  
 sodium chloride (NS) flush 5-10 mL  5-10 mL IntraVENous PRN  
 acetaminophen (TYLENOL) tablet 1,000 mg  1,000 mg Oral Q8H  
 ondansetron (ZOFRAN) injection 4 mg  4 mg IntraVENous Q4H PRN  
 cefTRIAXone (ROCEPHIN) 1 g in 0.9% sodium chloride (MBP/ADV) 50 mL  1 g IntraVENous Q24H  
 azithromycin (ZITHROMAX) 500 mg in 0.9% sodium chloride 250 mL ([id number])  500 mg IntraVENous Q24H  
 dextrose 5% infusion  50 mL/hr IntraVENous CONTINUOUS  
 lidocaine (LIDODERM) 5 % patch 1 Patch  1 Patch TransDERmal Q24H  
 amLODIPine (NORVASC) tablet 5 mg  5 mg Oral DAILY  calcium carbonate (OS-SIMONE) tablet 500 mg [elemental]  500 mg Oral BID  metoprolol tartrate (LOPRESSOR) tablet 12.5 mg  12.5 mg Oral BID  therapeutic multivitamin (THERAGRAN) tablet 1 Tab  1 Tab Oral DAILY  oxyCODONE IR (ROXICODONE) tablet 5 mg  5 mg Oral Q6H PRN  
 
______________________________________________________________________ EXPECTED LENGTH OF STAY: 4d 19h ACTUAL LENGTH OF STAY:          1 Radha Barnard MD

## 2018-10-26 ENCOUNTER — APPOINTMENT (OUTPATIENT)
Dept: GENERAL RADIOLOGY | Age: 83
DRG: 871 | End: 2018-10-26
Attending: INTERNAL MEDICINE
Payer: MEDICARE

## 2018-10-26 LAB
ANION GAP SERPL CALC-SCNC: 8 MMOL/L (ref 5–15)
BACTERIA SPEC CULT: ABNORMAL
BUN SERPL-MCNC: 32 MG/DL (ref 6–20)
BUN/CREAT SERPL: 36 (ref 12–20)
CALCIUM SERPL-MCNC: 8.1 MG/DL (ref 8.5–10.1)
CC UR VC: ABNORMAL
CHLORIDE SERPL-SCNC: 108 MMOL/L (ref 97–108)
CO2 SERPL-SCNC: 31 MMOL/L (ref 21–32)
CREAT SERPL-MCNC: 0.9 MG/DL (ref 0.55–1.02)
ERYTHROCYTE [DISTWIDTH] IN BLOOD BY AUTOMATED COUNT: 13.4 % (ref 11.5–14.5)
GLUCOSE BLD STRIP.AUTO-MCNC: 119 MG/DL (ref 65–100)
GLUCOSE BLD STRIP.AUTO-MCNC: 120 MG/DL (ref 65–100)
GLUCOSE BLD STRIP.AUTO-MCNC: 177 MG/DL (ref 65–100)
GLUCOSE BLD STRIP.AUTO-MCNC: 222 MG/DL (ref 65–100)
GLUCOSE SERPL-MCNC: 123 MG/DL (ref 65–100)
HCT VFR BLD AUTO: 37.2 % (ref 35–47)
HGB BLD-MCNC: 11.5 G/DL (ref 11.5–16)
MCH RBC QN AUTO: 30.4 PG (ref 26–34)
MCHC RBC AUTO-ENTMCNC: 30.9 G/DL (ref 30–36.5)
MCV RBC AUTO: 98.4 FL (ref 80–99)
NRBC # BLD: 0 K/UL (ref 0–0.01)
NRBC BLD-RTO: 0 PER 100 WBC
PLATELET # BLD AUTO: 265 K/UL (ref 150–400)
PMV BLD AUTO: 10.6 FL (ref 8.9–12.9)
POTASSIUM SERPL-SCNC: 3.6 MMOL/L (ref 3.5–5.1)
RBC # BLD AUTO: 3.78 M/UL (ref 3.8–5.2)
SERVICE CMNT-IMP: ABNORMAL
SODIUM SERPL-SCNC: 147 MMOL/L (ref 136–145)
WBC # BLD AUTO: 8.6 K/UL (ref 3.6–11)

## 2018-10-26 PROCEDURE — 74011000250 HC RX REV CODE- 250: Performed by: FAMILY MEDICINE

## 2018-10-26 PROCEDURE — 74011000250 HC RX REV CODE- 250: Performed by: INTERNAL MEDICINE

## 2018-10-26 PROCEDURE — 73552 X-RAY EXAM OF FEMUR 2/>: CPT

## 2018-10-26 PROCEDURE — 97110 THERAPEUTIC EXERCISES: CPT

## 2018-10-26 PROCEDURE — 82962 GLUCOSE BLOOD TEST: CPT

## 2018-10-26 PROCEDURE — 85027 COMPLETE CBC AUTOMATED: CPT | Performed by: INTERNAL MEDICINE

## 2018-10-26 PROCEDURE — 74011250636 HC RX REV CODE- 250/636: Performed by: INTERNAL MEDICINE

## 2018-10-26 PROCEDURE — 65660000000 HC RM CCU STEPDOWN

## 2018-10-26 PROCEDURE — 94640 AIRWAY INHALATION TREATMENT: CPT

## 2018-10-26 PROCEDURE — 97530 THERAPEUTIC ACTIVITIES: CPT

## 2018-10-26 PROCEDURE — 92526 ORAL FUNCTION THERAPY: CPT

## 2018-10-26 PROCEDURE — 80048 BASIC METABOLIC PNL TOTAL CA: CPT | Performed by: INTERNAL MEDICINE

## 2018-10-26 PROCEDURE — 74011000258 HC RX REV CODE- 258: Performed by: INTERNAL MEDICINE

## 2018-10-26 PROCEDURE — 94664 DEMO&/EVAL PT USE INHALER: CPT

## 2018-10-26 PROCEDURE — 74018 RADEX ABDOMEN 1 VIEW: CPT

## 2018-10-26 PROCEDURE — 36415 COLL VENOUS BLD VENIPUNCTURE: CPT | Performed by: INTERNAL MEDICINE

## 2018-10-26 PROCEDURE — 74011250637 HC RX REV CODE- 250/637: Performed by: INTERNAL MEDICINE

## 2018-10-26 PROCEDURE — 72170 X-RAY EXAM OF PELVIS: CPT

## 2018-10-26 RX ORDER — FUROSEMIDE 10 MG/ML
20 INJECTION INTRAMUSCULAR; INTRAVENOUS ONCE
Status: COMPLETED | OUTPATIENT
Start: 2018-10-26 | End: 2018-10-26

## 2018-10-26 RX ORDER — SERTRALINE HYDROCHLORIDE 50 MG/1
25 TABLET, FILM COATED ORAL EVERY EVENING
Status: DISCONTINUED | OUTPATIENT
Start: 2018-10-26 | End: 2018-10-31 | Stop reason: HOSPADM

## 2018-10-26 RX ADMIN — CASTOR OIL AND BALSAM, PERU: 788; 87 OINTMENT TOPICAL at 10:26

## 2018-10-26 RX ADMIN — CASTOR OIL AND BALSAM, PERU: 788; 87 OINTMENT TOPICAL at 18:59

## 2018-10-26 RX ADMIN — SERTRALINE HYDROCHLORIDE 25 MG: 50 TABLET ORAL at 18:59

## 2018-10-26 RX ADMIN — CALCIUM 500 MG: 500 TABLET ORAL at 10:25

## 2018-10-26 RX ADMIN — CALCIUM 500 MG: 500 TABLET ORAL at 18:59

## 2018-10-26 RX ADMIN — OXYCODONE HYDROCHLORIDE 5 MG: 5 TABLET ORAL at 13:04

## 2018-10-26 RX ADMIN — VITAMIN D, TAB 1000IU (100/BT) 1000 UNITS: 25 TAB at 10:25

## 2018-10-26 RX ADMIN — THERA TABS 1 TABLET: TAB at 10:25

## 2018-10-26 RX ADMIN — ACETAMINOPHEN 1000 MG: 500 TABLET, FILM COATED ORAL at 18:59

## 2018-10-26 RX ADMIN — ACETAMINOPHEN 1000 MG: 500 TABLET, FILM COATED ORAL at 03:34

## 2018-10-26 RX ADMIN — ACETAMINOPHEN 1000 MG: 500 TABLET, FILM COATED ORAL at 10:24

## 2018-10-26 RX ADMIN — AMLODIPINE BESYLATE 5 MG: 5 TABLET ORAL at 10:25

## 2018-10-26 RX ADMIN — Medication 10 ML: at 21:32

## 2018-10-26 RX ADMIN — FUROSEMIDE 20 MG: 10 INJECTION, SOLUTION INTRAMUSCULAR; INTRAVENOUS at 17:08

## 2018-10-26 RX ADMIN — Medication 10 ML: at 17:10

## 2018-10-26 RX ADMIN — METOPROLOL TARTRATE 12.5 MG: 25 TABLET ORAL at 10:25

## 2018-10-26 RX ADMIN — AZITHROMYCIN MONOHYDRATE 500 MG: 500 INJECTION, POWDER, LYOPHILIZED, FOR SOLUTION INTRAVENOUS at 19:04

## 2018-10-26 RX ADMIN — CEFTRIAXONE 1 G: 1 INJECTION, POWDER, FOR SOLUTION INTRAMUSCULAR; INTRAVENOUS at 19:00

## 2018-10-26 RX ADMIN — METOPROLOL TARTRATE 12.5 MG: 25 TABLET ORAL at 18:59

## 2018-10-26 RX ADMIN — IPRATROPIUM BROMIDE AND ALBUTEROL SULFATE 3 ML: .5; 3 SOLUTION RESPIRATORY (INHALATION) at 18:02

## 2018-10-26 NOTE — PROGRESS NOTES
Hospitalist Progress Note Zoya Rodríguez MD 
Answering service: [phone] OR [phone] from in house phone Date of Service:  10/26/2018 NAME:  Javad Chavez :  1925 MRN:  [id number] Admission Summary:  
79 yo woman with arthritis, h/o PE, h/o stroke/TIA with residual right hemiparesis, chronic vertebral compression fractures, HTN, HLD, osteoporosis, h/o left hip replacement who was BIBEMS to the ED from [facility]) on 10/24/18 with hypoxia and HTN per home PT. Patient's SpO2 was reportedly in the 70s, RR 50s, hypotensive and pt was panting and anxious due to the pain. Patient was admitted with CAP with sepsis, intractable back pain, acute hypoxic respiratory failure, hypokalemia, hypernatremia. Interval history / Subjective:  
Pt c/o right inner thigh and lower abdominal pain but no CP, SOB, or back pain; d/w nurse, no overnight events, now on 3L Assessment & Plan:  
 
CAP vs aspiration PNA with sepsis (POA) - tachycardia, tachypnea on admission 
- lactic acid WNL 
- BCx 10/24 NGTD 
- influenza swab (pt received vaccine 1 week ago) negative 
- started empiric ceftriaxone, azithromycin x5 days; de-escalate to po abx as indicated - CTA chest 10/24 RLL PNA 
- SLP consulted E coli UTI (POA) - UCx 10/24 E coli  
- empiric ceftriaxone started 10/24 x5 days 
  
Intractable back pain - likely fall exacerbated chronic multiple compression fractures - pain control 
- all imaging negative for acute fx, acute process 
- PT/OT recommending SNF 
 
R medial thigh pain - check XR pelvis and R femur Lower abdominal pain - check KUB Vitamin D insufficiency - started supplement 
  
Acute hypoxic respiratory failure - SpO2 70s per home PT and was 77% on RA in ED 
- may be due to CAP 
- wean O2 as tolerated - CTA chest 10/24 no PE 
  
Multiple vertebral compression fractures (POA) - XR 10/19 and CT a/p 10/24 unchanged from March 2017 
- PT/OT evals: SNF recommended 
  
Hypokalemia (POA) - replete prn 
  
Hypernatremia (POA) - likely due to dehydration from decreased fluid intake 
- had improved with IV D5 x24h but Na rising since IVF stopped 
- encourage po free water intake Code status: DNR 
DVT prophylaxis: SCDs Care Plan discussed with: Patient/Family, Nurse and . D/w daughter Mae Nash by phone on 10/25 and at bedside 10/26. Disposition: TBD. Came from [facility] but will need SNF . Daughter requesting [facility] Hospital Problems  Date Reviewed: 10/24/2018 Codes Class Noted POA * (Principal) Intractable back pain ICD-10-CM: M54.9 ICD-9-CM: 724.5  10/24/2018 Yes Hypoxia ICD-10-CM: R09.02 
ICD-9-CM: 799.02  10/24/2018 Yes Vertebral fracture (Chronic) ICD-10-CM: [id number] ICD-9-CM: 805.8  10/24/2018 Yes Hypokalemia ICD-10-CM: E87.6 ICD-9-CM: 276.8  10/24/2018 Unknown CAP (community acquired pneumonia) ICD-10-CM: J18.9 ICD-9-CM: 795  10/24/2018 Yes Review of Systems:  
Pertinent items are noted in HPI. Vital Signs:  
 Last 24hrs VS reviewed since prior progress note. Most recent are: 
Visit Vitals /69 (BP 1 Location: Right arm, BP Patient Position: At rest) Pulse 65 Temp 98.4 °F (36.9 °C) Resp 28 Ht 5' (1.524 m) Wt 53.8 kg (118 lb 8 oz) SpO2 91% BMI 23.14 kg/m² No intake or output data in the 24 hours ending 10/26/18 1400 Physical Examination:  
 
Gen: awake, NAD, cooperative, pleasant, elderly, NC in place Head: NCAT 
EENT: PERRL, EOMI, MMM, oropharynx benign Neck: supple, no masses Lungs: tachypnic, CTAB, no w/r/r 
CVS: regular rhythm, normal rate, S1S2, no m/r/g appreciated, no peripheral edema, +pulses Abd: +BS, soft, NT, ND 
MSK: severely reduced ROM b/l LE Neuro: AOx3, responds appropriately to questions and commands Skin: warm, dry; no rashes, lesions, ulcers noted Data Review: Review and/or order of clinical lab test 
Review and/or order of tests in the radiology section of CPT Review and/or order of tests in the medicine section of CPT Labs:  
 
Recent Labs 10/26/18 
3954 10/25/18 
4254 WBC 8.6 8.3 HGB 11.5 11.7 HCT 37.2 37.2  251 Recent Labs 10/26/18 
0324 10/25/18 
0529 10/24/18 
1322 * 146* 148* K 3.6 4.2 3.4*  
 111* 105 CO2 31 27 35* BUN 32* 30* 28* CREA 0.90 0.91 0.97 * 143* 120* CA 8.1* 8.4* 9.4 Recent Labs 10/24/18 
1322 SGOT 31 ALT 26 AP 77 TBILI 0.9 TP 6.9 ALB 3.1*  
GLOB 3.8 No results for input(s): INR, PTP, APTT in the last 72 hours. No lab exists for component: INREXT, INREXT No results for input(s): FE, TIBC, PSAT, FERR in the last 72 hours. No results found for: FOL, RBCF No results for input(s): PH, PCO2, PO2 in the last 72 hours. Recent Labs 10/24/18 
1322 TROIQ 0.12* Lab Results Component Value Date/Time Cholesterol, total 168 05/21/2010 03:00 AM  
 HDL Cholesterol 53 05/21/2010 03:00 AM  
 LDL, calculated 95.4 05/21/2010 03:00 AM  
 Triglyceride 98 05/21/2010 03:00 AM  
 CHOL/HDL Ratio 3.2 05/21/2010 03:00 AM  
 
Lab Results Component Value Date/Time Glucose (POC) 222 (H) 10/26/2018 11:51 AM  
 Glucose (POC) 119 (H) 10/26/2018 05:52 AM  
 Glucose (POC) 120 (H) 10/26/2018 12:27 AM  
 Glucose (POC) 81 03/08/2017 09:18 PM  
 Glucose (POC) 104 05/22/2010 08:06 AM  
 
Lab Results Component Value Date/Time  Color YELLOW/STRAW 10/24/2018 02:52 PM  
 Appearance CLOUDY (A) 10/24/2018 02:52 PM  
 Specific gravity 1.015 10/24/2018 02:52 PM  
 Specific gravity 1.005 03/07/2017 01:54 PM  
 pH (UA) 5.5 10/24/2018 02:52 PM  
 Protein 30 (A) 10/24/2018 02:52 PM  
 Glucose NEGATIVE  10/24/2018 02:52 PM  
 Ketone TRACE (A) 10/24/2018 02:52 PM  
 Bilirubin NEGATIVE  10/24/2018 02:52 PM  
 Urobilinogen 0.2 10/24/2018 02:52 PM  
 Nitrites NEGATIVE  10/24/2018 02:52 PM  
 Leukocyte Esterase MODERATE (A) 10/24/2018 02:52 PM  
 Epithelial cells FEW 10/24/2018 02:52 PM  
 Bacteria 4+ (A) 10/24/2018 02:52 PM  
 WBC  10/24/2018 02:52 PM  
 RBC 0-5 10/24/2018 02:52 PM  
 
 
 
Medications Reviewed:  
 
Current Facility-Administered Medications Medication Dose Route Frequency  furosemide (LASIX) injection 20 mg  20 mg IntraVENous ONCE  cholecalciferol (VITAMIN D3) tablet 1,000 Units  1,000 Units Oral DAILY  balsam peru-castor oil (VENELEX) [id number] mg/gram ointment   Topical BID  albuterol-ipratropium (DUO-NEB) 2.5 MG-0.5 MG/3 ML  3 mL Nebulization Q4H PRN  
 sodium chloride (NS) flush 5-10 mL  5-10 mL IntraVENous Q8H  
 sodium chloride (NS) flush 5-10 mL  5-10 mL IntraVENous PRN  
 acetaminophen (TYLENOL) tablet 1,000 mg  1,000 mg Oral Q8H  
 ondansetron (ZOFRAN) injection 4 mg  4 mg IntraVENous Q4H PRN  
 cefTRIAXone (ROCEPHIN) 1 g in 0.9% sodium chloride (MBP/ADV) 50 mL  1 g IntraVENous Q24H  
 azithromycin (ZITHROMAX) 500 mg in 0.9% sodium chloride 250 mL ([id number])  500 mg IntraVENous Q24H  
 lidocaine (LIDODERM) 5 % patch 1 Patch  1 Patch TransDERmal Q24H  
 amLODIPine (NORVASC) tablet 5 mg  5 mg Oral DAILY  calcium carbonate (OS-SIMONE) tablet 500 mg [elemental]  500 mg Oral BID  metoprolol tartrate (LOPRESSOR) tablet 12.5 mg  12.5 mg Oral BID  therapeutic multivitamin (THERAGRAN) tablet 1 Tab  1 Tab Oral DAILY  oxyCODONE IR (ROXICODONE) tablet 5 mg  5 mg Oral Q6H PRN  
 
______________________________________________________________________ EXPECTED LENGTH OF STAY: 4d 19h ACTUAL LENGTH OF STAY:          2 Lauren Crump MD

## 2018-10-26 NOTE — PROGRESS NOTES
Bedside shift change report given to Fuentes Looney (oncoming nurse) by Jacque Warren (offgoing nurse). Report included the following information SBAR, Kardex and MAR.

## 2018-10-26 NOTE — PROGRESS NOTES
I returned page from nurse who reports that patient had respiratory rate increased to 35. Per my chart review, patient was hypoxic on admission with O2sat= 77%. She is now on 3 liters O2 via NC. CTA chest yesterday showed: 
1. No evidence pulmonary embolism. 2. Right lower lobe patchy airspace consolidation. Mild left basilar 
atelectasis. 3. Thoracic compression fractures, as described above. 4. Trace right pleural fluid. Patient is on IV Zithromax and Rocephin. Tonight, I will repeat chest xray to see if there is interval lung changes and stat ABG. Continue O2 and place on continuous pulse oximetry for monitoring. Nurse also comments of back pain. Will order K pad for supportive cares.

## 2018-10-26 NOTE — PROGRESS NOTES
Reason for Admission:  Recent fall, 2 days PTA, decreased O2 sats, h/o PE, stroke/TIA, chronic vertebrae fx, osteoporosis, h/o lt hip replacement RRAT Score:   10/low Plan for utilizing home health:  Pt has lived at [address] [place] [address] for several years. She has PT/OT services there provided by [facility]. This CM talked to Liat, a nurse at the [facility], who provided this information. This CM reviewed documentation from prior admission here, 3/2017. Pt discharged to [address] [place] [address] after discharge. Likelihood of Readmission: Moderate secondary medical co-morbidities. Transition of Care Plan: PT eval recommended SNF. CM discussed with pt and attempted to reach her daughter, Marissa Scott, [phone]  (m), [phone] (W). VM left with this CM contact information. Pt uses a walker. She receives assistance with shower and ADLs from [facility] caregivers. Her daughter provides transportation. Pt has another daughter, Sonam Santiago, [phone], who recently moved here from [place].  at [address] [place] [address] is Liat or Koby Both, [facility], [phone]. Care Management Interventions PCP Verified by CM: Frank Mattson., PA) Palliative Care Criteria Met (RRAT>21 & CHF Dx)?: No(RRAT score 10) Mode of Transport at Discharge: Other (see comment)(pt's daughter usually transports her) Discharge Durable Medical Equipment: No 
Physical Therapy Consult: Yes Occupational Therapy Consult: Yes Speech Therapy Consult: Yes [phone][facility] Confirm Follow Up Transport: Family Plan discussed with Pt/Family/Caregiver: Yes Freedom of Choice Offered: Yes Tiff Mcneil RN ACM CRM 
 
10:38 am 
 
Pt's daughter returned call. CM discussed discharge planning and PT recommendation for SNF. CM had received a consult for SNF as well.  Pt's daughter in agreement for SNF. She has a preference for [facility]. Referral sent to [facility] per CC link. CM talked to Farzanaharriet Nga in Admission at [facility], [phone]. She stated that she had not received the referral as they are not able to pull anything off the CC Link since the update of [facility]. She does not have a bed available and will not have anything open until the first part of next week. ALIZA sent referral via [email or web address]. CM will talk to pt's daughter to determine any other preference when she comes to visit this afternoon. 3:00 pm 
 
Met with pt's two daughters and provided them with a list of SNF. They have preference for [facility] if a bed becomes available. Second choice is [facility]. Referral sent to [facility] via [email or web address].

## 2018-10-26 NOTE — PROGRESS NOTES
Problem: Dysphagia (Adult) Goal: *Acute Goals and Plan of Care (Insert Text) Speech therapy goals Initiated 10/25/2018 1. Patient will tolerate a regular/thin liquid diet without s/s of aspiration within 7 days  MET 10/26/2018 Speech language pathology dysphagia treatment/discharge Patient: Earlene Barrios [de-identified]80 y.o. female) Date: 10/26/2018 Diagnosis: Intractable back pain Intractable back pain Precautions: Fall ASSESSMENT: 
Patient continues with suspected functional oropharyngeal swallowfor age. Patient only agreeable to small amount of liquid and meds whole in applesauce. Timely bolus manipulation and posterior propulsion with applesauce. Phayngeal swallow initiation is suspected to be timely and hyolaryngeal elevation/excursion functional via palpation. Similarly to yesterday, frequent belching noted with liquids which could be indicative of pharyngeal residue. Patient continues to benefit from small, single cup sips and avoidance of straws. Educated on importance of safe swallow strategies. Progression toward goals: 
[]           Improving appropriately and progressing toward goals [x]           Improving slowly and progressing toward goals 
[]           Not making progress toward goals and plan of care will be adjusted PLAN: 
Continue regular diet/ thin liquids. NO STRAWS- SMALL, SINGLE cup sips only STRICT upright with all PO Continue meds whole in applesauce Consider gi consult if frequent belching with po persists Patient will be discharged from speech therapy at this time. Rationale for discharge: 
[x]      Goals Achieved 
[]      [address] 
[]      Patient not participating in therapy 
[]      Other: 
Discharge Recommendations:  None SUBJECTIVE:  
Patient stated first they said my back wasn't broken and now they say it is. OBJECTIVE:  
Cognitive and Communication Status: 
Neurologic State: Alert Orientation Level: Appropriate for age Cognition: Follows commands Perception: Appears intact Perseveration: No perseveration noted Safety/Judgement: Not assessed Dysphagia Treatment: 
Oral Assessment: 
Oral Assessment Labial: No impairment Dentition: Intact Oral Hygiene: (wfl) Lingual: No impairment Velum: Unable to visualize Mandible: No impairment P.O. Trials: 
Patient Position: (up in bed) Vocal quality prior to P.O.: No impairment Consistency Presented: Thin liquid;Puree;Pill/Tablet How Presented: Self-fed/presented;Cup/sip;SLP-fed/presented;Spoon Bolus Acceptance: No impairment Bolus Formation/Control: No impairment Propulsion: No impairment Oral Residue: None Initiation of Swallow: No impairment Laryngeal Elevation: Functional 
Aspiration Signs/Symptoms: (belching noted) Effective Modifications: Small sips and bites;Cup/sip Cues for Modifications: Minimal-moderate Oral Phase Severity: No impairment Pharyngeal Phase Severity : No impairment G Codes: In compliance with CMSs Claims Based Outcome Reporting, the following G-code set was chosen for this patient based the use of the NOMS functional outcome to quantify this patient's level of swallowing impairment. Using the NOMS, the patient was determined to be at level 6 for swallow function which correlates with the CI= 1-19% level of severity. Based on the objective assessment provided within this note, the current, goal, and discharge g-codes are as follows: 
 
Swallow  Swallowing: 
 Swallow Goal Status CI= 1-19%  Swallow D/C Status CI= 1-19% NOMS Swallowing Levels: 
Level 1 (CN): NPO Level 2 (CM): NPO but takes consistency in therapy Level 3 (CL): Takes less than 50% of nutrition p.o. and continues with nonoral feedings; and/or safe with mod cues; and/or max diet restriction Level 4 (CK): Safe swallow but needs mod cues; and/or mod diet restriction; and/or still requires some nonoral feeding/supplements Level 5 (CJ): Safe swallow with min diet restriction; and/or needs min cues Level 6 (CI): Independent with p.o.; rare cues; usually self cues; may need to avoid some foods or needs extra time Level 7 (CH): Independent for all p.o. NELI. (2003). National Outcomes Measurement System (NOMS): Adult Speech-Language Pathology User's Guide. Pain: 
Pain Scale 1: Numeric (0 - 10) Pain Intensity 1: 7(when moving. need to gedt breathing under control) After treatment:  
[]                Patient left in no apparent distress sitting up in chair 
[x]                Patient left in no apparent distress in bed 
[x]                Call bell left within reach [x]                Nursing notified 
[]                Caregiver present 
[]                Bed alarm activated COMMUNICATION/EDUCATION:  
 
The patients plan of care including recommendations, planned interventions, and recommended diet changes were discussed with: Registered Nurse. Jake Cook, SLP Time Calculation: 10 mins

## 2018-10-26 NOTE — PROGRESS NOTES
Came 2010 on duty and worked with pt using incentive spirometer. Her o2 SATs got up to 93% after getting the incentive up to 250 several times. Turned her on her right side and put a bed alarm on her. Reparatory rate is elevated and she is breathing shallow. No wheezing heard. Called Dr Celeste Muñiz to let him know of her rapid respirations and low oxygen saturation. Orders given for ABG's and chest xray. Just talked to Dr Celeste Muñiz regarding the results of the c xray and ABG's orders given also let him know that her respirations are still about 35 and shallow.

## 2018-10-26 NOTE — PROGRESS NOTES
Problem: Self Care Deficits Care Plan (Adult) Goal: *Acute Goals and Plan of Care (Insert Text) Occupational Therapy Goals Initiated 10/25/2018 1. Patient will perform lower body dressing with moderate assistance  within 7 day(s). 2.  Patient will perform upper body dressing with modified independence within 7 day(s). 3.  Patient will perform standing grooming tasks x2 minutes with minimal assistance/contact guard assist within 7 day(s). 4.  Patient will perform toilet transfers with minimal assistance/contact guard assist within 7 day(s). 5.  Patient will perform all aspects of toileting with moderate assistance  within 7 day(s). 6.  Patient will participate in upper extremity therapeutic exercise/activities with supervision/set-up for 5 minutes within 7 day(s). 7.  Patient will utilize energy conservation techniques during functional activities with verbal cues within 7 day(s). Occupational Therapy TREATMENT Patient: Emily Hardin [de-identified]80 y.o. female) Date: 10/26/2018 Diagnosis: Intractable back pain Intractable back pain Precautions: Fall Chart, occupational therapy assessment, plan of care, and goals were reviewed. ASSESSMENT: 
Patient received in supine, agreeable to therapy with encouragement. Continues to require Max A for bed mobility with cues for LRT & bed rail use. Able to sit EOB x10 minutes completing BUE theraband exercises, noted AAROM for RUE d/t baseline weakness & decreased ROM. Cues required throughout all activity for PLB & pacing as patient with desats to 86-89% on 3L, recovered with rest breaks. Returned to supine with Mod A with cues for technique, all needs met. Encouraged patient to complete BUE exercises & up to the chair for meals as tolerated, patient & daughter acknowledging understanding. Continue to recommend SNF rehab upon d/c as patient remains significantly deconditioned. Progression toward goals: []       Improving appropriately and progressing toward goals [x]       Improving slowly and progressing toward goals 
[]       Not making progress toward goals and plan of care will be adjusted PLAN: 
Patient continues to benefit from skilled intervention to address the above impairments. Continue treatment per established plan of care. Discharge Recommendations:  Alejandro Fregoso Further Equipment Recommendations for Discharge:  TBD by rehab SUBJECTIVE:  
Patient stated Thanks for working with me, I know I'm stubborn & hard headed.  OBJECTIVE DATA SUMMARY:  
Cognitive/Behavioral Status: 
Neurologic State: Alert Orientation Level: Oriented X4 Cognition: Appropriate for age attention/concentration; Follows commands Perception: Appears intact Perseveration: No perseveration noted Safety/Judgement: Awareness of environment; Fall prevention Functional Mobility and Transfers for ADLs:Bed Mobility: 
Rolling: Minimum assistance Supine to Sit: Maximum assistance; Adaptive equipment; Additional time Sit to Supine: Moderate assistance; Additional time; Adaptive equipment Scooting: Total assistance;Assist x2 Transfers: 
  
  
  
 
Balance: 
Sitting: Intact Sitting - Static: Good (unsupported) Sitting - Dynamic: Good (unsupported) ADL Intervention: 
  
Cognitive Retraining Safety/Judgement: Awareness of environment; Fall prevention Therapeutic Exercises:  
Red theraband BUE exercises: shoulder & elbow flexion, shoulder abduction, AAROM for RUE with decr ROM Pain: 
Pain Scale 1: Numeric (0 - 10) Pain Intensity 1: 10 
Pain Location 1: Leg;Back Pain Orientation 1: Right; Lower Pain Description 1: Aching; Throbbing Pain Intervention(s) 1: Medication (see MAR) Activity Tolerance:  
Good despite fatigue & desats Please refer to the flowsheet for vital signs taken during this treatment. After treatment:  
[] Patient left in no apparent distress sitting up in chair [x] Patient left in no apparent distress in bed 
[x] Call bell left within reach [x] Nursing notified 
[x] Caregiver present 
[] Bed alarm activated COMMUNICATION/COLLABORATION:  
The patients plan of care was discussed with: Physical Therapist and Registered Nurse Leisa Paiz OT Time Calculation: 29 mins

## 2018-10-26 NOTE — PROGRESS NOTES
Problem: Mobility Impaired (Adult and Pediatric) Goal: *Acute Goals and Plan of Care (Insert Text) Physical Therapy Goals Initiated 10/25/2018 1. Patient will move from supine to sit and sit to supine , scoot up and down and roll side to side in bed with minimal assistance/contact guard assist within 7 day(s). 2.  Patient will transfer from bed to chair and chair to bed with minimal assistance/contact guard assist using the least restrictive device within 7 day(s). 3.  Patient will perform sit to stand with minimal assistance/contact guard assist within 7 day(s). 4.  Patient will ambulate with minimal assistance/contact guard assist for 20 feet with the least restrictive device within 7 day(s). physical Therapy TREATMENT Patient: Vidya Larsen [de-identified]80 y.o. female) Date: 10/26/2018 Diagnosis: Intractable back pain Intractable back pain Precautions: Fall Chart, physical therapy assessment, plan of care and goals were reviewed. ASSESSMENT: 
Chart reviewed and RN approved patient for mobility. Therapy arrived 1 hr after pain medication was administered to maximize mobility without pain. Patient received lying in bed with daughter at bedside. Patient initially refusing to work with therapy, but agreeable to bed level mobility with encouragement from therapist and daughter. Patient rolled L and R with modA and verbal cuing for technique. Patient complaining of pain during mobility, but unable to localize pain, pointing first to anterior thigh, then to groin, and later to lower abdomen. Patient completed BLE exercise. O2 sats 90% with activity, and patient demonstrated quick shallow breathing. Performed deep breathing with incentive spirometry. Session ended with patient lying in bed with all needs met. Continuing to recommend IP rehab upon discharge to maximize patient safety and independence with mobility. Progression toward goals: []    Improving appropriately and progressing toward goals [x]    Improving slowly and progressing toward goals 
[]    Not making progress toward goals and plan of care will be adjusted PLAN: 
Patient continues to benefit from skilled intervention to address the above impairments. Continue treatment per established plan of care. Discharge Recommendations:  Alejandro Fregoso Further Equipment Recommendations for Discharge:  none SUBJECTIVE:  
Patient stated All I want to do is go to sleep.  OBJECTIVE DATA SUMMARY:  
Critical Behavior: 
Neurologic State: Alert Orientation Level: Appropriate for age Cognition: Follows commands Safety/Judgement: Not assessed Functional Mobility Training: 
Bed Mobility: 
Rolling: Moderate assistance Transfers: 
  
  
     
  
     
  
  
  
  
Balance: 
 Ambulation/Gait Training: 
  
  
  
  
  
  
  
  
  
  
  
  
  
  
  
  
  
 Stairs: 
  
  
   
 
Neuro Re-Education: 
 
Therapeutic Exercises: BLE exercises Pain: 
Pain Scale 1: Numeric (0 - 10) Pain Intensity 1: 10 
Pain Location 1: Leg;Back Pain Orientation 1: Right; Lower Pain Description 1: Aching; Throbbing Pain Intervention(s) 1: Medication (see MAR) Activity Tolerance:  
O2 sats 90% with activity, quick shallow breathing Please refer to the flowsheet for vital signs taken during this treatment. After treatment:  
[]    Patient left in no apparent distress sitting up in chair 
[x]    Patient left in no apparent distress in bed 
[x]    Call bell left within reach 
[]    Nursing notified 
[x]    Caregiver present 
[]    Bed alarm activated COMMUNICATION/COLLABORATION:  
The patients plan of care was discussed with: Occupational Therapist and Registered Nurse Dorthula Cooks, PT, DPT Time Calculation: 21 mins

## 2018-10-26 NOTE — PROGRESS NOTES
Spiritual Care Partner Volunteer visited patient in room 543/01 on 10.26.18. Documented by: : Rev. Kunal Gonzalez. Rosamaria Doing; [facility], to contact [phone] [address] call: [phone]

## 2018-10-26 NOTE — PROGRESS NOTES
NUTRITION COMPLETE ASSESSMENT 
 
RECOMMENDATIONS:  
1. Cardiac diet 2. RD add Boost pudding + Ensure Compact; Both chocolate x 1 day/each 3. SLP: No straws, small single cup sips; position upright all PO 
4. Staff assist with tray set-up and menu selections [facility]) Interventions/Plan:  
Food/Nutrient Delivery:    Commercial supplement(Boost pudding, Ensure Compact (lizzette)) Meal setup Nutrition Education:    
Coordination of Care:   
Nutrition Counseling:     
 
Assessment:  
Reason for Assessment:  
[x] Provider Consult Diet: Cardiac Supplements: RD add Ensure Compact + Boost pudding (chocolate) Nutritionally Significant Medications: [x] Reviewed & Includes: Zithromax, Oscal D, D3, Rocephin Meal Intake: No data found. Subjective: 
Haven't been eating much; weighed 122# last time they weighed me. Don't drink milk. I'm a \"chocoholic\". Partial dentures, chew ok. Like salads with ranch dressing and sweet tea with lemon. Objective: Hx arthritis; PE; Stroke/TIA with R-quiana; HTN; HL; osteoporosis; ? Hx low vit D with Rx D3; DNR, [place] resident. Admit CAP, sepsis, acute hypoxic RF; intractable back pain, hypokalemia, hypernatremia. Admit wt 118#, BMI 23 and admit wt WDL. Pt believes recent (? When) weight 122#. Wt hx: bed scale 124# (3/7/17) and 117# (1/12/2017); remote wt 127# (11/30/16). Wt with fluctuations, appears ~6# down from 124#. Pt believes recently weighed 122#. Wt loss has been mild. Reports decline in appetite and willing to try ONS. Loves chocolate and likes pudding. RD add one Boost pudding + one Ensure Compact = 460 kcal & 16 gm pro meets 34% kcal and 27% pro needs respectively. RD assisted pt with menu selects for lunch and dinner, also asking for chocolate ice cream. 
 
Wears partial dentures and per pt, no difficulty chewing. Hx R-quiana, age 80; Will need assist with menu selections and tray set-up.   SLP noted no straws, small sips, Rx in applesauce and upright for all PO. Cleared for thin liquids and regular texture diet. Skin intact. No hx DM, , 143 and 120 since admit. Last A1C 6.1 in 2017; POC  today at 1151. ABX noted; Rx D3, Oscal D and MV. Hgb 11.5, 
 
 
Estimated Nutrition Needs:  
Kcals/day: 1350 Kcals/day Protein: 60 g(1.1 gm/kg) Fluid: 1400 ml(~1 mL/kcal) Based On: Bryn Valdez(MSJ x 1.3) Weight Used:  53.8 kg (bed scale) Pt expected to meet estimated nutrient needs: [x] Unable to predict at this time Comparative Standards:  ;  ;   
 
Nutrition Diagnosis:  
1. Inadequate oral intake related to appetite, pain as evidenced by pt claims, \"haven't been eating much\"; admit intractable pain Goals:   
 Consume 100% minimum one ONS daily until PO % all meals Monitoring & Evaluation: - Total energy intake, Liquid meal replacement, Protein intake Previous Nutrition Goals Met: N/A Previous Recommendations: N/A Education & Discharge Needs: 
 [x] None Identified 
 [x] Participated in care plan, discharge planning, and/or interdisciplinary rounds Cultural, Jainism and ethnic food preferences identified: N/A Skin Integrity: [x]Intact Edema: [x]None Last BM: ? Not documented; Per pt, no BM today or yesterday; Urinary incontenence, wears diaper Food Allergies: [x]NKFA Anthropometrics:   
Weight Loss Metrics 10/26/2018 ** bed scale 10/19/2018 ** bed scale 3/12/2017 1/12/2017 12/3/2016 11/30/2016 5/14/2013 Today's Wt 118 lb 8 oz 124 lb 124 lb 9 oz 117 lb 130 lb 127 lb 2 oz 122 lb BMI 23.14 kg/m2 24.22 kg/m2 24.33 kg/m2 21.4 kg/m2 23.78 kg/m2 23.25 kg/m2 24.63 kg/m2 Last 3 Recorded Weights in this Encounter 10/24/18 1301 10/26/18 9325 Weight: 56.2 kg (124 lb) 53.8 kg (118 lb 8 oz) Weight Source: Bed Height: 5' (152.4 cm), Body mass index is 23.14 kg/m². IBW : 45.4 kg (100 lb), Usual Body Weight: 55.3 kg (122 lb),   
 
 Labs:   
Lab Results Component Value Date/Time Sodium 147 (H) 10/26/2018 03:24 AM  
 Potassium 3.6 10/26/2018 03:24 AM  
 Chloride 108 10/26/2018 03:24 AM  
 CO2 31 10/26/2018 03:24 AM  
 Glucose 123 (H) 10/26/2018 03:24 AM  
 BUN 32 (H) 10/26/2018 03:24 AM  
 Creatinine 0.90 10/26/2018 03:24 AM  
 Calcium 8.1 (L) 10/26/2018 03:24 AM  
 Magnesium 1.8 03/09/2017 03:49 AM  
 Phosphorus 2.3 (L) 03/09/2017 03:49 AM  
 Albumin 3.1 (L) 10/24/2018 01:22 PM  
 
Lab Results Component Value Date/Time Hemoglobin A1c 6.1 01/13/2017 03:45 AM  
 
Lab Results Component Value Date/Time Glucose 123 (H) 10/26/2018 03:24 AM  
 Glucose (POC) 222 (H) 10/26/2018 11:51 AM  
  
Lab Results Component Value Date/Time ALT (SGPT) 26 10/24/2018 01:22 PM  
 AST (SGOT) 31 10/24/2018 01:22 PM  
 Alk.  phosphatase 77 10/24/2018 01:22 PM  
 Bilirubin, direct 0.2 03/09/2017 11:06 AM  
 Bilirubin, total 0.9 10/24/2018 01:22 PM  
  
 
Vinny Vergara RD

## 2018-10-27 ENCOUNTER — APPOINTMENT (OUTPATIENT)
Dept: GENERAL RADIOLOGY | Age: 83
DRG: 871 | End: 2018-10-27
Attending: INTERNAL MEDICINE
Payer: MEDICARE

## 2018-10-27 LAB
ANION GAP SERPL CALC-SCNC: 4 MMOL/L (ref 5–15)
ARTERIAL PATENCY WRIST A: YES
ARTERIAL PATENCY WRIST A: YES
BASE EXCESS BLD CALC-SCNC: 12 MMOL/L
BASE EXCESS BLD CALC-SCNC: 14 MMOL/L
BDY SITE: ABNORMAL
BDY SITE: ABNORMAL
BUN SERPL-MCNC: 29 MG/DL (ref 6–20)
BUN/CREAT SERPL: 36 (ref 12–20)
CALCIUM SERPL-MCNC: 8.2 MG/DL (ref 8.5–10.1)
CHLORIDE SERPL-SCNC: 106 MMOL/L (ref 97–108)
CO2 SERPL-SCNC: 35 MMOL/L (ref 21–32)
CREAT SERPL-MCNC: 0.81 MG/DL (ref 0.55–1.02)
GAS FLOW.O2 O2 DELIVERY SYS: ABNORMAL L/MIN
GAS FLOW.O2 O2 DELIVERY SYS: ABNORMAL L/MIN
GAS FLOW.O2 SETTING OXYMISER: 4 L/M
GLUCOSE BLD STRIP.AUTO-MCNC: 120 MG/DL (ref 65–100)
GLUCOSE BLD STRIP.AUTO-MCNC: 133 MG/DL (ref 65–100)
GLUCOSE SERPL-MCNC: 118 MG/DL (ref 65–100)
HCO3 BLD-SCNC: 37.9 MMOL/L (ref 22–26)
HCO3 BLD-SCNC: 39 MMOL/L (ref 22–26)
O2/TOTAL GAS SETTING VFR VENT: 50 %
PCO2 BLD: 67.2 MMHG (ref 35–45)
PCO2 BLD: 70.6 MMHG (ref 35–45)
PEEP RESPIRATORY: 5 CMH2O
PH BLD: 7.34 [PH] (ref 7.35–7.45)
PH BLD: 7.37 [PH] (ref 7.35–7.45)
PIP ISTAT,IPIP: 14
PO2 BLD: 131 MMHG (ref 80–100)
PO2 BLD: 67 MMHG (ref 80–100)
POTASSIUM SERPL-SCNC: 4.2 MMOL/L (ref 3.5–5.1)
SAO2 % BLD: 91 % (ref 92–97)
SAO2 % BLD: 99 % (ref 92–97)
SERVICE CMNT-IMP: ABNORMAL
SERVICE CMNT-IMP: ABNORMAL
SODIUM SERPL-SCNC: 145 MMOL/L (ref 136–145)
SPECIMEN TYPE: ABNORMAL
SPECIMEN TYPE: ABNORMAL
SPONTANEOUS TIMED, IST: YES
TOTAL RESP. RATE, ITRR: 22

## 2018-10-27 PROCEDURE — 71045 X-RAY EXAM CHEST 1 VIEW: CPT

## 2018-10-27 PROCEDURE — 36415 COLL VENOUS BLD VENIPUNCTURE: CPT | Performed by: INTERNAL MEDICINE

## 2018-10-27 PROCEDURE — 77010033678 HC OXYGEN DAILY

## 2018-10-27 PROCEDURE — 82803 BLOOD GASES ANY COMBINATION: CPT

## 2018-10-27 PROCEDURE — 82962 GLUCOSE BLOOD TEST: CPT

## 2018-10-27 PROCEDURE — 74011000258 HC RX REV CODE- 258: Performed by: INTERNAL MEDICINE

## 2018-10-27 PROCEDURE — 94660 CPAP INITIATION&MGMT: CPT

## 2018-10-27 PROCEDURE — 74011000250 HC RX REV CODE- 250: Performed by: INTERNAL MEDICINE

## 2018-10-27 PROCEDURE — 65660000001 HC RM ICU INTERMED STEPDOWN

## 2018-10-27 PROCEDURE — 80048 BASIC METABOLIC PNL TOTAL CA: CPT | Performed by: INTERNAL MEDICINE

## 2018-10-27 PROCEDURE — 74011250637 HC RX REV CODE- 250/637: Performed by: INTERNAL MEDICINE

## 2018-10-27 PROCEDURE — 36600 WITHDRAWAL OF ARTERIAL BLOOD: CPT

## 2018-10-27 PROCEDURE — 74011250636 HC RX REV CODE- 250/636: Performed by: INTERNAL MEDICINE

## 2018-10-27 RX ORDER — FUROSEMIDE 10 MG/ML
10 INJECTION INTRAMUSCULAR; INTRAVENOUS ONCE
Status: COMPLETED | OUTPATIENT
Start: 2018-10-27 | End: 2018-10-27

## 2018-10-27 RX ORDER — HEPARIN SODIUM 5000 [USP'U]/ML
5000 INJECTION, SOLUTION INTRAVENOUS; SUBCUTANEOUS EVERY 12 HOURS
Status: DISCONTINUED | OUTPATIENT
Start: 2018-10-27 | End: 2018-10-31 | Stop reason: HOSPADM

## 2018-10-27 RX ORDER — TRAMADOL HYDROCHLORIDE 50 MG/1
25 TABLET ORAL EVERY 8 HOURS
Status: DISCONTINUED | OUTPATIENT
Start: 2018-10-27 | End: 2018-10-27

## 2018-10-27 RX ADMIN — Medication 10 ML: at 21:37

## 2018-10-27 RX ADMIN — FUROSEMIDE 10 MG: 10 INJECTION, SOLUTION INTRAMUSCULAR; INTRAVENOUS at 10:22

## 2018-10-27 RX ADMIN — CASTOR OIL AND BALSAM, PERU: 788; 87 OINTMENT TOPICAL at 21:37

## 2018-10-27 RX ADMIN — SERTRALINE HYDROCHLORIDE 25 MG: 50 TABLET ORAL at 18:18

## 2018-10-27 RX ADMIN — HEPARIN SODIUM 5000 UNITS: 5000 INJECTION INTRAVENOUS; SUBCUTANEOUS at 10:23

## 2018-10-27 RX ADMIN — ACETAMINOPHEN 1000 MG: 500 TABLET, FILM COATED ORAL at 10:23

## 2018-10-27 RX ADMIN — METOPROLOL TARTRATE 12.5 MG: 25 TABLET ORAL at 10:23

## 2018-10-27 RX ADMIN — CALCIUM 500 MG: 500 TABLET ORAL at 10:24

## 2018-10-27 RX ADMIN — CALCIUM 500 MG: 500 TABLET ORAL at 18:20

## 2018-10-27 RX ADMIN — CEFTRIAXONE 1 G: 1 INJECTION, POWDER, FOR SOLUTION INTRAMUSCULAR; INTRAVENOUS at 18:16

## 2018-10-27 RX ADMIN — CASTOR OIL AND BALSAM, PERU: 788; 87 OINTMENT TOPICAL at 10:30

## 2018-10-27 RX ADMIN — Medication 10 ML: at 18:23

## 2018-10-27 RX ADMIN — VITAMIN D, TAB 1000IU (100/BT) 1000 UNITS: 25 TAB at 10:24

## 2018-10-27 RX ADMIN — AZITHROMYCIN MONOHYDRATE 500 MG: 500 INJECTION, POWDER, LYOPHILIZED, FOR SOLUTION INTRAVENOUS at 18:50

## 2018-10-27 RX ADMIN — AMLODIPINE BESYLATE 5 MG: 5 TABLET ORAL at 10:24

## 2018-10-27 RX ADMIN — Medication 10 ML: at 06:00

## 2018-10-27 RX ADMIN — THERA TABS 1 TABLET: TAB at 10:23

## 2018-10-27 RX ADMIN — HEPARIN SODIUM 5000 UNITS: 5000 INJECTION INTRAVENOUS; SUBCUTANEOUS at 21:37

## 2018-10-27 RX ADMIN — METOPROLOL TARTRATE 12.5 MG: 25 TABLET ORAL at 18:20

## 2018-10-27 RX ADMIN — ACETAMINOPHEN 1000 MG: 500 TABLET, FILM COATED ORAL at 18:20

## 2018-10-27 NOTE — PROGRESS NOTES
Tom Perdomo RN, Spoke with Dr. Gabino Coello this morning regarding Patient, overnight O2 was increased from 3L nasal cannula to 5L nasal cannula per respritory. Due to O2 saturation at 88-91 on 3L and increased to 96 on 5L. Patient then decreased down to 4L Nasal Cannula and is at 94%. Patient is shallow and tachypeinic. Pain is also an issue as patient is hesitant to move due to increase in pain level, Dr. Gabino Coello ordered tylenol scheduled and toradol.

## 2018-10-27 NOTE — PROGRESS NOTES
Hospitalist Progress Note Katelyn Lee MD 
Answering service: [phone] OR [phone] from in house phone Date of Service:  10/27/2018 NAME:  Pedro Pablo Jackson :  1925 MRN:  [id number] Admission Summary:  
81 yo woman with arthritis, h/o PE, h/o stroke/TIA with residual right hemiparesis, chronic vertebral compression fractures, HTN, HLD, osteoporosis, h/o left hip replacement who was BIBEMS to the ED from [facility] on 10/24/18 with hypoxia and HTN per home PT. Patient's SpO2 was reportedly in the 70s, RR 50s, hypotensive and pt was panting and anxious due to the pain. Patient was admitted with CAP with sepsis, intractable back pain, acute hypoxic respiratory failure, hypokalemia, hypernatremia. Interval history / Subjective:  
C/o back pain when she moves; d/w nurse, overnight SpO2 dropped and O2 increased to 5L and currently back to 4L but desaturating and APAP not controlling her pain Transferred to CCU [facility] overflow) after ABG showed hypercapnic hypoxic respiratory failure requiring BiPAP; however, pt refusing to wear BiPAP; daughter Kelli Day at bedside Assessment & Plan:  
 
Acute hypoxic hypercapnic respiratory failure - likely due to atx and pleural effusions 
- transferred to CCU a IMCU overflow for BiPAP; pt refusing to wear but have asked RT to try different mask and pt willing to try - pt and both daughters advised that if pt does not wear BiPAP long enough to improve her respiratory status, we will need to discuss goals of care as pt is DNR 
- stop tramadol CAP vs aspiration PNA with sepsis (POA) - tachycardia, tachypnea on admission 
- lactic acid WNL 
- BCx 10/24 NGTD 
- influenza swab (pt received vaccine 1 week ago) negative 
- started empiric ceftriaxone, azithromycin x5 days; de-escalate to po abx as indicated - CTA chest 10/24 RLL PNA 
- SLP consulted E coli UTI (POA) - UCx 10/24 E coli  
- empiric ceftriaxone started 10/24 x5 days 
  
Intractable back pain - likely fall exacerbated chronic multiple compression fractures - pain control 
- all imaging negative for acute fx, acute process 
- PT/OT recommending SNF 
 
R medial thigh pain - XR pelvis and R femur 10/26 unremarkable Lower abdominal pain - KUB 10/26 unremarkable 
- resolved Vitamin D insufficiency - started supplement 
  
Acute hypoxic respiratory failure - SpO2 70s per home PT and was 77% on RA in ED 
- may be due to CAP 
- wean O2 as tolerated - CTA chest 10/24 no PE 
  
Multiple vertebral compression fractures (POA) - XR 10/19 and CT a/p 10/24 unchanged from March 2017 
- PT/OT evals: SNF recommended 
  
Hypokalemia (POA) - replete prn 
  
Hypernatremia (POA) - likely due to dehydration from decreased fluid intake 
- had improved with IV D5 x24h but Na rising since IVF stopped 
- encourage po free water intake Code status: DNR 
DVT prophylaxis: SCDs Care Plan discussed with: Patient/Family and Nurse. D/w daughter Mark Matter by phone on 10/25 and at bedside 10/26. D/w both daughters today Disposition: TBD. Came from [facility] but will need SNF . Daughter requesting [facility] Problems  Date Reviewed: 10/24/2018 Codes Class Noted POA * (Principal) Intractable back pain ICD-10-CM: M54.9 ICD-9-CM: 724.5  10/24/2018 Yes Hypoxia ICD-10-CM: R09.02 
ICD-9-CM: 799.02  10/24/2018 Yes Vertebral fracture (Chronic) ICD-10-CM: NWN8734 ICD-9-CM: 805.8  10/24/2018 Yes Hypokalemia ICD-10-CM: E87.6 ICD-9-CM: 276.8  10/24/2018 Unknown CAP (community acquired pneumonia) ICD-10-CM: J18.9 ICD-9-CM: 346  10/24/2018 Yes Review of Systems:  
Pertinent items are noted in HPI. Vital Signs:  
 Last 24hrs VS reviewed since prior progress note. Most recent are: 
Visit Vitals /53 Pulse 67 Temp 98.5 °F (36.9 °C) Resp 16  
 Ht 5' (1.524 m) Wt 53.8 kg (118 lb 11.2 oz) SpO2 97% BMI 23.18 kg/m² Intake/Output Summary (Last 24 hours) at 10/27/2018 1546 Last data filed at 10/26/2018 2030 Gross per 24 hour Intake  Output 600 ml Net -600 ml Physical Examination:  
 
Gen: awake, NAD, cooperative, elderly, NC in place Head: NCAT 
EENT: PERRL, EOMI, MMM, oropharynx benign Neck: supple, no masses Lungs: coarse BS b/l CVS: regular rhythm, normal rate, S1S2, no m/r/g appreciated, no peripheral edema, +pulses Abd: +BS, soft, NT, ND 
MSK: severely reduced ROM b/l LE Neuro: AOx3, responds appropriately to questions and commands Skin: warm, dry; no rashes, lesions, ulcers noted Data Review:  
 Review and/or order of clinical lab test 
Review and/or order of tests in the radiology section of CPT Review and/or order of tests in the medicine section of CPT Labs:  
 
Recent Labs 10/26/18 
4139 10/25/18 
0841 WBC 8.6 8.3 HGB 11.5 11.7 HCT 37.2 37.2  251 Recent Labs 10/27/18 
5955 10/26/18 
3772 10/25/18 
6769  147* 146*  
K 4.2 3.6 4.2  108 111* CO2 35* 31 27 BUN 29* 32* 30* CREA 0.81 0.90 0.91  
* 123* 143* CA 8.2* 8.1* 8.4* No results for input(s): SGOT, GPT, ALT, AP, TBIL, TBILI, TP, ALB, GLOB, GGT, AML, LPSE in the last 72 hours. No lab exists for component: AMYP, HLPSE No results for input(s): INR, PTP, APTT in the last 72 hours. No lab exists for component: INREXT, INREXT No results for input(s): FE, TIBC, PSAT, FERR in the last 72 hours. No results found for: FOL, RBCF No results for input(s): PH, PCO2, PO2 in the last 72 hours. No results for input(s): CPK, CKNDX, TROIQ in the last 72 hours. No lab exists for component: CPKMB Lab Results Component Value Date/Time  Cholesterol, total 168 05/21/2010 03:00 AM  
 HDL Cholesterol 53 05/21/2010 03:00 AM  
 LDL, calculated 95.4 05/21/2010 03:00 AM  
 Triglyceride 98 05/21/2010 03:00 AM  
 CHOL/HDL Ratio 3.2 05/21/2010 03:00 AM  
 
Lab Results Component Value Date/Time Glucose (POC) 120 (H) 10/27/2018 06:05 AM  
 Glucose (POC) 133 (H) 10/27/2018 12:18 AM  
 Glucose (POC) 177 (H) 10/26/2018 06:21 PM  
 Glucose (POC) 222 (H) 10/26/2018 11:51 AM  
 Glucose (POC) 119 (H) 10/26/2018 05:52 AM  
 
Lab Results Component Value Date/Time Color YELLOW/STRAW 10/24/2018 02:52 PM  
 Appearance CLOUDY (A) 10/24/2018 02:52 PM  
 Specific gravity 1.015 10/24/2018 02:52 PM  
 Specific gravity 1.005 03/07/2017 01:54 PM  
 pH (UA) 5.5 10/24/2018 02:52 PM  
 Protein 30 (A) 10/24/2018 02:52 PM  
 Glucose NEGATIVE  10/24/2018 02:52 PM  
 Ketone TRACE (A) 10/24/2018 02:52 PM  
 Bilirubin NEGATIVE  10/24/2018 02:52 PM  
 Urobilinogen 0.2 10/24/2018 02:52 PM  
 Nitrites NEGATIVE  10/24/2018 02:52 PM  
 Leukocyte Esterase MODERATE (A) 10/24/2018 02:52 PM  
 Epithelial cells FEW 10/24/2018 02:52 PM  
 Bacteria 4+ (A) 10/24/2018 02:52 PM  
 WBC  10/24/2018 02:52 PM  
 RBC 0-5 10/24/2018 02:52 PM  
 
 
 
Medications Reviewed:  
 
Current Facility-Administered Medications Medication Dose Route Frequency  heparin (porcine) injection 5,000 Units  5,000 Units SubCUTAneous Q12H  sertraline (ZOLOFT) tablet 25 mg  25 mg Oral QPM  
 cholecalciferol (VITAMIN D3) tablet 1,000 Units  1,000 Units Oral DAILY  balsam peru-castor oil (VENELEX) [id number] mg/gram ointment   Topical BID  albuterol-ipratropium (DUO-NEB) 2.5 MG-0.5 MG/3 ML  3 mL Nebulization Q4H PRN  
 sodium chloride (NS) flush 5-10 mL  5-10 mL IntraVENous Q8H  
 sodium chloride (NS) flush 5-10 mL  5-10 mL IntraVENous PRN  
 acetaminophen (TYLENOL) tablet 1,000 mg  1,000 mg Oral Q8H  
 ondansetron (ZOFRAN) injection 4 mg  4 mg IntraVENous Q4H PRN  
 cefTRIAXone (ROCEPHIN) 1 g in 0.9% sodium chloride (MBP/ADV) 50 mL  1 g IntraVENous Q24H  
 azithromycin (ZITHROMAX) 500 mg in 0.9% sodium chloride 250 mL ([id number])  500 mg IntraVENous Q24H  
 lidocaine (LIDODERM) 5 % patch 1 Patch  1 Patch TransDERmal Q24H  
 amLODIPine (NORVASC) tablet 5 mg  5 mg Oral DAILY  calcium carbonate (OS-SIMONE) tablet 500 mg [elemental]  500 mg Oral BID  metoprolol tartrate (LOPRESSOR) tablet 12.5 mg  12.5 mg Oral BID  therapeutic multivitamin (THERAGRAN) tablet 1 Tab  1 Tab Oral DAILY  
 
______________________________________________________________________ EXPECTED LENGTH OF STAY: 4d 19h ACTUAL LENGTH OF STAY:          3 Rosanna Bentley MD

## 2018-10-27 NOTE — PROGRESS NOTES
Problem: Falls - Risk of 
Goal: *Absence of Falls Document [address] Fall Risk and appropriate interventions in the flowsheet. Outcome: Progressing Towards Goal 
Fall Risk Interventions: 
Mobility Interventions: PT Consult for mobility concerns Mentation Interventions: Reorient patient Medication Interventions: Patient to call before getting OOB Elimination Interventions: Call light in reach History of Falls Interventions: Door open when patient unattended Problem: Pressure Injury - Risk of 
Goal: *Prevention of pressure injury Document Mohit Scale and appropriate interventions in the flowsheet. Frequent turns Offload heels Venelex Outcome: Progressing Towards Goal 
Pressure Injury Interventions: 
Sensory Interventions: Monitor skin under medical devices Moisture Interventions: Maintain skin hydration (lotion/cream) Activity Interventions: PT/OT evaluation Mobility Interventions: PT/OT evaluation Nutrition Interventions: Offer support with meals,snacks and hydration Friction and Shear Interventions: Lift sheet

## 2018-10-27 NOTE — PROGRESS NOTES
200 - RN paged Dr. Tacho Kerr regarding pt oxygen saturation 90% on 4L. Awaiting callback. [facility] and orders received. 6058 - RN paged Dr. Tacho Kerr to report PCO2 70 pH 7.33. Awaiting callback. 0897 - Orders received. 3090 - RN informed nurse supervisor, Emmy Quiros of transfer order to [facility]. TRANSFER - OUT REPORT: 
 
Verbal report given to Jung(name) on Severiano Mohr  being transferred to CCU(unit) for urgent transfer Report consisted of patients Situation, Background, Assessment and  
Recommendations(SBAR). Information from the following report(s) SBAR, Kardex, ED Summary, Procedure Summary, Intake/Output, MAR, Accordion and Recent Results was reviewed with the receiving nurse. 36 - RN spoke with pt daughter,Rayne Morales to inform her that pt has been transferred to CCU RM. 422. Lines:  
Peripheral IV 10/24/18 Left Arm (Active) Site Assessment Clean, dry, & intact 10/27/2018  3:54 AM  
Phlebitis Assessment 0 10/27/2018  3:54 AM  
Infiltration Assessment 0 10/27/2018  3:54 AM  
Dressing Status Clean, dry, & intact 10/27/2018  3:54 AM  
Dressing Type Tape;Transparent 10/27/2018  3:54 AM  
Hub Color/Line Status Capped;Blue 10/27/2018  3:54 AM  
Action Taken Open ports on tubing capped 10/27/2018  3:54 AM  
Alcohol Cap Used Yes 10/27/2018  3:54 AM  
   
Peripheral IV 10/26/18 (Active) Site Assessment Clean, dry, & intact 10/27/2018  3:54 AM  
Phlebitis Assessment 0 10/27/2018  3:54 AM  
Infiltration Assessment 0 10/27/2018  3:54 AM  
Dressing Status Clean, dry, & intact 10/27/2018  3:54 AM  
Dressing Type Tape;Transparent 10/27/2018  3:54 AM  
Hub Color/Line Status Capped;Pink 10/27/2018  3:54 AM  
Action Taken Open ports on tubing capped 10/27/2018  3:54 AM  
Alcohol Cap Used Yes 10/27/2018  3:54 AM  
  
 
Opportunity for questions and clarification was provided. Patient transported with: 
 O2 @ 4 liters, RN, tele monitor

## 2018-10-27 NOTE — PROGRESS NOTES
1100- Patient arrives from [address]. Somewhat SOB but alert. Primary Nurse Valeria Davey RN and Mariel Padgett RN performed a dual skin assessment on this patient Impairment noted- see wound doc flow sheet Current Bed:  
[facility] SPORT (air with burgundy cover) Mohit score is 16 
 
1130- Bipap attempted for patient but she refuses. Tolerating Nasal Cannula now. Patients daughter called and they will come in to help the patient through this time. Dr. Greg Harrison updated. 1230- Family at the bedside. Patient still refusing bipap. Otherwise stable. 1600- Patient agrees to wear the bipap after urging from Dr. Greg Harrison. 1800- Patient has worn the bipap for 2 hours. AGB drawn now. Patient appears stable. 1930- Bedside report given to Gabriella PATEL.

## 2018-10-28 LAB
ANION GAP SERPL CALC-SCNC: 6 MMOL/L (ref 5–15)
BASOPHILS # BLD: 0.1 K/UL (ref 0–0.1)
BASOPHILS NFR BLD: 1 % (ref 0–1)
BUN SERPL-MCNC: 29 MG/DL (ref 6–20)
BUN/CREAT SERPL: 39 (ref 12–20)
CALCIUM SERPL-MCNC: 8.3 MG/DL (ref 8.5–10.1)
CHLORIDE SERPL-SCNC: 107 MMOL/L (ref 97–108)
CO2 SERPL-SCNC: 32 MMOL/L (ref 21–32)
CREAT SERPL-MCNC: 0.75 MG/DL (ref 0.55–1.02)
DIFFERENTIAL METHOD BLD: ABNORMAL
EOSINOPHIL # BLD: 0.1 K/UL (ref 0–0.4)
EOSINOPHIL NFR BLD: 1 % (ref 0–7)
ERYTHROCYTE [DISTWIDTH] IN BLOOD BY AUTOMATED COUNT: 13.3 % (ref 11.5–14.5)
GLUCOSE SERPL-MCNC: 116 MG/DL (ref 65–100)
HCT VFR BLD AUTO: 42 % (ref 35–47)
HGB BLD-MCNC: 13 G/DL (ref 11.5–16)
IMM GRANULOCYTES # BLD: 0.1 K/UL (ref 0–0.04)
IMM GRANULOCYTES NFR BLD AUTO: 1 % (ref 0–0.5)
LYMPHOCYTES # BLD: 0.7 K/UL (ref 0.8–3.5)
LYMPHOCYTES NFR BLD: 8 % (ref 12–49)
MCH RBC QN AUTO: 30.9 PG (ref 26–34)
MCHC RBC AUTO-ENTMCNC: 31 G/DL (ref 30–36.5)
MCV RBC AUTO: 99.8 FL (ref 80–99)
MONOCYTES # BLD: 0.6 K/UL (ref 0–1)
MONOCYTES NFR BLD: 7 % (ref 5–13)
NEUTS SEG # BLD: 7.3 K/UL (ref 1.8–8)
NEUTS SEG NFR BLD: 82 % (ref 32–75)
NRBC # BLD: 0 K/UL (ref 0–0.01)
NRBC BLD-RTO: 0 PER 100 WBC
PLATELET # BLD AUTO: 314 K/UL (ref 150–400)
PMV BLD AUTO: 10.4 FL (ref 8.9–12.9)
POTASSIUM SERPL-SCNC: 5.1 MMOL/L (ref 3.5–5.1)
RBC # BLD AUTO: 4.21 M/UL (ref 3.8–5.2)
SODIUM SERPL-SCNC: 145 MMOL/L (ref 136–145)
WBC # BLD AUTO: 8.9 K/UL (ref 3.6–11)

## 2018-10-28 PROCEDURE — 74011250637 HC RX REV CODE- 250/637: Performed by: INTERNAL MEDICINE

## 2018-10-28 PROCEDURE — 74011250636 HC RX REV CODE- 250/636: Performed by: INTERNAL MEDICINE

## 2018-10-28 PROCEDURE — 80048 BASIC METABOLIC PNL TOTAL CA: CPT | Performed by: INTERNAL MEDICINE

## 2018-10-28 PROCEDURE — 65660000000 HC RM CCU STEPDOWN

## 2018-10-28 PROCEDURE — 74011000250 HC RX REV CODE- 250: Performed by: INTERNAL MEDICINE

## 2018-10-28 PROCEDURE — 77010033678 HC OXYGEN DAILY

## 2018-10-28 PROCEDURE — 36415 COLL VENOUS BLD VENIPUNCTURE: CPT | Performed by: INTERNAL MEDICINE

## 2018-10-28 PROCEDURE — 85025 COMPLETE CBC W/AUTO DIFF WBC: CPT | Performed by: INTERNAL MEDICINE

## 2018-10-28 PROCEDURE — 74011000258 HC RX REV CODE- 258: Performed by: INTERNAL MEDICINE

## 2018-10-28 RX ORDER — LIDOCAINE 50 MG/G
2 PATCH TOPICAL EVERY 24 HOURS
Status: DISCONTINUED | OUTPATIENT
Start: 2018-10-28 | End: 2018-10-31 | Stop reason: HOSPADM

## 2018-10-28 RX ADMIN — CASTOR OIL AND BALSAM, PERU: 788; 87 OINTMENT TOPICAL at 08:26

## 2018-10-28 RX ADMIN — CALCIUM 500 MG: 500 TABLET ORAL at 18:49

## 2018-10-28 RX ADMIN — VITAMIN D, TAB 1000IU (100/BT) 1000 UNITS: 25 TAB at 08:15

## 2018-10-28 RX ADMIN — AZITHROMYCIN MONOHYDRATE 500 MG: 500 INJECTION, POWDER, LYOPHILIZED, FOR SOLUTION INTRAVENOUS at 18:56

## 2018-10-28 RX ADMIN — METOPROLOL TARTRATE 12.5 MG: 25 TABLET ORAL at 18:50

## 2018-10-28 RX ADMIN — CEFTRIAXONE 1 G: 1 INJECTION, POWDER, FOR SOLUTION INTRAMUSCULAR; INTRAVENOUS at 18:59

## 2018-10-28 RX ADMIN — ACETAMINOPHEN 1000 MG: 500 TABLET, FILM COATED ORAL at 18:49

## 2018-10-28 RX ADMIN — HEPARIN SODIUM 5000 UNITS: 5000 INJECTION INTRAVENOUS; SUBCUTANEOUS at 08:12

## 2018-10-28 RX ADMIN — HEPARIN SODIUM 5000 UNITS: 5000 INJECTION INTRAVENOUS; SUBCUTANEOUS at 22:06

## 2018-10-28 RX ADMIN — Medication 10 ML: at 14:00

## 2018-10-28 RX ADMIN — CASTOR OIL AND BALSAM, PERU: 788; 87 OINTMENT TOPICAL at 18:00

## 2018-10-28 RX ADMIN — THERA TABS 1 TABLET: TAB at 08:15

## 2018-10-28 RX ADMIN — METOPROLOL TARTRATE 12.5 MG: 25 TABLET ORAL at 08:15

## 2018-10-28 RX ADMIN — Medication 10 ML: at 22:13

## 2018-10-28 RX ADMIN — CALCIUM 500 MG: 500 TABLET ORAL at 08:15

## 2018-10-28 RX ADMIN — Medication 10 ML: at 05:32

## 2018-10-28 RX ADMIN — AMLODIPINE BESYLATE 5 MG: 5 TABLET ORAL at 08:15

## 2018-10-28 RX ADMIN — SERTRALINE HYDROCHLORIDE 25 MG: 50 TABLET ORAL at 18:50

## 2018-10-28 RX ADMIN — ACETAMINOPHEN 1000 MG: 500 TABLET, FILM COATED ORAL at 09:11

## 2018-10-28 NOTE — PROGRESS NOTES
TRANSFER - IN REPORT: 
 
Verbal report received from Patricia Smith RN (name) on Edie Loser  being received from CCU (unit) for routine progression of care Report consisted of patients Situation, Background, Assessment and  
Recommendations(SBAR). Information from the following report(s) SBAR, Kardex and MAR was reviewed with the receiving nurse. Opportunity for questions and clarification was provided. Assessment completed upon patients arrival to unit and care assumed.

## 2018-10-28 NOTE — INTERDISCIPLINARY ROUNDS
IDR/SLIDR Summary Patient: Manisha Murrieta MRN: [id number]    Age: 80 y.o. YOB: 1925 Room/Bed: [address] [place] [address] Admit Diagnosis: Intractable back pain  Principal Diagnosis: Intractable back pain Goals: Monitor Breathing, transfer Readmission: NO  Quality Measure: Not applicable VTE Prophylaxis: Chemical 
Influenza Vaccine screening completed? YES Pneumococcal Vaccine screening completed? YES Mobility needs: No   Nutrition plan:Yes 
Consults:P.T, O.T. and Case Management Financial concerns:No  Escalated to CM? NO 
RRAT Score: 10   Interventions: 
Testing due for pt today? NO 
LOS: 4 days Expected length of stay 6 days Discharge plan: Back to facility when stable PCP: ABRLI Hutchinson Transportation needs: Yes Days before discharge:one day until discharge Discharge disposition: Rehab Signed:  
 
Elder Ayala RN 
10/28/2018 12:38 AM

## 2018-10-28 NOTE — PROGRESS NOTES
Received update from physician who has been in contact with family on facility placement. Daughter now wishes for referral to be sent to [address] [place] [address] and action has been completed in [employer] and [employer]. Daughter explained to Dr. Deidre Valdez that she has completed a tour of the facility and was told beds were currently available. Demi Price, RN 
 
 
UPDATE:  10/28/18 Ms. Luca Carlos has been accepted by [facility] in most recent message showing in [employer]. (Original referral completed on 10/25/18 by CM)

## 2018-10-28 NOTE — PROGRESS NOTES
0730- Bedside report obtained from [address] [place] [address] [address] Assumed care for patient at this time. 0800- Patient feeling well. Seems to be more oriented at this time per the night shift nurse. 0830- Breakfast served. 1200- Patient up to the chair after using the [facility]. Lunch served. Up with walker was poorly tolerated due to intermittent back pain. 1500- TRANSFER - OUT REPORT: 
 
Verbal report given to Lori(name) on [facility]  being transferred to [facility] (unit) for routine progression of care Report consisted of patients Situation, Background, Assessment and  
Recommendations(SBAR). Information from the following report(s) SBAR, Kardex and MAR was reviewed with the receiving nurse. Lines:  
Peripheral IV 10/27/18 Anterior; Inferior; Lower;Right Arm (Active) Site Assessment Clean, dry, & intact 10/28/2018 12:00 PM  
Phlebitis Assessment 0 10/28/2018 12:00 PM  
Infiltration Assessment 0 10/28/2018 12:00 PM  
Dressing Status Clean, dry, & intact 10/28/2018 12:00 PM  
Dressing Type Transparent 10/28/2018 12:00 PM  
Hub Color/Line Status Yellow 10/28/2018 12:00 PM  
Action Taken Open ports on tubing capped 10/28/2018 12:00 PM  
Alcohol Cap Used Yes 10/28/2018 12:00 PM  
   
Peripheral IV 10/28/18 Anterior; Inferior;Left;Lower Arm (Active) Site Assessment Clean, dry, & intact 10/28/2018 12:00 PM  
Phlebitis Assessment 0 10/28/2018 12:00 PM  
Infiltration Assessment 0 10/28/2018 12:00 PM  
Dressing Status Clean, dry, & intact 10/28/2018 12:00 PM  
Dressing Type Transparent 10/28/2018 12:00 PM  
Hub Color/Line Status Blue 10/28/2018 12:00 PM  
Action Taken Open ports on tubing capped 10/28/2018 12:00 PM  
Alcohol Cap Used Yes 10/28/2018 12:00 PM  
  
 
Opportunity for questions and clarification was provided. Patient transported with: 
 Monitor O2 @ 4 liters Registered Nurse Tech

## 2018-10-28 NOTE — PROGRESS NOTES
1930: SBAR received from [facility]. 2000: Patient assessed. VSS. Call bell within reach. [phone]: Patient periodically confused and agitated throughout shift. Offered patient BiPap several times throughout the night patient refused. 0730: Bedside and Verbal shift change report given to [facility] (oncoming nurse) by [address] (offgoing nurse). Report included the following information SBAR, Kardex, Intake/Output, MAR and Recent Results.

## 2018-10-28 NOTE — PROGRESS NOTES
Hospitalist Progress Note Miguel Nichols MD 
Answering service: [phone] OR [phone] from in house phone Date of Service:  10/28/2018 NAME:  Nelsy Lopez :  1925 MRN:  [id number] Admission Summary:  
79 yo woman with arthritis, h/o PE, h/o stroke/TIA with residual right hemiparesis, chronic vertebral compression fractures, HTN, HLD, osteoporosis, h/o left hip replacement who was BIBEMS to the ED from [facility] on 10/24/18 with hypoxia and HTN per home PT. Patient's SpO2 was reportedly in the 70s, RR 50s, hypotensive and pt was panting and anxious due to the pain. Patient was admitted with CAP with sepsis, intractable back pain, acute hypoxic respiratory failure, hypokalemia, hypernatremia. Interval history / Subjective:  
C/o back pain; d/w nurse, wore BiPAP with scuba mask x2 hours, overnight got very confused, pulled out IV, still on 4L; APAP and lidocaine not controlling her pain Assessment & Plan:  
 
Acute hypoxic (POA) hypercapnic respiratory failure - SpO2 70s per home PT and was 77% on RA in ED 
- may be due to CAP 
- wean O2 as tolerated - CTA chest 10/24 no PE 
- likely due to atx and pleural effusions 
- transferred to CCU a IMCU overflow for BiPAP; pt refusing to wear but have asked RT to try different mask and pt willing to try - pt and both daughters advised that if pt does not wear BiPAP long enough to improve her respiratory status, we will need to discuss goals of care as pt is DNR 
- stopped tramadol - pt wore BiPAP x2 hours yesterday; repeat ABG with compensated hypercapnia CAP vs aspiration PNA with sepsis (POA) - tachycardia, tachypnea on admission 
- lactic acid WNL 
- BCx 10/24 NGTD 
- influenza swab (pt received vaccine 1 week ago) negative 
- started empiric ceftriaxone, azithromycin 10/24 x5 days; de-escalate to po abx as indicated - CTA chest 10/24 RLL PNA - SLP consulted E coli UTI (POA) - UCx 10/24 E coli  
- empiric ceftriaxone started 10/24 x5 days 
  
Intractable back pain - likely fall exacerbated chronic multiple compression fractures - pain control with lidocaine patches x2 and q8h APAP 
- all imaging negative for acute fx, acute process 
- PT/OT recommending SNF 
 
R medial thigh pain - XR pelvis and R femur 10/26 unremarkable Lower abdominal pain - KUB 10/26 unremarkable 
- resolved Vitamin D insufficiency - started supplement 
  
Multiple vertebral compression fractures (POA) - XR 10/19 and CT a/p 10/24 unchanged from March 2017 
- PT/OT evals: SNF recommended 
  
Hypokalemia (POA) - replete prn 
  
Hypernatremia (POA) - likely due to dehydration from decreased fluid intake 
- had improved with IV D5 x24h but Na rising since IVF stopped 
- encourage po free water intake Code status: DNR 
DVT prophylaxis: SCDs Care Plan discussed with: Patient/Family and Nurse. D/w daughter Jennifer Winslow by phone on 10/25 and at bedside 10/26. D/w both daughters 10/27. Disposition: TBD. Came from [place] but will need SNF . Daughter requesting [facility] Problems  Date Reviewed: 10/24/2018 Codes Class Noted POA * (Principal) Intractable back pain ICD-10-CM: M54.9 ICD-9-CM: 724.5  10/24/2018 Yes Hypoxia ICD-10-CM: R09.02 
ICD-9-CM: 799.02  10/24/2018 Yes Vertebral fracture (Chronic) ICD-10-CM: HPZ3637 ICD-9-CM: 805.8  10/24/2018 Yes Hypokalemia ICD-10-CM: E87.6 ICD-9-CM: 276.8  10/24/2018 Unknown CAP (community acquired pneumonia) ICD-10-CM: J18.9 ICD-9-CM: 909  10/24/2018 Yes Review of Systems:  
Pertinent items are noted in HPI. Vital Signs:  
 Last 24hrs VS reviewed since prior progress note. Most recent are: 
Visit Vitals /63 Pulse (!) 59 Temp 98.9 °F (37.2 °C) Resp 25 Ht 5' (1.524 m) Wt 52.5 kg (115 lb 11.9 oz) SpO2 97% BMI 22.60 kg/m² Intake/Output Summary (Last 24 hours) at 10/28/2018 1142 Last data filed at 10/28/2018 0800 Gross per 24 hour Intake 500 ml Output  Net 500 ml Physical Examination:  
 
Gen: awake, NAD, cooperative, elderly, NC in place Head: NCAT 
EENT: PERRL, EOMI, MMM, oropharynx benign Neck: supple, no masses Lungs: coarse BS b/l CVS: regular rhythm, normal rate, S1S2, no m/r/g appreciated, no peripheral edema, +pulses Abd: +BS, soft, NT, ND 
MSK: severely reduced ROM b/l LE Neuro: AOx3, responds appropriately to questions and commands Skin: warm, dry; no rashes, lesions, ulcers noted Data Review:  
 Review and/or order of clinical lab test 
Review and/or order of tests in the radiology section of CPT Review and/or order of tests in the medicine section of CPT Labs:  
 
Recent Labs 10/28/18 
0051 10/26/18 
3684 WBC 8.9 8.6 HGB 13.0 11.5 HCT 42.0 37.2  265 Recent Labs 10/28/18 
1032 10/27/18 
9372 10/26/18 
0190  145 147*  
K 5.1 4.2 3.6  106 108 CO2 32 35* 31 BUN 29* 29* 32* CREA 0.75 0.81 0.90 * 118* 123* CA 8.3* 8.2* 8.1* No results for input(s): SGOT, GPT, ALT, AP, TBIL, TBILI, TP, ALB, GLOB, GGT, AML, LPSE in the last 72 hours. No lab exists for component: AMYP, HLPSE No results for input(s): INR, PTP, APTT in the last 72 hours. No lab exists for component: INREXT, INREXT No results for input(s): FE, TIBC, PSAT, FERR in the last 72 hours. No results found for: FOL, RBCF No results for input(s): PH, PCO2, PO2 in the last 72 hours. No results for input(s): CPK, CKNDX, TROIQ in the last 72 hours. No lab exists for component: CPKMB Lab Results Component Value Date/Time Cholesterol, total 168 05/21/2010 03:00 AM  
 HDL Cholesterol 53 05/21/2010 03:00 AM  
 LDL, calculated 95.4 05/21/2010 03:00 AM  
 Triglyceride 98 05/21/2010 03:00 AM  
 CHOL/HDL Ratio 3.2 05/21/2010 03:00 AM  
 
Lab Results Component Value Date/Time Glucose (POC) 120 (H) 10/27/2018 06:05 AM  
 Glucose (POC) 133 (H) 10/27/2018 12:18 AM  
 Glucose (POC) 177 (H) 10/26/2018 06:21 PM  
 Glucose (POC) 222 (H) 10/26/2018 11:51 AM  
 Glucose (POC) 119 (H) 10/26/2018 05:52 AM  
 
Lab Results Component Value Date/Time Color YELLOW/STRAW 10/24/2018 02:52 PM  
 Appearance CLOUDY (A) 10/24/2018 02:52 PM  
 Specific gravity 1.015 10/24/2018 02:52 PM  
 Specific gravity 1.005 03/07/2017 01:54 PM  
 pH (UA) 5.5 10/24/2018 02:52 PM  
 Protein 30 (A) 10/24/2018 02:52 PM  
 Glucose NEGATIVE  10/24/2018 02:52 PM  
 Ketone TRACE (A) 10/24/2018 02:52 PM  
 Bilirubin NEGATIVE  10/24/2018 02:52 PM  
 Urobilinogen 0.2 10/24/2018 02:52 PM  
 Nitrites NEGATIVE  10/24/2018 02:52 PM  
 Leukocyte Esterase MODERATE (A) 10/24/2018 02:52 PM  
 Epithelial cells FEW 10/24/2018 02:52 PM  
 Bacteria 4+ (A) 10/24/2018 02:52 PM  
 WBC  10/24/2018 02:52 PM  
 RBC 0-5 10/24/2018 02:52 PM  
 
 
 
Medications Reviewed:  
 
Current Facility-Administered Medications Medication Dose Route Frequency  heparin (porcine) injection 5,000 Units  5,000 Units SubCUTAneous Q12H  sertraline (ZOLOFT) tablet 25 mg  25 mg Oral QPM  
 cholecalciferol (VITAMIN D3) tablet 1,000 Units  1,000 Units Oral DAILY  balsam peru-castor oil (VENELEX) [id number] mg/gram ointment   Topical BID  albuterol-ipratropium (DUO-NEB) 2.5 MG-0.5 MG/3 ML  3 mL Nebulization Q4H PRN  
 sodium chloride (NS) flush 5-10 mL  5-10 mL IntraVENous Q8H  
 sodium chloride (NS) flush 5-10 mL  5-10 mL IntraVENous PRN  
 acetaminophen (TYLENOL) tablet 1,000 mg  1,000 mg Oral Q8H  
 ondansetron (ZOFRAN) injection 4 mg  4 mg IntraVENous Q4H PRN  
 cefTRIAXone (ROCEPHIN) 1 g in 0.9% sodium chloride (MBP/ADV) 50 mL  1 g IntraVENous Q24H  
 azithromycin (ZITHROMAX) 500 mg in 0.9% sodium chloride 250 mL ([id number])  500 mg IntraVENous Q24H  lidocaine (LIDODERM) 5 % patch 1 Patch  1 Patch TransDERmal Q24H  
 amLODIPine (NORVASC) tablet 5 mg  5 mg Oral DAILY  calcium carbonate (OS-SIMONE) tablet 500 mg [elemental]  500 mg Oral BID  metoprolol tartrate (LOPRESSOR) tablet 12.5 mg  12.5 mg Oral BID  therapeutic multivitamin (THERAGRAN) tablet 1 Tab  1 Tab Oral DAILY  
 
______________________________________________________________________ EXPECTED LENGTH OF STAY: 4d 19h ACTUAL LENGTH OF STAY:          4 Abhinav Medrano MD

## 2018-10-28 NOTE — PROGRESS NOTES
Problem: Falls - Risk of 
Goal: *Absence of Falls Document [facility] Fall Risk and appropriate interventions in the flowsheet. Fall Risk Interventions: 
Mobility Interventions: Assess mobility with egress test, Patient to call before getting OOB Mentation Interventions: Adequate sleep, hydration, pain control, Bed/chair exit alarm, Door open when patient unattended, More frequent rounding, Reorient patient, Room close to nurse's station Medication Interventions: Evaluate medications/consider consulting pharmacy, Patient to call before getting OOB, Bed/chair exit alarm Elimination Interventions: Bed/chair exit alarm, Call light in reach, Patient to call for help with toileting needs, Toileting schedule/hourly rounds History of Falls Interventions: Bed/chair exit alarm, Door open when patient unattended, Evaluate medications/consider consulting pharmacy, Investigate reason for fall, Room close to nurse's station Problem: Pressure Injury - Risk of 
Goal: *Prevention of pressure injury Document Mohit Scale and appropriate interventions in the flowsheet. Frequent turns Offload heels Venelex Outcome: Progressing Towards Goal 
Pressure Injury Interventions: 
Sensory Interventions: Assess changes in LOC, Assess need for specialty bed Moisture Interventions: Absorbent underpads, Check for incontinence Q2 hours and as needed, Apply protective barrier, creams and emollients, Assess need for specialty bed, Maintain skin hydration (lotion/cream) Activity Interventions: Increase time out of bed, Pressure redistribution bed/mattress(bed type) Mobility Interventions: PT/OT evaluation, Pressure redistribution bed/mattress (bed type) Nutrition Interventions: Document food/fluid/supplement intake Friction and Shear Interventions: Lift sheet, Apply protective barrier, creams and emollients

## 2018-10-28 NOTE — PROGRESS NOTES
Problem: Falls - Risk of 
Goal: *Absence of Falls Document Tate Cifuentes Fall Risk and appropriate interventions in the flowsheet. Outcome: Progressing Towards Goal 
Fall Risk Interventions: 
Mobility Interventions: Assess mobility with egress test, Patient to call before getting OOB Mentation Interventions: Adequate sleep, hydration, pain control, Bed/chair exit alarm, Door open when patient unattended, More frequent rounding, Reorient patient, Room close to nurse's station Medication Interventions: Evaluate medications/consider consulting pharmacy, Patient to call before getting OOB, Bed/chair exit alarm Elimination Interventions: Bed/chair exit alarm, Call light in reach, Patient to call for help with toileting needs, Toileting schedule/hourly rounds History of Falls Interventions: Bed/chair exit alarm, Door open when patient unattended, Evaluate medications/consider consulting pharmacy, Investigate reason for fall, Room close to nurse's station Problem: Pressure Injury - Risk of 
Goal: *Prevention of pressure injury Document Mohit Scale and appropriate interventions in the flowsheet. Frequent turns Offload heels Venelex Outcome: Progressing Towards Goal 
Pressure Injury Interventions: 
Sensory Interventions: Assess changes in LOC, Assess need for specialty bed Moisture Interventions: Absorbent underpads, Check for incontinence Q2 hours and as needed, Apply protective barrier, creams and emollients, Assess need for specialty bed, Maintain skin hydration (lotion/cream) Activity Interventions: Increase time out of bed, Pressure redistribution bed/mattress(bed type) Mobility Interventions: PT/OT evaluation, Pressure redistribution bed/mattress (bed type) Nutrition Interventions: Document food/fluid/supplement intake Friction and Shear Interventions: Lift sheet, Apply protective barrier, creams and emollients

## 2018-10-29 ENCOUNTER — APPOINTMENT (OUTPATIENT)
Dept: GENERAL RADIOLOGY | Age: 83
DRG: 871 | End: 2018-10-29
Attending: INTERNAL MEDICINE
Payer: MEDICARE

## 2018-10-29 LAB
ANION GAP SERPL CALC-SCNC: 4 MMOL/L (ref 5–15)
BACTERIA SPEC CULT: NORMAL
BASOPHILS # BLD: 0 K/UL (ref 0–0.1)
BASOPHILS NFR BLD: 0 % (ref 0–1)
BUN SERPL-MCNC: 33 MG/DL (ref 6–20)
BUN/CREAT SERPL: 32 (ref 12–20)
CALCIUM SERPL-MCNC: 8.4 MG/DL (ref 8.5–10.1)
CHLORIDE SERPL-SCNC: 108 MMOL/L (ref 97–108)
CO2 SERPL-SCNC: 34 MMOL/L (ref 21–32)
CREAT SERPL-MCNC: 1.02 MG/DL (ref 0.55–1.02)
DIFFERENTIAL METHOD BLD: ABNORMAL
EOSINOPHIL # BLD: 0 K/UL (ref 0–0.4)
EOSINOPHIL NFR BLD: 0 % (ref 0–7)
ERYTHROCYTE [DISTWIDTH] IN BLOOD BY AUTOMATED COUNT: 13.2 % (ref 11.5–14.5)
GLUCOSE SERPL-MCNC: 151 MG/DL (ref 65–100)
HCT VFR BLD AUTO: 38.8 % (ref 35–47)
HGB BLD-MCNC: 11.8 G/DL (ref 11.5–16)
IMM GRANULOCYTES # BLD: 0 K/UL
IMM GRANULOCYTES NFR BLD AUTO: 0 %
LYMPHOCYTES # BLD: 0.7 K/UL (ref 0.8–3.5)
LYMPHOCYTES NFR BLD: 5 % (ref 12–49)
MAGNESIUM SERPL-MCNC: 2.6 MG/DL (ref 1.6–2.4)
MCH RBC QN AUTO: 30.1 PG (ref 26–34)
MCHC RBC AUTO-ENTMCNC: 30.4 G/DL (ref 30–36.5)
MCV RBC AUTO: 99 FL (ref 80–99)
METAMYELOCYTES NFR BLD MANUAL: 1 %
MONOCYTES # BLD: 0.7 K/UL (ref 0–1)
MONOCYTES NFR BLD: 5 % (ref 5–13)
MYELOCYTES NFR BLD MANUAL: 1 %
NEUTS BAND NFR BLD MANUAL: 1 % (ref 0–6)
NEUTS SEG # BLD: 12.2 K/UL (ref 1.8–8)
NEUTS SEG NFR BLD: 87 % (ref 32–75)
NRBC # BLD: 0 K/UL (ref 0–0.01)
NRBC BLD-RTO: 0 PER 100 WBC
PLATELET # BLD AUTO: 321 K/UL (ref 150–400)
PLATELET COMMENTS,PCOM: ABNORMAL
PMV BLD AUTO: 10.6 FL (ref 8.9–12.9)
POTASSIUM SERPL-SCNC: 4.3 MMOL/L (ref 3.5–5.1)
RBC # BLD AUTO: 3.92 M/UL (ref 3.8–5.2)
RBC MORPH BLD: ABNORMAL
RBC MORPH BLD: ABNORMAL
SERVICE CMNT-IMP: NORMAL
SODIUM SERPL-SCNC: 146 MMOL/L (ref 136–145)
WBC # BLD AUTO: 13.9 K/UL (ref 3.6–11)

## 2018-10-29 PROCEDURE — 74011000250 HC RX REV CODE- 250: Performed by: INTERNAL MEDICINE

## 2018-10-29 PROCEDURE — 77030038269 HC DRN EXT URIN PURWCK BARD -A

## 2018-10-29 PROCEDURE — 83735 ASSAY OF MAGNESIUM: CPT | Performed by: INTERNAL MEDICINE

## 2018-10-29 PROCEDURE — 74011250637 HC RX REV CODE- 250/637: Performed by: INTERNAL MEDICINE

## 2018-10-29 PROCEDURE — 80048 BASIC METABOLIC PNL TOTAL CA: CPT | Performed by: INTERNAL MEDICINE

## 2018-10-29 PROCEDURE — 77010033678 HC OXYGEN DAILY

## 2018-10-29 PROCEDURE — 93005 ELECTROCARDIOGRAM TRACING: CPT

## 2018-10-29 PROCEDURE — 74011250636 HC RX REV CODE- 250/636: Performed by: INTERNAL MEDICINE

## 2018-10-29 PROCEDURE — 94640 AIRWAY INHALATION TREATMENT: CPT

## 2018-10-29 PROCEDURE — 85025 COMPLETE CBC W/AUTO DIFF WBC: CPT | Performed by: INTERNAL MEDICINE

## 2018-10-29 PROCEDURE — 65660000000 HC RM CCU STEPDOWN

## 2018-10-29 PROCEDURE — 71045 X-RAY EXAM CHEST 1 VIEW: CPT

## 2018-10-29 PROCEDURE — 74011000250 HC RX REV CODE- 250: Performed by: FAMILY MEDICINE

## 2018-10-29 PROCEDURE — 36415 COLL VENOUS BLD VENIPUNCTURE: CPT | Performed by: INTERNAL MEDICINE

## 2018-10-29 PROCEDURE — 97530 THERAPEUTIC ACTIVITIES: CPT

## 2018-10-29 RX ADMIN — VITAMIN D, TAB 1000IU (100/BT) 1000 UNITS: 25 TAB at 09:44

## 2018-10-29 RX ADMIN — ACETAMINOPHEN 1000 MG: 500 TABLET, FILM COATED ORAL at 17:41

## 2018-10-29 RX ADMIN — METOPROLOL TARTRATE 12.5 MG: 25 TABLET ORAL at 17:41

## 2018-10-29 RX ADMIN — CASTOR OIL AND BALSAM, PERU: 788; 87 OINTMENT TOPICAL at 17:41

## 2018-10-29 RX ADMIN — HEPARIN SODIUM 5000 UNITS: 5000 INJECTION INTRAVENOUS; SUBCUTANEOUS at 21:23

## 2018-10-29 RX ADMIN — SERTRALINE HYDROCHLORIDE 25 MG: 50 TABLET ORAL at 17:41

## 2018-10-29 RX ADMIN — ACETAMINOPHEN 1000 MG: 500 TABLET, FILM COATED ORAL at 09:43

## 2018-10-29 RX ADMIN — THERA TABS 1 TABLET: TAB at 09:44

## 2018-10-29 RX ADMIN — IPRATROPIUM BROMIDE AND ALBUTEROL SULFATE 3 ML: .5; 3 SOLUTION RESPIRATORY (INHALATION) at 08:47

## 2018-10-29 RX ADMIN — Medication 10 ML: at 03:21

## 2018-10-29 RX ADMIN — CALCIUM 500 MG: 500 TABLET ORAL at 09:44

## 2018-10-29 RX ADMIN — Medication 10 ML: at 22:04

## 2018-10-29 RX ADMIN — METOPROLOL TARTRATE 12.5 MG: 25 TABLET ORAL at 09:48

## 2018-10-29 RX ADMIN — CASTOR OIL AND BALSAM, PERU: 788; 87 OINTMENT TOPICAL at 09:45

## 2018-10-29 RX ADMIN — AMLODIPINE BESYLATE 5 MG: 5 TABLET ORAL at 09:48

## 2018-10-29 RX ADMIN — CALCIUM 500 MG: 500 TABLET ORAL at 17:41

## 2018-10-29 RX ADMIN — HEPARIN SODIUM 5000 UNITS: 5000 INJECTION INTRAVENOUS; SUBCUTANEOUS at 09:44

## 2018-10-29 RX ADMIN — ACETAMINOPHEN 1000 MG: 500 TABLET, FILM COATED ORAL at 01:42

## 2018-10-29 RX ADMIN — Medication 10 ML: at 13:12

## 2018-10-29 NOTE — PROGRESS NOTES
I spoke to Dr. Yu Angulo and notified him that [facility] called to notify me at 7:40 that the patient's heart rate jumped up to 195 but quickly returned to in the 80's and that each time I went in to check on the patient after it was in the 60's. No orders were given aside from to continue to monitor patient.

## 2018-10-29 NOTE — PROGRESS NOTES
Chart reviewed. ALIZA received call from [facility] at the [address] [place] [address] ([phone]) asking if the patient is ready for discharge today. ALIZA will discuss with MD. 
 
3:20 PM: CM spoke with Zee at the [address] [facility]. They no longer have a bed today. ALIZA will follow-up with Zee regarding bed availability tomorrow. Carey Kothari

## 2018-10-29 NOTE — PROGRESS NOTES
Bedside shift change report given to [employer], RN (oncoming nurse) by Rajan Galvez RN (offgoing nurse). Report included the following information SBAR.

## 2018-10-29 NOTE — PROGRESS NOTES
Problem: Falls - Risk of 
Goal: *Absence of Falls Document Martha Oliver Fall Risk and appropriate interventions in the flowsheet. Outcome: Progressing Towards Goal 
Fall Risk Interventions: 
Mobility Interventions: Bed/chair exit alarm Mentation Interventions: Door open when patient unattended, Bed/chair exit alarm Medication Interventions: Bed/chair exit alarm Elimination Interventions: Call light in reach, Bed/chair exit alarm History of Falls Interventions: Bed/chair exit alarm, Door open when patient unattended

## 2018-10-29 NOTE — PROGRESS NOTES
Problem: Mobility Impaired (Adult and Pediatric) Goal: *Acute Goals and Plan of Care (Insert Text) Physical Therapy Goals Initiated 10/25/2018 1. Patient will move from supine to sit and sit to supine , scoot up and down and roll side to side in bed with minimal assistance/contact guard assist within 7 day(s). 2.  Patient will transfer from bed to chair and chair to bed with minimal assistance/contact guard assist using the least restrictive device within 7 day(s). 3.  Patient will perform sit to stand with minimal assistance/contact guard assist within 7 day(s). 4.  Patient will ambulate with minimal assistance/contact guard assist for 20 feet with the least restrictive device within 7 day(s). physical Therapy TREATMENT Patient: Will Stone [de-identified]80 y.o. female) Date: 10/29/2018 Diagnosis: Intractable back pain Intractable back pain Precautions: Fall Chart, physical therapy assessment, plan of care and goals were reviewed. ASSESSMENT: 
Patient agreeable to mobility activities, but not able to attempt standing due to pain and fear. She was able to maintain sitting and attempted some forward lean in sitting with her feet on the floor. However, this elicited so much anxiety that she was not able to move towards a standing trial.  She did maintain stable VS throughout, but does tend to increase her RR with quick shallow breaths with any effort. She remains appropriate for SNF level rehab once medically ready. Progression toward goals: 
[]    Improving appropriately and progressing toward goals [x]    Improving slowly and progressing toward goals 
[]    Not making progress toward goals and plan of care will be adjusted PLAN: 
Patient continues to benefit from skilled intervention to address the above impairments. Continue treatment per established plan of care. Discharge Recommendations:  Alejandro Fregoso Further Equipment Recommendations for Discharge:   To be determined in rehab SUBJECTIVE:  
Patient stated How did I get this way? Hema [place] OBJECTIVE DATA SUMMARY:  
Critical Behavior: 
Neurologic State: Alert Orientation Level: Oriented X4 Cognition: Follows commands Safety/Judgement: Awareness of environment, Fall prevention Functional Mobility Training: 
Bed Mobility: 
Rolling: Moderate assistance Supine to Sit: Maximum assistance Sit to Supine: Maximum assistance Scooting: Maximum assistance Transfers: 
  
  
     
  
     
  
  
  
  
Balance: 
Sitting: Impaired; Without support Sitting - Static: Fair (occasional) Sitting - Dynamic: Fair (occasional) Standing: (unable to attempt )Ambulation/Gait Training: 
  
  
  
  
  
  
  
  
  
  
  
  
  
  
  
  
  
  
Stairs: 
  
  
   
 
Neuro Re-Education: 
 
Therapeutic Exercises: Forward lean in sitting, partial bridging Pain: 
  
  
  
  
  
  
Activity Tolerance:  
Fair Please refer to the flowsheet for vital signs taken during this treatment. After treatment:  
[]    Patient left in no apparent distress sitting up in chair 
[x]    Patient left in no apparent distress in bed 
[x]    Call bell left within reach [x]    Nursing notified 
[]    Caregiver present 
[]    Bed alarm activated COMMUNICATION/COLLABORATION:  
The patients plan of care was discussed with: Registered Nurse and  Gunnar Douglas PT Time Calculation: 13 mins

## 2018-10-29 NOTE — PROGRESS NOTES
Problem: Falls - Risk of 
Goal: *Absence of Falls Document Angel Guanica Fall Risk and appropriate interventions in the flowsheet. Outcome: Progressing Towards Goal 
Fall Risk Interventions: 
Mobility Interventions: Bed/chair exit alarm, Patient to call before getting OOB Mentation Interventions: Adequate sleep, hydration, pain control, Door open when patient unattended, Room close to nurse's station, More frequent rounding Medication Interventions: Bed/chair exit alarm, Patient to call before getting OOB Elimination Interventions: Bed/chair exit alarm, Call light in reach, Patient to call for help with toileting needs History of Falls Interventions: Bed/chair exit alarm, Door open when patient unattended, Investigate reason for fall

## 2018-10-29 NOTE — PROGRESS NOTES
Hospitalist Progress Note Kimberly Fernández MD 
Answering service: [phone] OR [phone] from in house phone Date of Service:  10/29/2018 NAME:  Jonny Carmichael :  1925 MRN:  [id number] Admission Summary:  
79 yo woman with arthritis, h/o PE, h/o stroke/TIA with residual right hemiparesis, chronic vertebral compression fractures, HTN, HLD, osteoporosis, h/o left hip replacement who was BIBEMS to the ED from [facility]) on 10/24/18 with hypoxia and HTN per home PT. Patient's SpO2 was reportedly in the 70s, RR 50s, hypotensive and pt was panting and anxious due to the pain. Patient was admitted with CAP with sepsis, intractable back pain, acute hypoxic respiratory failure, hypokalemia, hypernatremia. Interval history / Subjective:  
C/o back pain; d/w nurse, still on NC, HR up to 170s overnight while pt sleeping but pt denies symptoms this morning Assessment & Plan:  
 
Acute hypoxic (POA) hypercapnic respiratory failure - SpO2 70s per home PT and was 77% on RA in ED 
- may be due to CAP 
- wean O2 as tolerated - CTA chest 10/24 no PE 
- likely due to atx and pleural effusions 
- transferred to CCU as IMCU overflow for BiPAP; pt wore BiPAP x2 hours 10/27; repeat ABG with compensated hypercapnia - pt refusing to wear BiPAP anymore Afebrile leucocytosis, 10/29 - unclear etiology - check CXR Tachycardia - overnight HR up to 170s while pt sleeping 
- check ECG 
 
CAP vs aspiration PNA with sepsis (POA) - tachycardia, tachypnea on admission 
- lactic acid WNL 
- BCx 10/24 NGTD 
- influenza swab (pt received vaccine 1 week ago) negative 
- started empiric ceftriaxone, azithromycin 10/24 x5 days; de-escalate to po abx as indicated - CTA chest 10/24 RLL PNA 
- SLP consulted E coli UTI (POA) - UCx 10/24 E coli  
- s/p ceftriaxone 10/24-10/28 
  
 Intractable back pain - likely fall exacerbated chronic multiple compression fractures - pain control with lidocaine patches x2 and q8h APAP 
- all imaging negative for acute fx, acute process 
- PT/OT recommending SNF 
 
R medial thigh pain - XR pelvis and R femur 10/26 unremarkable Lower abdominal pain - KUB 10/26 unremarkable 
- resolved Vitamin D insufficiency - started supplement 
  
Multiple vertebral compression fractures (POA) - XR 10/19 and CT a/p 10/24 unchanged from March 2017 
- PT/OT evals: SNF recommended 
  
Hypokalemia (POA) - replete prn 
  
Hypernatremia, intermittent (POA) - likely due to dehydration from decreased fluid intake 
- had improved with IV D5 x24h but Na rising since IVF stopped 
- encourage po free water intake Code status: DNR 
DVT prophylaxis: SCDs Care Plan discussed with: Patient/Family and Nurse. D/w daughter Augustin Neff by phone on 10/25 and at bedside 10/26. D/w both daughters 10/27. Disposition: TBD. Came from [facility] but needs SNF . D/c to [address] [place] [address] tomorrow if WBC normal and HR normal  
 
Hospital Problems  Date Reviewed: 10/24/2018 Codes Class Noted POA * (Principal) Intractable back pain ICD-10-CM: M54.9 ICD-9-CM: 724.5  10/24/2018 Yes Hypoxia ICD-10-CM: R09.02 
ICD-9-CM: 799.02  10/24/2018 Yes Vertebral fracture (Chronic) ICD-10-CM: SEG9601 ICD-9-CM: 805.8  10/24/2018 Yes Hypokalemia ICD-10-CM: E87.6 ICD-9-CM: 276.8  10/24/2018 Unknown CAP (community acquired pneumonia) ICD-10-CM: J18.9 ICD-9-CM: 841  10/24/2018 Yes Review of Systems:  
Pertinent items are noted in HPI. Vital Signs:  
 Last 24hrs VS reviewed since prior progress note. Most recent are: 
Visit Vitals /63 Pulse 70 Temp 97.9 °F (36.6 °C) Resp 16 Ht 5' (1.524 m) Wt 56.7 kg (125 lb 0 oz) SpO2 95% BMI 24.41 kg/m² Intake/Output Summary (Last 24 hours) at 10/29/2018 1703 Last data filed at 10/29/2018 1313 Gross per 24 hour Intake 240 ml Output 250 ml Net -10 ml Physical Examination:  
 
Gen: awake, NAD, cooperative, elderly, NC in place Head: NCAT 
EENT: PERRL, EOMI, MMM, oropharynx benign Neck: supple, no masses Lungs: coarse BS b/l CVS: regular rhythm, normal rate, S1S2, no m/r/g appreciated, no peripheral edema, +pulses Abd: +BS, soft, NT, ND 
MSK: severely reduced ROM b/l LE Neuro: AOx3, responds appropriately to questions and commands Skin: warm, dry; no rashes, lesions, ulcers noted Data Review:  
 Review and/or order of clinical lab test 
Review and/or order of tests in the radiology section of CPT Review and/or order of tests in the medicine section of CPT Labs:  
 
Recent Labs 10/29/18 
0310 10/28/18 
7967 WBC 13.9* 8.9 HGB 11.8 13.0 HCT 38.8 42.0  314 Recent Labs 10/29/18 
0310 10/28/18 
8751 10/27/18 
1362 * 145 145  
K 4.3 5.1 4.2  107 106 CO2 34* 32 35* BUN 33* 29* 29* CREA 1.02 0.75 0.81 * 116* 118* CA 8.4* 8.3* 8.2* MG 2.6*  --   -- No results for input(s): SGOT, GPT, ALT, AP, TBIL, TBILI, TP, ALB, GLOB, GGT, AML, LPSE in the last 72 hours. No lab exists for component: AMYP, HLPSE No results for input(s): INR, PTP, APTT in the last 72 hours. No lab exists for component: INREXT, INREXT No results for input(s): FE, TIBC, PSAT, FERR in the last 72 hours. No results found for: FOL, RBCF No results for input(s): PH, PCO2, PO2 in the last 72 hours. No results for input(s): CPK, CKNDX, TROIQ in the last 72 hours. No lab exists for component: CPKMB Lab Results Component Value Date/Time Cholesterol, total 168 05/21/2010 03:00 AM  
 HDL Cholesterol 53 05/21/2010 03:00 AM  
 LDL, calculated 95.4 05/21/2010 03:00 AM  
 Triglyceride 98 05/21/2010 03:00 AM  
 CHOL/HDL Ratio 3.2 05/21/2010 03:00 AM  
 
Lab Results Component Value Date/Time Glucose (POC) 120 (H) 10/27/2018 06:05 AM  
 Glucose (POC) 133 (H) 10/27/2018 12:18 AM  
 Glucose (POC) 177 (H) 10/26/2018 06:21 PM  
 Glucose (POC) 222 (H) 10/26/2018 11:51 AM  
 Glucose (POC) 119 (H) 10/26/2018 05:52 AM  
 
Lab Results Component Value Date/Time Color YELLOW/STRAW 10/24/2018 02:52 PM  
 Appearance CLOUDY (A) 10/24/2018 02:52 PM  
 Specific gravity 1.015 10/24/2018 02:52 PM  
 Specific gravity 1.005 03/07/2017 01:54 PM  
 pH (UA) 5.5 10/24/2018 02:52 PM  
 Protein 30 (A) 10/24/2018 02:52 PM  
 Glucose NEGATIVE  10/24/2018 02:52 PM  
 Ketone TRACE (A) 10/24/2018 02:52 PM  
 Bilirubin NEGATIVE  10/24/2018 02:52 PM  
 Urobilinogen 0.2 10/24/2018 02:52 PM  
 Nitrites NEGATIVE  10/24/2018 02:52 PM  
 Leukocyte Esterase MODERATE (A) 10/24/2018 02:52 PM  
 Epithelial cells FEW 10/24/2018 02:52 PM  
 Bacteria 4+ (A) 10/24/2018 02:52 PM  
 WBC  10/24/2018 02:52 PM  
 RBC 0-5 10/24/2018 02:52 PM  
 
 
 
Medications Reviewed:  
 
Current Facility-Administered Medications Medication Dose Route Frequency  lidocaine (LIDODERM) 5 % patch 2 Patch  2 Patch TransDERmal Q24H  
 heparin (porcine) injection 5,000 Units  5,000 Units SubCUTAneous Q12H  sertraline (ZOLOFT) tablet 25 mg  25 mg Oral QPM  
 cholecalciferol (VITAMIN D3) tablet 1,000 Units  1,000 Units Oral DAILY  balsam peru-castor oil (VENELEX) [id number] mg/gram ointment   Topical BID  albuterol-ipratropium (DUO-NEB) 2.5 MG-0.5 MG/3 ML  3 mL Nebulization Q4H PRN  
 sodium chloride (NS) flush 5-10 mL  5-10 mL IntraVENous Q8H  
 sodium chloride (NS) flush 5-10 mL  5-10 mL IntraVENous PRN  
 acetaminophen (TYLENOL) tablet 1,000 mg  1,000 mg Oral Q8H  
 ondansetron (ZOFRAN) injection 4 mg  4 mg IntraVENous Q4H PRN  
 amLODIPine (NORVASC) tablet 5 mg  5 mg Oral DAILY  calcium carbonate (OS-SIMONE) tablet 500 mg [elemental]  500 mg Oral BID  metoprolol tartrate (LOPRESSOR) tablet 12.5 mg  12.5 mg Oral BID  
  therapeutic multivitamin (THERAGRAN) tablet 1 Tab  1 Tab Oral DAILY  
 
______________________________________________________________________ EXPECTED LENGTH OF STAY: 4d 19h ACTUAL LENGTH OF STAY:          5 Pj Dietz MD

## 2018-10-29 NOTE — PROGRESS NOTES
1645:Notified by remote telemetry of 11 runs of V tach. Patient resting comfortably, family present in room, VS stable. 1650: Dr. Tomy Nichols paged. 1700: Dr. Tomy Nichols notified of 11 beats of v tach. No new orders at this time, will continue to monitor.

## 2018-10-29 NOTE — PROGRESS NOTES
Bedside and Verbal shift change report given to Eden (oncoming nurse) by Maddison Summers (offgoing nurse). Report included the following information SBAR.

## 2018-10-30 ENCOUNTER — APPOINTMENT (OUTPATIENT)
Dept: GENERAL RADIOLOGY | Age: 83
DRG: 871 | End: 2018-10-30
Attending: INTERNAL MEDICINE
Payer: MEDICARE

## 2018-10-30 LAB
ANION GAP SERPL CALC-SCNC: 6 MMOL/L (ref 5–15)
ATRIAL RATE: 59 BPM
BASOPHILS # BLD: 0 K/UL (ref 0–0.1)
BASOPHILS NFR BLD: 0 % (ref 0–1)
BUN SERPL-MCNC: 29 MG/DL (ref 6–20)
BUN/CREAT SERPL: 32 (ref 12–20)
CALCIUM SERPL-MCNC: 8.8 MG/DL (ref 8.5–10.1)
CALCULATED P AXIS, ECG09: 52 DEGREES
CALCULATED R AXIS, ECG10: -3 DEGREES
CALCULATED T AXIS, ECG11: 0 DEGREES
CHLORIDE SERPL-SCNC: 108 MMOL/L (ref 97–108)
CO2 SERPL-SCNC: 32 MMOL/L (ref 21–32)
CREAT SERPL-MCNC: 0.92 MG/DL (ref 0.55–1.02)
DIAGNOSIS, 93000: NORMAL
DIFFERENTIAL METHOD BLD: ABNORMAL
EOSINOPHIL # BLD: 0 K/UL (ref 0–0.4)
EOSINOPHIL NFR BLD: 0 % (ref 0–7)
ERYTHROCYTE [DISTWIDTH] IN BLOOD BY AUTOMATED COUNT: 13.2 % (ref 11.5–14.5)
GLUCOSE SERPL-MCNC: 120 MG/DL (ref 65–100)
HCT VFR BLD AUTO: 38.6 % (ref 35–47)
HGB BLD-MCNC: 11.7 G/DL (ref 11.5–16)
IMM GRANULOCYTES # BLD: 0 K/UL
IMM GRANULOCYTES NFR BLD AUTO: 0 %
LYMPHOCYTES # BLD: 0.4 K/UL (ref 0.8–3.5)
LYMPHOCYTES NFR BLD: 3 % (ref 12–49)
MAGNESIUM SERPL-MCNC: 2.7 MG/DL (ref 1.6–2.4)
MCH RBC QN AUTO: 30.3 PG (ref 26–34)
MCHC RBC AUTO-ENTMCNC: 30.3 G/DL (ref 30–36.5)
MCV RBC AUTO: 100 FL (ref 80–99)
METAMYELOCYTES NFR BLD MANUAL: 2 %
MONOCYTES # BLD: 0.6 K/UL (ref 0–1)
MONOCYTES NFR BLD: 4 % (ref 5–13)
NEUTS BAND NFR BLD MANUAL: 3 % (ref 0–6)
NEUTS SEG # BLD: 13.1 K/UL (ref 1.8–8)
NEUTS SEG NFR BLD: 88 % (ref 32–75)
NRBC # BLD: 0 K/UL (ref 0–0.01)
NRBC BLD-RTO: 0 PER 100 WBC
P-R INTERVAL, ECG05: 126 MS
PLATELET # BLD AUTO: 317 K/UL (ref 150–400)
PLATELET COMMENTS,PCOM: ABNORMAL
PMV BLD AUTO: 10.8 FL (ref 8.9–12.9)
POTASSIUM SERPL-SCNC: 4.4 MMOL/L (ref 3.5–5.1)
Q-T INTERVAL, ECG07: 380 MS
QRS DURATION, ECG06: 86 MS
QTC CALCULATION (BEZET), ECG08: 376 MS
RBC # BLD AUTO: 3.86 M/UL (ref 3.8–5.2)
RBC MORPH BLD: ABNORMAL
SODIUM SERPL-SCNC: 146 MMOL/L (ref 136–145)
VENTRICULAR RATE, ECG03: 59 BPM
WBC # BLD AUTO: 14.4 K/UL (ref 3.6–11)

## 2018-10-30 PROCEDURE — 94760 N-INVAS EAR/PLS OXIMETRY 1: CPT

## 2018-10-30 PROCEDURE — 71045 X-RAY EXAM CHEST 1 VIEW: CPT

## 2018-10-30 PROCEDURE — 80048 BASIC METABOLIC PNL TOTAL CA: CPT | Performed by: INTERNAL MEDICINE

## 2018-10-30 PROCEDURE — 74011250636 HC RX REV CODE- 250/636: Performed by: INTERNAL MEDICINE

## 2018-10-30 PROCEDURE — 74011000250 HC RX REV CODE- 250: Performed by: INTERNAL MEDICINE

## 2018-10-30 PROCEDURE — 94640 AIRWAY INHALATION TREATMENT: CPT

## 2018-10-30 PROCEDURE — 74018 RADEX ABDOMEN 1 VIEW: CPT

## 2018-10-30 PROCEDURE — 65660000000 HC RM CCU STEPDOWN

## 2018-10-30 PROCEDURE — 74011250637 HC RX REV CODE- 250/637: Performed by: INTERNAL MEDICINE

## 2018-10-30 PROCEDURE — 85025 COMPLETE CBC W/AUTO DIFF WBC: CPT | Performed by: INTERNAL MEDICINE

## 2018-10-30 PROCEDURE — 74011000250 HC RX REV CODE- 250: Performed by: FAMILY MEDICINE

## 2018-10-30 PROCEDURE — 83735 ASSAY OF MAGNESIUM: CPT | Performed by: INTERNAL MEDICINE

## 2018-10-30 PROCEDURE — 82803 BLOOD GASES ANY COMBINATION: CPT

## 2018-10-30 PROCEDURE — 36415 COLL VENOUS BLD VENIPUNCTURE: CPT | Performed by: INTERNAL MEDICINE

## 2018-10-30 PROCEDURE — 77010033678 HC OXYGEN DAILY

## 2018-10-30 PROCEDURE — 74011000258 HC RX REV CODE- 258: Performed by: INTERNAL MEDICINE

## 2018-10-30 PROCEDURE — 97535 SELF CARE MNGMENT TRAINING: CPT

## 2018-10-30 PROCEDURE — 36600 WITHDRAWAL OF ARTERIAL BLOOD: CPT

## 2018-10-30 RX ORDER — DEXTROSE MONOHYDRATE 50 MG/ML
75 INJECTION, SOLUTION INTRAVENOUS CONTINUOUS
Status: DISCONTINUED | OUTPATIENT
Start: 2018-10-30 | End: 2018-10-31 | Stop reason: HOSPADM

## 2018-10-30 RX ADMIN — Medication 10 ML: at 21:07

## 2018-10-30 RX ADMIN — ACETAMINOPHEN 1000 MG: 500 TABLET, FILM COATED ORAL at 02:16

## 2018-10-30 RX ADMIN — VITAMIN D, TAB 1000IU (100/BT) 1000 UNITS: 25 TAB at 11:22

## 2018-10-30 RX ADMIN — CASTOR OIL AND BALSAM, PERU: 788; 87 OINTMENT TOPICAL at 19:34

## 2018-10-30 RX ADMIN — Medication 10 ML: at 06:00

## 2018-10-30 RX ADMIN — ACETAMINOPHEN 1000 MG: 500 TABLET, FILM COATED ORAL at 11:21

## 2018-10-30 RX ADMIN — AMLODIPINE BESYLATE 5 MG: 5 TABLET ORAL at 11:22

## 2018-10-30 RX ADMIN — HEPARIN SODIUM 5000 UNITS: 5000 INJECTION INTRAVENOUS; SUBCUTANEOUS at 21:06

## 2018-10-30 RX ADMIN — CALCIUM 500 MG: 500 TABLET ORAL at 19:53

## 2018-10-30 RX ADMIN — SERTRALINE HYDROCHLORIDE 25 MG: 50 TABLET ORAL at 19:35

## 2018-10-30 RX ADMIN — THERA TABS 1 TABLET: TAB at 11:22

## 2018-10-30 RX ADMIN — CEFTRIAXONE SODIUM 1 G: 1 INJECTION, POWDER, FOR SOLUTION INTRAMUSCULAR; INTRAVENOUS at 16:46

## 2018-10-30 RX ADMIN — METOPROLOL TARTRATE 12.5 MG: 25 TABLET ORAL at 19:23

## 2018-10-30 RX ADMIN — CALCIUM 500 MG: 500 TABLET ORAL at 11:22

## 2018-10-30 RX ADMIN — DEXTROSE MONOHYDRATE 75 ML/HR: 5 INJECTION, SOLUTION INTRAVENOUS at 16:47

## 2018-10-30 RX ADMIN — ACETAMINOPHEN 1000 MG: 500 TABLET, FILM COATED ORAL at 19:35

## 2018-10-30 RX ADMIN — Medication 10 ML: at 16:49

## 2018-10-30 RX ADMIN — METOPROLOL TARTRATE 12.5 MG: 25 TABLET ORAL at 11:22

## 2018-10-30 RX ADMIN — CASTOR OIL AND BALSAM, PERU: 788; 87 OINTMENT TOPICAL at 11:23

## 2018-10-30 RX ADMIN — IPRATROPIUM BROMIDE AND ALBUTEROL SULFATE 3 ML: .5; 3 SOLUTION RESPIRATORY (INHALATION) at 12:26

## 2018-10-30 RX ADMIN — HEPARIN SODIUM 5000 UNITS: 5000 INJECTION INTRAVENOUS; SUBCUTANEOUS at 11:21

## 2018-10-30 NOTE — PROGRESS NOTES
Problem: Self Care Deficits Care Plan (Adult) Goal: *Acute Goals and Plan of Care (Insert Text) Occupational Therapy Goals Initiated 10/25/2018 1. Patient will perform lower body dressing with moderate assistance  within 7 day(s). 2.  Patient will perform upper body dressing with modified independence within 7 day(s). 3.  Patient will perform standing grooming tasks x2 minutes with minimal assistance/contact guard assist within 7 day(s). 4.  Patient will perform toilet transfers with minimal assistance/contact guard assist within 7 day(s). 5.  Patient will perform all aspects of toileting with moderate assistance  within 7 day(s). 6.  Patient will participate in upper extremity therapeutic exercise/activities with supervision/set-up for 5 minutes within 7 day(s). 7.  Patient will utilize energy conservation techniques during functional activities with verbal cues within 7 day(s). Occupational Therapy TREATMENT Patient: Joon Harley [de-identified]80 y.o. female) Date: 10/30/2018 Diagnosis: Intractable back pain Intractable back pain Precautions: Fall Chart, occupational therapy assessment, plan of care, and goals were reviewed. ASSESSMENT: 
Patient received supine in bed completing toileting and bathing with PCT. Patient is overall Total A for all aspects of toileting and bathing excluding anterior upper body components. Patient requiring physical assist and multiple cues to assist with maintaining midline in bed. Patient completing simple grooming tasks but limited by impaired sitting balance, fine motor strength, and attention to task (did not realize she still had plates in and required cue to remove and replace appropriately). Patient will benfeit from d/c to SNF rehab when medically stable. Progression toward goals: 
[]       Improving appropriately and progressing toward goals 
[]       Improving slowly and progressing toward goals []       Not making progress toward goals and plan of care will be adjusted PLAN: 
Patient continues to benefit from skilled intervention to address the above impairments. Continue treatment per established plan of care. Discharge Recommendations:  SNF Rehab Further Equipment Recommendations for Discharge:  TBD SUBJECTIVE:  
Patient stated I'm such a mess aren't I? OBJECTIVE DATA SUMMARY:  
Cognitive/Behavioral Status: 
Neurologic State: Alert Orientation Level: Oriented X4 Cognition: Follows commands; Appropriate for age attention/concentration Perception: Cues to maintain midline in sitting(R lateral lean in supine) Perseveration: No perseveration noted Safety/Judgement: Fall prevention Functional Mobility and Transfers for ADLs:Bed Mobility: 
Rolling: Moderate assistance;Assist x2 Scooting: Maximum assistance Transfers: 
  
  
  
 
Balance: 
Sitting: Impaired ADL Intervention: 
  
 
Grooming Brushing Teeth: Minimum assistance(Difficulty with sitting balance today/fine motor strength) Toileting Toileting Assistance: Total assistance(dependent) Bladder Hygiene: Total assistance (dependent) Bowel Hygiene: Total assistance (dependent) Clothing Management: Total assistance (dependent) Cognitive Retraining Safety/Judgement: Fall prevention Therapeutic Exercises:  
- Patient rolling multiple times in bed for bathing - Patient able to use LUE to pull on side bed rail and bring self back to midline Activity Tolerance:  
Good. Please refer to the flowsheet for vital signs taken during this treatment. After treatment:  
[] Patient left in no apparent distress sitting up in chair 
[x] Patient left in no apparent distress in bed 
[x] Call bell left within reach [x] Nursing notified 
[] Caregiver present [x] Bed alarm activated COMMUNICATION/COLLABORATION:  
The patients plan of care was discussed with: Registered Nurse Rosalia Patel OT 
 Time Calculation: 11 mins

## 2018-10-30 NOTE — PROGRESS NOTES
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

## 2018-10-30 NOTE — PROGRESS NOTES
Assessed breath sounds of pt with bedside RN paul. Crackles bilateral lower. Respirations 24, pt lethargic, oriented but repeatedly closes eyes during conversation. RT at bedside for ABG, 
 ABG pH=7.407, CO2 = 56.4, pO2 = 55, HCO3 = 35, Respiratory increased nasal cannula to 6L.  
 
1600 - Informed MD Tacho Ureña of assessment/lab result, orders for CXR, BNP, transfer to [facility]. 
 
1605 - MD in room to discuss with family, no transfer or cont bipap given DNR status and family/patienty requests

## 2018-10-30 NOTE — PROGRESS NOTES
Hospitalist Progress Note Genaro De Los Santos MD 
Answering service: [phone] OR [phone] from in house phone Date of Service:  10/30/2018 NAME:  Nithya Danielle :  1925 MRN:  [id number] Admission Summary:  
81 yo woman with arthritis, h/o PE, h/o stroke/TIA with residual right hemiparesis, chronic vertebral compression fractures, HTN, HLD, osteoporosis, h/o left hip replacement who was BIBEMS to the ED from [facility] ([facility]) on 10/24/18 with hypoxia and HTN per home PT. Patient's SpO2 was reportedly in the 70s, RR 50s, hypotensive and pt was panting and anxious due to the pain. Patient was admitted with CAP with sepsis, intractable back pain, acute hypoxic respiratory failure, hypokalemia, hypernatremia. Interval history / Subjective:  
First encounter Pt today complains of abdominal pain but there is no tenderness to palpation DW nursing staff, no acute events O/N 
No nausea or vomiting Tolerating O2 via NC Assessment & Plan:  
 
Acute hypoxic (POA) hypercapnic respiratory failure - SpO2 70s per home PT and was 77% on RA in ED 
- may be due to CAP 
- wean O2 as tolerated - CTA chest 10/24 no PE 
- likely due to atx and pleural effusions 
- transferred to CCU as IMCU overflow for BiPAP; pt wore BiPAP x2 hours 10/27; repeat ABG with compensated hypercapnia - pt refusing to wear BiPAP anymore 
- repeat ABGs today Leukocytosis, worsening 
afebrile Patient complains of suprapubic pain although it has been ongoing for a few days. CXR neg for acute pathology KUB neg for acute pathology Check UA and Urine cx (pt recently had UTI treated with Ceftriaxone) Start on Ceftriaxone empirically ?hemoconcetration Tachycardia - With runs of NSVT Resolved CAP vs aspiration PNA with sepsis (POA) - tachycardia, tachypnea on admission 
- lactic acid WNL 
- BCx 10/24 NGTD - influenza swab (pt received vaccine 1 week ago) negative 
- started empiric ceftriaxone, azithromycin 10/24 x5 days; de-escalate to po abx as indicated - CTA chest 10/24 RLL PNA 
- SLP consulted E coli UTI (POA) - UCx 10/24 E coli  
- s/p ceftriaxone 10/24-10/28 
  
Intractable back pain - likely fall exacerbated chronic multiple compression fractures - pain control with lidocaine patches x2 and q8h APAP 
- all imaging negative for acute fx, acute process 
- PT/OT recommending SNF 
 
R medial thigh pain - XR pelvis and R femur 10/26 unremarkable Vitamin D insufficiency - started supplement 
  
Multiple vertebral compression fractures (POA) - XR 10/19 and CT a/p 10/24 unchanged from March 2017 
- PT/OT evals: SNF recommended 
  
Hypokalemia (POA) - replete prn 
  
Hypernatremia, intermittent (POA) - likely due to dehydration from decreased fluid intake 
- had improved with IV D5 x24h but Na rising since IVF stopped, resume D5W today 
- encourage po free water intake Code status: DNR 
DVT prophylaxis: SCDs Care Plan discussed with: Patient/Family and Nurse. Disposition: TBD. Came from [facility] but needs SNF . D/c to [address] [place] [address] was contemplated today but has worsening WBC so will monitor today Addendum; w family, pt is dnr dni, rerfusing bipap. Family ages, keep pt comfortable. cont IV fluis Dc bipap O2 support Hospital Problems  Date Reviewed: 10/24/2018 Codes Class Noted POA * (Principal) Intractable back pain ICD-10-CM: M54.9 ICD-9-CM: 724.5  10/24/2018 Yes Hypoxia ICD-10-CM: R09.02 
ICD-9-CM: 799.02  10/24/2018 Yes Vertebral fracture (Chronic) ICD-10-CM: IMZ7339 ICD-9-CM: 805.8  10/24/2018 Yes Hypokalemia ICD-10-CM: E87.6 ICD-9-CM: 276.8  10/24/2018 Unknown CAP (community acquired pneumonia) ICD-10-CM: J18.9 ICD-9-CM: 936  10/24/2018 Yes Review of Systems:  
Pertinent items are noted in HPI. Vital Signs:  
 Last 24hrs VS reviewed since prior progress note. Most recent are: 
Visit Vitals /67 Pulse 75 Temp 97.9 °F (36.6 °C) Resp 16 Ht 5' (1.524 m) Wt 53 kg (116 lb 13.5 oz) SpO2 94% BMI 22.82 kg/m² Intake/Output Summary (Last 24 hours) at 10/30/2018 1440 Last data filed at 10/29/2018 1800 Gross per 24 hour Intake 220 ml Output  Net 220 ml Physical Examination:  
 
Gen: awake, NAD, cooperative, elderly, NC in place Head: NCAT 
EENT: PERRL, EOMI, MMM, oropharynx benign Neck: supple, no masses Lungs: coarse BS b/l CVS: regular rhythm, normal rate, S1S2, no m/r/g appreciated, no peripheral edema, +pulses Abd: +BS, soft, NT, ND 
MSK: severely reduced ROM b/l LE Neuro: AOx3, responds appropriately to questions and commands Skin: warm, dry; no rashes, lesions, ulcers noted Data Review:  
 Review and/or order of clinical lab test 
Review and/or order of tests in the radiology section of CPT Review and/or order of tests in the medicine section of CPT Labs:  
 
Recent Labs 10/30/18 
0413 10/29/18 
0310 WBC 14.4* 13.9* HGB 11.7 11.8 HCT 38.6 38.8  321 Recent Labs 10/30/18 
0413 10/29/18 
0310 10/28/18 
1107 * 146* 145  
K 4.4 4.3 5.1  108 107 CO2 32 34* 32 BUN 29* 33* 29* CREA 0.92 1.02 0.75 * 151* 116* CA 8.8 8.4* 8.3*  
MG 2.7* 2.6*  -- No results for input(s): SGOT, GPT, ALT, AP, TBIL, TBILI, TP, ALB, GLOB, GGT, AML, LPSE in the last 72 hours. No lab exists for component: AMYP, HLPSE No results for input(s): INR, PTP, APTT in the last 72 hours. No lab exists for component: INREXT, INREXT No results for input(s): FE, TIBC, PSAT, FERR in the last 72 hours. No results found for: FOL, RBCF No results for input(s): PH, PCO2, PO2 in the last 72 hours. No results for input(s): CPK, CKNDX, TROIQ in the last 72 hours. No lab exists for component: CPKMB Lab Results Component Value Date/Time Cholesterol, total 168 05/21/2010 03:00 AM  
 HDL Cholesterol 53 05/21/2010 03:00 AM  
 LDL, calculated 95.4 05/21/2010 03:00 AM  
 Triglyceride 98 05/21/2010 03:00 AM  
 CHOL/HDL Ratio 3.2 05/21/2010 03:00 AM  
 
Lab Results Component Value Date/Time Glucose (POC) 120 (H) 10/27/2018 06:05 AM  
 Glucose (POC) 133 (H) 10/27/2018 12:18 AM  
 Glucose (POC) 177 (H) 10/26/2018 06:21 PM  
 Glucose (POC) 222 (H) 10/26/2018 11:51 AM  
 Glucose (POC) 119 (H) 10/26/2018 05:52 AM  
 
Lab Results Component Value Date/Time Color YELLOW/STRAW 10/24/2018 02:52 PM  
 Appearance CLOUDY (A) 10/24/2018 02:52 PM  
 Specific gravity 1.015 10/24/2018 02:52 PM  
 Specific gravity 1.005 03/07/2017 01:54 PM  
 pH (UA) 5.5 10/24/2018 02:52 PM  
 Protein 30 (A) 10/24/2018 02:52 PM  
 Glucose NEGATIVE  10/24/2018 02:52 PM  
 Ketone TRACE (A) 10/24/2018 02:52 PM  
 Bilirubin NEGATIVE  10/24/2018 02:52 PM  
 Urobilinogen 0.2 10/24/2018 02:52 PM  
 Nitrites NEGATIVE  10/24/2018 02:52 PM  
 Leukocyte Esterase MODERATE (A) 10/24/2018 02:52 PM  
 Epithelial cells FEW 10/24/2018 02:52 PM  
 Bacteria 4+ (A) 10/24/2018 02:52 PM  
 WBC  10/24/2018 02:52 PM  
 RBC 0-5 10/24/2018 02:52 PM  
 
 
 
Medications Reviewed:  
 
Current Facility-Administered Medications Medication Dose Route Frequency  dextrose 5% infusion  75 mL/hr IntraVENous CONTINUOUS  
 cefTRIAXone (ROCEPHIN) 1 g in 0.9% sodium chloride (MBP/ADV) 50 mL  1 g IntraVENous Q24H  
 lidocaine (LIDODERM) 5 % patch 2 Patch  2 Patch TransDERmal Q24H  
 heparin (porcine) injection 5,000 Units  5,000 Units SubCUTAneous Q12H  sertraline (ZOLOFT) tablet 25 mg  25 mg Oral QPM  
 cholecalciferol (VITAMIN D3) tablet 1,000 Units  1,000 Units Oral DAILY  balsam peru-castor oil (VENELEX) [id number] mg/gram ointment   Topical BID  albuterol-ipratropium (DUO-NEB) 2.5 MG-0.5 MG/3 ML  3 mL Nebulization Q4H PRN  
  sodium chloride (NS) flush 5-10 mL  5-10 mL IntraVENous Q8H  
 sodium chloride (NS) flush 5-10 mL  5-10 mL IntraVENous PRN  
 acetaminophen (TYLENOL) tablet 1,000 mg  1,000 mg Oral Q8H  
 ondansetron (ZOFRAN) injection 4 mg  4 mg IntraVENous Q4H PRN  
 amLODIPine (NORVASC) tablet 5 mg  5 mg Oral DAILY  calcium carbonate (OS-SIMONE) tablet 500 mg [elemental]  500 mg Oral BID  metoprolol tartrate (LOPRESSOR) tablet 12.5 mg  12.5 mg Oral BID  therapeutic multivitamin (THERAGRAN) tablet 1 Tab  1 Tab Oral DAILY  
 
______________________________________________________________________ EXPECTED LENGTH OF STAY: 4d 19h ACTUAL LENGTH OF STAY:          6 Aakash Greenberg MD

## 2018-10-30 NOTE — PROGRESS NOTES
Chart reviewed. Spoke with Charmayne Ferry at the [address] [place] [address]. They have a bed today. CM will notify MD and follow for discharge orders. Spoke with MD. Plan is for discharge today to SNF. CM arranged transport with [facility] for 3 PM.  CM called and notified facility of transport time. Rn is aware. Met with daughter at bedside, she is aware of discharge plan and transport time. Discharge folder located on hard chart to include ambulance form and discharge instructions. RN to follow with Kardex and MAR. RN to call report to #[phone]. Update 2:15 PM: Ambulance arrived an hour early. MD has decided to not discharge patient today. CM called facility to notify. RENE Alston/RADHA

## 2018-10-30 NOTE — WOUND CARE
WOCN Note: Follow-up visit for buttocks and sacrum. Chart shows: 
Admitted for back pain post fall; history of PE, CVA, left hip replacement, compression fractures Admitted from [facility] 
  
Assessment:  
Patient is communicative, and assists in repositioning. Patient wearing briefs for incontinence Bed: [facility] Patient reports generalized back pain that is relieved with repositioning. 
  
Bilateral heel skin intact and without erythema.  
  
Bilateral buttocks and sacrum with diffuse blanching pink tissue. Hernandez Bare in use.  
  
Patient repositioned on left side with pillow between the knees. 
  
Recommendations:   
Venelex twice daily to sacrum and buttock. Minimize layers of linen/pads under patient to optimize support surface. Turn/reposition approximately every 2 hours and offload heels. EMILY Conklin, RN, [address] Certified Wound, Ostomy, Continence Nurse 
office [phone] 
pager [phone] or call  to page

## 2018-10-31 VITALS
WEIGHT: 116.84 LBS | HEART RATE: 60 BPM | BODY MASS INDEX: 22.94 KG/M2 | HEIGHT: 60 IN | RESPIRATION RATE: 18 BRPM | OXYGEN SATURATION: 94 % | SYSTOLIC BLOOD PRESSURE: 154 MMHG | TEMPERATURE: 98.3 F | DIASTOLIC BLOOD PRESSURE: 75 MMHG

## 2018-10-31 PROBLEM — J18.9 CAP (COMMUNITY ACQUIRED PNEUMONIA): Status: RESOLVED | Noted: 2018-10-24 | Resolved: 2018-10-31

## 2018-10-31 PROBLEM — M54.9 INTRACTABLE BACK PAIN: Status: RESOLVED | Noted: 2018-10-24 | Resolved: 2018-10-31

## 2018-10-31 LAB
ANION GAP SERPL CALC-SCNC: 6 MMOL/L (ref 5–15)
ARTERIAL PATENCY WRIST A: YES
BASE EXCESS BLD CALC-SCNC: 11 MMOL/L
BASOPHILS # BLD: 0 K/UL (ref 0–0.1)
BASOPHILS NFR BLD: 0 % (ref 0–1)
BDY SITE: ABNORMAL
BNP SERPL-MCNC: 1706 PG/ML (ref 0–450)
BUN SERPL-MCNC: 28 MG/DL (ref 6–20)
BUN/CREAT SERPL: 29 (ref 12–20)
CALCIUM SERPL-MCNC: 8.6 MG/DL (ref 8.5–10.1)
CHLORIDE SERPL-SCNC: 104 MMOL/L (ref 97–108)
CO2 SERPL-SCNC: 32 MMOL/L (ref 21–32)
CREAT SERPL-MCNC: 0.96 MG/DL (ref 0.55–1.02)
DIFFERENTIAL METHOD BLD: ABNORMAL
EOSINOPHIL # BLD: 0 K/UL (ref 0–0.4)
EOSINOPHIL NFR BLD: 0 % (ref 0–7)
ERYTHROCYTE [DISTWIDTH] IN BLOOD BY AUTOMATED COUNT: 13.4 % (ref 11.5–14.5)
GAS FLOW.O2 O2 DELIVERY SYS: ABNORMAL L/MIN
GAS FLOW.O2 SETTING OXYMISER: 3 L/M
GLUCOSE SERPL-MCNC: 159 MG/DL (ref 65–100)
HCO3 BLD-SCNC: 35.5 MMOL/L (ref 22–26)
HCT VFR BLD AUTO: 36.3 % (ref 35–47)
HGB BLD-MCNC: 11.2 G/DL (ref 11.5–16)
IMM GRANULOCYTES # BLD: 0.2 K/UL (ref 0–0.04)
IMM GRANULOCYTES NFR BLD AUTO: 1 % (ref 0–0.5)
LYMPHOCYTES # BLD: 0.7 K/UL (ref 0.8–3.5)
LYMPHOCYTES NFR BLD: 5 % (ref 12–49)
MCH RBC QN AUTO: 30.4 PG (ref 26–34)
MCHC RBC AUTO-ENTMCNC: 30.9 G/DL (ref 30–36.5)
MCV RBC AUTO: 98.4 FL (ref 80–99)
MONOCYTES # BLD: 0.9 K/UL (ref 0–1)
MONOCYTES NFR BLD: 6 % (ref 5–13)
NEUTS SEG # BLD: 13.2 K/UL (ref 1.8–8)
NEUTS SEG NFR BLD: 88 % (ref 32–75)
NRBC # BLD: 0 K/UL (ref 0–0.01)
NRBC BLD-RTO: 0 PER 100 WBC
PCO2 BLD: 56.4 MMHG (ref 35–45)
PH BLD: 7.41 [PH] (ref 7.35–7.45)
PLATELET # BLD AUTO: 326 K/UL (ref 150–400)
PMV BLD AUTO: 10.8 FL (ref 8.9–12.9)
PO2 BLD: 55 MMHG (ref 80–100)
POTASSIUM SERPL-SCNC: 4.4 MMOL/L (ref 3.5–5.1)
RBC # BLD AUTO: 3.69 M/UL (ref 3.8–5.2)
SAO2 % BLD: 88 % (ref 92–97)
SODIUM SERPL-SCNC: 142 MMOL/L (ref 136–145)
SPECIMEN TYPE: ABNORMAL
WBC # BLD AUTO: 15 K/UL (ref 3.6–11)

## 2018-10-31 PROCEDURE — 74011250636 HC RX REV CODE- 250/636: Performed by: INTERNAL MEDICINE

## 2018-10-31 PROCEDURE — 77010033678 HC OXYGEN DAILY

## 2018-10-31 PROCEDURE — 94760 N-INVAS EAR/PLS OXIMETRY 1: CPT

## 2018-10-31 PROCEDURE — 80048 BASIC METABOLIC PNL TOTAL CA: CPT | Performed by: INTERNAL MEDICINE

## 2018-10-31 PROCEDURE — 97116 GAIT TRAINING THERAPY: CPT

## 2018-10-31 PROCEDURE — 36415 COLL VENOUS BLD VENIPUNCTURE: CPT | Performed by: INTERNAL MEDICINE

## 2018-10-31 PROCEDURE — 85025 COMPLETE CBC W/AUTO DIFF WBC: CPT | Performed by: INTERNAL MEDICINE

## 2018-10-31 PROCEDURE — 74011250637 HC RX REV CODE- 250/637: Performed by: INTERNAL MEDICINE

## 2018-10-31 PROCEDURE — 83880 ASSAY OF NATRIURETIC PEPTIDE: CPT | Performed by: INTERNAL MEDICINE

## 2018-10-31 RX ORDER — FUROSEMIDE 10 MG/ML
40 INJECTION INTRAMUSCULAR; INTRAVENOUS
Status: COMPLETED | OUTPATIENT
Start: 2018-10-31 | End: 2018-10-31

## 2018-10-31 RX ORDER — SERTRALINE HYDROCHLORIDE 25 MG/1
25 TABLET, FILM COATED ORAL EVERY EVENING
Qty: 30 TAB | Refills: 0 | Status: SHIPPED | OUTPATIENT
Start: 2018-10-31 | End: 2020-01-01

## 2018-10-31 RX ORDER — LIDOCAINE 50 MG/G
PATCH TOPICAL
Qty: 3 EACH | Refills: 0 | Status: SHIPPED | OUTPATIENT
Start: 2018-10-31 | End: 2019-01-15

## 2018-10-31 RX ADMIN — CALCIUM 500 MG: 500 TABLET ORAL at 10:58

## 2018-10-31 RX ADMIN — DEXTROSE MONOHYDRATE 75 ML/HR: 5 INJECTION, SOLUTION INTRAVENOUS at 05:04

## 2018-10-31 RX ADMIN — THERA TABS 1 TABLET: TAB at 10:58

## 2018-10-31 RX ADMIN — ACETAMINOPHEN 1000 MG: 500 TABLET, FILM COATED ORAL at 01:19

## 2018-10-31 RX ADMIN — METOPROLOL TARTRATE 12.5 MG: 25 TABLET ORAL at 10:59

## 2018-10-31 RX ADMIN — HEPARIN SODIUM 5000 UNITS: 5000 INJECTION INTRAVENOUS; SUBCUTANEOUS at 10:58

## 2018-10-31 RX ADMIN — AMLODIPINE BESYLATE 5 MG: 5 TABLET ORAL at 10:59

## 2018-10-31 RX ADMIN — VITAMIN D, TAB 1000IU (100/BT) 1000 UNITS: 25 TAB at 10:59

## 2018-10-31 RX ADMIN — Medication 10 ML: at 06:59

## 2018-10-31 RX ADMIN — ACETAMINOPHEN 1000 MG: 500 TABLET, FILM COATED ORAL at 10:58

## 2018-10-31 RX ADMIN — CASTOR OIL AND BALSAM, PERU: 788; 87 OINTMENT TOPICAL at 10:59

## 2018-10-31 RX ADMIN — FUROSEMIDE 40 MG: 10 INJECTION, SOLUTION INTRAMUSCULAR; INTRAVENOUS at 10:58

## 2018-10-31 NOTE — PROGRESS NOTES
Bedside shift change report given to Kamala (oncoming nurse) by Martha Kerr (offgoing nurse). Report included the following information SBAR, Kardex, Intake/Output, MAR and Recent Results.

## 2018-10-31 NOTE — PROGRESS NOTES
Problem: Mobility Impaired (Adult and Pediatric) Goal: *Acute Goals and Plan of Care (Insert Text) Physical Therapy Goals Goals re-evaluated 10/31/2018 and still appropriate Initiated 10/25/2018 1. Patient will move from supine to sit and sit to supine , scoot up and down and roll side to side in bed with minimal assistance/contact guard assist within 7 day(s). 2.  Patient will transfer from bed to chair and chair to bed with minimal assistance/contact guard assist using the least restrictive device within 7 day(s). 3.  Patient will perform sit to stand with minimal assistance/contact guard assist within 7 day(s). 4.  Patient will ambulate with minimal assistance/contact guard assist for 20 feet with the least restrictive device within 7 day(s). physical Therapy TREATMENT: WEEKLY REASSESSMENT Patient: Raisa Saucedo [de-identified]80 y.o. female) Date: 10/31/2018 Diagnosis: Intractable back pain Intractable back pain Precautions: Fall Chart, physical therapy assessment, plan of care and goals were reviewed. ASSESSMENT: 
Chart reviewed and patient received lying in bed agreeable to work with therapy with encouragement. Patient reporting pain in back and lower abdomen throughout session. Patient able to initiate supine to sit transfer by moving BLEs toward edge of bed and pushing to sit up with maxA for trunk management. Patient able to scoot forward with verbal cuing and additional time. Completed stand pivot transfer with maxA x2 with decreased safety awareness and motor planning. Patient with quick shallow breathing during mobility, but O2 sats remained above 94%. Session ended with patient sitting up in chair eating lunch with chair alarm under patient and all needs met. Recommending SNF upon discharge to maximize patient safety and independence with mobility.    
 
Patient's progression toward goals since last assessment: Progressing slowly, did not meet any goals this week but able to tolerate transfer to chair and remain sitting at end of session. PLAN: 
Goals have been updated based on progression since last assessment. Patient continues to benefit from skilled intervention to address the above impairments. Continue to follow the patient 5 times a week to address goals. Planned Interventions: 
[x]              Bed Mobility Training             [x]       Neuromuscular Re-Education [x]              Transfer Training                   []       Orthotic/Prosthetic Training 
[x]              Gait Training                         [x]       Modalities [x]              Therapeutic Exercises           []       Edema Management/Control [x]              Therapeutic Activities            [x]       Patient and Family Training/Education []              Other (comment): 
Discharge Recommendations: [facility] Fregoso Further Equipment Recommendations for Discharge: TBD SUBJECTIVE:  
Patient stated The chicken salad here is really good.  OBJECTIVE DATA SUMMARY:  
Critical Behavior: 
Neurologic State: Alert Orientation Level: Oriented X4 Cognition: Follows commands, Appropriate safety awareness Safety/Judgement: Fall prevention Strength:  
  
  
  
  
  
  
  
 
Functional Mobility Training: 
Bed Mobility: 
  
Supine to Sit: Maximum assistance Scooting: Minimum assistance; Additional time Transfers: 
Sit to Stand: Maximum assistance;Assist x2 Stand to Sit: Maximum assistance;Assist x2 Bed to Chair: Maximum assistance;Assist x2; Additional time Balance: 
Sitting: Impaired Sitting - Static: Good (unsupported) Sitting - Dynamic: Fair (occasional) Standing: Impaired; With support Standing - Static: Poor Standing - Dynamic : PoorAmbulation/Gait Training: 
  
  
  
  
  
  
  
  
  
  
  
  
  
  
  
  
  
  
Stairs: 
  
  
 Neuro Re-Education: 
 
Therapeutic Exercises:  
 
Pain: Pain Scale 1: Numeric (0 - 10) Pain Intensity 1: 0 Activity Tolerance:  
VSS, NAD Please refer to the flowsheet for vital signs taken during this treatment. After treatment:  
[x]  Patient left in no apparent distress sitting up in chair 
[]  Patient left in no apparent distress in bed 
[x]  Call bell left within reach [x]  Nursing notified 
[]  Caregiver present [x]  Chair alarm activated COMMUNICATION/COLLABORATION:  
The patients plan of care was discussed with: Registered Nurse Diya Hammond, PT, DPT Time Calculation: 18 mins

## 2018-10-31 NOTE — PROGRESS NOTES
Patient declined BiPap; so the patient will continue to stay on the unit until discharged.  
Transfer to [facility]- D/C

## 2018-10-31 NOTE — PROGRESS NOTES
Bedside and Verbal shift change report given to Nguyễn (oncoming nurse) by Mynor Rios (offgoing nurse). Report included the following information SBAR, Kardex, Procedure Summary, Intake/Output, MAR and Recent Results.

## 2018-10-31 NOTE — DISCHARGE SUMMARY
Inpatient hospitalist discharge summary Brief Overview PATIENT ID: Akbar Grant MRN: [id number] YOB: 1925 Admitting Provider: Jaya Prakash MD 
 
Discharging Provider: Salma Sin MD  
To contact this individual call [phone] and ask the  to page. If unavailable ask to be transferred the Adult Hospitalist Department. PCP at discharge: Bhargavi Cardozo, [address] [facility]  
[address] [facility] [address] Admission date: 10/24/2018  Date of Discharge: 10/31/18 Chief complaint:  
Chief Complaint Patient presents with  Back Pain  Fever Patient Active Problem List  
Diagnosis Code  Acute bronchitis J20.9  Bronchitis J40  Hypoxia R09.02  Vertebral fracture [id number]  Hypokalemia E87.6 Primary discharge diagnosis: 
Acute hypoxemic and hypercapnic resp failure- sec to CAP Requires supplemental O2 Refuses BiPAP 
DW family in detail, both the daughters are nurses, they wanted mother to be DNR/DNI, no aggressive treatment, they will consider hospice down the road, likely from Rehab. Secondary discharge diagnosis: 
Recurrent afebrile leukocytosis- etiology unclear, likely reactive, no obvious source of infection noted. DW family they prefer that no further work up be done and patient be discharged. RLL CAP, Organism unspecified, POA 
 
E. Coli UTI, POA- resolved NSVT-resolved Intractable back pain - likely fall exacerbated chronic multiple compression fractures - pain control with lidocaine patches x2  
- PT/OT recommending SNF 
  
R medial thigh pain - XR pelvis and R femur 10/26 unremarkable 
  
Vitamin D insufficiency - started supplement 
  
Multiple vertebral compression fractures (POA) - XR 10/19 and CT a/p 10/24 unchanged from March 2017 
- PT/OT evals: SNF recommended 
  
Hypokalemia (POA) - replete prn 
  
Hypernatremia, intermittent (POA) - likely due to dehydration from decreased fluid intake; encourage free water intake Discharge Disposition: 
SNF Discharge activity: 
PT/OT Eval and Treat Code status at discharge: DNR Active issues requiring follow up: 
Leukocytosis O2 requirement Hypernatremia Outpatient follow up: 
FU with PCP in a week Future appointments- No future appointments. Test results pending upon discharge: 
n/a Details of hospital stay Presenting problem/ History of present illness: 
Intractable back pain Juanito Carolina is a 80 y.o. female with arthritis, h/o PE, h/o stroke/TIA with residual right hemiparesis, chronic vertebral compression fractures, HTN, HLD, osteoporosis, h/o left hip replacement who was BIBEMS to the ED from [facility] with hypoxia and HTN per home PT. Patient's SpO2 was reportedly in the 70s, RR 50s, hypotensive and pt was panting and anxious due to the pain. Pt was at the [facility] waiting for the elevator on 10/19/18 when she was pushed, fell, hit the back of her head on the wall, and hurt her back. She was seen at [facility] but sent home when back XR showed unchanged multiple compression fractures. Daughters report that since the fall, pt has been much less mobile, not bedbound, and eating and drinking much less than usual.  
Patient reports low-grade fevers. She denies constipation, diarrhea, c/n/v, incontinence, numbness, tingling. She was chronic weakness of the RUE and RLE from previous stroke. 
  
ED triage VS were temp 100.3F, HR 79, /70, RR 30, SpO2 77% on RA. She was placed on 4L NC. In the ED, she received APAP 650mg po x1. 
  
 
Hospital Course: Pt was admitted under hospitalist service on a monitored bed. Patient noted to have CAP with sepsis, antibiotics started after cx were taken. Cx remained sterile except for Urine which showed E.coli.  Patient completed antibiotic course and felt better, meanwhile she had acute hypoxic resp failure sec to PNA, and she cont to require supplemental O2 on DC. PE neg per CTPE. Patient has chronic back pain due to chronic multiple vertebral compression fracture but no acute fracture. DW family in detail, they prefer minimal interventions, DNR/DNI and planning to move towards hospice if patient doesn't improve at the rehab. Daughters are both nurses and very pleasant and receptive to information. Patient had recurrent Hypernatremia due to poor oral intake which improved with D5W. There is a risk of volume overload so one has to be cautious with her in terms of volume resuscitation. patient refuses BiPAP and family supports the decision. The over all picture is to allow SNF do the Rehab and then plan according to patient's condition. Family will move towards hospice if quality of life is compromised. Family requests that patient be discharged today to SNF for rehab. Since she is hemodynamically stable, labs are ok except for leukocytosis etiology of which is unclear and family is aware, they still prefer that patient be discharged. Will honor the wishes and will DC patient to [facility] in fair condition. On the date of discharge, diagnostic face to face encounter was performed. Patient was hemodynamically stable, offering no new complaints. Denies any shortness of breath at rest, no fevers or chills, no diarrhea or constipation. Patient is agreeable for discharge. Patient understood and verbalized the understanding of the discharge plan. Patient was advised to seek medical help/ care or return to ED, if symptoms recur, worsen or new symptoms develop. Operative procedures performed: 
 
 
Treatments: IV hydration, antibiotics: ceftriaxone and azithromycin and analgesia: Acetaminophen Consults:  IP CONSULT TO HOSPITALIST 
IP CONSULT TO HOSPITALIST Procedures: * No surgery found * Diet:  DIET CARDIAC Oral Nutritional Supplements: Pudding: Fortified (Ensure or Boost)Twice daily Pertinent test results: 
Xr Spine Lumb 2 Or 3 V Result Date: 10/19/2018 EXAM:  XR SPINE LUMB 2 OR 3 V INDICATION:   fall, low back pain COMPARISON: 12/3/2016 radiograph FINDINGS: AP, lateral and spot lateral views of the lumbar spine demonstrate severe diffuse osteopenia. Severe vertebral compression fractures again shown at T11, minimal compression fracture at T1, moderate vertebral compression fracture at L2 and mild to moderate fracture at L4, unchanged. The other vertebral body heights are normal.  Mild L3-4, severe L4-5 and moderate L5-S1 degenerative disc space narrowing is again shown with borderline anterolisthesis at L4-5 appearing unchanged. Substantial bilateral facet osteoarthrosis is again noted throughout the lower lumbar spine as is mild bilateral SI joint osteoarthrosis. Extensive atherosclerotic calcifications are again noted. Nonspecific left abdominal calcifications are again noted, possibly of renal origin. IMPRESSION:  Unchanged bony appearance. Xr Pelv Ap Only Result Date: 10/24/2018 INDICATION: Back pain, history of left hip replacement. Exam: AP view of the pelvis. AP and lateral views of the left femur. FINDINGS: The osseous structures are diffusely demineralized. There is a left hip prosthesis is anatomic alignment without evidence of hardware complication. Extensive atherosclerotic calcifications are noted. IMPRESSION: No evidence of acute fracture, dislocation or evidence of orthopedic hardware complication. Xr Femur Lt 2 V Result Date: 10/24/2018 INDICATION: Back pain, history of left hip replacement. Exam: AP view of the pelvis. AP and lateral views of the left femur. FINDINGS: The osseous structures are diffusely demineralized. There is a left hip prosthesis is anatomic alignment without evidence of hardware complication. Extensive atherosclerotic calcifications are noted.   
 
IMPRESSION: No evidence of acute fracture, dislocation or evidence of orthopedic hardware complication. Xr Femur Rt 2 Vs 
 
Result Date: 10/26/2018 EXAM:  XR FEMUR RT 2 VS INDICATION:   right medial thigh pain. COMPARISON: None. FINDINGS: Two views of the right femur demonstrate no fracture or other acute osseous, articular or soft tissue abnormality. Degenerative changes are seen in the right hip joint. Vascular calcification is noted. IMPRESSION:  No acute abnormality. Xr Abd (kub) Result Date: 10/30/2018 EXAM:  XR ABD (KUB). INDICATION: Abdominal pain. COMPARISON: 10/26/2018. FINDINGS: A portable supine radiograph of the abdomen was obtained at 1308 hours and shows a normal bowel gas pattern. No soft tissue masses or pathologic calcifications are identified. The patient is rotated to the left. There is a left total hip prosthesis. There are degenerative changes of the spine and vascular calcifications of the aorta and iliac and femoral arteries. IMPRESSION: No acute abdominal abnormality. Ct Head Wo Cont Result Date: 10/19/2018 EXAM:  CT HEAD WO CONT INDICATION:   Status post fall with posterior headache COMPARISON: 11/7/2011. CONTRAST:  None. TECHNIQUE: Unenhanced CT of the head was performed using 5 mm images. Brain and bone windows were generated. CT dose reduction was achieved through use of a standardized protocol tailored for this examination and automatic exposure control for dose modulation. FINDINGS: The ventricles and sulci are normal in size, shape and configuration and midline. Significant small vessel ischemic changes are noted in the periventricular white matter. Old left frontal infarct is evident. There is no intracranial hemorrhage, extra-axial collection, mass, mass effect or midline shift. The basilar cisterns are open. No acute infarct is identified. The bone windows demonstrate no abnormalities. The visualized portions of the paranasal sinuses and mastoid air cells are clear. Posterior soft tissue swelling is noted. Vascular calcification is evident. IMPRESSION: Posterior soft tissue swelling. No acute intracranial abnormality. Cta Chest W Or W Wo Cont Result Date: 10/24/2018 INDICATION: Hypoxia, status post fall with compression fractures. CT pulmonary angiogram is performed with 2.5 mm collimation. 100 mL of nonionic IV Isovue-370 was administered for exam. 3D coronal and sagittal postprocessed images were performed for this examination. CT dose reduction was achieved through use of a standardized protocol tailored for this examination and automatic exposure control for dose modulation. Chest: CTPA: The pulmonary arteries are well opacified and there is no evidence of pulmonary embolism. Lymph nodes: There is no axillary, mediastinal or hilar lymphadenopathy. Heart: The heart is normal in the size and there is no pericardial effusion. Lungs: There is right lower lobe patchy airspace consolidation. There is mild left basilar atelectasis. Pleura: There is trace right pleural fluid. There is no left pleural fluid. Bones: Osseous structures are diffusely demineralized. There are C6, T8, T9, T11 compression deformities which are unchanged compared to prior CT dated March 2017. There is also a T7 mild compression deformity with increasing loss of vertebral body height is compared to prior CT dated March 2017. Upper abdomen: There is a 3.4 cm x 2 cm right adrenal adenoma. The visualized upper abdominal structures are otherwise normal.  
 
IMPRESSION: 1. No evidence pulmonary embolism. 2. Right lower lobe patchy airspace consolidation. Mild left basilar atelectasis. 3. Thoracic compression fractures, as described above. 4. Trace right pleural fluid. Ct Abd Pelv Wo Cont Result Date: 10/24/2018 INDICATION: Rule out hip or L-spine fracture. Back pain, fever. Exam: Noncontrast CT of the abdomen and pelvis is performed with 5 mm collimation. Sagittal and coronal reformatted images were also performed. CT dose reduction was achieved through the use of a standardized protocol tailored for this examination and automatic exposure control for dose modulation. Adaptive statistical iterative reconstruction (ASIR) was utilized. Direct comparison is made to prior CT dated March 2017. FINDINGS: There is right basilar dependent patchy airspace consolidation. There is mild left basilar atelectasis. Abdomen: Liver: The liver is normal on noncontrast images. Spleen: The spleen is normal on noncontrast images. Adrenals: There is a 2.9 cm x 2.2 cm right adrenal adenoma. Left adrenal gland is normal. Pancreas: The pancreas is normal on noncontrast images. Gallbladder: The gallbladder is normal on noncontrast images. Kidneys: Kidneys are atrophic bilaterally. There is nonobstructing bilateral nephrolithiasis. There is no hydronephrosis. There is a 1.6 cm left renal cyst. Bowel: No thickened or dilated loop of large or small bowel seen. Scattered colonic diverticulosis is noted. Appendix: The appendix is normal. Pelvis: Urinary bladder is partially filled and grossly normal. Bones: The osseous structures are diffusely demineralized. There is a 3 mm grade 1 anterolisthesis of L4 with respect to L5, unchanged compared to prior CT dated March 2017. There is a mild L2 compression deformity, unchanged compared to prior CT dated March 2017. There is a severe T11 compression deformity, unchanged compared to prior CT dated March 2017. There is a chronic-appearing T9 severe compression deformity. There is a left hip prosthesis evidence of orthopedic hardware complication, however the distal aspect of the prosthesis was not imaged. Miscellaneous: There is no free intraperitoneal gas or fluid. There is no focal fluid collection to suggest abscess. IMPRESSION: 1. No bowel obstruction, ileus or perforation. 2. Right basilar patchy airspace consolidation. Xr Pelv 1 Or 2 V Result Date: 10/26/2018 CLINICAL HISTORY: Right hip and groin pain Comparison to 10/24/2018 Single AP view of the pelvis is negative for acute fracture. Degenerative changes are seen in the right hip and visualized lumbar spine. The patient is status post left total hip replacement. IMPRESSION: no acute process. Xr Chest [facility] Result Date: 10/30/2018 Indication: Heart failure Comparison: 10/29/2018 Portable exam of the chest obtained at 1721 demonstrates stable cardiomegaly. Mild pulmonary edema is now noted with small bilateral pleural effusions. Degenerative changes are again seen in the shoulder joints bilaterally. Impression: Mild pulmonary edema with small bilateral pleural effusions. Xr Chest [facility] Result Date: 10/29/2018 PORTABLE CHEST RADIOGRAPH/S: 10/29/2018 8:46 PM INDICATION: Leukocytosis, dyspnea. COMPARISON: 10/25/2018, 1/12/2017. CT chest 10/24/2018. TECHNIQUE: Portable frontal semiupright radiograph/s of the chest. FINDINGS: The lungs are clear. The central airways are patent. Possible trace bilateral pleural effusions. No pneumothorax. Left glenohumeral osteoarthritis. IMPRESSION: Clear lungs. Xr Chest [facility] Result Date: 10/27/2018 Indication: Shortness of breath, tachypnea Comparison: 10/25/2018 Portable exam of the chest obtained at 851 demonstrates normal heart size. There are small bilateral pleural effusions with underlying atelectasis, increased compared to the prior exam. The osseous structures are unremarkable. Impression: Small bilateral effusions with underlying atelectasis. Xr Chest [facility] Result Date: 10/25/2018 INDICATION:   sob/ hypoxia EXAM:  AP CHEST RADIOGRAPH COMPARISON: October 24, 2018 FINDINGS: AP portable view of the chest demonstrates cardiomegaly and tortuous, calcified thoracic aorta. The lungs are adequately expanded. There is pulmonary edema. Possible small pleural effusions. No pneumothorax. The osseous structures are unremarkable. IMPRESSION: Cardiomegaly with pulmonary edema and probable small pleural effusions. Xr Chest [facility] Result Date: 10/24/2018 INDICATION: Cough, fever. Portable AP semiupright view of the chest. Direct comparison made to prior chest x-ray dated January 12, 2017. Cardiomediastinal silhouette is stable. There is very mild bibasilar atelectasis. No pleural fluid is visualized. There is no pneumothorax. Osseous structures are diffusely demineralized, but intact. IMPRESSION: Very mild bibasilar atelectasis. Xr Abd Port  1 V Result Date: 10/26/2018 CLINICAL HISTORY: Abdominal pain Single KUB demonstrates a normal bowel gas pattern. The degenerative changes are seen in the lumbar spine and right hip. The patient is status post left total hip replacement. Vascular calcification is noted. IMPRESSION: No acute process. Recent Results (from the past 336 hour(s)) EKG, 12 LEAD, INITIAL Collection Time: 10/24/18  1:10 PM  
Result Value Ref Range Ventricular Rate 80 BPM  
 Atrial Rate 80 BPM  
 P-R Interval 124 ms QRS Duration 86 ms  
 Q-T Interval 360 ms QTC Calculation (Bezet) 415 ms Calculated P Axis 54 degrees Calculated R Axis -19 degrees Calculated T Axis 71 degrees Diagnosis Sinus rhythm with premature atrial complexes Nonspecific ST and T wave abnormality When compared with ECG of 07-MAR-2017 15:50, 
premature atrial complexes are now present Confirmed by Ayesha Miranda MD, Raegan Vázquez ([id number]) on 10/24/2018 6:28:17 PM 
  
METABOLIC PANEL, COMPREHENSIVE Collection Time: 10/24/18  1:22 PM  
Result Value Ref Range Sodium 148 (H) 136 - 145 mmol/L Potassium 3.4 (L) 3.5 - 5.1 mmol/L Chloride 105 97 - 108 mmol/L  
 CO2 35 (H) 21 - 32 mmol/L Anion gap 8 5 - 15 mmol/L Glucose 120 (H) 65 - 100 mg/dL BUN 28 (H) 6 - 20 MG/DL Creatinine 0.97 0.55 - 1.02 MG/DL  
 BUN/Creatinine ratio 29 (H) 12 - 20 GFR est AA >60 >60 ml/min/1.73m2 GFR est non-AA 54 (L) >60 ml/min/1.73m2 Calcium 9.4 8.5 - 10.1 MG/DL Bilirubin, total 0.9 0.2 - 1.0 MG/DL  
 ALT (SGPT) 26 12 - 78 U/L  
 AST (SGOT) 31 15 - 37 U/L Alk. phosphatase 77 45 - 117 U/L Protein, total 6.9 6.4 - 8.2 g/dL Albumin 3.1 (L) 3.5 - 5.0 g/dL Globulin 3.8 2.0 - 4.0 g/dL A-G Ratio 0.8 (L) 1.1 - 2.2    
CBC WITH AUTOMATED DIFF Collection Time: 10/24/18  1:22 PM  
Result Value Ref Range WBC 9.5 3.6 - 11.0 K/uL  
 RBC 4.28 3.80 - 5.20 M/uL  
 HGB 13.0 11.5 - 16.0 g/dL HCT 41.5 35.0 - 47.0 % MCV 97.0 80.0 - 99.0 FL  
 MCH 30.4 26.0 - 34.0 PG  
 MCHC 31.3 30.0 - 36.5 g/dL  
 RDW 13.5 11.5 - 14.5 % PLATELET 066 861 - 014 K/uL MPV 10.5 8.9 - 12.9 FL  
 NRBC 0.0 0  WBC ABSOLUTE NRBC 0.00 0.00 - 0.01 K/uL NEUTROPHILS 87 (H) 32 - 75 % LYMPHOCYTES 4 (L) 12 - 49 % MONOCYTES 8 5 - 13 % EOSINOPHILS 0 0 - 7 % BASOPHILS 0 0 - 1 % IMMATURE GRANULOCYTES 1 (H) 0.0 - 0.5 % ABS. NEUTROPHILS 8.2 (H) 1.8 - 8.0 K/UL  
 ABS. LYMPHOCYTES 0.4 (L) 0.8 - 3.5 K/UL  
 ABS. MONOCYTES 0.8 0.0 - 1.0 K/UL  
 ABS. EOSINOPHILS 0.0 0.0 - 0.4 K/UL  
 ABS. BASOPHILS 0.0 0.0 - 0.1 K/UL  
 ABS. IMM. GRANS. 0.1 (H) 0.00 - 0.04 K/UL  
 DF SMEAR SCANNED    
 RBC COMMENTS NORMOCYTIC, NORMOCHROMIC    
SAMPLES BEING HELD Collection Time: 10/24/18  1:22 PM  
Result Value Ref Range SAMPLES BEING HELD 1RED,1BLUE   
 COMMENT Add-on orders for these samples will be processed based on acceptable specimen integrity and analyte stability, which may vary by analyte. LACTIC ACID Collection Time: 10/24/18  1:22 PM  
Result Value Ref Range Lactic acid 1.6 0.4 - 2.0 MMOL/L  
CULTURE, BLOOD, PAIRED Collection Time: 10/24/18  1:22 PM  
Result Value Ref Range Special Requests: NO SPECIAL REQUESTS Culture result: NO GROWTH 5 DAYS    
NT-PRO BNP Collection Time: 10/24/18  1:22 PM  
Result Value Ref Range  NT pro-BNP 6,264 (H) 0 - 450 PG/ML  
TROPONIN I  
 Collection Time: 10/24/18  1:22 PM  
Result Value Ref Range Troponin-I, Qt. 0.12 (H) <0.05 ng/mL URINALYSIS W/ REFLEX CULTURE Collection Time: 10/24/18  2:52 PM  
Result Value Ref Range Color YELLOW/STRAW Appearance CLOUDY (A) CLEAR Specific gravity 1.015 1.003 - 1.030    
 pH (UA) 5.5 5.0 - 8.0 Protein 30 (A) NEG mg/dL Glucose NEGATIVE  NEG mg/dL Ketone TRACE (A) NEG mg/dL Bilirubin NEGATIVE  NEG Blood TRACE (A) NEG Urobilinogen 0.2 0.2 - 1.0 EU/dL Nitrites NEGATIVE  NEG Leukocyte Esterase MODERATE (A) NEG    
 WBC  0 - 4 /hpf  
 RBC 0-5 0 - 5 /hpf Epithelial cells FEW FEW /lpf Bacteria 4+ (A) NEG /hpf  
 UA:UC IF INDICATED URINE CULTURE ORDERED (A) CNI Hyaline cast 5-10 0 - 5 /lpf CULTURE, URINE Collection Time: 10/24/18  2:52 PM  
Result Value Ref Range Special Requests: NO SPECIAL REQUESTS Reflexed from [id number] [place] Count >100,000 COLONIES/mL Culture result: ESCHERICHIA COLI (A) Susceptibility Escherichia coli - IVELISSE Amikacin ($) <=16 Susceptible ug/mL Ampicillin ($) <=8 Susceptible ug/mL Ampicillin/sulbactam ($) <=8/4 Susceptible ug/mL Aztreonam ($$$$) <=4 Susceptible ug/mL Cefazolin ($) <=8 Susceptible ug/mL Cefepime ($$) <=4 Susceptible ug/mL Cefotaxime <=2 Susceptible ug/mL Ceftazidime ($) <=1 Susceptible ug/mL Ceftriaxone ($) <=1 Susceptible ug/mL Cefuroxime ($) <=4 Susceptible ug/mL Ciprofloxacin ($) <=1 Susceptible ug/mL Gentamicin ($) <=4 Susceptible ug/mL Imipenem <=1 Susceptible ug/mL Levofloxacin ($) <=2 Susceptible ug/mL Meropenem ($$) <=1 Susceptible ug/mL Nitrofurantoin <=32 Susceptible ug/mL Piperacillin/Tazobac ($) <=16 Susceptible ug/mL Tobramycin ($) <=4 Susceptible ug/mL Trimeth/Sulfa <=2/38 Susceptible ug/mL INFLUENZA A & B AG (RAPID TEST) Collection Time: 10/24/18  6:42 PM  
Result Value Ref Range Influenza A Antigen NEGATIVE  NEG Influenza B Antigen NEGATIVE  NEG    
METABOLIC PANEL, BASIC Collection Time: 10/25/18  5:29 AM  
Result Value Ref Range Sodium 146 (H) 136 - 145 mmol/L Potassium 4.2 3.5 - 5.1 mmol/L Chloride 111 (H) 97 - 108 mmol/L  
 CO2 27 21 - 32 mmol/L Anion gap 8 5 - 15 mmol/L Glucose 143 (H) 65 - 100 mg/dL BUN 30 (H) 6 - 20 MG/DL Creatinine 0.91 0.55 - 1.02 MG/DL  
 BUN/Creatinine ratio 33 (H) 12 - 20 GFR est AA >60 >60 ml/min/1.73m2 GFR est non-AA 58 (L) >60 ml/min/1.73m2 Calcium 8.4 (L) 8.5 - 10.1 MG/DL  
VITAMIN D, 25 HYDROXY Collection Time: 10/25/18  5:29 AM  
Result Value Ref Range Vitamin D 25-Hydroxy 23.2 (L) 30 - 100 ng/mL CBC W/O DIFF Collection Time: 10/25/18  6:50 AM  
Result Value Ref Range WBC 8.3 3.6 - 11.0 K/uL  
 RBC 3.82 3.80 - 5.20 M/uL  
 HGB 11.7 11.5 - 16.0 g/dL HCT 37.2 35.0 - 47.0 % MCV 97.4 80.0 - 99.0 FL  
 MCH 30.6 26.0 - 34.0 PG  
 MCHC 31.5 30.0 - 36.5 g/dL  
 RDW 13.6 11.5 - 14.5 % PLATELET 491 750 - 177 K/uL MPV 10.4 8.9 - 12.9 FL  
 NRBC 0.0 0  WBC ABSOLUTE NRBC 0.00 0.00 - 0.01 K/uL POC G3 - PUL Collection Time: 10/25/18 10:45 PM  
Result Value Ref Range pH (POC) 7.357 7.35 - 7.45    
 pCO2 (POC) 60.1 (H) 35.0 - 45.0 MMHG  
 pO2 (POC) 82 80 - 100 MMHG  
 HCO3 (POC) 33.7 (H) 22 - 26 MMOL/L  
 sO2 (POC) 95 92 - 97 % Base excess (POC) 8 mmol/L Site RIGHT RADIAL Device: NASAL CANNULA Flow rate (POC) 3 L/M Allens test (POC) YES Specimen type (POC) ARTERIAL Total resp. rate 22 GLUCOSE, POC Collection Time: 10/26/18 12:27 AM  
Result Value Ref Range Glucose (POC) 120 (H) 65 - 100 mg/dL Performed by Randolph Estrada CBC W/O DIFF Collection Time: 10/26/18  3:24 AM  
Result Value Ref Range WBC 8.6 3.6 - 11.0 K/uL  
 RBC 3.78 (L) 3.80 - 5.20 M/uL  
 HGB 11.5 11.5 - 16.0 g/dL HCT 37.2 35.0 - 47.0 %  MCV 98.4 80.0 - 99.0 FL  
 MCH 30.4 26.0 - 34.0 PG  
 MCHC 30.9 30.0 - 36.5 g/dL  
 RDW 13.4 11.5 - 14.5 % PLATELET 689 319 - 926 K/uL MPV 10.6 8.9 - 12.9 FL  
 NRBC 0.0 0  WBC ABSOLUTE NRBC 0.00 0.00 - 0.01 K/uL METABOLIC PANEL, BASIC Collection Time: 10/26/18  3:24 AM  
Result Value Ref Range Sodium 147 (H) 136 - 145 mmol/L Potassium 3.6 3.5 - 5.1 mmol/L Chloride 108 97 - 108 mmol/L  
 CO2 31 21 - 32 mmol/L Anion gap 8 5 - 15 mmol/L Glucose 123 (H) 65 - 100 mg/dL BUN 32 (H) 6 - 20 MG/DL Creatinine 0.90 0.55 - 1.02 MG/DL  
 BUN/Creatinine ratio 36 (H) 12 - 20 GFR est AA >60 >60 ml/min/1.73m2 GFR est non-AA 58 (L) >60 ml/min/1.73m2 Calcium 8.1 (L) 8.5 - 10.1 MG/DL  
GLUCOSE, POC Collection Time: 10/26/18  5:52 AM  
Result Value Ref Range Glucose (POC) 119 (H) 65 - 100 mg/dL Performed by Morelia Salguero GLUCOSE, POC Collection Time: 10/26/18 11:51 AM  
Result Value Ref Range Glucose (POC) 222 (H) 65 - 100 mg/dL Performed by Tulio Junior GLUCOSE, POC Collection Time: 10/26/18  6:21 PM  
Result Value Ref Range Glucose (POC) 177 (H) 65 - 100 mg/dL Performed by [facility] GLUCOSE, POC Collection Time: 10/27/18 12:18 AM  
Result Value Ref Range Glucose (POC) 133 (H) 65 - 100 mg/dL Performed by Riya Troy METABOLIC PANEL, BASIC Collection Time: 10/27/18  3:53 AM  
Result Value Ref Range Sodium 145 136 - 145 mmol/L Potassium 4.2 3.5 - 5.1 mmol/L Chloride 106 97 - 108 mmol/L  
 CO2 35 (H) 21 - 32 mmol/L Anion gap 4 (L) 5 - 15 mmol/L Glucose 118 (H) 65 - 100 mg/dL BUN 29 (H) 6 - 20 MG/DL Creatinine 0.81 0.55 - 1.02 MG/DL  
 BUN/Creatinine ratio 36 (H) 12 - 20 GFR est AA >60 >60 ml/min/1.73m2 GFR est non-AA >60 >60 ml/min/1.73m2 Calcium 8.2 (L) 8.5 - 10.1 MG/DL  
GLUCOSE, POC Collection Time: 10/27/18  6:05 AM  
Result Value Ref Range Glucose (POC) 120 (H) 65 - 100 mg/dL Performed by Irineo Lance POC G3 - PUL Collection Time: 10/27/18  9:24 AM  
Result Value Ref Range pH (POC) 7.338 (L) 7.35 - 7.45    
 pCO2 (POC) 70.6 (H) 35.0 - 45.0 MMHG  
 pO2 (POC) 67 (L) 80 - 100 MMHG  
 HCO3 (POC) 37.9 (H) 22 - 26 MMOL/L  
 sO2 (POC) 91 (L) 92 - 97 % Base excess (POC) 12 mmol/L Site RIGHT BRACHIAL Device: NASAL CANNULA Flow rate (POC) 4 L/M Allens test (POC) YES Specimen type (POC) ARTERIAL    
POC G3 - PUL Collection Time: 10/27/18  6:01 PM  
Result Value Ref Range FIO2 (POC) 50 % pH (POC) 7.371 7.35 - 7.45    
 pCO2 (POC) 67.2 (H) 35.0 - 45.0 MMHG  
 pO2 (POC) 131 (H) 80 - 100 MMHG  
 HCO3 (POC) 39.0 (H) 22 - 26 MMOL/L  
 sO2 (POC) 99 (H) 92 - 97 % Base excess (POC) 14 mmol/L Site RIGHT BRACHIAL Device: BIPAP    
 PEEP/CPAP (POC) 5 cmH2O  
 PIP (POC) 14 Allens test (POC) YES Specimen type (POC) ARTERIAL Total resp. rate 22 Spontaneous timed YES    
CBC WITH AUTOMATED DIFF Collection Time: 10/28/18  2:32 AM  
Result Value Ref Range WBC 8.9 3.6 - 11.0 K/uL  
 RBC 4.21 3.80 - 5.20 M/uL  
 HGB 13.0 11.5 - 16.0 g/dL HCT 42.0 35.0 - 47.0 % MCV 99.8 (H) 80.0 - 99.0 FL  
 MCH 30.9 26.0 - 34.0 PG  
 MCHC 31.0 30.0 - 36.5 g/dL  
 RDW 13.3 11.5 - 14.5 % PLATELET 462 041 - 008 K/uL MPV 10.4 8.9 - 12.9 FL  
 NRBC 0.0 0  WBC ABSOLUTE NRBC 0.00 0.00 - 0.01 K/uL NEUTROPHILS 82 (H) 32 - 75 % LYMPHOCYTES 8 (L) 12 - 49 % MONOCYTES 7 5 - 13 % EOSINOPHILS 1 0 - 7 % BASOPHILS 1 0 - 1 % IMMATURE GRANULOCYTES 1 (H) 0.0 - 0.5 % ABS. NEUTROPHILS 7.3 1.8 - 8.0 K/UL  
 ABS. LYMPHOCYTES 0.7 (L) 0.8 - 3.5 K/UL  
 ABS. MONOCYTES 0.6 0.0 - 1.0 K/UL  
 ABS. EOSINOPHILS 0.1 0.0 - 0.4 K/UL  
 ABS. BASOPHILS 0.1 0.0 - 0.1 K/UL  
 ABS. IMM. GRANS. 0.1 (H) 0.00 - 0.04 K/UL  
 DF AUTOMATED METABOLIC PANEL, BASIC Collection Time: 10/28/18  2:32 AM  
Result Value Ref Range Sodium 145 136 - 145 mmol/L Potassium 5.1 3.5 - 5.1 mmol/L Chloride 107 97 - 108 mmol/L  
 CO2 32 21 - 32 mmol/L Anion gap 6 5 - 15 mmol/L Glucose 116 (H) 65 - 100 mg/dL BUN 29 (H) 6 - 20 MG/DL Creatinine 0.75 0.55 - 1.02 MG/DL  
 BUN/Creatinine ratio 39 (H) 12 - 20 GFR est AA >60 >60 ml/min/1.73m2 GFR est non-AA >60 >60 ml/min/1.73m2 Calcium 8.3 (L) 8.5 - 10.1 MG/DL  
CBC WITH AUTOMATED DIFF Collection Time: 10/29/18  3:10 AM  
Result Value Ref Range WBC 13.9 (H) 3.6 - 11.0 K/uL  
 RBC 3.92 3.80 - 5.20 M/uL  
 HGB 11.8 11.5 - 16.0 g/dL HCT 38.8 35.0 - 47.0 % MCV 99.0 80.0 - 99.0 FL  
 MCH 30.1 26.0 - 34.0 PG  
 MCHC 30.4 30.0 - 36.5 g/dL  
 RDW 13.2 11.5 - 14.5 % PLATELET 946 255 - 855 K/uL MPV 10.6 8.9 - 12.9 FL  
 NRBC 0.0 0  WBC ABSOLUTE NRBC 0.00 0.00 - 0.01 K/uL NEUTROPHILS 87 (H) 32 - 75 % BAND NEUTROPHILS 1 0 - 6 % LYMPHOCYTES 5 (L) 12 - 49 % MONOCYTES 5 5 - 13 % EOSINOPHILS 0 0 - 7 % BASOPHILS 0 0 - 1 % METAMYELOCYTES 1 (H) 0 % MYELOCYTES 1 (H) 0 % IMMATURE GRANULOCYTES 0 %  
 ABS. NEUTROPHILS 12.2 (H) 1.8 - 8.0 K/UL  
 ABS. LYMPHOCYTES 0.7 (L) 0.8 - 3.5 K/UL  
 ABS. MONOCYTES 0.7 0.0 - 1.0 K/UL  
 ABS. EOSINOPHILS 0.0 0.0 - 0.4 K/UL  
 ABS. BASOPHILS 0.0 0.0 - 0.1 K/UL  
 ABS. IMM. GRANS. 0.0 K/UL  
 DF MANUAL PLATELET COMMENTS Large Platelets RBC COMMENTS OVALOCYTES PRESENT 
    
 RBC COMMENTS POLYCHROMASIA 1+ METABOLIC PANEL, BASIC Collection Time: 10/29/18  3:10 AM  
Result Value Ref Range Sodium 146 (H) 136 - 145 mmol/L Potassium 4.3 3.5 - 5.1 mmol/L Chloride 108 97 - 108 mmol/L  
 CO2 34 (H) 21 - 32 mmol/L Anion gap 4 (L) 5 - 15 mmol/L Glucose 151 (H) 65 - 100 mg/dL BUN 33 (H) 6 - 20 MG/DL Creatinine 1.02 0.55 - 1.02 MG/DL  
 BUN/Creatinine ratio 32 (H) 12 - 20 GFR est AA >60 >60 ml/min/1.73m2 GFR est non-AA 51 (L) >60 ml/min/1.73m2 Calcium 8.4 (L) 8.5 - 10.1 MG/DL MAGNESIUM Collection Time: 10/29/18  3:10 AM  
Result Value Ref Range Magnesium 2.6 (H) 1.6 - 2.4 mg/dL EKG, 12 LEAD, INITIAL Collection Time: 10/29/18  7:06 PM  
Result Value Ref Range Ventricular Rate 59 BPM  
 Atrial Rate 59 BPM  
 P-R Interval 126 ms  
 QRS Duration 86 ms  
 Q-T Interval 380 ms QTC Calculation (Bezet) 376 ms Calculated P Axis 52 degrees Calculated R Axis -3 degrees Calculated T Axis 0 degrees Diagnosis Sinus bradycardia Otherwise normal ECG 
24-OCT-2018 13:10, 
premature atrial complexes are no longer present Non-specific change in ST segment in Lateral leads T wave inversion now evident in Inferior leads Nonspecific T wave abnormality, improved in Lateral leads Confirmed by Alex Leiva M.D., Justino Stewart ([id number]) on 10/30/2018 7:35:27 AM 
  
CBC WITH AUTOMATED DIFF Collection Time: 10/30/18  4:13 AM  
Result Value Ref Range WBC 14.4 (H) 3.6 - 11.0 K/uL  
 RBC 3.86 3.80 - 5.20 M/uL  
 HGB 11.7 11.5 - 16.0 g/dL HCT 38.6 35.0 - 47.0 % .0 (H) 80.0 - 99.0 FL  
 MCH 30.3 26.0 - 34.0 PG  
 MCHC 30.3 30.0 - 36.5 g/dL  
 RDW 13.2 11.5 - 14.5 % PLATELET 584 505 - 029 K/uL MPV 10.8 8.9 - 12.9 FL  
 NRBC 0.0 0  WBC ABSOLUTE NRBC 0.00 0.00 - 0.01 K/uL NEUTROPHILS 88 (H) 32 - 75 % BAND NEUTROPHILS 3 0 - 6 % LYMPHOCYTES 3 (L) 12 - 49 % MONOCYTES 4 (L) 5 - 13 % EOSINOPHILS 0 0 - 7 % BASOPHILS 0 0 - 1 % METAMYELOCYTES 2 (H) 0 % IMMATURE GRANULOCYTES 0 %  
 ABS. NEUTROPHILS 13.1 (H) 1.8 - 8.0 K/UL  
 ABS. LYMPHOCYTES 0.4 (L) 0.8 - 3.5 K/UL  
 ABS. MONOCYTES 0.6 0.0 - 1.0 K/UL  
 ABS. EOSINOPHILS 0.0 0.0 - 0.4 K/UL  
 ABS. BASOPHILS 0.0 0.0 - 0.1 K/UL  
 ABS. IMM. GRANS. 0.0 K/UL  
 DF MANUAL PLATELET COMMENTS Large Platelets RBC COMMENTS MACROCYTOSIS 
1+ METABOLIC PANEL, BASIC Collection Time: 10/30/18  4:13 AM  
Result Value Ref Range Sodium 146 (H) 136 - 145 mmol/L Potassium 4.4 3.5 - 5.1 mmol/L Chloride 108 97 - 108 mmol/L  
 CO2 32 21 - 32 mmol/L Anion gap 6 5 - 15 mmol/L Glucose 120 (H) 65 - 100 mg/dL BUN 29 (H) 6 - 20 MG/DL Creatinine 0.92 0.55 - 1.02 MG/DL  
 BUN/Creatinine ratio 32 (H) 12 - 20 GFR est AA >60 >60 ml/min/1.73m2 GFR est non-AA 57 (L) >60 ml/min/1.73m2 Calcium 8.8 8.5 - 10.1 MG/DL MAGNESIUM Collection Time: 10/30/18  4:13 AM  
Result Value Ref Range Magnesium 2.7 (H) 1.6 - 2.4 mg/dL POC G3 - PUL Collection Time: 10/30/18  3:28 PM  
Result Value Ref Range pH (POC) 7.407 7.35 - 7.45    
 pCO2 (POC) 56.4 (H) 35.0 - 45.0 MMHG  
 pO2 (POC) 55 (L) 80 - 100 MMHG  
 HCO3 (POC) 35.5 (H) 22 - 26 MMOL/L  
 sO2 (POC) 88 (L) 92 - 97 % Base excess (POC) 11 mmol/L Site RIGHT RADIAL Device: NASAL CANNULA Flow rate (POC) 3 L/M Allens test (POC) YES Specimen type (POC) ARTERIAL    
CBC WITH AUTOMATED DIFF Collection Time: 10/31/18  4:41 AM  
Result Value Ref Range WBC 15.0 (H) 3.6 - 11.0 K/uL  
 RBC 3.69 (L) 3.80 - 5.20 M/uL  
 HGB 11.2 (L) 11.5 - 16.0 g/dL HCT 36.3 35.0 - 47.0 % MCV 98.4 80.0 - 99.0 FL  
 MCH 30.4 26.0 - 34.0 PG  
 MCHC 30.9 30.0 - 36.5 g/dL  
 RDW 13.4 11.5 - 14.5 % PLATELET 313 803 - 229 K/uL MPV 10.8 8.9 - 12.9 FL  
 NRBC 0.0 0  WBC ABSOLUTE NRBC 0.00 0.00 - 0.01 K/uL NEUTROPHILS 88 (H) 32 - 75 % LYMPHOCYTES 5 (L) 12 - 49 % MONOCYTES 6 5 - 13 % EOSINOPHILS 0 0 - 7 % BASOPHILS 0 0 - 1 % IMMATURE GRANULOCYTES 1 (H) 0.0 - 0.5 % ABS. NEUTROPHILS 13.2 (H) 1.8 - 8.0 K/UL  
 ABS. LYMPHOCYTES 0.7 (L) 0.8 - 3.5 K/UL  
 ABS. MONOCYTES 0.9 0.0 - 1.0 K/UL  
 ABS. EOSINOPHILS 0.0 0.0 - 0.4 K/UL  
 ABS. BASOPHILS 0.0 0.0 - 0.1 K/UL  
 ABS. IMM. GRANS. 0.2 (H) 0.00 - 0.04 K/UL  
 DF AUTOMATED METABOLIC PANEL, BASIC Collection Time: 10/31/18  4:41 AM  
Result Value Ref Range  Sodium 142 136 - 145 mmol/L  
 Potassium 4.4 3.5 - 5.1 mmol/L Chloride 104 97 - 108 mmol/L  
 CO2 32 21 - 32 mmol/L Anion gap 6 5 - 15 mmol/L Glucose 159 (H) 65 - 100 mg/dL BUN 28 (H) 6 - 20 MG/DL Creatinine 0.96 0.55 - 1.02 MG/DL  
 BUN/Creatinine ratio 29 (H) 12 - 20 GFR est AA >60 >60 ml/min/1.73m2 GFR est non-AA 54 (L) >60 ml/min/1.73m2 Calcium 8.6 8.5 - 10.1 MG/DL  
NT-PRO BNP Collection Time: 10/31/18  4:41 AM  
Result Value Ref Range NT pro-BNP 1,706 (H) 0 - 450 PG/ML Physical Exam on Discharge: 
 
Discharge condition: fair Vital signs:  
Patient Vitals for the past 12 hrs: 
 Temp Pulse Resp BP SpO2  
10/31/18 0908 97.7 °F (36.5 °C) 66 18 164/67 96 % 10/31/18 0358 98.6 °F (37 °C) 67 14 176/70 96 % General appearance: alert, no distress, appears stated age Lungs: diminished breath sounds bibasilar, AE is much improved at the rest of the lung zones Heart: regular rate and rhythm Current Discharge Medication List  
  
START taking these medications Details  
balsam peru-castor oil (VENELEX)  mg/gram ointment Apply  to affected area two (2) times a day. Qty: 60 g, Refills: 0  
  
lidocaine (LIDODERM) 5 % Apply patch to the affected area for 12 hours a day and remove for 12 hours a day. Qty: 3 Each, Refills: 0  
  
sertraline (ZOLOFT) 25 mg tablet Take 1 Tab by mouth every evening. Qty: 30 Tab, Refills: 0 CONTINUE these medications which have NOT CHANGED Details  
acetaminophen (TYLENOL) 325 mg tablet Take 650 mg by mouth every four (4) hours as needed (back pain). metoprolol tartrate (LOPRESSOR) 25 mg tablet Take 12.5 mg by mouth two (2) times a day. calcium carbonate (OS-SIMONE) 500 mg calcium (1,250 mg) tablet Take 1 Tab by mouth two (2) times a day. alendronate (FOSAMAX) 70 mg tablet Take 70 mg by mouth Every Friday. multivitamin (ONE A DAY) tablet Take 1 Tab by mouth daily. furosemide (LASIX) 20 mg tablet Take 20 mg by mouth daily. AMLODIPINE BESYLATE (AMLODIPINE PO) Take 5 mg by mouth daily. Total time spent on discharge plannin minutes Gregorio Arevalo MD 
10/31/18 
12:37 PM 
 
 
 
NOTIFY YOUR PHYSICIAN FOR ANY OF THE FOLLOWING:  
Fever over 101 degrees for 24 hours. Chest pain, shortness of breath, fever, chills, nausea, vomiting, diarrhea, change in mentation, falling, weakness, bleeding. Severe pain or pain not relieved by medications. Or, any other signs or symptoms that you may have questions about.

## 2018-10-31 NOTE — PROGRESS NOTES
Chart reviewed. CM spoke with MD. Plan is for discharge to [address] [place] [address] today. CM requested stretcher transport with [facility]. They can accommodate 2 PM. CM called the [facility] ([phone]) and spoke with Ara Randolph to notify of transport time. CM called and notified daughter Jackie First of transport time. Discharge folder located on hard chart to include ambulance form and discharge instructions. RN to follow with [facility] and Kardex and call report to #[phone]. Patient will go into Room #601. RENE Valero/CRM

## 2018-10-31 NOTE — DISCHARGE INSTRUCTIONS
Discharge SNF/Rehab Instructions/LTAC       PATIENT ID: Joon Harley  MRN: [id number]   YOB: 1925    DATE OF ADMISSION: 10/24/2018 12:55 PM    DATE OF DISCHARGE: 10/31/2018    PRIMARY CARE PROVIDER: ABRIL Small       ATTENDING PHYSICIAN: Fanny Odonnell MD  DISCHARGING PROVIDER: Lucy Meza MD     To contact this individual call [phone] and ask the  to page. If unavailable ask to be transferred the Adult Hospitalist Department.     CONSULTATIONS: IP CONSULT TO HOSPITALIST  IP CONSULT TO HOSPITALIST    PROCEDURES/SURGERIES: * No surgery found *    ADMITTING [address] [place] [address] COURSE:   Back pain  Hypoxemia        DISCHARGE DIAGNOSES / PLAN:      - UTI: compelted abx course  - afebrile leukocytosis- no obvious source of infection, afebrile, monitor CBC in week, likely reactive  -Intractable back pain - likely fall exacerbated chronic multiple compression fractures  Pain control, PTOT  -Acute hypoxemic resp failure sec to CAP-completed abx course  Need O2 support  -Hypernatremia-encourage oral intake       PENDING TEST RESULTS:   At the time of discharge the following test results are still pending: n/a    FOLLOW UP APPOINTMENTS:    Follow-up Information     Follow up With Specialties Details Why Contact [facility]  For rehab [address] Physician Assistant, Physician Assistant   [address] [place] [address]  [phone]      Josafat Small Physician Assistant, Physician Assistant  for post-hospitalization follow up, with in 7 days [address] [place] [address]  [address]  [phone]             ADDITIONAL CARE RECOMMENDATIONS: PTOT    DIET: DIET CARDIAC    Oral Nutritional Supplements: Pudding: Fortified (Ensure or Boost) Twice daily    TUBE FEEDING INSTRUCTIONS: n/a    OXYGEN / BiPAP SETTINGS: O2 via NC at 2-3 LPM    ACTIVITY: PT/OT Eval and Treat    WOUND CARE: n/a    EQUIPMENT needed: as per PTOT      DISCHARGE MEDICATIONS:   See Medication Reconciliation Form      NOTIFY YOUR PHYSICIAN FOR ANY OF THE FOLLOWING:   Fever over 101 degrees for 24 hours. Chest pain, shortness of breath, fever, chills, nausea, vomiting, diarrhea, change in mentation, falling, weakness, bleeding. Severe pain or pain not relieved by medications. Or, any other signs or symptoms that you may have questions about.     DISPOSITION:    Home With:   OT  PT  HH  RN      x SNF/Inpatient Rehab/LTAC    Independent/assisted living    Hospice    Other:       PATIENT CONDITION AT DISCHARGE:     Functional status    Poor    x Deconditioned     Independent      Cognition   x  Lucid     Forgetful     Dementia      Catheters/lines (plus indication)    Horn     PICC     PEG    x None      Code status     Full code    x DNR      PHYSICAL EXAMINATION AT DISCHARGE:  Lungs:improved AE, crackles have improved  AAOx 3  S1S2      CHRONIC MEDICAL DIAGNOSES:  Problem List as of 10/31/2018 Date Reviewed: 10/24/2018          Codes Class Noted - Resolved    Hypoxia ICD-10-CM: R09.02  ICD-9-CM: 799.02  10/24/2018 - Present        Vertebral fracture (Chronic) ICD-10-CM: [id number]  ICD-9-CM: 805.8  10/24/2018 - Present        Hypokalemia ICD-10-CM: E87.6  ICD-9-CM: 276.8  10/24/2018 - Present        Bronchitis ICD-10-CM: J40  ICD-9-CM: 485  1/13/2017 - Present        Acute bronchitis ICD-10-CM: J20.9  ICD-9-CM: 466.0  1/12/2017 - Present        * (Principal) RESOLVED: Intractable back pain ICD-10-CM: M54.9  ICD-9-CM: 724.5  10/24/2018 - 10/31/2018        RESOLVED: CAP (community acquired pneumonia) ICD-10-CM: J18.9  ICD-9-CM: 334  10/24/2018 - 10/31/2018        RESOLVED: Chest pain ICD-10-CM: R07.9  ICD-9-CM: 786.50  3/7/2017 - 3/12/2017        RESOLVED: Cholecystitis ICD-10-CM: K81.9  ICD-9-CM: 575.10  3/7/2017 - 3/12/2017        RESOLVED: Nephrolithiasis ICD-10-CM: N20.0  ICD-9-CM: 592.0 3/7/2017 - 3/12/2017                      Signed:   Florecita Roldan MD  10/31/2018  12:33 PM

## 2018-11-01 NOTE — PROGRESS NOTES
Patient has left via ambulance. Report was given to Anders Thibodeaux RN at [facility]. Kardex, MAR, SBAR, Prescriptions, were included.

## 2018-11-02 ENCOUNTER — APPOINTMENT (OUTPATIENT)
Dept: CT IMAGING | Age: 83
End: 2018-11-02
Attending: EMERGENCY MEDICINE
Payer: MEDICARE

## 2018-11-02 ENCOUNTER — APPOINTMENT (OUTPATIENT)
Dept: GENERAL RADIOLOGY | Age: 83
End: 2018-11-02
Attending: EMERGENCY MEDICINE
Payer: MEDICARE

## 2018-11-02 ENCOUNTER — HOSPITAL ENCOUNTER (EMERGENCY)
Age: 83
Discharge: HOME | End: 2018-11-02
Attending: EMERGENCY MEDICINE
Payer: MEDICARE

## 2018-11-02 VITALS
HEIGHT: 58 IN | OXYGEN SATURATION: 98 % | SYSTOLIC BLOOD PRESSURE: 171 MMHG | BODY MASS INDEX: 25.82 KG/M2 | TEMPERATURE: 98.2 F | WEIGHT: 123 LBS | DIASTOLIC BLOOD PRESSURE: 55 MMHG | HEART RATE: 74 BPM | RESPIRATION RATE: 26 BRPM

## 2018-11-02 DIAGNOSIS — M54.5 ACUTE LOW BACK PAIN, UNSPECIFIED BACK PAIN LATERALITY, WITH SCIATICA PRESENCE UNSPECIFIED: Primary | ICD-10-CM

## 2018-11-02 DIAGNOSIS — R06.02 SOB (SHORTNESS OF BREATH): ICD-10-CM

## 2018-11-02 LAB
ALBUMIN SERPL-MCNC: 2.4 G/DL (ref 3.5–5)
ALBUMIN/GLOB SERPL: 0.6 {RATIO} (ref 1.1–2.2)
ALP SERPL-CCNC: 182 U/L (ref 45–117)
ALT SERPL-CCNC: 26 U/L (ref 12–78)
ANION GAP SERPL CALC-SCNC: 8 MMOL/L (ref 5–15)
AST SERPL-CCNC: 21 U/L (ref 15–37)
ATRIAL RATE: 84 BPM
BASOPHILS # BLD: 0 K/UL (ref 0–0.1)
BASOPHILS NFR BLD: 0 % (ref 0–1)
BILIRUB SERPL-MCNC: 0.6 MG/DL (ref 0.2–1)
BUN SERPL-MCNC: 17 MG/DL (ref 6–20)
BUN/CREAT SERPL: 28 (ref 12–20)
CALCIUM SERPL-MCNC: 8.9 MG/DL (ref 8.5–10.1)
CALCULATED P AXIS, ECG09: 59 DEGREES
CALCULATED R AXIS, ECG10: -4 DEGREES
CALCULATED T AXIS, ECG11: 47 DEGREES
CHLORIDE SERPL-SCNC: 102 MMOL/L (ref 97–108)
CO2 SERPL-SCNC: 33 MMOL/L (ref 21–32)
COMMENT, HOLDF: NORMAL
CREAT SERPL-MCNC: 0.61 MG/DL (ref 0.55–1.02)
DIAGNOSIS, 93000: NORMAL
DIFFERENTIAL METHOD BLD: ABNORMAL
EOSINOPHIL # BLD: 0.1 K/UL (ref 0–0.4)
EOSINOPHIL NFR BLD: 1 % (ref 0–7)
ERYTHROCYTE [DISTWIDTH] IN BLOOD BY AUTOMATED COUNT: 13.8 % (ref 11.5–14.5)
GLOBULIN SER CALC-MCNC: 4 G/DL (ref 2–4)
GLUCOSE SERPL-MCNC: 100 MG/DL (ref 65–100)
HCT VFR BLD AUTO: 40.4 % (ref 35–47)
HGB BLD-MCNC: 12.5 G/DL (ref 11.5–16)
IMM GRANULOCYTES # BLD: 0 K/UL
IMM GRANULOCYTES NFR BLD AUTO: 0 %
LYMPHOCYTES # BLD: 0.7 K/UL (ref 0.8–3.5)
LYMPHOCYTES NFR BLD: 9 % (ref 12–49)
MCH RBC QN AUTO: 30.2 PG (ref 26–34)
MCHC RBC AUTO-ENTMCNC: 30.9 G/DL (ref 30–36.5)
MCV RBC AUTO: 97.6 FL (ref 80–99)
METAMYELOCYTES NFR BLD MANUAL: 1 %
MONOCYTES # BLD: 0.1 K/UL (ref 0–1)
MONOCYTES NFR BLD: 1 % (ref 5–13)
NEUTS BAND NFR BLD MANUAL: 3 % (ref 0–6)
NEUTS SEG # BLD: 6.9 K/UL (ref 1.8–8)
NEUTS SEG NFR BLD: 85 % (ref 32–75)
NRBC # BLD: 0 K/UL (ref 0–0.01)
NRBC BLD-RTO: 0 PER 100 WBC
P-R INTERVAL, ECG05: 122 MS
PLATELET # BLD AUTO: 384 K/UL (ref 150–400)
PMV BLD AUTO: 10.2 FL (ref 8.9–12.9)
POTASSIUM SERPL-SCNC: 3.7 MMOL/L (ref 3.5–5.1)
PROT SERPL-MCNC: 6.4 G/DL (ref 6.4–8.2)
Q-T INTERVAL, ECG07: 340 MS
QRS DURATION, ECG06: 84 MS
QTC CALCULATION (BEZET), ECG08: 401 MS
RBC # BLD AUTO: 4.14 M/UL (ref 3.8–5.2)
RBC MORPH BLD: ABNORMAL
SAMPLES BEING HELD,HOLD: NORMAL
SODIUM SERPL-SCNC: 143 MMOL/L (ref 136–145)
VENTRICULAR RATE, ECG03: 84 BPM
WBC # BLD AUTO: 7.8 K/UL (ref 3.6–11)

## 2018-11-02 PROCEDURE — G8979 MOBILITY GOAL STATUS: HCPCS

## 2018-11-02 PROCEDURE — 71045 X-RAY EXAM CHEST 1 VIEW: CPT

## 2018-11-02 PROCEDURE — G8978 MOBILITY CURRENT STATUS: HCPCS

## 2018-11-02 PROCEDURE — 99285 EMERGENCY DEPT VISIT HI MDM: CPT

## 2018-11-02 PROCEDURE — 85025 COMPLETE CBC W/AUTO DIFF WBC: CPT | Performed by: EMERGENCY MEDICINE

## 2018-11-02 PROCEDURE — 93005 ELECTROCARDIOGRAM TRACING: CPT

## 2018-11-02 PROCEDURE — 97530 THERAPEUTIC ACTIVITIES: CPT

## 2018-11-02 PROCEDURE — G8980 MOBILITY D/C STATUS: HCPCS

## 2018-11-02 PROCEDURE — 97161 PT EVAL LOW COMPLEX 20 MIN: CPT

## 2018-11-02 PROCEDURE — 80053 COMPREHEN METABOLIC PANEL: CPT | Performed by: EMERGENCY MEDICINE

## 2018-11-02 PROCEDURE — 36415 COLL VENOUS BLD VENIPUNCTURE: CPT | Performed by: EMERGENCY MEDICINE

## 2018-11-02 NOTE — ED TRIAGE NOTES
Pt is coming from a nursing facility, she fell last week and states that she has lower back pain. Staff called EMS stating that the pt's sats were in the low 90's and was placed on 2L NC. Pt was admitted last Wednesday for a week for pneumonia and low sats.

## 2018-11-02 NOTE — DISCHARGE INSTRUCTIONS
Back Pain: Care Instructions  Your Care Instructions    Back pain has many possible causes. It is often related to problems with muscles and ligaments of the back. It may also be related to problems with the nerves, discs, or bones of the back. Moving, lifting, standing, sitting, or sleeping in an awkward way can strain the back. Sometimes you don't notice the injury until later. Arthritis is another common cause of back pain. Although it may hurt a lot, back pain usually improves on its own within several weeks. Most people recover in 12 weeks or less. Using good home treatment and being careful not to stress your back can help you feel better sooner. Follow-up care is a key part of your treatment and safety. Be sure to make and go to all appointments, and call your doctor if you are having problems. It's also a good idea to know your test results and keep a list of the medicines you take. How can you care for yourself at home? · Sit or lie in positions that are most comfortable and reduce your pain. Try one of these positions when you lie down:  ? Lie on your back with your knees bent and supported by large pillows. ? Lie on the floor with your legs on the seat of a sofa or chair. ? Lie on your side with your knees and hips bent and a pillow between your legs. ? Lie on your stomach if it does not make pain worse. · Do not sit up in bed, and avoid soft couches and twisted positions. Bed rest can help relieve pain at first, but it delays healing. Avoid bed rest after the first day of back pain. · Change positions every 30 minutes. If you must sit for long periods of time, take breaks from sitting. Get up and walk around, or lie in a comfortable position. · Try using a heating pad on a low or medium setting for 15 to 20 minutes every 2 or 3 hours. Try a warm shower in place of one session with the heating pad. · You can also try an ice pack for 10 to 15 minutes every 2 to 3 hours.  Put a thin cloth between the ice pack and your skin. · Take pain medicines exactly as directed. ? If the doctor gave you a prescription medicine for pain, take it as prescribed. ? If you are not taking a prescription pain medicine, ask your doctor if you can take an over-the-counter medicine. · Take short walks several times a day. You can start with 5 to 10 minutes, 3 or 4 times a day, and work up to longer walks. Walk on level surfaces and avoid hills and stairs until your back is better. · Return to work and other activities as soon as you can. Continued rest without activity is usually not good for your back. · To prevent future back pain, do exercises to stretch and strengthen your back and stomach. Learn how to use good posture, safe lifting techniques, and proper body mechanics. When should you call for help? Call your doctor now or seek immediate medical care if:    · You have new or worsening numbness in your legs.     · You have new or worsening weakness in your legs. (This could make it hard to stand up.)     · You lose control of your bladder or bowels.    Watch closely for changes in your health, and be sure to contact your doctor if:    · You have a fever, lose weight, or don't feel well.     · You do not get better as expected. Where can you learn more? Go to http://sena-bindu.info/. Enter Z779 in the search box to learn more about \"Back Pain: Care Instructions. \"  Current as of: November 29, 2017  Content Version: 11.8  © 1526-4637 [employer]. Care instructions adapted under license by [employer] (which disclaims liability or warranty for this information). If you have questions about a medical condition or this instruction, always ask your healthcare professional. [place] [address] any warranty or liability for your use of this information.

## 2018-11-02 NOTE — SENIOR SERVICES NOTE
physical Therapy Emergency Department EVALUATION/DISCHARGE Patient: Severiano Mohr [de-identified]80 y.o. female) Date: 11/2/2018 Primary Diagnosis: No admission diagnoses are documented for this encounter. Precautions: Fall risk ASSESSMENT : 
Chart reviewed. Patient cleared to be seen by nursing staff in the ED. Patient presents from SNF rehab secondary to SOB and low sats with mobility during therapy. Today, patient needing assist x 2 to sit EOB. Significant pain observed in the low back. No tenderness to palpation or muscle spasm felt during palpation. Of note, CT has cleared patient of any fractures since her fall. However because of pain, patient is demonstrating very shallow breathing, RR up to 40. O2 down to 88% on 2L. Patient recovering quickly to 94% with deep breathing education. Patient able to stand with RW EOB, bearing weight well through bilat LEs. Patient only able to tolerate approx 1 minute of standing and not taking steps before asking emphatically to return to bed. Of note BP up to 187/94. However O2 stable on 2L. Patient is significantly below baseline functionally, however has had full work up for symptoms and needs to work with therapy at [facility]. Daughters in agreement with plan. Discussed with CM. Further acute physical therapy is not indicated at this time. PLAN : 
Discharge Recommendations:  
 
[]   Home with family 
[x]   Skilled nursing facility []   Admission to hospital with rehab likely needed 
[]   Inpatient rehab referral 
[]   Outpatient physical therapy referral 
[]   Other: Further Equipment Recommendations for Discharge: none 
[]   Rolling walker with 5\" wheels 
[]   Crutches  
[]   Cane  
[]   Wheelchair  
[]   Other: COMMUNICATION/EDUCATION:  
Communication/Collaboration: 
[x]   Fall prevention education was provided and the patient/caregiver indicated understanding. [x]   Patient/family have participated as able and agree with findings and recommendations. []   Patient is unable to participate in plan of care at this time. Findings and recommendations were discussed with: MD physician and Registered Nurse SUBJECTIVE:  
Patient stated Oh please let me stop.  OBJECTIVE DATA SUMMARY:  
HISTORY:   
Past Medical History:  
Diagnosis Date  Arthritis  Breast lump 06/13/2018  
 right breast lump x a few days  HTN (hypertension)  Hypercholesteremia  Hyperlipidemia  Osteoporosis  Stroke ([address] CVA, TIA  TIA (transient ischemic attack)  Vertebral fracture Past Surgical History:  
Procedure Laterality Date  HX HIP REPLACEMENT  2009  
 left  HX ORTHOPAEDIC    
 left hip replacement Prior Level of Function/Home Situation: Patient presents from SNF rehab. Was in ILF prior to fall and hospitalization for PNA two weeks ago. Ambulates with a RW at baseline, however has not been walking yet with therapy at rehab. Personal factors and/or comorbidities impacting plan of care:  
 
Home Situation Home Environment: Skilled nursing facility Care Facility Name: [address] [place] [address] One/Two Story Residence: One story Living Alone: No 
Support Systems: Skilled nursing facility, Child(sanya) Patient Expects to be Discharged to[de-identified] Skilled nursing facility Current DME Used/Available at Home: Walker, rolling, Wheelchair EXAMINATION/PRESENTATION/DECISION MAKING: Hearing: Auditory Auditory Impairment: None Strength:   
Strength: Grossly decreased, non-functional 
  
Tone & Sensation:  
Tone: Normal 
Sensation: Intact Coordination: 
Coordination: Generally decreased, functional 
Functional Mobility: 
Bed Mobility: 
Supine to Sit: Moderate assistance;Assist x2 Sit to Supine: Maximum assistance;Assist x2 Transfers: 
Sit to Stand: Maximum assistance;Assist x1 Stand to Sit: Moderate assistance;Assist x2 Balance:  
Sitting: Impaired Sitting - Static: Good (unsupported) Sitting - Dynamic: Fair (occasional) Standing: Impaired Standing - Static: Poor;Constant support Standing - Dynamic : Poor Ambulation/Gait Training: 
Unable to complete at this time Special Tests: 
10 Meter walk test:  (Specify if any supplemental oxygen is used, the type, pre, during and post sats.) Self-Selected Or Fast-Velocity: Self Selected Velocity Trial 1: (unable to complete) Walking Speed (m/s) Modifier Scale Age 52-63 Age 61-76 Age 66-77 Age 80-80 Male Female Male Female Male Female Male Female CH 
 0% Impaired ? 1.39 ? 1.40 ? 1.36 ? 1.30 ? 1.33 ? 1.27 ? 1.21 ? 1.15  
CI  
1-19% Impaired 1.11-1.38 1.12-1.39 1.09-1.35 1.04-1.29 1.06-1.32 1.01-1.26 0.96-1.20 0.92-1.14 CJ  
20-39% Impaired 0.83-1.10 0.84-1.11 0.82-1.08 0.78-1.03 0.80-1.05 0.76-1.00 0.72-0.95 0.69-0.91 CK  
40-59% Impaired 0.56-0.82 0.57-0.83 0.54-0.81 0.52-0.77 0.53-0.79 0.51-0.75 0.48-0.71 0.46-0.68 CL  
60-79% Impaired 0.28-0.55 0.28-0.56 0.27-0.53 0.26-0.51 0.27-0.52 0.25-0.50 0.24-0.49 0.23-0.45 CM 
 80-99% Impaired 0.01-0.28 < 0.01-0.28 < 0.01-0.27 < 0.01-0.26 0.01-0.27 0.01-0.24 0.01-0.23 0.01-0.22  
CN  
100% Impaired Cannot Perform Minimal Detectable Change (MDC-90) = 0.1 m/s Tierra R. \"Comfortable and maximum walking speed of adults aged 20-79 years: reference values and determinants. \" Age and Agin Volume 26(1):15-9. Yvonna Simmonds, Mollinger L. \"Age- and gender-related test performance in community-dwelling elderly people: Six-Minute Walk Test, Culp Balance Scale, Timed Up & Go Test, and gait speeds. \" Physical Therapy: 2002 Volume 82(2):128-37. Miracle SERRANO, Martina GARCIA, Lei READ, Edward GARRISON. \"Assessing stability and change of four performance measures: a longitudinal study evaluating outcome following total hip and knee arthroplasty. \" [facility] Musculoskeletal Disorders: 2005 Volume 6(3).  
Carlito Cerna, PhD; Cheko Lu, PhD. Tala Hargrove Paper: \"Walking Speed: the Sixth Vital Sign\" Journal of Geriatric Physical Therapy: 2009 - Volume 32 - Issue 2 - p 25 . In compliance with CMSs Claims Based Outcome Reporting, the following G-code set was chosen for this patient based on their primary functional limitation being treated: The outcome measure chosen to determine the severity of the functional limitation was the 10 MWT with a score of unable to complete which was correlated with the impairment scale. ? Mobility - Walking and Moving Around:  
  - CURRENT STATUS: CN - 100% impaired, limited or restricted  - GOAL STATUS: CN - 100% impaired, limited or restricted  - D/C STATUS:  CN - 100% impaired, limited or restricted Physical Therapy Evaluation Charge Determination History Examination Presentation Decision-Making MEDIUM  Complexity : 1-2 comorbidities / personal factors will impact the outcome/ POC  LOW Complexity : 1-2 Standardized tests and measures addressing body structure, function, activity limitation and / or participation in recreation  LOW Complexity : Stable, uncomplicated  LOW Complexity : FOTO score of  Based on the above components, the patient evaluation is determined to be of the following complexity level: LOW Pain: 
Pain Scale 1: Numeric (0 - 10) Pain Intensity 1: unable to give number, but significant. BP elevated, short shallow breaths with mobility Activity Tolerance:  
Significantly decreased. See above in assessment Please refer to the flowsheet for vital signs taken during this treatment. After treatment:  
[]         Patient left in no apparent distress sitting up in chair 
[x]         Patient left in no apparent distress in bed 
[x]         Call bell left within reach [x]         Nursing notified 
[x]         Caregiver present 
[]         Bed alarm activated Thank you for this referral. 
Charles Nixon PT, DPT Geriatric Clinical Specialist  
 Time Calculation: 27 mins

## 2018-11-02 NOTE — PROGRESS NOTES
Updates received from [address] [place] [address]. Met w/patient and daughters/MPOAs Marjorie Park and [facility] - introduced to role of CM. Patient is alert - history obtained from both daughters. Ms. Yamile Alvarez previous resided at [facility] (now [facility] placed at the [facility] on Wednesday. Prior to fall in October patient had been active and social at [facility] (utilized a RW for ambulation).  remained in bed several days after fall and was admitted to [facility] [place]. Daughters have noted physical decline in this period of time and after Pneumonia and UTI ABX treatment wanted to not improvement in current presentation. Divina Bal is a DNR and has a LTC policy. Daughters plan to communicate w/ SNF team regarding the need if patient stats decrease during therapy to allow rest break and retry activity before 911 intervention. Plan is to return and continue skilled care and not patient status - MPOAs acknowledges will continue to explore additional interventions / transitions of care planning as needed. Patient will need stretcher transport home. Updates received from Farhat Graham. Case discussed w/ VirgilioRN regarding d/c planning and this writer able to assist w/ transportation. Electronic referral placed to [facility] via [employer] - ETA w/in the hour. CM will complete PCS form.

## 2018-11-02 NOTE — PROGRESS NOTES
Date of previous inpatient admission/ ED visit? 10/24/18-10/31/18 (Pneumonia). What brought the patient back to ED? Patient presents to the ED w/SOB. Did patient decline recommended services during last admission/ ED visit (if yes, what)? No. Patient placed at the [address] [place] [address] on 10/31/18. Has patient seen a provider since their last inpatient admission/ED visit (if yes, when)? Yes. Patient is followed in SNF. CM Interventions: 
From previous inpatient admission/ED visit: Assessment From current inpatient admission/ED visit: Assessment EMR reviewed. Noted recent hospitalization < 30 days. Patient prior to 10/24/18 resided at [facility] then placed at the [address] [place] [address]. Noted AMD in MR and MPOAs listed. CM will follow for transitional care needs. [facility] Medicare A/B and [employer] are insurance providers. Care Management Interventions PCP Verified by CM: Yes 
Palliative Care Criteria Met (RRAT>21 & CHF Dx)?: No 
Transition of Care Consult (CM Consult): Other(recent hospitalization <30 days) MyChart Signup: No 
Discharge Durable Medical Equipment: No 
Health Maintenance Reviewed: Yes Physical Therapy Consult: No 
Occupational Therapy Consult: No 
Speech Therapy Consult: No 
Current Support Network: [address] [place] [address] Confirm Follow Up Transport: (TBD) Plan discussed with Pt/Family/Caregiver: No 
[employer] Information Provided?: No 
Discharge Location Discharge Placement: (TBD)

## 2018-11-02 NOTE — ED NOTES
Discharge instructions given to Family. Family expressed understanding. [facility] at bedside to transport patient

## 2018-11-02 NOTE — ED PROVIDER NOTES
HPI  
 
  80y F from nursing home here with low back pain and trouble breathing. Had a fall last week - seen here and had neg workup (had pain at that time as well). Pain has persisted. Doesn't radiate. Is in the lower middle back. No distal numbness or weakness. No reports of fever. Found to be hypoxic by EMS. At some point she has been treated for pneumonia but pt not sure if she's on anything currently or not. Does not normally wear oxygen and required 2L en route. No chest pain. No abdominal pain. Denies urinary sx's. Past Medical History:  
Diagnosis Date  Arthritis  Breast lump 2018  
 right breast lump x a few days  HTN (hypertension)  Hypercholesteremia  Hyperlipidemia  Osteoporosis  Stroke ([facility] [address] CVA, TIA  TIA (transient ischemic attack)  Vertebral fracture Past Surgical History:  
Procedure Laterality Date  HX HIP REPLACEMENT    
 left  HX ORTHOPAEDIC    
 left hip replacement History reviewed. No pertinent family history. Social History Socioeconomic History  Marital status:  Spouse name: Not on file  Number of children: Not on file  Years of education: Not on file  Highest education level: Not on file Social Needs  Financial resource strain: Not on file  Food insecurity - worry: Not on file  Food insecurity - inability: Not on file  Transportation needs - medical: Not on file  Transportation needs - non-medical: Not on file Occupational History  Not on file Tobacco Use  Smoking status: Former Smoker Years: 40.00 Last attempt to quit: 1986 Years since quittin.0  Smokeless tobacco: Never Used Substance and Sexual Activity  Alcohol use: Yes  Drug use: No  
 Sexual activity: Not on file Other Topics Concern  Not on file Social History Narrative  Not on file ALLERGIES: Hydrochlorothiazide Review of Systems Review of Systems Constitutional: (-) weight loss. HEENT: (-) stiff neck Eyes: (-) discharge. Respiratory: (-) cough. Cardiovascular: (-) syncope. Gastrointestinal: (-) blood in stool. Genitourinary: (-) hematuria. Musculoskeletal: (-) myalgias. Neurological: (-) seizure. Skin: (-) petechiae Lymph/Immunologic: (-) enlarged lymph nodes All other systems reviewed and are negative. Vitals:  
 11/02/18 1213 BP: 181/64 Pulse: 80 Resp: 27 Temp: 98.3 °F (36.8 °C) SpO2: 93% Weight: 55.8 kg (123 lb) Height: 4' 10\" (1.473 m) Physical Exam Nursing note and vitals reviewed. Constitutional: oriented to person, place, and time. appears well-developed and well-nourished. No distress. Head: Normocephalic and atraumatic. Sclera anicteric Nose: No rhinorrhea Mouth/Throat: Oropharynx is clear and moist. Pharynx normal 
Eyes: Conjunctivae are normal. Pupils are equal, round, and reactive to light. Right eye exhibits no discharge. Left eye exhibits no discharge. Neck: Painless normal range of motion. Neck supple. No LAD. Cardiovascular: Normal rate, regular rhythm, normal heart sounds and intact distal pulses. Exam reveals no gallop and no friction rub. No murmur heard. Pulmonary/Chest:  No respiratory distress. No wheezes. No rales. No rhonchi. No increased work of breathing. No accessory muscle use. Good air exchange throughout. Abdominal: soft, non-tender, no rebound or guarding. No hepatosplenomegaly. Normal bowel sounds throughout. Back: no tenderness to palpation, no deformities, no CVA tenderness Extremities/Musculoskeletal: Normal range of motion. no tenderness. No edema. Distal extremities are neurovasc intact. Lymphadenopathy:   No adenopathy. Neurological:  Alert and oriented to person, place, and time. Coordination normal. CN 2-12 intact. Motor and sensory function intact. Skin: Skin is warm and dry. No rash noted. No pallor. MDM 80y F here with low back pain from a fall last week. Also with hypoxia and possibly pneumonia? Will need labs, CXR, CT back. Procedures 12:47 PM 
Pt just d/c'ed on 10/31 for pneumonia and required supplemental oxygen. Refused further treatment. Also had back pain on arrival with that admission but no new compression fx's seen on CT. No new falls since being discharged.

## 2018-11-27 ENCOUNTER — APPOINTMENT (OUTPATIENT)
Dept: CT IMAGING | Age: 83
End: 2018-11-27
Attending: EMERGENCY MEDICINE
Payer: MEDICARE

## 2018-11-27 ENCOUNTER — HOSPITAL ENCOUNTER (EMERGENCY)
Age: 83
Discharge: NURSING, REHAB OR LONG-TERM CARE | End: 2018-11-27
Attending: EMERGENCY MEDICINE
Payer: MEDICARE

## 2018-11-27 VITALS
HEART RATE: 70 BPM | RESPIRATION RATE: 16 BRPM | OXYGEN SATURATION: 98 % | HEIGHT: 58 IN | TEMPERATURE: 99 F | BODY MASS INDEX: 26.24 KG/M2 | DIASTOLIC BLOOD PRESSURE: 71 MMHG | SYSTOLIC BLOOD PRESSURE: 143 MMHG | WEIGHT: 125 LBS

## 2018-11-27 DIAGNOSIS — S00.03XA CONTUSION OF SCALP, INITIAL ENCOUNTER: Primary | ICD-10-CM

## 2018-11-27 PROCEDURE — 70450 CT HEAD/BRAIN W/O DYE: CPT

## 2018-11-27 PROCEDURE — 99284 EMERGENCY DEPT VISIT MOD MDM: CPT

## 2018-11-27 NOTE — ED NOTES
Report given to the nurse Sarai Velasquez at the [address] [facility] [place]. Discharge instructions given to patient and family by MD and nurse. Pt has been given counseling on medication use and verbalizes understanding. Set up transport with [facility] for transport home with ETA 2000 
 
7:15 PM 
Transport here to take pt, pt in no signs of distress. paperwork completed

## 2018-11-27 NOTE — ED PROVIDER NOTES
This is a 70-year-old female who is not supposed to get up and ambulate by herself. However, today she made that attempted and fell, striking the left side of the back of her head. Denies loss of consciousness and denies any injury or pain elsewhere. Breathing and has had no nausea or vomiting. There is no back pain, chest pain, abdominal pain or hip pain. She doesn't know her voice any other complaints. Past Medical History:  
Diagnosis Date  Arthritis  Breast lump 2018  
 right breast lump x a few days  HTN (hypertension)  Hypercholesteremia  Hyperlipidemia  Osteoporosis  Stroke ([facility] [address] CVA, TIA  TIA (transient ischemic attack)  Vertebral fracture Past Surgical History:  
Procedure Laterality Date  HX HIP REPLACEMENT  2009  
 left  HX ORTHOPAEDIC    
 left hip replacement History reviewed. No pertinent family history. Social History Socioeconomic History  Marital status:  Spouse name: Not on file  Number of children: Not on file  Years of education: Not on file  Highest education level: Not on file Social Needs  Financial resource strain: Not on file  Food insecurity - worry: Not on file  Food insecurity - inability: Not on file  Transportation needs - medical: Not on file  Transportation needs - non-medical: Not on file Occupational History  Not on file Tobacco Use  Smoking status: Former Smoker Years: 40.00 Last attempt to quit: 1986 Years since quittin.0  Smokeless tobacco: Never Used Substance and Sexual Activity  Alcohol use: Yes  Drug use: No  
 Sexual activity: Not on file Other Topics Concern  Not on file Social History Narrative  Not on file ALLERGIES: Hydrochlorothiazide Review of Systems Constitutional: Negative for activity change, appetite change and fatigue. HENT: Negative for ear pain, facial swelling, sore throat and trouble swallowing. Eyes: Negative for pain, discharge and visual disturbance. Respiratory: Negative for chest tightness, shortness of breath and wheezing. Cardiovascular: Negative for chest pain and palpitations. Gastrointestinal: Negative for abdominal pain, blood in stool, nausea and vomiting. Genitourinary: Negative for difficulty urinating, flank pain and hematuria. Musculoskeletal: Negative for arthralgias, joint swelling, myalgias and neck pain. Skin: Negative for color change and rash. Neurological: Positive for headaches. Negative for dizziness, weakness and numbness. Hematological: Negative for adenopathy. Does not bruise/bleed easily. Psychiatric/Behavioral: Negative for behavioral problems, confusion and sleep disturbance. All other systems reviewed and are negative. Vitals:  
 11/27/18 1503 11/27/18 1637 BP:  146/73 Pulse: 66 66 Resp:  18 Temp:  98.6 °F (37 °C) SpO2: 96% 96% Weight:  56.7 kg (125 lb) Height:  4' 10\" (1.473 m) Physical Exam  
Constitutional: She is oriented to person, place, and time. She appears well-developed and well-nourished. No distress. HENT:  
Head: Normocephalic and atraumatic. Nose: Nose normal.  
Mouth/Throat: Oropharynx is clear and moist.  
Hematoma left occipital scalp. Eyes: Conjunctivae and EOM are normal. Pupils are equal, round, and reactive to light. No scleral icterus. Neck: Normal range of motion. Neck supple. No JVD present. No tracheal deviation present. No thyromegaly present. No carotid bruits noted. Cardiovascular: Normal rate, regular rhythm, normal heart sounds and intact distal pulses. Exam reveals no gallop and no friction rub. No murmur heard. Pulmonary/Chest: Effort normal and breath sounds normal. No respiratory distress. She has no wheezes. She has no rales. She exhibits no tenderness. Abdominal: Soft. Bowel sounds are normal. She exhibits no distension and no mass. There is no tenderness. There is no rebound and no guarding. Musculoskeletal: Normal range of motion. She exhibits no edema or tenderness. Lymphadenopathy:  
  She has no cervical adenopathy. Neurological: She is alert and oriented to person, place, and time. She has normal reflexes. No cranial nerve deficit. Coordination normal.  
Skin: Skin is warm and dry. No rash noted. No erythema. Psychiatric: She has a normal mood and affect. Her behavior is normal. Judgment and thought content normal.  
Nursing note and vitals reviewed. MDM Number of Diagnoses or Management Options Amount and/or Complexity of Data Reviewed Tests in the radiology section of CPT®: ordered and reviewed Decide to obtain previous medical records or to obtain history from someone other than the patient: yes Obtain history from someone other than the patient: yes Review and summarize past medical records: yes Independent visualization of images, tracings, or specimens: yes Risk of Complications, Morbidity, and/or Mortality Presenting problems: moderate Diagnostic procedures: high Management options: moderate Patient Progress Patient progress: stable Procedures Will check CT of the head. CT fails to demonstrate any acute process. Will discharge with HI instructions,.

## 2018-11-27 NOTE — ED TRIAGE NOTES
Triage:  Pt to ED due to concerns over a GLF while attempting to walk unassisted to restroom at nursing facility. Pt states was walking out of the restroom using a walker, Pt states she lost her balance causing her to fall backwards hitting her head. Pt denies any LOC and currently denies any pain or discomfort.

## 2018-11-27 NOTE — DISCHARGE INSTRUCTIONS
Head Injury: Care Instructions  Your Care Instructions    Most injuries to the head are minor. Bumps, cuts, and scrapes on the head and face usually heal well and can be treated the same as injuries to other parts of the body. Although it's rare, once in a while a more serious problem shows up after you are home. So it's good to be on the lookout for symptoms for a day or two. Follow-up care is a key part of your treatment and safety. Be sure to make and go to all appointments, and call your doctor if you are having problems. It's also a good idea to know your test results and keep a list of the medicines you take. How can you care for yourself at home? · Follow your doctor's instructions. He or she will tell you if you need someone to watch you closely for the next 24 hours or longer. · Take it easy for the next few days or more if you are not feeling well. · Ask your doctor when it's okay for you to go back to activities like driving a car, riding a bike, or operating machinery. When should you call for help? Call 911 anytime you think you may need emergency care. For example, call if:    · You have a seizure.     · You passed out (lost consciousness).     · You are confused or can't stay awake.    Call your doctor now or seek immediate medical care if:    · You have new or worse vomiting.     · You feel less alert.     · You have new weakness or numbness in any part of your body.    Watch closely for changes in your health, and be sure to contact your doctor if:    · You do not get better as expected.     · You have new symptoms, such as headaches, trouble concentrating, or changes in mood. Where can you learn more? Go to http://sena-bindu.info/. Enter L456 in the search box to learn more about \"Head Injury: Care Instructions. \"  Current as of: June 4, 2018  Content Version: 11.8  © 7226-9767 [employer].  Care instructions adapted under license by [employer] (which disclaims liability or warranty for this information). If you have questions about a medical condition or this instruction, always ask your healthcare professional. [address] any warranty or liability for your use of this information.

## 2019-01-01 ENCOUNTER — APPOINTMENT (OUTPATIENT)
Dept: GENERAL RADIOLOGY | Age: 84
End: 2019-01-01
Attending: EMERGENCY MEDICINE
Payer: MEDICARE

## 2019-01-01 ENCOUNTER — APPOINTMENT (OUTPATIENT)
Dept: CT IMAGING | Age: 84
End: 2019-01-01
Attending: EMERGENCY MEDICINE
Payer: MEDICARE

## 2019-01-01 ENCOUNTER — HOSPITAL ENCOUNTER (EMERGENCY)
Age: 84
Discharge: HOME | End: 2019-10-28
Attending: EMERGENCY MEDICINE | Admitting: EMERGENCY MEDICINE
Payer: MEDICARE

## 2019-01-01 ENCOUNTER — TELEPHONE (OUTPATIENT)
Dept: CASE MANAGEMENT | Age: 84
End: 2019-01-01

## 2019-01-01 VITALS
DIASTOLIC BLOOD PRESSURE: 72 MMHG | TEMPERATURE: 97.6 F | SYSTOLIC BLOOD PRESSURE: 145 MMHG | RESPIRATION RATE: 14 BRPM | HEART RATE: 59 BPM | OXYGEN SATURATION: 98 %

## 2019-01-01 DIAGNOSIS — S09.90XA CLOSED HEAD INJURY, INITIAL ENCOUNTER: Primary | ICD-10-CM
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
## 2019-01-01 DIAGNOSIS — S32.591A INFERIOR PUBIC RAMUS FRACTURE, RIGHT, CLOSED, INITIAL ENCOUNTER (HCC): ICD-10-CM

## 2019-01-01 PROCEDURE — 73502 X-RAY EXAM HIP UNI 2-3 VIEWS: CPT

## 2019-01-01 PROCEDURE — 97161 PT EVAL LOW COMPLEX 20 MIN: CPT

## 2019-01-01 PROCEDURE — 70450 CT HEAD/BRAIN W/O DYE: CPT

## 2019-01-01 PROCEDURE — 74011250637 HC RX REV CODE- 250/637: Performed by: EMERGENCY MEDICINE

## 2019-01-01 PROCEDURE — 99285 EMERGENCY DEPT VISIT HI MDM: CPT

## 2019-01-01 RX ORDER — ACETAMINOPHEN 325 MG/1
650 TABLET ORAL
Status: COMPLETED | OUTPATIENT
Start: 2019-01-01 | End: 2019-01-01

## 2019-01-01 RX ADMIN — ACETAMINOPHEN 650 MG: 325 TABLET, FILM COATED ORAL at 17:14

## 2019-01-15 ENCOUNTER — APPOINTMENT (OUTPATIENT)
Dept: CT IMAGING | Age: 84
DRG: 206 | End: 2019-01-15
Attending: FAMILY MEDICINE
Payer: MEDICARE

## 2019-01-15 ENCOUNTER — APPOINTMENT (OUTPATIENT)
Dept: GENERAL RADIOLOGY | Age: 84
DRG: 206 | End: 2019-01-15
Attending: EMERGENCY MEDICINE
Payer: MEDICARE

## 2019-01-15 ENCOUNTER — HOSPITAL ENCOUNTER (INPATIENT)
Age: 84
LOS: 6 days | Discharge: NURSING, REHAB OR LONG-TERM CARE | DRG: 206 | End: 2019-01-23
Attending: EMERGENCY MEDICINE | Admitting: INTERNAL MEDICINE
Payer: MEDICARE

## 2019-01-15 DIAGNOSIS — R73.9 HYPERGLYCEMIA: ICD-10-CM

## 2019-01-15 DIAGNOSIS — S22.31XA CLOSED FRACTURE OF ONE RIB OF RIGHT SIDE, INITIAL ENCOUNTER: ICD-10-CM

## 2019-01-15 DIAGNOSIS — J93.9 PNEUMOTHORAX ON RIGHT: ICD-10-CM
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
## 2019-01-15 PROBLEM — R06.02 SOB (SHORTNESS OF BREATH): Status: ACTIVE | Noted: 2019-01-15

## 2019-01-15 LAB
ALBUMIN SERPL-MCNC: 3.1 G/DL (ref 3.5–5)
ALBUMIN/GLOB SERPL: 0.9 {RATIO} (ref 1.1–2.2)
ALP SERPL-CCNC: 80 U/L (ref 45–117)
ALT SERPL-CCNC: 25 U/L (ref 12–78)
ANION GAP SERPL CALC-SCNC: 4 MMOL/L (ref 5–15)
AST SERPL-CCNC: 23 U/L (ref 15–37)
ATRIAL RATE: 73 BPM
BASOPHILS # BLD: 0 K/UL (ref 0–0.1)
BASOPHILS NFR BLD: 0 % (ref 0–1)
BILIRUB SERPL-MCNC: 0.5 MG/DL (ref 0.2–1)
BUN SERPL-MCNC: 15 MG/DL (ref 6–20)
BUN/CREAT SERPL: 20 (ref 12–20)
CALCIUM SERPL-MCNC: 9 MG/DL (ref 8.5–10.1)
CALCULATED P AXIS, ECG09: 38 DEGREES
CALCULATED R AXIS, ECG10: -15 DEGREES
CALCULATED T AXIS, ECG11: 48 DEGREES
CHLORIDE SERPL-SCNC: 106 MMOL/L (ref 97–108)
CO2 SERPL-SCNC: 34 MMOL/L (ref 21–32)
COMMENT, HOLDF: NORMAL
CREAT SERPL-MCNC: 0.75 MG/DL (ref 0.55–1.02)
DIAGNOSIS, 93000: NORMAL
DIFFERENTIAL METHOD BLD: ABNORMAL
EOSINOPHIL # BLD: 0 K/UL (ref 0–0.4)
EOSINOPHIL NFR BLD: 0 % (ref 0–7)
ERYTHROCYTE [DISTWIDTH] IN BLOOD BY AUTOMATED COUNT: 14 % (ref 11.5–14.5)
EST. AVERAGE GLUCOSE BLD GHB EST-MCNC: 111 MG/DL
GLOBULIN SER CALC-MCNC: 3.4 G/DL (ref 2–4)
GLUCOSE SERPL-MCNC: 191 MG/DL (ref 65–100)
HBA1C MFR BLD: 5.5 % (ref 4.2–6.3)
HCT VFR BLD AUTO: 40.6 % (ref 35–47)
HGB BLD-MCNC: 12.2 G/DL (ref 11.5–16)
IMM GRANULOCYTES # BLD AUTO: 0.1 K/UL (ref 0–0.04)
IMM GRANULOCYTES NFR BLD AUTO: 1 % (ref 0–0.5)
LYMPHOCYTES # BLD: 0.4 K/UL (ref 0.8–3.5)
LYMPHOCYTES NFR BLD: 6 % (ref 12–49)
MCH RBC QN AUTO: 29.5 PG (ref 26–34)
MCHC RBC AUTO-ENTMCNC: 30 G/DL (ref 30–36.5)
MCV RBC AUTO: 98.3 FL (ref 80–99)
MONOCYTES # BLD: 0.5 K/UL (ref 0–1)
MONOCYTES NFR BLD: 7 % (ref 5–13)
NEUTS SEG # BLD: 6.4 K/UL (ref 1.8–8)
NEUTS SEG NFR BLD: 86 % (ref 32–75)
NRBC # BLD: 0 K/UL (ref 0–0.01)
NRBC BLD-RTO: 0 PER 100 WBC
P-R INTERVAL, ECG05: 168 MS
PLATELET # BLD AUTO: 269 K/UL (ref 150–400)
PMV BLD AUTO: 11.2 FL (ref 8.9–12.9)
POTASSIUM SERPL-SCNC: 3.8 MMOL/L (ref 3.5–5.1)
PROT SERPL-MCNC: 6.5 G/DL (ref 6.4–8.2)
Q-T INTERVAL, ECG07: 366 MS
QRS DURATION, ECG06: 88 MS
QTC CALCULATION (BEZET), ECG08: 403 MS
RBC # BLD AUTO: 4.13 M/UL (ref 3.8–5.2)
RBC MORPH BLD: ABNORMAL
RBC MORPH BLD: ABNORMAL
SAMPLES BEING HELD,HOLD: NORMAL
SODIUM SERPL-SCNC: 144 MMOL/L (ref 136–145)
VENTRICULAR RATE, ECG03: 73 BPM
WBC # BLD AUTO: 7.4 K/UL (ref 3.6–11)

## 2019-01-15 PROCEDURE — 70450 CT HEAD/BRAIN W/O DYE: CPT

## 2019-01-15 PROCEDURE — 93005 ELECTROCARDIOGRAM TRACING: CPT

## 2019-01-15 PROCEDURE — 80053 COMPREHEN METABOLIC PANEL: CPT

## 2019-01-15 PROCEDURE — 99218 HC RM OBSERVATION: CPT

## 2019-01-15 PROCEDURE — 83036 HEMOGLOBIN GLYCOSYLATED A1C: CPT

## 2019-01-15 PROCEDURE — 72125 CT NECK SPINE W/O DYE: CPT

## 2019-01-15 PROCEDURE — 71101 X-RAY EXAM UNILAT RIBS/CHEST: CPT

## 2019-01-15 PROCEDURE — 85025 COMPLETE CBC W/AUTO DIFF WBC: CPT

## 2019-01-15 PROCEDURE — 74011000258 HC RX REV CODE- 258: Performed by: FAMILY MEDICINE

## 2019-01-15 PROCEDURE — 74011250636 HC RX REV CODE- 250/636: Performed by: FAMILY MEDICINE

## 2019-01-15 PROCEDURE — 74011250637 HC RX REV CODE- 250/637: Performed by: FAMILY MEDICINE

## 2019-01-15 PROCEDURE — 74011000250 HC RX REV CODE- 250: Performed by: FAMILY MEDICINE

## 2019-01-15 PROCEDURE — 74011250637 HC RX REV CODE- 250/637: Performed by: EMERGENCY MEDICINE

## 2019-01-15 PROCEDURE — 99285 EMERGENCY DEPT VISIT HI MDM: CPT

## 2019-01-15 RX ORDER — TRAMADOL HYDROCHLORIDE 50 MG/1
50 TABLET ORAL
Status: COMPLETED | OUTPATIENT
Start: 2019-01-15 | End: 2019-01-15

## 2019-01-15 RX ORDER — METOPROLOL TARTRATE 25 MG/1
12.5 TABLET, FILM COATED ORAL 2 TIMES DAILY
Status: DISCONTINUED | OUTPATIENT
Start: 2019-01-15 | End: 2019-01-23 | Stop reason: HOSPADM

## 2019-01-15 RX ORDER — AMLODIPINE BESYLATE 5 MG/1
10 TABLET ORAL DAILY
Status: DISCONTINUED | OUTPATIENT
Start: 2019-01-16 | End: 2019-01-23 | Stop reason: HOSPADM

## 2019-01-15 RX ORDER — SERTRALINE HYDROCHLORIDE 50 MG/1
25 TABLET, FILM COATED ORAL EVERY EVENING
Status: DISCONTINUED | OUTPATIENT
Start: 2019-01-15 | End: 2019-01-23 | Stop reason: HOSPADM

## 2019-01-15 RX ORDER — AMLODIPINE BESYLATE 10 MG/1
10 TABLET ORAL DAILY
COMMUNITY
End: 2020-01-01

## 2019-01-15 RX ORDER — HEPARIN SODIUM 5000 [USP'U]/ML
5000 INJECTION, SOLUTION INTRAVENOUS; SUBCUTANEOUS EVERY 8 HOURS
Status: DISCONTINUED | OUTPATIENT
Start: 2019-01-15 | End: 2019-01-23 | Stop reason: HOSPADM

## 2019-01-15 RX ORDER — FERROUS SULFATE, DRIED 160(50) MG
1 TABLET, EXTENDED RELEASE ORAL 2 TIMES DAILY WITH MEALS
COMMUNITY
End: 2020-01-01

## 2019-01-15 RX ORDER — ACETAMINOPHEN 325 MG/1
650 TABLET ORAL
Status: DISCONTINUED | OUTPATIENT
Start: 2019-01-15 | End: 2019-01-16

## 2019-01-15 RX ORDER — RANITIDINE 150 MG/1
150 TABLET, FILM COATED ORAL 2 TIMES DAILY
COMMUNITY
End: 2020-01-01

## 2019-01-15 RX ORDER — SODIUM CHLORIDE 0.9 % (FLUSH) 0.9 %
5-40 SYRINGE (ML) INJECTION AS NEEDED
Status: DISCONTINUED | OUTPATIENT
Start: 2019-01-15 | End: 2019-01-23 | Stop reason: HOSPADM

## 2019-01-15 RX ORDER — ALBUTEROL SULFATE 90 UG/1
2 AEROSOL, METERED RESPIRATORY (INHALATION)
COMMUNITY
End: 2019-01-01

## 2019-01-15 RX ORDER — SODIUM CHLORIDE 450 MG/100ML
75 INJECTION, SOLUTION INTRAVENOUS ONCE
Status: COMPLETED | OUTPATIENT
Start: 2019-01-15 | End: 2019-01-15

## 2019-01-15 RX ORDER — TRAMADOL HYDROCHLORIDE 50 MG/1
25 TABLET ORAL DAILY
COMMUNITY
End: 2019-01-23

## 2019-01-15 RX ORDER — FAMOTIDINE 20 MG/1
20 TABLET, FILM COATED ORAL 2 TIMES DAILY
Status: DISCONTINUED | OUTPATIENT
Start: 2019-01-15 | End: 2019-01-16

## 2019-01-15 RX ORDER — ALBUTEROL SULFATE 0.83 MG/ML
2.5 SOLUTION RESPIRATORY (INHALATION)
COMMUNITY
Start: 2019-01-03 | End: 2019-01-17

## 2019-01-15 RX ORDER — THERA TABS 400 MCG
1 TAB ORAL DAILY
Status: DISCONTINUED | OUTPATIENT
Start: 2019-01-16 | End: 2019-01-23 | Stop reason: HOSPADM

## 2019-01-15 RX ORDER — SODIUM CHLORIDE 0.9 % (FLUSH) 0.9 %
5-40 SYRINGE (ML) INJECTION EVERY 8 HOURS
Status: DISCONTINUED | OUTPATIENT
Start: 2019-01-15 | End: 2019-01-23 | Stop reason: HOSPADM

## 2019-01-15 RX ORDER — IPRATROPIUM BROMIDE AND ALBUTEROL SULFATE 2.5; .5 MG/3ML; MG/3ML
3 SOLUTION RESPIRATORY (INHALATION)
Status: DISCONTINUED | OUTPATIENT
Start: 2019-01-15 | End: 2019-01-23 | Stop reason: HOSPADM

## 2019-01-15 RX ORDER — HYDRALAZINE HYDROCHLORIDE 20 MG/ML
10 INJECTION INTRAMUSCULAR; INTRAVENOUS
Status: DISCONTINUED | OUTPATIENT
Start: 2019-01-15 | End: 2019-01-23 | Stop reason: HOSPADM

## 2019-01-15 RX ADMIN — METOPROLOL TARTRATE 12.5 MG: 25 TABLET ORAL at 20:40

## 2019-01-15 RX ADMIN — Medication 10 ML: at 17:56

## 2019-01-15 RX ADMIN — IPRATROPIUM BROMIDE AND ALBUTEROL SULFATE 3 ML: .5; 3 SOLUTION RESPIRATORY (INHALATION) at 20:45

## 2019-01-15 RX ADMIN — Medication 10 ML: at 22:00

## 2019-01-15 RX ADMIN — HEPARIN SODIUM 5000 UNITS: 5000 INJECTION INTRAVENOUS; SUBCUTANEOUS at 17:56

## 2019-01-15 RX ADMIN — TRAMADOL HYDROCHLORIDE 50 MG: 50 TABLET, FILM COATED ORAL at 12:22

## 2019-01-15 RX ADMIN — SODIUM CHLORIDE 75 ML/HR: 450 INJECTION, SOLUTION INTRAVENOUS at 16:39

## 2019-01-15 RX ADMIN — HYDRALAZINE HYDROCHLORIDE 10 MG: 20 INJECTION INTRAMUSCULAR; INTRAVENOUS at 23:54

## 2019-01-15 RX ADMIN — SERTRALINE HYDROCHLORIDE 25 MG: 50 TABLET ORAL at 17:55

## 2019-01-15 RX ADMIN — FAMOTIDINE 20 MG: 20 TABLET, FILM COATED ORAL at 17:55

## 2019-01-15 NOTE — ED PROVIDER NOTES
80 y.o. female with past medical history significant for HTN, TIA, and osteoporosis who presents from the [address] with chief complaint of rib pain. History is mostly provided by the patient's daughter. Daughter states yesterday between 46-65, the patient had an unwitnessed GLF. Pt denies hitting her head or LOC. Patient hit her RUE (with bruising noted) and right chest wall in the fall. Daughter notes the patient wears a boot on her left foot d/t a pressure ulcer on her heel, and suspects the patient tried to get to use the bathroom without assistance and tripped with the boot. Per medical records, the patient had X-rays of her right shoulder, right forearm, and right humerus that were normal, but the right rib X-Ray showed lateral 4th, 5th, and 6th posterior rib fractures. Patient was sent here for further evaluation. Daughter states the patient has been in rehab since last fall after a thoracic fracture, and has not been doing well there, but they are trying to get her transferred out. Since being there, the patient is now O2 dependant. Daughter states was prescribed tramadol and Tylenol for her back pain, and she suspects the patient is still getting these. Pt denies any neck pain. Daughter denies any anticoagulation use. There are no other acute medical concerns at this time. Social hx: Former smoker ([address]) PCP: ABRIL Early Note written by Miriam Pressley, as dictated by Jo Crawford MD 11:30 AM 
 
 
The history is provided by the patient, a relative, the EMS personnel and medical records (Daughter). No  was used. Past Medical History:  
Diagnosis Date  Arthritis  Breast lump 06/13/2018  
 right breast lump x a few days  HTN (hypertension)  Hypercholesteremia  Hyperlipidemia  Osteoporosis  Stroke ([facility] [address] CVA, TIA  TIA (transient ischemic attack)  Vertebral fracture Past Surgical History: Procedure Laterality Date  HX HIP REPLACEMENT  2009  
 left  HX ORTHOPAEDIC    
 left hip replacement History reviewed. No pertinent family history. Social History Socioeconomic History  Marital status:  Spouse name: Not on file  Number of children: Not on file  Years of education: Not on file  Highest education level: Not on file Social Needs  Financial resource strain: Not on file  Food insecurity - worry: Not on file  Food insecurity - inability: Not on file  Transportation needs - medical: Not on file  Transportation needs - non-medical: Not on file Occupational History  Not on file Tobacco Use  Smoking status: Former Smoker Years: 40.00 Last attempt to quit: 1986 Years since quittin.2  Smokeless tobacco: Never Used Substance and Sexual Activity  Alcohol use: Yes  Drug use: No  
 Sexual activity: Not on file Other Topics Concern  Not on file Social History Narrative  Not on file ALLERGIES: Hydrochlorothiazide Review of Systems Musculoskeletal: Negative for neck pain. (+) Right rib pain Skin: Positive for color change and wound (Chronic). Neurological: Negative for syncope and headaches. All other systems reviewed and are negative. Vitals:  
 01/15/19 1113 BP: 176/88 Pulse: 74 Resp: 22 Temp: 98.4 °F (36.9 °C) SpO2: 91% Weight: 56.7 kg (125 lb) Height: 4' 10\" (1.473 m) Physical Exam  
Physical Examination: General appearance - alert, elderly, mild distress, oriented to person, place, and time and normal appearing weight Eyes - pupils equal and reactive, extraocular eye movements intact Neck - supple, no significant adenopathy Chest - clear to auscultation, no wheezes, rales or rhonchi, symmetric air entry Heart - normal rate, regular rhythm, normal S1, S2, no murmurs, rubs, clicks or gallops Abdomen - soft, nontender, nondistended, no masses or organomegaly Back exam - full range of motion, no tenderness, palpable spasm or pain on motion Neurological - alert, oriented, normal speech, no focal findings or movement disorder noted Musculoskeletal - no joint tenderness, deformity or swelling Extremities - peripheral pulses normal, no pedal edema, no clubbing or cyanosis, small pressure ulcer to left heel, no surrounding erythema/warmth or drainage Skin - normal coloration and turgor, no rashes, no suspicious skin lesions noted MDM Number of Diagnoses or Management Options Closed fracture of one rib of right side, initial encounter:  
Fall, initial encounter: Pneumothorax on right:  
  
Amount and/or Complexity of Data Reviewed Clinical lab tests: ordered and reviewed Tests in the radiology section of CPT®: ordered and reviewed Decide to obtain previous medical records or to obtain history from someone other than the patient: yes Obtain history from someone other than the patient: yes (daughters) Review and summarize past medical records: yes Discuss the patient with other providers: yes (hospitalist) Independent visualization of images, tracings, or specimens: yes Patient Progress Patient progress: stable Procedures 12:56 PM  
Updated the patient and family on plan of care and results. Agreeable for admission. CONSULT NOTE: 
12:58 PM Nawaf Beckett MD communicated with Dr. Driss Saab, Consult for Hospitalist via [facility] Text. Discussed available diagnostic tests and clinical findings. He will see and admit the patient. ED EKG interpretation: 
Rhythm: normal sinus rhythm; and regular . Rate (approx.): 73;  Axis: left axis deviation; P wave: normal; QRS interval: normal ; ST/T wave: non-specific changes;   
EKG documented by Martita Riddle MD

## 2019-01-15 NOTE — PROGRESS NOTES
Date of previous inpatient admission/ ED visit? Patient last admitted 10/24-10/31 for Intractable Back pain. Patient last seen in the ED on 11/2 for Acute low back pain as well as 11/27 for Contusion of Scalp. RRAT score is 10. Patient with 3 ED visits in the past 6 months. What brought the patient back to ED? Chart reviewed. Patient comes to the ER via EMS from [facility] c/o R rib pain/fracture. Patient had a fall yesterday resulting in R rib fracture per facility. Did patient decline recommended services during last admission/ ED visit (if yes, what)? NO Has patient seen a provider since their last inpatient admission/ED visit (if yes, when)? Yes, patient being followed at The [id number] [place] [address] for skilled services. CM Interventions: 
From previous inpatient admission/ED visit: Patient discharged to [facility] for skilled services. Transport arranged. Currently awaiting a bed for intermediate. From current inpatient admission/ED visit: Patient currently in the ED being evaluated and needs assessed. CM received a consult for ED Senior Services Case Management. CM spoke with [facility] [phone] with [facility] and received updates that patient is at facility for skilled services. Goal is for patient to transition to an ILIA bed when medically stable. Patient with a wound that needs to be resolved prior to discharge from nursing facility. Patient is able to return to facility for continued services at discharge. Daughter at bedside. Care Management Interventions PCP Verified by CM: Yes 
Palliative Care Criteria Met (RRAT>21 & CHF Dx)?: No 
Mode of Transport at Discharge: BLS Transition of Care Consult (CM Consult): Other(ED Senior Service Consult) Discharge Durable Medical Equipment: No 
Physical Therapy Consult: Yes Occupational Therapy Consult: No 
Speech Therapy Consult: No 
 Current Support Network: Nursing Facility([facility]) Confirm Follow Up Transport: Other (see comment)(TBD based on what's appropriate at discharge) Plan discussed with Pt/Family/Caregiver: Yes Discharge Location Discharge Placement: Skilled nursing facility([facility]) ARNULFO Rodrigues/RADHA Care Management 2:05 PM

## 2019-01-15 NOTE — PROGRESS NOTES
Admission Medication Reconciliation: 
 
Information obtained from:  med list from [facility] Comments/Recommendations: The following changes were made to the PTA medication list: 1. Meds Added: albuterol neb solution, albuterol inhaler, Anora Ellipta, ranitidine, tramadol 2. Meds Removed: lidocaine 3. Meds Adjusted: increased amlodipine 5mg to 10mg Allergies:  Hydrochlorothiazide Chief Complaint for this Admission: Chief Complaint Patient presents with  Rib Pain Significant PMH/Disease States:  
Past Medical History:  
Diagnosis Date  Arthritis  Breast lump 2018  
 right breast lump x a few days  HTN (hypertension)  Hypercholesteremia  Hyperlipidemia  Osteoporosis  Stroke ([facility] [address] CVA, TIA  TIA (transient ischemic attack)  Vertebral fracture Prior to Admission Medications:  
Prior to Admission Medications Prescriptions Last Dose Informant Patient Reported? Taking?  
acetaminophen (TYLENOL) 325 mg tablet   Yes Yes Sig: Take 650 mg by mouth every four (4) hours as needed (back pain). albuterol (PROVENTIL VENTOLIN) 2.5 mg /3 mL (0.083 %) nebulizer solution   Yes Yes Si.5 mg by Nebulization route every six (6) hours as needed for Shortness of Breath. albuterol (VENTOLIN HFA) 90 mcg/actuation inhaler   Yes Yes Sig: Take 2 Puffs by inhalation every four (4) hours as needed for Shortness of Breath. alendronate (FOSAMAX) 70 mg tablet 2019  Yes Yes Sig: Take 70 mg by mouth Every Friday. amLODIPine (NORVASC) 10 mg tablet 1/15/2019 at am  Yes Yes Sig: Take 10 mg by mouth daily. balsam peru-castor oil (VENELEX) [id number] mg/gram ointment 1/15/2019 at am  No Yes Sig: Apply  to affected area two (2) times a day. calcium carbonate (OS-SIMONE) 500 mg calcium (1,250 mg) tablet 1/15/2019 at am  Yes Yes Sig: Take 1 Tab by mouth two (2) times a day. collagenase (SANTYL) 250 unit/gram ointment   Yes Yes Sig: Apply  to affected area daily. Apply to left heel  
furosemide (LASIX) 20 mg tablet 1/15/2019 at am  Yes Yes Sig: Take 20 mg by mouth daily. metoprolol tartrate (LOPRESSOR) 25 mg tablet 1/15/2019 at am  Yes Yes Sig: Take 12.5 mg by mouth two (2) times a day. multivitamin (ONE A DAY) tablet 1/15/2019 at am  Yes Yes Sig: Take 1 Tab by mouth daily. raNITIdine (ZANTAC) 150 mg tablet 1/15/2019 at am  Yes Yes Sig: Take 150 mg by mouth two (2) times a day. sertraline (ZOLOFT) 25 mg tablet 1/14/2019 at pm  No Yes Sig: Take 1 Tab by mouth every evening. traMADol (ULTRAM) 50 mg tablet 1/15/2019 at am  Yes Yes Sig: Take 25 mg by mouth daily. umeclidinium-vilanterol (ANORO ELLIPTA) 62.5-25 mcg/actuation inhaler 1/15/2019 at am  Yes Yes Sig: Take 1 Puff by inhalation daily. Facility-Administered Medications: None Thank you for allowing me to participate in this patient's care. Please call the main pharmacy at  or the [facility] pharmacist at  with any questions. Sign-off: Kat Banda, 2019 PharmD Candidate

## 2019-01-15 NOTE — H&P
[address]  HISTORY AND PHYSICAL [place] Seat 
MR#: [id number] : 1925 ACCOUNT #: [de-identified] ADMIT DATE: 01/15/2019 CHIEF COMPLAINT:  Fall. HISTORY OF PRESENT ILLNESS:  The patient is a 63-year-old female with a past medical history of gait abnormalities, multiple falls, left heel decubitus prior to arrival to the hospital, history of acute hypoxic and hypercapnic respiratory failure, community-acquired pneumonia, UTI, multiple compression fractures and hypernatremia, who presents to the hospital today with the above-mentioned symptoms. The patient apparently lives at [facility]. She had a fall yesterday. The patient reports that she did not hit her head or her neck, but fell on the right side, immediately started having pain on the right side of her chest.  The patient also reports that she had some pain in her right arm. She had multiple x-rays done at the facility and they showed some rib fracture. Patient continued to be in pain, was also having some shortness of breath. Family got concerned and the patient was transferred to [facility].  The patient reports that she did not lose consciousness. It was a purely mechanical fall. She is wearing a boot for the decubitus ulcer on her left foot and \"the boot got tangled,\" which caused her to fall. Patient was here in October after a fall and had multiple thoracic fractures and since then, has been living at [facility]. The daughter also reports that the patient is oxygen dependent since being discharged from the hospital in October. Patient denies any other complaints or problems. Denies any headache, blurry vision, sore throat, trouble swallowing, trouble with speech, any shortness of breath, cough. Does admit to low-grade fevers, but denies any chills.   Denies any abdominal pain, constipation, diarrhea, urinary symptoms, focal or generalized neurological weakness, recent travel, sick contacts, hematemesis, melena, hemoptysis, hematuria, or any other concerns or problems. PAST MEDICAL HISTORY:  See above. HOME MEDICATIONS:  Currently, the patient is on amlodipine 10 mg every day, Anoro Ellipta 1 puff daily, ranitidine 150 mg b.i.d.,  albuterol p.r.n., tramadol 25 mg every day, Zoloft 25 mg every day, acetaminophen 650 mg q.4 hours as needed, Lopressor 25 mg b.i.d., Fosamax 70 mg every Friday,  multivitamin 1 tablet every day, Lasix 20 mg every day. SOCIAL HISTORY:  Former smoker, used to smoke for 40 years. Occasional alcohol. No IV drug abuse. ALLERGIES:  HYDROCHLOROTHIAZIDE. REVIEW OF SYSTEMS:  All systems were reviewed and were found to be essentially negative except for the symptoms mentioned above. FAMILY HISTORY:  Discussed, was found to be noncontributory to the present admission. PHYSICAL EXAMINATION: 
VITAL SIGNS:  Temperature 98.4, pulse 74, respiratory rate 22, blood pressure 176/88, pulse ox 91% on 2 liters. GENERAL:  Alert x2, awake, moderately distressed, pleasant female, appears to be stated age. HEENT:  Pupils equal and reactive to light. Dry mucous membranes. Tympanic membranes clear. NECK:  Supple. CHEST:   Shallow breathing, mildly tachypneic. Clear to auscultation bilaterally. CARDIOVASCULAR:  S1, S2 are heard. ABDOMEN:  Soft, nontender, nondistended. Bowel sounds are physiological. 
EXTREMITIES:  No clubbing, no cyanosis, no edema. The left lower extremity is in a boot secondary to patient having a decubitus ulcer on the heel, stage II, per her daughters. NEUROPSYCHIATRIC:  Pleasant mood and affect. Cranial nerves II-XII grossly intact. No focal neurological deficits were noted. SKIN:  Warm. LABORATORY DATA:  White count 7.4, hemoglobin 12.2, hematocrit 40.6, platelets 248.   Sodium 144, potassium 3.8, chloride 106, bicarbonate 34, anion gap 4, glucose 191, BUN 15, creatinine 0.75, calcium 9.0, bilirubin total 0.5, ALT 25, AST 23, alkaline phosphatase 80, glucose 191. X-ray of the ribs shows right fourth rib fracture with possible small apical pneumothorax. EKG shows normal sinus rhythm with nonspecific ST changes. ASSESSMENT AND PLAN: 
1. Fourth rib fractures with pneumothorax. Patient will be admitted. Patient will be observed on a telemetry bed. Provide oxygen support, pain control, repeat chest x-ray in the morning, incentive spirometry and further intervention will be per hospital course. Continue to closely monitor and reassess as needed. Any changes in respiratory status will mandate emergent intervention. The patient will be on continuous pulse ox monitoring. Will continue patient on oxygen. Further intervention will be per hospital course. 2.  Subjective fevers. The family reports the patient has been having low-grade fevers. We will get a UA. Currently, patient is afebrile in the ER. No need for antibiotics. Continue to closely monitor. Reassess as needed. 3.  Hypertension, suboptimally controlled. Will add p.r.n. medication and continue home medications. Continue to monitor. 4.  Hyponatremia. The patient appears to be mildly hyponatremic. We will start the patient on half normal saline and repeat labs in the morning. Continue to monitor. 5.  Hyperglycemia. Previous history of diabetes. We will get a hemoglobin A1c and repeat sugar levels in the morning. Will also get postprandial Accu-Cheks and may consider sliding scale if blood glucose is significantly elevated. 6.  Gastrointestinal and deep venous thrombosis prophylaxis. Patient will be on sequential compression devices. Andrey Beltre MD MM/CRISTIAN 
D: 01/15/2019 14:19 T: 01/15/2019 14:58 JOB #: [id number]

## 2019-01-15 NOTE — ED TRIAGE NOTES
Patient comes to the ER via EMS from [facility] c/o R rib pain/fracture. Patient had a fall yesterday resulting in R rib fracture per facility.

## 2019-01-16 ENCOUNTER — APPOINTMENT (OUTPATIENT)
Dept: GENERAL RADIOLOGY | Age: 84
DRG: 206 | End: 2019-01-16
Attending: FAMILY MEDICINE
Payer: MEDICARE

## 2019-01-16 LAB
APPEARANCE UR: ABNORMAL
BILIRUB UR QL: NEGATIVE
COLOR UR: ABNORMAL
GLUCOSE UR STRIP.AUTO-MCNC: NEGATIVE MG/DL
HGB UR QL STRIP: NEGATIVE
KETONES UR QL STRIP.AUTO: NEGATIVE MG/DL
LEUKOCYTE ESTERASE UR QL STRIP.AUTO: ABNORMAL
NITRITE UR QL STRIP.AUTO: NEGATIVE
PH UR STRIP: 5.5 [PH] (ref 5–8)
PROT UR STRIP-MCNC: 30 MG/DL
SP GR UR REFRACTOMETRY: 1.02 (ref 1–1.03)
UROBILINOGEN UR QL STRIP.AUTO: 0.2 EU/DL (ref 0.2–1)

## 2019-01-16 PROCEDURE — 97530 THERAPEUTIC ACTIVITIES: CPT

## 2019-01-16 PROCEDURE — 74011250637 HC RX REV CODE- 250/637: Performed by: FAMILY MEDICINE

## 2019-01-16 PROCEDURE — 99218 HC RM OBSERVATION: CPT

## 2019-01-16 PROCEDURE — 97602 WOUND(S) CARE NON-SELECTIVE: CPT

## 2019-01-16 PROCEDURE — 94762 N-INVAS EAR/PLS OXIMTRY CONT: CPT

## 2019-01-16 PROCEDURE — 81001 URINALYSIS AUTO W/SCOPE: CPT

## 2019-01-16 PROCEDURE — 97535 SELF CARE MNGMENT TRAINING: CPT

## 2019-01-16 PROCEDURE — 97161 PT EVAL LOW COMPLEX 20 MIN: CPT

## 2019-01-16 PROCEDURE — 94640 AIRWAY INHALATION TREATMENT: CPT

## 2019-01-16 PROCEDURE — 77010033678 HC OXYGEN DAILY

## 2019-01-16 PROCEDURE — 74011000250 HC RX REV CODE- 250: Performed by: INTERNAL MEDICINE

## 2019-01-16 PROCEDURE — 77030011256 HC DRSG MEPILEX <16IN NO BORD MOLN -A

## 2019-01-16 PROCEDURE — 77030020186 HC BOOT HL PROTCT SAGE -B

## 2019-01-16 PROCEDURE — 74011250636 HC RX REV CODE- 250/636: Performed by: INTERNAL MEDICINE

## 2019-01-16 PROCEDURE — 97165 OT EVAL LOW COMPLEX 30 MIN: CPT

## 2019-01-16 PROCEDURE — 71046 X-RAY EXAM CHEST 2 VIEWS: CPT

## 2019-01-16 PROCEDURE — 74011250637 HC RX REV CODE- 250/637: Performed by: INTERNAL MEDICINE

## 2019-01-16 PROCEDURE — 74011000250 HC RX REV CODE- 250: Performed by: FAMILY MEDICINE

## 2019-01-16 PROCEDURE — 74011250636 HC RX REV CODE- 250/636: Performed by: FAMILY MEDICINE

## 2019-01-16 RX ORDER — ACETAMINOPHEN 325 MG/1
650 TABLET ORAL EVERY 8 HOURS
Status: DISCONTINUED | OUTPATIENT
Start: 2019-01-16 | End: 2019-01-17

## 2019-01-16 RX ORDER — LIDOCAINE 50 MG/G
2 PATCH TOPICAL EVERY 24 HOURS
Status: DISCONTINUED | OUTPATIENT
Start: 2019-01-16 | End: 2019-01-23 | Stop reason: HOSPADM

## 2019-01-16 RX ORDER — FUROSEMIDE 10 MG/ML
20 INJECTION INTRAMUSCULAR; INTRAVENOUS ONCE
Status: COMPLETED | OUTPATIENT
Start: 2019-01-16 | End: 2019-01-16

## 2019-01-16 RX ORDER — FAMOTIDINE 20 MG/1
20 TABLET, FILM COATED ORAL DAILY
Status: DISCONTINUED | OUTPATIENT
Start: 2019-01-17 | End: 2019-01-23 | Stop reason: HOSPADM

## 2019-01-16 RX ADMIN — Medication 10 ML: at 06:00

## 2019-01-16 RX ADMIN — METOPROLOL TARTRATE 12.5 MG: 25 TABLET ORAL at 08:57

## 2019-01-16 RX ADMIN — HEPARIN SODIUM 5000 UNITS: 5000 INJECTION INTRAVENOUS; SUBCUTANEOUS at 16:25

## 2019-01-16 RX ADMIN — CASTOR OIL AND BALSAM, PERU: 788; 87 OINTMENT TOPICAL at 21:56

## 2019-01-16 RX ADMIN — SERTRALINE HYDROCHLORIDE 25 MG: 50 TABLET ORAL at 18:05

## 2019-01-16 RX ADMIN — ACETAMINOPHEN 650 MG: 325 TABLET ORAL at 16:25

## 2019-01-16 RX ADMIN — IPRATROPIUM BROMIDE AND ALBUTEROL SULFATE 3 ML: .5; 3 SOLUTION RESPIRATORY (INHALATION) at 08:00

## 2019-01-16 RX ADMIN — Medication 10 ML: at 21:55

## 2019-01-16 RX ADMIN — AMLODIPINE BESYLATE 10 MG: 5 TABLET ORAL at 08:57

## 2019-01-16 RX ADMIN — CASTOR OIL AND BALSAM, PERU: 788; 87 OINTMENT TOPICAL at 16:09

## 2019-01-16 RX ADMIN — Medication 10 ML: at 15:39

## 2019-01-16 RX ADMIN — HEPARIN SODIUM 5000 UNITS: 5000 INJECTION INTRAVENOUS; SUBCUTANEOUS at 08:57

## 2019-01-16 RX ADMIN — FUROSEMIDE 20 MG: 10 INJECTION, SOLUTION INTRAMUSCULAR; INTRAVENOUS at 16:25

## 2019-01-16 RX ADMIN — METOPROLOL TARTRATE 12.5 MG: 25 TABLET ORAL at 21:53

## 2019-01-16 RX ADMIN — ACETAMINOPHEN 650 MG: 325 TABLET ORAL at 21:55

## 2019-01-16 RX ADMIN — THERA TABS 1 TABLET: TAB at 08:57

## 2019-01-16 RX ADMIN — IPRATROPIUM BROMIDE AND ALBUTEROL SULFATE 3 ML: .5; 3 SOLUTION RESPIRATORY (INHALATION) at 20:48

## 2019-01-16 RX ADMIN — HEPARIN SODIUM 5000 UNITS: 5000 INJECTION INTRAVENOUS; SUBCUTANEOUS at 00:00

## 2019-01-16 RX ADMIN — FAMOTIDINE 20 MG: 20 TABLET, FILM COATED ORAL at 08:57

## 2019-01-16 NOTE — PROGRESS NOTES
Renal Dosing/Monitoring Medication: Famotidine Current regimen:  20 mg PO BID Recent Labs  
  01/15/19 
1121 CREA 0.75 BUN 15 Estimated CrCl:  24.8 ml/min Plan: Change to 20 mg PO daily   per [facility] P&T Committee Protocol with respect to renal function. Pharmacy will continue to monitor patient daily and will make dosage adjustments based upon changing renal function.  
 
Louann Rahman, EfraD, BCPS

## 2019-01-16 NOTE — PROGRESS NOTES
Problem: Pressure Injury - Risk of 
Goal: *Prevention of pressure injury Document Mohit Scale and appropriate interventions in the flowsheet. Outcome: Progressing Towards Goal 
Primary Nurse Betty Mcknight and JORGE Song performed a dual skin assessment on this patient Impairment noted- see wound doc flow sheet. Left heel wound R foot callous Maroon buttocks Mohit score is 18 Pressure Injury Interventions: 
Sensory Interventions: Assess changes in LOC, Turn and reposition approx. every two hours (pillows and wedges if needed), Pressure redistribution bed/mattress (bed type) Moisture Interventions: Internal/External urinary devices, Limit adult briefs, Minimize layers, Absorbent underpads Activity Interventions: Increase time out of bed, Pressure redistribution bed/mattress(bed type) Mobility Interventions: Pressure redistribution bed/mattress (bed type) Bedside shift change report given to Neno Perez (oncoming nurse) by Dillan Muñoz RN (offgoing nurse). Report included the following information SBAR, Kardex, MAR, Accordion, Recent Results, Med Rec Status, Cardiac Rhythm NSR and Alarm Parameters .

## 2019-01-16 NOTE — PROGRESS NOTES
Problem: Breathing Pattern - Ineffective Goal: *Absence of hypoxia Outcome: Progressing Towards Goal 
Pt on 2L NC. Pt had CXR today. Pt demonstrated proper use of incentive spirometer. Will continue to monitor and educate. Bedside shift change report given to Skip Bebe Place (oncoming nurse) by Namrata Rashid RN (offgoing nurse). Report included the following information SBAR, Kardex, MAR, Accordion, Recent Results, Med Rec Status, Cardiac Rhythm NSR and Alarm Parameters .

## 2019-01-16 NOTE — PROGRESS NOTES
Problem: Self Care Deficits Care Plan (Adult) Goal: *Acute Goals and Plan of Care (Insert Text) Occupational Therapy Goals Initiated 1/16/2019 1. Patient will perform grooming sitting unsupported with minimal SOB with supervision/set-up within 7 day(s). 2.  Patient will perform upper body ADLs sitting unsupported with minimal SOB with supervision/set-up within 7 day(s). 3.  Patient will perform lower body ADLs using AE PRN sitting unsupported with minimal SOB with moderate assistance  within 7 day(s). 4.  Patient will perform toilet transfers to [facility] with moderate assistance  within 7 day(s). 5.  Patient will perform all aspects of toileting with minimal assistance/contact guard assist within 7 day(s). 6.  Patient will participate in upper extremity therapeutic exercise/activities with supervision/set-up for 5 minutes within 7 day(s). 7.  Patient will utilize energy conservation techniques during functional activities with verbal cues within 7 day(s). Occupational Therapy EVALUATION Patient: Juan Eli [de-identified]80 y.o. female) Date: 1/16/2019 Primary Diagnosis: SOB (shortness of breath) Precautions:  Fall, Skin ASSESSMENT : 
Based on the objective data described below, the patient presents with overall mod A for bed mobility, s/u- min A for upper body ADLs and max -total A for lower body ADLs s/p admission for SOB; patient with recent GLF resulting in R 4-6th rib fx. Patient evaluation completed in bed today 2* RR increasing to 30/40s with change in position to modified chair position and pain. Patient most recently in rehab (SNF) since October 2018, however, prior was living in [address] [place] [address] and was mod I/IND with ADLs.  Today, patient limited by pain (R rib fx), impaired sitting balance, decreased functional activity tolerance, impaired reach to feet, generalized weakness and impaired cardiopulmonary endurance (on 3L O2; RR 17-40 - increased with movement). Patient functioning well below baseline and would benefit from return to SNF once medically cleared for D/C. Recommend with nursing patient to complete as able in order to maintain strength, endurance and independence: ADLs with supervision/setup, bed to chair position 3x/day and mobilizing self in bed in prep for toileting with 2 assist. Thank you for your assistance. Patient will benefit from skilled intervention to address the above impairments. Patients rehabilitation potential is considered to be Fair Factors which may influence rehabilitation potential include:  
[]             None noted []             Mental ability/status []             Medical condition []             Home/family situation and support systems []             Safety awareness []             Pain tolerance/management 
[]             Other: PLAN : 
Recommendations and Planned Interventions: 
[x]               Self Care Training                  [x]        Therapeutic Activities [x]               Functional Mobility Training    []        Cognitive Retraining 
[x]               Therapeutic Exercises           []        Endurance Activities [x]               Balance Training                   []        Neuromuscular Re-Education []               Visual/Perceptual Training     [x]   Home Safety Training 
[x]               Patient Education                 [x]        Family Training/Education []               Other (comment): Frequency/Duration: Patient will be followed by occupational therapy 5 times a week to address goals. Discharge Recommendations: Alejandro Fregoso Further Equipment Recommendations for Discharge: TBD SUBJECTIVE:  
Patient stated My ribs just hurt so much.  OBJECTIVE DATA SUMMARY:  
HISTORY:  
Past Medical History:  
Diagnosis Date  Arthritis  Breast lump 06/13/2018  
 right breast lump x a few days  HTN (hypertension)  Hypercholesteremia  Hyperlipidemia  Osteoporosis  Stroke ([facility] [address] CVA, TIA  TIA (transient ischemic attack)  Vertebral fracture Past Surgical History:  
Procedure Laterality Date  HX HIP REPLACEMENT  2009  
 left  HX ORTHOPAEDIC    
 left hip replacement Prior Level of Function/Environment/Context: see above - patient with boot on LLE 2* pressure ulcer. Daughter involved in her care Occupations in which the patient is/was successful, what are the barriers preventing that success:  
Performance Patterns (routines, roles, habits, and rituals):  
Personal Interests and/or values:  
Expanded or extensive additional review of patient history:  
 
Home Situation Home Environment: Independent living # Steps to Enter: 0 One/Two Story Residence: One story Living Alone: Yes Patient Expects to be Discharged to[de-identified] Skilled nursing facility(back to [address] [place] [address]) Current DME Used/Available at Home: Adaptive bathing aides, Adaptive dressing aides, Grab bars, Shower chair, Walker, rolling Tub or Shower Type: Shower Hand dominance: RightEXAMINATION OF PERFORMANCE DEFICITS: 
Cognitive/Behavioral Status: 
Neurologic State: Alert;Confused Orientation Level: Oriented to person;Oriented to situation;Disoriented to place; Disoriented to time Cognition: Appropriate for age attention/concentration; Follows commands; Impaired decision making Perception: Appears intact Perseveration: No perseveration noted Safety/Judgement: Decreased awareness of environment;Decreased awareness of need for assistance;Decreased awareness of need for safety;Decreased insight into deficits; Fall prevention Skin: Appears grossly intact except ulcer on L heel Edema: none noted in BUEs Hearing: Auditory Auditory Impairment: None Vision/Perceptual:   
Tracking: Able to track stimulus in all quadrants w/o difficulty Diplopia: No   
Acuity: Within Defined Limits Corrective Lenses: Glasses Range of Motion: In [id number] [place] [address] [address] AROM: Generally decreased, functional 
Strength: [place] [address] [facility] [address] Strength: Generally decreased, functional 
Coordination: 
Coordination: Generally decreased, functional 
Fine Motor Skills-Upper: Left Intact; Right Intact Gross Motor Skills-Upper: Left Intact; Right Intact Tone & Sensation: [place] [address] [facility] [address] Tone: Normal 
Sensation: Intact Balance: 
Sitting: Intact Standing: (NT) Functional Mobility and Transfers for ADLs:Bed Mobility: 
Rolling: Moderate assistance(roll to L, assist at LEs and trunk) Supine to Sit: Moderate assistance(sup to sit toward R, assist for trunk >LEs) Sit to Supine: Moderate assistance(assist for LEs) Transfers: Toilet Transfer : Maximum assistance; Additional time(Infer ) ADL Assessment: 
Feeding: Setup - Infer per obs of functional reach, FM/GM coordination and strength in BUE Oral Facial Hygiene/Grooming: Setup - Infer per obs of functional reach, FM/GM coordination and strength in BUE Bathing: Maximum assistance - Infer per obs of functional mobility, strength, balance and cardiopulmonary endurance Upper Body Dressing: Minimum assistance - Infer per obs of functional reach, FM/GM coordination and strength in BUE Lower Body Dressing: Total assistance  - Infer per obs of functional mobility, functional reach, strength, balance and cardiopulmonary endurance Toileting: Maximum assistance  - Infer per obs of functional mobility, strength, balance and cardiopulmonary endurance ADL Intervention and task modifications: 
Educated on importance of participating in therapy, sitting upright in bed/getting to chair for all meals as vitals allow, and completing ADLs as IND as possible. Educated on PLB with fair/poor carryover. Lower Body Dressing Assistance Antiembolitic Stockings: Total assistance (dependent) Leg Crossed Method Used: No 
Position Performed: Supine Cognitive Retraining Safety/Judgement: Decreased awareness of environment;Decreased awareness of need for assistance;Decreased awareness of need for safety;Decreased insight into deficits; Fall prevention Functional Measure: 
Barthel Index: 
 
Bathin Bladder: 5 Bowels: 5 Groomin Dressin Feedin Mobility: 0 Stairs: 0 Toilet Use: 5 Transfer (Bed to Chair and Back): 0 Total: 20 The Barthel ADL Index: Guidelines 1. The index should be used as a record of what a patient does, not as a record of what a patient could do. 2. The main aim is to establish degree of independence from any help, physical or verbal, however minor and for whatever reason. 3. The need for supervision renders the patient not independent. 4. A patient's performance should be established using the best available evidence. Asking the patient, friends/relatives and nurses are the usual sources, but direct observation and common sense are also important. However direct testing is not needed. 5. Usually the patient's performance over the preceding 24-48 hours is important, but occasionally longer periods will be relevant. 6. Middle categories imply that the patient supplies over 50 per cent of the effort. 7. Use of aids to be independent is allowed. Corwin Carnes., Barthel, D.W. (7449). Functional evaluation: the Barthel Index. [address] [facility] (14)2. Ema Sunshine joon CARLOS ALBERTO Junior, Dale Elaine., Arash Coleman., [place], [address] [place] [address] (). Measuring the change indisability after inpatient rehabilitation; comparison of the responsiveness of the Barthel Index and Functional [place] Measure. Journal of Neurology, Neurosurgery, and Psychiatry, 66(4), 157-972. Robin Winkler, N.J.GOPI, KAMERON Galvan, & Frank Victoria MBENNIE. (2004.) Assessment of post-stroke quality of life in cost-effectiveness studies: The usefulness of the Barthel Index and the EuroQoL-5D. [facility], 13, 197-12 Occupational Therapy Evaluation Charge Determination History Examination Decision-Making LOW Complexity : Brief history review  MEDIUM Complexity : 3-5 performance deficits relating to physical, cognitive , or psychosocial skils that result in activity limitations and / or participation restrictions HIGH Complexity : Patient presents with comorbidities that affect occupational performance. Signifigant modification of tasks or assistance (eg, physical or verbal) with assessment (s) is necessary to enable patient to complete evaluation Based on the above components, the patient evaluation is determined to be of the following complexity level: LOW Pain: 
  
  
  
  
  
  
Activity Tolerance:  
Poor, VS (RR 17-40 with minimal exertion/bed changed position) Please refer to the flowsheet for vital signs taken during this treatment. After treatment:  
[] Patient left in no apparent distress sitting up in chair 
[x] Patient left in no apparent distress in bed/ modified chair 
[x] Call bell left within reach [x] Nursing notified 
[x] Caregiver present 
[] Bed alarm activated COMMUNICATION/EDUCATION:  
The patients plan of care was discussed with: Physical Therapist and Registered Nurse. [x] Home safety education was provided and the patient/caregiver indicated understanding. [x] Patient/family have participated as able in goal setting and plan of care. [] Patient/family agree to work toward stated goals and plan of care. [] Patient understands intent and goals of therapy, but is neutral about his/her participation. [] Patient is unable to participate in goal setting and plan of care. This patients plan of care is appropriate for delegation to [facility] [place]. Thank you for this referral. 
Stephanie Newton OT Time Calculation: 18 mins

## 2019-01-16 NOTE — PROGRESS NOTES
Problem: Mobility Impaired (Adult and Pediatric) Goal: *Acute Goals and Plan of Care (Insert Text) Physical Therapy Goals Initiated 1/16/2019 1. Patient will move from supine to sit and sit to supine  and roll side to side in bed with minimal assistance/contact guard assist within 7 day(s). 2.  Patient will transfer from bed to chair and chair to bed with moderate assistance  using the least restrictive device within 7 day(s). 3.  Patient will perform sit to stand with moderate assistance  within 7 day(s). 4.  Patient will ambulate with moderate assistance  for at least 10 feet with the least restrictive device within 7 day(s). physical Therapy EVALUATION Patient: Duarte Calderon [de-identified]80 y.o. female) Date: 1/16/2019 Primary Diagnosis: SOB (shortness of breath) Precautions: fall, aspiration ASSESSMENT : 
Based on the objective data described below, the patient presents with general debility, impaired balance, decreased function with mobility, decreased tolerance to activity. Pt with supplemental O2 requirement of 3 lpm at this time. Noted RR 20's/30's at rest; up to 40s/ SpO2 high 80s with minimal activity, poor response to cues for pursed lip breathing. Pt required mod A for bed mob this date, complicated by R ribcage pain with movement. Pt able to maintain static sit with supervision, but unable to further mobilize due to increased RR and decreased SpO2; self initiated return to supine due to pain/ fatigue, required mod A for lifting LEs. Pt's daughter present and providing most of history. Notes pt receiving PT in SNF prior to admit. Reports LOF as amb indep transfer to w/c and toilet, amb with RW and therapist assist, use of w/c for mobility otherwise due to unable to manage O2 tank and RW. Pt's daughter notes improved tolerance to therapy when session coordinated with pain med administration. Pt wearing pressure relieving boots due to ulcer L heel; has PRAFO with walking sole for use with mobility. Recommend SNF level PT at d/c. Patient will benefit from skilled intervention to address the above impairments. Patients rehabilitation potential is considered to be Fair Factors which may influence rehabilitation potential include:  
[]         None noted 
[]         Mental ability/status [x]         Medical condition (resp status) []         Home/family situation and support systems 
[]         Safety awareness 
[]         Pain tolerance/management 
[]         Other: PLAN : 
Recommendations and Planned Interventions: 
[x]           Bed Mobility Training             []    Neuromuscular Re-Education 
[x]           Transfer Training                   []    Orthotic/Prosthetic Training 
[x]           Gait Training                         []    Modalities [x]           Therapeutic Exercises           []    Edema Management/Control 
[x]           Therapeutic Activities            [x]    Patient and Family Training/Education 
[]           Other (comment): Frequency/Duration: Patient will be followed by physical therapy  5 times a week to address goals. Discharge Recommendations: Alejandro Fregoso Further Equipment Recommendations for Discharge: to be determined SUBJECTIVE:  
Patient stated I'll try.  OBJECTIVE DATA SUMMARY:  
HISTORY:   
Past Medical History:  
Diagnosis Date  Arthritis  Breast lump 06/13/2018  
 right breast lump x a few days  HTN (hypertension)  Hypercholesteremia  Hyperlipidemia  Osteoporosis  Stroke ([facility] [address] CVA, TIA  TIA (transient ischemic attack)  Vertebral fracture Past Surgical History:  
Procedure Laterality Date  HX HIP REPLACEMENT  2009  
 left  HX ORTHOPAEDIC    
 left hip replacement Prior Level of Function/Home Situation: pt most recently residing at [facility]; family goal for eventual group home Personal factors and/or comorbidities impacting plan of care: resp status, pain 
 
Home Situation Patient Expects to be Discharged to[de-identified] Skilled nursing facility(back to [address] [place] [address]) EXAMINATION/PRESENTATION/DECISION MAKING: Critical Behavior: 
Neurologic State: Alert, Confused Orientation Level: Oriented to person, Oriented to place, Disoriented to time, Oriented to situation Cognition: Appropriate for age attention/concentration, Follows commands Hearing: Auditory Auditory Impairment: NoneSkin:  Pressure ulcer L heel and sacrum Edema:  
Range Of Motion: 
AROM: Generally decreased, functional 
  
  
  
  
  
  
  
Strength:   
Strength: Generally decreased, functional 
  
  
  
  
  
  
Tone & Sensation:  
Tone: Normal 
  
  
  
  
  
  
  
  
   
Coordination: 
Coordination: Generally decreased, functional 
Vision:  
  
Functional Mobility: 
Bed Mobility: 
Rolling: Moderate assistance(roll to L, assist at LEs and trunk) Supine to Sit: Moderate assistance(sup to sit toward R, assist for trunk >LEs) Sit to Supine: Moderate assistance(assist for LEs) Transfers: 
  
  
     
  
     
  
  
Balance:  
Sitting: Intact Standing: (NT)Ambulation/Gait Training:  
  
  
  
  
  
  
  
  
  
  
  
  
  
  
  
   
 Stairs: Therapeutic Exercises:  
 
 
Functional Measure: 
Culp Balance Test: 
 
Sitting to Standin Standing Unsupported: 0 Sitting with Back Unsupported: 3 Standing to Sittin Transfers: 0 Standing Unsupported with Eyes Closed: 0 Standing Unsupported with Feet Together: 0 Reach Forward with Outstretched Arm: 0  Object: 0 Turn to Look Over Shoulders: 0 Turn 360 Degrees: 0 Alternate Foot on Step/Stool: 0 Standing Unsupported One Foot in Front: 0 Stand on One Le Total: 3 
 
 
 
56=Maximum possible score;  
0-20=High fall risk 21-40=Moderate fall risk 41-56=Low fall risk Physical Therapy Evaluation Charge Determination History Examination Presentation Decision-Making MEDIUM  Complexity : 1-2 comorbidities / personal factors will impact the outcome/ POC  LOW Complexity : 1-2 Standardized tests and measures addressing body structure, function, activity limitation and / or participation in recreation  MEDIUM Complexity : Evolving with changing characteristics  LOW Complexity : FOTO score of  Based on the above components, the patient evaluation is determined to be of the following complexity level: LOW Pain: 
  
  
  
  
  
  
Activity Tolerance:  
Pt tolerance to activity limited by respiratory status at this time Please refer to the flowsheet for vital signs taken during this treatment. After treatment:  
[]         Patient left in no apparent distress sitting up in chair 
[x]         Patient left in no apparent distress in bed 
[x]         Call bell left within reach [x]         Nursing notified 
[x]         Caregiver present [x]         Bed alarm activated COMMUNICATION/EDUCATION:  
The patients plan of care was discussed with: Registered Nurse. [x]         Fall prevention education was provided and the patient/caregiver indicated understanding. [x]         Patient/family have participated as able in goal setting and plan of care. [x]         Patient/family agree to work toward stated goals and plan of care. []         Patient understands intent and goals of therapy, but is neutral about his/her participation. []         Patient is unable to participate in goal setting and plan of care. Thank you for this referral. 
Avril Chan, PT Time Calculation: 31 mins

## 2019-01-16 NOTE — PROGRESS NOTES
Hospitalist Progress Note Katy Harris MD 
Answering service: [phone] OR [phone] from in house phone Cell: [phone] Date of Service:  2019 NAME:  Montana Arrieta :  1925 MRN:  [id number] Admission Summary:  
 
27-year-old female with a past medical history of gait abnormalities, multiple falls, left heel decubitus prior to arrival to the hospital, history of acute hypoxic and hypercapnic respiratory failure, community-acquired pneumonia, UTI, multiple compression fractures and hypernatremia, who presents to the hospital after a fall. The patient apparently lives at [place] at [facility]. She had a fall yesterday. The patient reports that she did not hit her head or her neck, but fell on the right side, immediately started having pain on the right side of her chest.  The patient also reports that she had some pain in her right arm. She had multiple x-rays done at the facility and they showed some rib fracture. Patient continued to be in pain, was also having some shortness of breath. Family got concerned and the patient was transferred to [facility].  The patient reports that she did not lose consciousness. It was a purely mechanical fall. She is wearing a boot for the decubitus ulcer on her left foot and \"the boot got tangled,\" which caused her to fall. Patient was here in October after a fall and had multiple thoracic fractures and since then, has been living at [place] at [facility]. The daughter also reports that the patient is oxygen dependent since being discharged from the hospital in October. Interval history / Subjective:  
 
Patient stating she is not SOB and has no pain. Seen with nursing at the bedside. Some concern for ing overnight. CXR from this AM still pending. Assessment & Plan:  
 
Fourth rib fractures with pneumothorax: -patient currently admitted to the [facility] - can downgrade to medical  
-provide oxygen support, pain control 
-repeat chest x-ray ASAP. Has not yet been done. Discussed with nursing 
-incentive spirometry  
-PT/OT Subjective fevers: Patient has not had any fever inpatient. 
-check urinalysis - ?do not see it ordered 
-culture if febrile 
-no abx Hypertension, suboptimally controlled. Some component could be due to pain.  
-continue and adjust home meds as needed Hyperglycemia, A1C at 5.5. Code status: DNR 
DVT prophylaxis: heparin Care Plan discussed with: Patient/Family and Nurse Disposition: TBD Hospital Problems  Date Reviewed: 1/15/2019 Codes Class Noted POA * (Principal) SOB (shortness of breath) ICD-10-CM: R06.02 
ICD-9-CM: 786.05  1/15/2019 Unknown Review of Systems:  
Pertinent items are noted in HPI. Vital Signs:  
 Last 24hrs VS reviewed since prior progress note. Most recent are: 
Visit Vitals /70 (BP 1 Location: Right arm, BP Patient Position: At rest;Lying right side) Pulse 84 Temp 98.3 °F (36.8 °C) Resp 18 Ht 4' 10\" (1.473 m) Wt 53.4 kg (117 lb 12.8 oz) SpO2 93% BMI 24.62 kg/m² Intake/Output Summary (Last 24 hours) at 1/16/2019 1302 Last data filed at 1/15/2019 1740 Gross per 24 hour Intake 240 ml Output  Net 240 ml Physical Examination:  
 
 
Constitutional:  No acute distress, cooperative, pleasant   
ENT:  Neck supple Resp:  CTA bilaterally. No accessory muscle use CV:  Regular rhythm, normal rate GI:  Soft, non distended, non tender Musculoskeletal:  No edema, warm Neurologic:  Moves all extremities. Alert Data Review:  
 Review and/or order of clinical lab test 
Review and/or order of tests in the radiology section of CPT Review and/or order of tests in the medicine section of CPT Labs:  
 
Recent Labs  
  01/15/19 
1121 WBC 7.4 HGB 12.2 HCT 40.6  Recent Labs 01/15/19 
1121   
K 3.8  CO2 34* BUN 15  
CREA 0.75 * CA 9.0 Recent Labs  
  01/15/19 
1121 SGOT 23 ALT 25 AP 80 TBILI 0.5 TP 6.5 ALB 3.1*  
GLOB 3.4 No results for input(s): INR, PTP, APTT in the last 72 hours. No lab exists for component: INREXT No results for input(s): FE, TIBC, PSAT, FERR in the last 72 hours. No results found for: FOL, RBCF No results for input(s): PH, PCO2, PO2 in the last 72 hours. No results for input(s): CPK, CKNDX, TROIQ in the last 72 hours. No lab exists for component: CPKMB Lab Results Component Value Date/Time Cholesterol, total 168 05/21/2010 03:00 AM  
 HDL Cholesterol 53 05/21/2010 03:00 AM  
 LDL, calculated 95.4 05/21/2010 03:00 AM  
 Triglyceride 98 05/21/2010 03:00 AM  
 CHOL/HDL Ratio 3.2 05/21/2010 03:00 AM  
 
Lab Results Component Value Date/Time Glucose (POC) 120 (H) 10/27/2018 06:05 AM  
 Glucose (POC) 133 (H) 10/27/2018 12:18 AM  
 Glucose (POC) 177 (H) 10/26/2018 06:21 PM  
 Glucose (POC) 222 (H) 10/26/2018 11:51 AM  
 Glucose (POC) 119 (H) 10/26/2018 05:52 AM  
 
Lab Results Component Value Date/Time Color YELLOW/STRAW 10/24/2018 02:52 PM  
 Appearance CLOUDY (A) 10/24/2018 02:52 PM  
 Specific gravity 1.015 10/24/2018 02:52 PM  
 Specific gravity 1.005 03/07/2017 01:54 PM  
 pH (UA) 5.5 10/24/2018 02:52 PM  
 Protein 30 (A) 10/24/2018 02:52 PM  
 Glucose NEGATIVE  10/24/2018 02:52 PM  
 Ketone TRACE (A) 10/24/2018 02:52 PM  
 Bilirubin NEGATIVE  10/24/2018 02:52 PM  
 Urobilinogen 0.2 10/24/2018 02:52 PM  
 Nitrites NEGATIVE  10/24/2018 02:52 PM  
 Leukocyte Esterase MODERATE (A) 10/24/2018 02:52 PM  
 Epithelial cells FEW 10/24/2018 02:52 PM  
 Bacteria 4+ (A) 10/24/2018 02:52 PM  
 WBC  10/24/2018 02:52 PM  
 RBC 0-5 10/24/2018 02:52 PM  
 
 
 
Medications Reviewed:  
 
Current Facility-Administered Medications Medication Dose Route Frequency  [START ON 1/17/2019] famotidine (PEPCID) tablet 20 mg  20 mg Oral DAILY  [START ON 1/17/2019] sodium hypochlorite (QUARTER STRENGTH DAKIN'S) 0.125% irrigation (bottle)   Topical DAILY  collagenase (SANTYL) 250 unit/gram ointment   Topical DAILY  balsam peru-castor oil (VENELEX) [id number] mg/gram ointment   Topical TID  sodium chloride (NS) flush 5-40 mL  5-40 mL IntraVENous Q8H  
 sodium chloride (NS) flush 5-40 mL  5-40 mL IntraVENous PRN  
 acetaminophen (TYLENOL) tablet 650 mg  650 mg Oral Q4H PRN  
 heparin (porcine) injection 5,000 Units  5,000 Units SubCUTAneous Q8H  
 hydrALAZINE (APRESOLINE) 20 mg/mL injection 10 mg  10 mg IntraVENous Q6H PRN  
 amLODIPine (NORVASC) tablet 10 mg  10 mg Oral DAILY  metoprolol tartrate (LOPRESSOR) tablet 12.5 mg  12.5 mg Oral BID  therapeutic multivitamin (THERAGRAN) tablet 1 Tab  1 Tab Oral DAILY  sertraline (ZOLOFT) tablet 25 mg  25 mg Oral QPM  
 albuterol-ipratropium (DUO-NEB) 2.5 MG-0.5 MG/3 ML  3 mL Nebulization Q6H RT  
 
______________________________________________________________________ EXPECTED LENGTH OF STAY: - - - 
ACTUAL LENGTH OF STAY:          0 Alex Fraser MD

## 2019-01-16 NOTE — WOUND CARE
WOCN Note:  
 
New consult placed for assessment of left heel and sacrum. Patient and daughter report the the heel wound has been present for about 3 weeks. Daughter at the bedside for assessment. Verbal consent received for photography. Chart reviewed. Admitted DX:  SOB (shortness of breath) Past Medical History:  history of gait abnormalities, multiple falls, left heel decubitus prior to arrival to the hospital, history of acute hypoxic and hypercapnic respiratory failure, community-acquired pneumonia, UTI, multiple compression fractures and hypernatremia Admitted from [facility]. Assessment:  
Patient is A&O x 3, communicative and requires assist with repositioning. Bed: veracare Patient wearing briefs for incontinence and has a pure wick. Patient reports pain to the left heel. Patient repositioned on right side with pillow. Heels offloaded on prevalon boots. 1.  POA Left heel, Pressure Injury Unstageable:  0.7 x 1 x 0.5 cm; 95% yellow 5% red; small serous exudate; malodor present; no erythema. 2.  POA Sacrum Pressure Injury DTI (1 x 1 x 0 cm) with Pressure Injury Stage 1 (3 x 1 x 0 cm). Recommendations:   
Upgrade bed 
prevalon boots Turn team 
 
1. Left heel:  Daily cleanse with Dakins, apply nickel depth amount of Santyl and saline moist 2 x 2 gauze; cover with dry dressing. 2.  Sacrum and buttocks:  Venelex tid Skin Care & Pressure Prevention: 
Minimize layers of linen/pads under patient to optimize support surface. Turn/reposition approximately every 2 hours and offload heels. Manage incontinence / promote continence Specialty bed: Kaya Patel on Versacare ordered via [facility]. Use only flat sheet and one incontinence pad. Please call [facility] if not delivered. Ref# [id number]. Discussed above plan with patient, daughter and Avtar Adhikari RN.  
 
Transition of Care: Plan to follow as needed while admitted to hospital. 
 
EMILY Cervantes RN [facility]. 
 Wound Care Office [phone] Pager [phone]

## 2019-01-17 ENCOUNTER — APPOINTMENT (OUTPATIENT)
Dept: GENERAL RADIOLOGY | Age: 84
DRG: 206 | End: 2019-01-17
Attending: INTERNAL MEDICINE
Payer: MEDICARE

## 2019-01-17 LAB
ANION GAP SERPL CALC-SCNC: 5 MMOL/L (ref 5–15)
BASOPHILS # BLD: 0 K/UL (ref 0–0.1)
BASOPHILS NFR BLD: 0 % (ref 0–1)
BNP SERPL-MCNC: 787 PG/ML (ref 0–450)
BUN SERPL-MCNC: 17 MG/DL (ref 6–20)
BUN/CREAT SERPL: 26 (ref 12–20)
CALCIUM SERPL-MCNC: 8.1 MG/DL (ref 8.5–10.1)
CHLORIDE SERPL-SCNC: 106 MMOL/L (ref 97–108)
CO2 SERPL-SCNC: 31 MMOL/L (ref 21–32)
CREAT SERPL-MCNC: 0.65 MG/DL (ref 0.55–1.02)
DIFFERENTIAL METHOD BLD: ABNORMAL
EOSINOPHIL # BLD: 0 K/UL (ref 0–0.4)
EOSINOPHIL NFR BLD: 0 % (ref 0–7)
ERYTHROCYTE [DISTWIDTH] IN BLOOD BY AUTOMATED COUNT: 13.9 % (ref 11.5–14.5)
GLUCOSE SERPL-MCNC: 137 MG/DL (ref 65–100)
HCT VFR BLD AUTO: 35.4 % (ref 35–47)
HGB BLD-MCNC: 10.8 G/DL (ref 11.5–16)
IMM GRANULOCYTES # BLD AUTO: 0.1 K/UL (ref 0–0.04)
IMM GRANULOCYTES NFR BLD AUTO: 1 % (ref 0–0.5)
LYMPHOCYTES # BLD: 0.5 K/UL (ref 0.8–3.5)
LYMPHOCYTES NFR BLD: 7 % (ref 12–49)
MCH RBC QN AUTO: 29.7 PG (ref 26–34)
MCHC RBC AUTO-ENTMCNC: 30.5 G/DL (ref 30–36.5)
MCV RBC AUTO: 97.3 FL (ref 80–99)
MONOCYTES # BLD: 0.4 K/UL (ref 0–1)
MONOCYTES NFR BLD: 6 % (ref 5–13)
NEUTS SEG # BLD: 6 K/UL (ref 1.8–8)
NEUTS SEG NFR BLD: 86 % (ref 32–75)
NRBC # BLD: 0 K/UL (ref 0–0.01)
NRBC BLD-RTO: 0 PER 100 WBC
PLATELET # BLD AUTO: 234 K/UL (ref 150–400)
PLATELET COMMENTS,PCOM: ABNORMAL
PMV BLD AUTO: 11.2 FL (ref 8.9–12.9)
POTASSIUM SERPL-SCNC: 3.2 MMOL/L (ref 3.5–5.1)
RBC # BLD AUTO: 3.64 M/UL (ref 3.8–5.2)
RBC MORPH BLD: ABNORMAL
RBC MORPH BLD: ABNORMAL
SODIUM SERPL-SCNC: 142 MMOL/L (ref 136–145)
WBC # BLD AUTO: 7 K/UL (ref 3.6–11)

## 2019-01-17 PROCEDURE — 94664 DEMO&/EVAL PT USE INHALER: CPT

## 2019-01-17 PROCEDURE — 97530 THERAPEUTIC ACTIVITIES: CPT

## 2019-01-17 PROCEDURE — 85025 COMPLETE CBC W/AUTO DIFF WBC: CPT

## 2019-01-17 PROCEDURE — 80048 BASIC METABOLIC PNL TOTAL CA: CPT

## 2019-01-17 PROCEDURE — 99218 HC RM OBSERVATION: CPT

## 2019-01-17 PROCEDURE — 74011250636 HC RX REV CODE- 250/636: Performed by: FAMILY MEDICINE

## 2019-01-17 PROCEDURE — 65660000000 HC RM CCU STEPDOWN

## 2019-01-17 PROCEDURE — 36415 COLL VENOUS BLD VENIPUNCTURE: CPT

## 2019-01-17 PROCEDURE — 74011000250 HC RX REV CODE- 250: Performed by: INTERNAL MEDICINE

## 2019-01-17 PROCEDURE — 94640 AIRWAY INHALATION TREATMENT: CPT

## 2019-01-17 PROCEDURE — 83880 ASSAY OF NATRIURETIC PEPTIDE: CPT

## 2019-01-17 PROCEDURE — 74011250637 HC RX REV CODE- 250/637: Performed by: INTERNAL MEDICINE

## 2019-01-17 PROCEDURE — 74011250636 HC RX REV CODE- 250/636: Performed by: INTERNAL MEDICINE

## 2019-01-17 PROCEDURE — 77010033678 HC OXYGEN DAILY

## 2019-01-17 PROCEDURE — 74011000250 HC RX REV CODE- 250: Performed by: FAMILY MEDICINE

## 2019-01-17 PROCEDURE — 74011250637 HC RX REV CODE- 250/637: Performed by: FAMILY MEDICINE

## 2019-01-17 PROCEDURE — 71046 X-RAY EXAM CHEST 2 VIEWS: CPT

## 2019-01-17 RX ORDER — OXYCODONE HCL 5 MG/5 ML
2.5 SOLUTION, ORAL ORAL ONCE
Status: COMPLETED | OUTPATIENT
Start: 2019-01-17 | End: 2019-01-17

## 2019-01-17 RX ORDER — FUROSEMIDE 20 MG/1
20 TABLET ORAL DAILY
Status: DISCONTINUED | OUTPATIENT
Start: 2019-01-17 | End: 2019-01-18

## 2019-01-17 RX ORDER — ACETAMINOPHEN 500 MG
1000 TABLET ORAL EVERY 8 HOURS
Status: DISCONTINUED | OUTPATIENT
Start: 2019-01-17 | End: 2019-01-23 | Stop reason: HOSPADM

## 2019-01-17 RX ORDER — FUROSEMIDE 20 MG/1
20 TABLET ORAL DAILY
Status: DISCONTINUED | OUTPATIENT
Start: 2019-01-18 | End: 2019-01-17

## 2019-01-17 RX ORDER — OXYCODONE HCL 5 MG/5 ML
2.5 SOLUTION, ORAL ORAL
Status: DISCONTINUED | OUTPATIENT
Start: 2019-01-17 | End: 2019-01-23 | Stop reason: HOSPADM

## 2019-01-17 RX ORDER — FUROSEMIDE 10 MG/ML
20 INJECTION INTRAMUSCULAR; INTRAVENOUS ONCE
Status: COMPLETED | OUTPATIENT
Start: 2019-01-17 | End: 2019-01-17

## 2019-01-17 RX ORDER — POTASSIUM CHLORIDE 1.5 G/1.77G
40 POWDER, FOR SOLUTION ORAL ONCE
Status: COMPLETED | OUTPATIENT
Start: 2019-01-17 | End: 2019-01-17

## 2019-01-17 RX ADMIN — HEPARIN SODIUM 5000 UNITS: 5000 INJECTION INTRAVENOUS; SUBCUTANEOUS at 17:08

## 2019-01-17 RX ADMIN — METOPROLOL TARTRATE 12.5 MG: 25 TABLET ORAL at 09:16

## 2019-01-17 RX ADMIN — Medication 2.5 MG: at 13:02

## 2019-01-17 RX ADMIN — IPRATROPIUM BROMIDE AND ALBUTEROL SULFATE 3 ML: .5; 3 SOLUTION RESPIRATORY (INHALATION) at 14:30

## 2019-01-17 RX ADMIN — CASTOR OIL AND BALSAM, PERU: 788; 87 OINTMENT TOPICAL at 09:26

## 2019-01-17 RX ADMIN — AMLODIPINE BESYLATE 10 MG: 5 TABLET ORAL at 09:16

## 2019-01-17 RX ADMIN — FAMOTIDINE 20 MG: 20 TABLET ORAL at 09:16

## 2019-01-17 RX ADMIN — CASTOR OIL AND BALSAM, PERU: 788; 87 OINTMENT TOPICAL at 23:28

## 2019-01-17 RX ADMIN — ACETAMINOPHEN 1000 MG: 500 TABLET ORAL at 13:02

## 2019-01-17 RX ADMIN — IPRATROPIUM BROMIDE AND ALBUTEROL SULFATE 3 ML: .5; 3 SOLUTION RESPIRATORY (INHALATION) at 21:12

## 2019-01-17 RX ADMIN — ACETAMINOPHEN 1000 MG: 500 TABLET ORAL at 23:26

## 2019-01-17 RX ADMIN — Medication 10 ML: at 23:27

## 2019-01-17 RX ADMIN — HYDRALAZINE HYDROCHLORIDE 10 MG: 20 INJECTION INTRAMUSCULAR; INTRAVENOUS at 13:36

## 2019-01-17 RX ADMIN — HEPARIN SODIUM 5000 UNITS: 5000 INJECTION INTRAVENOUS; SUBCUTANEOUS at 09:17

## 2019-01-17 RX ADMIN — THERA TABS 1 TABLET: TAB at 09:16

## 2019-01-17 RX ADMIN — POTASSIUM CHLORIDE 40 MEQ: 1.5 POWDER, FOR SOLUTION ORAL at 09:16

## 2019-01-17 RX ADMIN — Medication 10 ML: at 13:03

## 2019-01-17 RX ADMIN — IPRATROPIUM BROMIDE AND ALBUTEROL SULFATE 3 ML: .5; 3 SOLUTION RESPIRATORY (INHALATION) at 02:00

## 2019-01-17 RX ADMIN — DAKIN'S SOLUTION 0.125% (QUARTER STRENGTH): 0.12 SOLUTION at 10:53

## 2019-01-17 RX ADMIN — CASTOR OIL AND BALSAM, PERU: 788; 87 OINTMENT TOPICAL at 17:08

## 2019-01-17 RX ADMIN — SERTRALINE HYDROCHLORIDE 25 MG: 50 TABLET ORAL at 17:08

## 2019-01-17 RX ADMIN — COLLAGENASE SANTYL: 250 OINTMENT TOPICAL at 09:26

## 2019-01-17 RX ADMIN — METOPROLOL TARTRATE 12.5 MG: 25 TABLET ORAL at 23:27

## 2019-01-17 RX ADMIN — ACETAMINOPHEN 650 MG: 325 TABLET ORAL at 09:16

## 2019-01-17 RX ADMIN — FUROSEMIDE 20 MG: 10 INJECTION, SOLUTION INTRAMUSCULAR; INTRAVENOUS at 17:08

## 2019-01-17 RX ADMIN — HEPARIN SODIUM 5000 UNITS: 5000 INJECTION INTRAVENOUS; SUBCUTANEOUS at 03:33

## 2019-01-17 RX ADMIN — Medication 10 ML: at 09:17

## 2019-01-17 RX ADMIN — FUROSEMIDE 20 MG: 20 TABLET ORAL at 13:01

## 2019-01-17 NOTE — PHYSICIAN ADVISORY
Letter of Status Determination:  
Recommend hospitalization status upgraded from OBSERVATION  to INPATIENT  Status Pt Name:  Mark Arciniega MR#  
DANYELLE # [id number] / 
[id number] Payor: Kendrick Crawford / Plan: [address] / Product Type: Medicare /   
FANTASMA#  [id number] Room and Hospital  422/01  @ . [facility] hospital  
Hospitalization date  1/15/2019 11:07 AM  
Current Attending Physician  Megan Anderson MD  
Principal diagnosis  SOB (shortness of breath) Clinicals  80 y.o. y.o  female hospitalized with above diagnosis This elderly lady has sustained multiple falls in the past.  
This episode of her hospital care is heralded by the fact that she developed rib fracture and small pneumothorax. Her care is complex and her risks of deteriorations were high. Due to appropriate and necessary medical care, this pt's hospitalization has now exceeded two midnights . [place] ([facility]) criteria Does  NOT apply STATUS DETERMINATION  This patient is at above high risk of deterioration based on documented presenting clinical data, comorbid conditions, high risk of adverse events and current acute care course. Ms. Mark Arciniega now meets Inpatient Admission status criteria in accordance with CMS regulation Section 43 .3. Specifically, due to medical necessity the patient's stay now exceeds Two Midnights. It is our recommendation that this patient's hospitalization status should be upgraded from  OBSERVATION to INPATIENT status. The final decision of the patient's hospitalization status depends on the attending physician's judgment Additional comments Payor: Kendrick Crawford / Plan: [address] / Product Type: Medicare /   
  
 
Sharifa hSell MD MPH FACP Cell: [phone] Physician Advisor [address] [facility] [address] [facility]  
President Medical Staff, [address] [facility]   
Cell  [phone]   
 
 
[phone] Jennifer Miner

## 2019-01-17 NOTE — PROGRESS NOTES
1930: Bedside shift change report given to Deloris Trimble RN (oncoming nurse) by Leisa Hartman RN (offgoing nurse). Report included the following information SBAR, Kardex, Intake/Output, MAR, Recent Results and Cardiac Rhythm NSR. Problem: Pressure Injury - Risk of 
Goal: *Prevention of pressure injury Document Mohit Scale and appropriate interventions in the flowsheet. Outcome: Progressing Towards Goal 
Pressure Injury Interventions: 
Sensory Interventions: Assess changes in LOC, Assess need for specialty bed, Check visual cues for pain, Discuss PT/OT consult with provider, Float heels, Keep linens dry and wrinkle-free, Minimize linen layers, Maintain/enhance activity level, Pressure redistribution bed/mattress (bed type), Turn and reposition approx. every two hours (pillows and wedges if needed) Moisture Interventions: Absorbent underpads, Apply protective barrier, creams and emollients, Assess need for specialty bed, Check for incontinence Q2 hours and as needed, Internal/External urinary devices, Limit adult briefs, Maintain skin hydration (lotion/cream), Minimize layers, Moisture barrier Activity Interventions: Assess need for specialty bed, Increase time out of bed, Pressure redistribution bed/mattress(bed type), PT/OT evaluation Mobility Interventions: Assess need for specialty bed, Float heels, HOB 30 degrees or less, Pressure redistribution bed/mattress (bed type), PT/OT evaluation, Turn and reposition approx. every two hours(pillow and wedges) Nutrition Interventions: Document food/fluid/supplement intake Friction and Shear Interventions: Apply protective barrier, creams and emollients, Feet elevated on foot rest, HOB 30 degrees or less, Lift sheet, Lift team/patient mobility team, Minimize layers, Transferring/repositioning devices Problem: Falls - Risk of 
Goal: *Absence of Falls Document Koreen Masters Fall Risk and appropriate interventions in the flowsheet. Outcome: Progressing Towards Goal 
Fall Risk Interventions: 
Mobility Interventions: Bed/chair exit alarm, Communicate number of staff needed for ambulation/transfer, OT consult for ADLs, Patient to call before getting OOB, PT Consult for mobility concerns, PT Consult for assist device competence, Strengthening exercises (ROM-active/passive), Utilize walker, cane, or other assistive device Mentation Interventions: Adequate sleep, hydration, pain control, Bed/chair exit alarm, Door open when patient unattended, Evaluate medications/consider consulting pharmacy, Eyeglasses and hearing aids, Increase mobility, More frequent rounding, Reorient patient, Room close to nurse's station, Self-releasing belt, Toileting rounds, Update white board Medication Interventions: Assess postural VS orthostatic hypotension, Bed/chair exit alarm, Evaluate medications/consider consulting pharmacy, Patient to call before getting OOB, Teach patient to arise slowly Elimination Interventions: Bed/chair exit alarm, Call light in reach, Patient to call for help with toileting needs, Elevated toilet seat, Toilet paper/wipes in reach, Toileting schedule/hourly rounds History of Falls Interventions: Bed/chair exit alarm, Consult care management for discharge planning, Door open when patient unattended, Evaluate medications/consider consulting pharmacy, Investigate reason for fall, Room close to nurse's station Problem: Breathing Pattern - Ineffective Goal: *Absence of hypoxia Outcome: Progressing Towards Goal 
Pt maintaining SpO2 >90% on 4L NC. Pt tolerating well with complaints of BUSTAMANTE with PT today. Pt receiving nebulizer treatments and prednisone. Will continue to monitor

## 2019-01-17 NOTE — PROGRESS NOTES
Problem: Mobility Impaired (Adult and Pediatric) Goal: *Acute Goals and Plan of Care (Insert Text) Physical Therapy Goals Initiated 1/16/2019 1. Patient will move from supine to sit and sit to supine  and roll side to side in bed with minimal assistance/contact guard assist within 7 day(s). 2.  Patient will transfer from bed to chair and chair to bed with moderate assistance  using the least restrictive device within 7 day(s). 3.  Patient will perform sit to stand with moderate assistance  within 7 day(s). 4.  Patient will ambulate with moderate assistance  for at least 10 feet with the least restrictive device within 7 day(s). physical Therapy TREATMENT Patient: Terrence Chu [de-identified]80 y.o. female) Date: 1/17/2019 Diagnosis: SOB (shortness of breath) SOB (shortness of breath) SOB (shortness of breath) Precautions: Fall, Skin, bed alarm Chart, physical therapy assessment, plan of care and goals were reviewed. ASSESSMENT: 
Pt received in supine reporting need to void. Opted for use of bedside commode. Overall pt required mod assist X 2 but max assist X 1 to stand holding onto my elbows. Note tendency for buckling. Pt was on 4 liters of 02 throughout session. Both after transfer bed to bedside commode and bedside commode to bed pt desat to 85%. Both times had her work on slowing down rate of breathing, at one point as high as 50 and focus on pursed lip breathing (pt is essentially a mouth breather). After both transfers it took pt about 2 minutes to recover with 02 sats to 90-92%. She is clearly limited by her respiratory status. Recommend return to SNF. Progression toward goals: 
[]    Improving appropriately and progressing toward goals [x]    Improving slowly and progressing toward goals 
[]    Not making progress toward goals and plan of care will be adjusted PLAN: 
Patient continues to benefit from skilled intervention to address the above impairments. Continue treatment per established plan of care. Discharge Recommendations:  Alejandro Fregoso Further Equipment Recommendations for Discharge:  none SUBJECTIVE:  
Patient stated i'll hold onto you.  OBJECTIVE DATA SUMMARY:  
Chart checked, pt cleared by nursing Critical Behavior: 
Neurologic State: Alert, Confused(periodic confusion) Orientation Level: Oriented X4 Cognition: Follows commands Safety/Judgement: Decreased awareness of environment, Decreased awareness of need for assistance, Decreased awareness of need for safety, Decreased insight into deficits, Fall prevention Functional Mobility Training: 
Bed Mobility: 
  
Supine to Sit: Assist x2; Moderate assistance Sit to Supine: Assist x2; Moderate assistance Transfers: 
Sit to Stand: Assist x1;Maximum assistance Stand to Sit: Assist x1;Maximum assistance Stand Pivot Transfers: Assist x2 Bed to Chair: Moderate assistance Balance: 
Sitting: Impaired Sitting - Static: Fair (occasional) Sitting - Dynamic: Fair (occasional)Ambulation/Gait Training: 
  
  
  
  
  
  
  
  
  
  
  
  
  
  
  
  
  
  
Stairs: 
  
  
   
 
Neuro Re-Education: 
 
Therapeutic Exercises:  
Pursed lip breathing Pain: 
Pain Scale 1: Numeric (0 - 10) Pain Intensity 1: 0 Activity Tolerance:  
See assessment above Please refer to the flowsheet for vital signs taken during this treatment. After treatment:  
[]    Patient left in no apparent distress sitting up in chair 
[x]    Patient left in no apparent distress in bed 
[x]    Call bell left within reach [x]    Nursing notified 
[x]    Caregiver present 
[]    Bed alarm activated COMMUNICATION/COLLABORATION:  
The patients plan of care was discussed with: Registered Nurse and  Tim Flores Time Calculation: 24 mins

## 2019-01-17 NOTE — CONSULTS
[facility] Consult Pulmonary, Critical Care, and Sleep Medicine Name: Calvin Hollingsworth MRN: [id number] : 1925 Hospital[facility] Date: 2019 IMPRESSION:  
1. Bibasilar atx / modest effusions L >R; effusions new from 10/2018 2. hypoxia 3. S/p fall with r rib fx's 
4. Initial rib detail suggested ? r apical ptx- not apparent subsequent CXRs 5. No prior pulmary history 6. osteoporosis 7. DNR  
  
RECOMMENDATIONS:  
· Push pulm toilet IS/ flutter · Nebs · Monitor saturations · Can consider BiPAP support if needed/tolerated · Consider non-contrast CT assess sig pleural fluid · Mobilize as able up to chair etc 
· Watch volume status- diuresis/prn tx thoracentesis- on home lasix · Judicious pain control · DVT prophylaxis Radiology ( personally reviewed) CXR reviewed ABG No results for input(s): PHI, PO2I, PCO2I in the last 72 hours. Subjective/Interval History:  
 
 
Asked to see this pleasanr 81 yo lade for abnml CXR S/p fall with rib fx Known osteoporosis with prior vert comp fx Now with oxygen needs Sl progressive atx L> R on CXR No sign PTX \" collapse\" = atx New hypoxia- needs stable so far Has soreness from fall Scant cough Has IS--> not using regularly No prior pulm issues/ 
dx Patient Active Problem List  
Diagnosis Code  Acute bronchitis J20.9  Bronchitis J40  Hypoxia R09.02  Vertebral fracture [id number]  Hypokalemia E87.6  
 SOB (shortness of breath) R06.02 Past Medical History:  
Diagnosis Date  Arthritis  Breast lump 2018  
 right breast lump x a few days  HTN (hypertension)  Hypercholesteremia  Hyperlipidemia  Osteoporosis  Stroke ([facility] [address] CVA, TIA  TIA (transient ischemic attack)  Vertebral fracture Past Surgical History:  
Procedure Laterality Date  HX HIP REPLACEMENT  2009  
 left  HX ORTHOPAEDIC    
 left hip replacement Prior to Admission Medications Prescriptions Last Dose Informant Patient Reported? Taking?  
acetaminophen (TYLENOL) 325 mg tablet   Yes Yes Sig: Take 650 mg by mouth every four (4) hours as needed (back pain). albuterol (PROVENTIL VENTOLIN) 2.5 mg /3 mL (0.083 %) nebulizer solution   Yes Yes Si.5 mg by Nebulization route every six (6) hours as needed for Shortness of Breath. albuterol (VENTOLIN HFA) 90 mcg/actuation inhaler   Yes Yes Sig: Take 2 Puffs by inhalation every four (4) hours as needed for Shortness of Breath. alendronate (FOSAMAX) 70 mg tablet 2019  Yes Yes Sig: Take 70 mg by mouth Every Friday. amLODIPine (NORVASC) 10 mg tablet 1/15/2019 at am  Yes Yes Sig: Take 10 mg by mouth daily. balsam peru-castor oil (VENELEX) [id number] mg/gram ointment 1/15/2019 at am  No Yes Sig: Apply  to affected area two (2) times a day. calcium-vitamin D (OYSTER SHELL) 500 mg(1,250mg) -200 unit per tablet 1/15/2019 at Unknown time  Yes Yes Sig: Take 1 Tab by mouth two (2) times daily (with meals). collagenase (SANTYL) 250 unit/gram ointment   Yes Yes Sig: Apply  to affected area daily. Apply to left heel  
furosemide (LASIX) 20 mg tablet 1/15/2019 at am  Yes Yes Sig: Take 20 mg by mouth daily. metoprolol tartrate (LOPRESSOR) 25 mg tablet 1/15/2019 at am  Yes Yes Sig: Take 12.5 mg by mouth two (2) times a day. multivitamin (ONE A DAY) tablet 1/15/2019 at am  Yes Yes Sig: Take 1 Tab by mouth daily. raNITIdine (ZANTAC) 150 mg tablet 1/15/2019 at am  Yes Yes Sig: Take 150 mg by mouth two (2) times a day. sertraline (ZOLOFT) 25 mg tablet 2019 at pm  No Yes Sig: Take 1 Tab by mouth every evening. traMADol (ULTRAM) 50 mg tablet 1/15/2019 at am  Yes Yes Sig: Take 25 mg by mouth daily. umeclidinium-vilanterol (ANORO ELLIPTA) 62.5-25 mcg/actuation inhaler 1/15/2019 at am  Yes Yes Sig: Take 1 Puff by inhalation daily. Facility-Administered Medications: None Allergies Allergen Reactions  Hydrochlorothiazide Nausea and Vomiting Prior to Admission Medications Prescriptions Last Dose Informant Patient Reported? Taking?  
acetaminophen (TYLENOL) 325 mg tablet   Yes Yes Sig: Take 650 mg by mouth every four (4) hours as needed (back pain). albuterol (PROVENTIL VENTOLIN) 2.5 mg /3 mL (0.083 %) nebulizer solution   Yes Yes Si.5 mg by Nebulization route every six (6) hours as needed for Shortness of Breath. albuterol (VENTOLIN HFA) 90 mcg/actuation inhaler   Yes Yes Sig: Take 2 Puffs by inhalation every four (4) hours as needed for Shortness of Breath. alendronate (FOSAMAX) 70 mg tablet 2019  Yes Yes Sig: Take 70 mg by mouth Every Friday. amLODIPine (NORVASC) 10 mg tablet 1/15/2019 at am  Yes Yes Sig: Take 10 mg by mouth daily. balsam peru-castor oil (VENELEX) [id number] mg/gram ointment 1/15/2019 at am  No Yes Sig: Apply  to affected area two (2) times a day. calcium-vitamin D (OYSTER SHELL) 500 mg(1,250mg) -200 unit per tablet 1/15/2019 at Unknown time  Yes Yes Sig: Take 1 Tab by mouth two (2) times daily (with meals). collagenase (SANTYL) 250 unit/gram ointment   Yes Yes Sig: Apply  to affected area daily. Apply to left heel  
furosemide (LASIX) 20 mg tablet 1/15/2019 at am  Yes Yes Sig: Take 20 mg by mouth daily. metoprolol tartrate (LOPRESSOR) 25 mg tablet 1/15/2019 at am  Yes Yes Sig: Take 12.5 mg by mouth two (2) times a day. multivitamin (ONE A DAY) tablet 1/15/2019 at am  Yes Yes Sig: Take 1 Tab by mouth daily. raNITIdine (ZANTAC) 150 mg tablet 1/15/2019 at am  Yes Yes Sig: Take 150 mg by mouth two (2) times a day. sertraline (ZOLOFT) 25 mg tablet 2019 at pm  No Yes Sig: Take 1 Tab by mouth every evening. traMADol (ULTRAM) 50 mg tablet 1/15/2019 at am  Yes Yes Sig: Take 25 mg by mouth daily. umeclidinium-vilanterol (ANORO ELLIPTA) 62.5-25 mcg/actuation inhaler 1/15/2019 at am  Yes Yes Sig: Take 1 Puff by inhalation daily. Facility-Administered Medications: None Social History Socioeconomic History  Marital status:  Spouse name: Not on file  Number of children: Not on file  Years of education: Not on file  Highest education level: Not on file Social Needs  Financial resource strain: Not on file  Food insecurity - worry: Not on file  Food insecurity - inability: Not on file  Transportation needs - medical: Not on file  Transportation needs - non-medical: Not on file Occupational History  Not on file Tobacco Use  Smoking status: Former Smoker Years: 40.00 Last attempt to quit: 1986 Years since quittin.2  Smokeless tobacco: Never Used Substance and Sexual Activity  Alcohol use: Yes  Drug use: No  
 Sexual activity: Not on file Other Topics Concern  Not on file Social History Narrative  Not on file History reviewed. No pertinent family history. Objective: 
 
 
Vital Signs:   
Patient Vitals for the past 24 hrs: 
 Temp Pulse Resp BP SpO2  
19 0819 98.5 °F (36.9 °C) 83 21 160/60 94 % 19 0330 97.9 °F (36.6 °C) 71 (!) 38 140/64 92 % 19 0202     95 % 19 2153  84  178/53   
19 2050     97 % 19 1918 98.8 °F (37.1 °C) 81 (!) 34 196/52 96 % 19 1113 98.3 °F (36.8 °C) 84 18 170/70 93 % Intake/Output:  
Last shift:        
Last 3 shifts: No intake/output data recorded. Norma Watson Intake/Output Summary (Last 24 hours) at 2019 1041 Last data filed at 2019 1918 Gross per 24 hour Intake 200 ml Output 1100 ml Net -900 ml EXAM:  
 
Pleasant frail pale female \"my back and side hurts\" Shallow RR effort NC oxygen NC./AT 
EOMI/ sclera anicteric Moist MM Neck supple Back kyphotic CV s1s2 RRR Chest decreased bases L >R; no wheeze Abd soft, benign LE no edema Heel ulcer Non focal 
 
Data I have personally reviewed data, flowsheets for the last 24 hours. Labs: 
Recent Labs  
  01/17/19 
0335 01/15/19 
1121 WBC 7.0 7.4 HGB 10.8* 12.2 HCT 35.4 40.6  269 Recent Labs  
  01/17/19 
0335 01/15/19 
1121  144  
K 3.2* 3.8  106 CO2 31 34* * 191* BUN 17 15 CREA 0.65 0.75 CA 8.1* 9.0 ALB  --  3.1*  
SGOT  --  23 ALT  --  25 Luis Felipe England MD 
[facility] [address] [facility]

## 2019-01-17 NOTE — PROGRESS NOTES
Bedside shift change report given to Gustavo Valadez (oncoming nurse) by Agusto Miranda (offgoing nurse). Report included the following information SBAR, Kardex, ED Summary, Intake/Output, Accordion and Recent Results. Problem: Pressure Injury - Risk of 
Goal: *Prevention of pressure injury Document Mohit Scale and appropriate interventions in the flowsheet. Outcome: Progressing Towards Goal 
Pressure Injury Interventions: 
Sensory Interventions: Assess changes in LOC, Keep linens dry and wrinkle-free, Float heels, Minimize linen layers, Monitor skin under medical devices, Pressure redistribution bed/mattress (bed type) Moisture Interventions: Apply protective barrier, creams and emollients, Internal/External urinary devices, Maintain skin hydration (lotion/cream), Minimize layers Activity Interventions: Increase time out of bed, Pressure redistribution bed/mattress(bed type), PT/OT evaluation Mobility Interventions: Assess need for specialty bed, HOB 30 degrees or less, Pressure redistribution bed/mattress (bed type), PT/OT evaluation Friction and Shear Interventions: Apply protective barrier, creams and emollients, Lift sheet, Lift team/patient mobility team, Minimize layers Problem: Falls - Risk of 
Goal: *Absence of Falls Document Ange Shows Fall Risk and appropriate interventions in the flowsheet. Outcome: Progressing Towards Goal 
Fall Risk Interventions: 
Mobility Interventions: Assess mobility with egress test, Communicate number of staff needed for ambulation/transfer, Patient to call before getting OOB, PT Consult for mobility concerns, PT Consult for assist device competence Mentation Interventions: Adequate sleep, hydration, pain control, Door open when patient unattended, Increase mobility, More frequent rounding, Reorient patient Medication Interventions: Evaluate medications/consider consulting pharmacy, Patient to call before getting OOB, Teach patient to arise slowly Elimination Interventions: Call light in reach, Patient to call for help with toileting needs, Toilet paper/wipes in reach, Toileting schedule/hourly rounds History of Falls Interventions: Door open when patient unattended, Evaluate medications/consider consulting pharmacy, Investigate reason for fall

## 2019-01-17 NOTE — PROGRESS NOTES
Hospitalist Progress Note Linda Sims MD 
Answering service: [phone] OR [phone] from in house phone Cell: [phone] Date of Service:  2019 NAME:  Jhony Couch :  1925 MRN:  [id number] Admission Summary:  
 
80-year-old female with a past medical history of gait abnormalities, multiple falls, left heel decubitus prior to arrival to the hospital, history of acute hypoxic and hypercapnic respiratory failure, community-acquired pneumonia, UTI, multiple compression fractures and hypernatremia, who presents to the hospital after a fall. The patient apparently lives at [facility]. She had a fall yesterday. The patient reports that she did not hit her head or her neck, but fell on the right side, immediately started having pain on the right side of her chest.  The patient also reports that she had some pain in her right arm. She had multiple x-rays done at the facility and they showed some rib fracture. Patient continued to be in pain, was also having some shortness of breath. Family got concerned and the patient was transferred to [facility].  The patient reports that she did not lose consciousness. It was a purely mechanical fall. She is wearing a boot for the decubitus ulcer on her left foot and \"the boot got tangled,\" which caused her to fall. Patient was here in October after a fall and had multiple thoracic fractures and since then, has been living at [facility]. The daughter also reports that the patient is oxygen dependent since being discharged from the hospital in October. Interval history / Subjective:  
 
Patient stating she is not SOB. Pain when she moves. CXR from this AM still pending. Pulm has evaluated. Assessment & Plan:  
 
Fourth rib fractures with pneumothorax, pneumo resolved: 
-patient currently admitted to the [facility] -provide oxygen support, pain control - Tylenol scheduled with PRN oxy 
-repeat chest x-ray with L lung collapse and effusions 
-incentive spirometry  
-PT/OT Pleural Effusions: S/P IV Lasix. 
-resumed home lasix 
-repeat CXR today pending Lung collapse, see above. Subjective fevers: Patient has not had any fever inpatient. 
-culture if febrile 
-no abx Hypertension, suboptimally controlled. Some component could be due to pain.  
-continue and adjust home meds as needed Hyperglycemia, A1C at 5.5. Code status: DNR 
DVT prophylaxis: heparin Care Plan discussed with: Patient/Family and Nurse Disposition: D Hospital Problems  Date Reviewed: 1/15/2019 Codes Class Noted POA * (Principal) SOB (shortness of breath) ICD-10-CM: R06.02 
ICD-9-CM: 786.05  1/15/2019 Unknown Review of Systems:  
Pertinent items are noted in HPI. Vital Signs:  
 Last 24hrs VS reviewed since prior progress note. Most recent are: 
Visit Vitals /64 (BP 1 Location: Right arm, BP Patient Position: At rest) Pulse 71 Temp 97.9 °F (36.6 °C) Resp (!) 38 Ht 4' 10\" (1.473 m) Wt 54 kg (119 lb) SpO2 92% BMI 24.87 kg/m² Intake/Output Summary (Last 24 hours) at 1/17/2019 9025 Last data filed at 1/16/2019 1918 Gross per 24 hour Intake 380 ml Output 1100 ml Net -720 ml Physical Examination:  
 
 
Constitutional:  No acute distress, cooperative, pleasant   
ENT:  Neck supple Resp:  Decreased in bases. No accessory muscle use CV:  Regular rhythm, normal rate GI:  Soft, non distended, non tender Musculoskeletal:  No edema, warm Neurologic:  Moves all extremities. Alert Data Review:  
 Review and/or order of clinical lab test 
Review and/or order of tests in the radiology section of CPT Review and/or order of tests in the medicine section of CPT Labs:  
 
Recent Labs  
  01/17/19 
0335 01/15/19 
1121 WBC 7.0 7.4 HGB 10.8* 12.2 HCT 35.4 40.6  269 Recent Labs  
  01/17/19 
0335 01/15/19 
1121  144  
K 3.2* 3.8  106 CO2 31 34* BUN 17 15 CREA 0.65 0.75 * 191* CA 8.1* 9.0 Recent Labs  
  01/15/19 
1121 SGOT 23 ALT 25 AP 80 TBILI 0.5 TP 6.5 ALB 3.1*  
GLOB 3.4 No results for input(s): INR, PTP, APTT in the last 72 hours. No lab exists for component: INREXT, INREXT No results for input(s): FE, TIBC, PSAT, FERR in the last 72 hours. No results found for: FOL, RBCF No results for input(s): PH, PCO2, PO2 in the last 72 hours. No results for input(s): CPK, CKNDX, TROIQ in the last 72 hours. No lab exists for component: CPKMB Lab Results Component Value Date/Time Cholesterol, total 168 05/21/2010 03:00 AM  
 HDL Cholesterol 53 05/21/2010 03:00 AM  
 LDL, calculated 95.4 05/21/2010 03:00 AM  
 Triglyceride 98 05/21/2010 03:00 AM  
 CHOL/HDL Ratio 3.2 05/21/2010 03:00 AM  
 
Lab Results Component Value Date/Time Glucose (POC) 120 (H) 10/27/2018 06:05 AM  
 Glucose (POC) 133 (H) 10/27/2018 12:18 AM  
 Glucose (POC) 177 (H) 10/26/2018 06:21 PM  
 Glucose (POC) 222 (H) 10/26/2018 11:51 AM  
 Glucose (POC) 119 (H) 10/26/2018 05:52 AM  
 
Lab Results Component Value Date/Time Color YELLOW/STRAW 01/16/2019 04:08 PM  
 Appearance CLOUDY (A) 01/16/2019 04:08 PM  
 Specific gravity 1.017 01/16/2019 04:08 PM  
 Specific gravity 1.005 03/07/2017 01:54 PM  
 pH (UA) 5.5 01/16/2019 04:08 PM  
 Protein 30 (A) 01/16/2019 04:08 PM  
 Glucose NEGATIVE  01/16/2019 04:08 PM  
 Ketone NEGATIVE  01/16/2019 04:08 PM  
 Bilirubin NEGATIVE  01/16/2019 04:08 PM  
 Urobilinogen 0.2 01/16/2019 04:08 PM  
 Nitrites NEGATIVE  01/16/2019 04:08 PM  
 Leukocyte Esterase SMALL (A) 01/16/2019 04:08 PM  
 Epithelial cells FEW 10/24/2018 02:52 PM  
 Bacteria 4+ (A) 10/24/2018 02:52 PM  
 WBC  10/24/2018 02:52 PM  
 RBC 0-5 10/24/2018 02:52 PM  
 
 
 
Medications Reviewed: Current Facility-Administered Medications Medication Dose Route Frequency  famotidine (PEPCID) tablet 20 mg  20 mg Oral DAILY  sodium hypochlorite (QUARTER STRENGTH DAKIN'S) 0.125% irrigation (bottle)   Topical DAILY  collagenase (SANTYL) 250 unit/gram ointment   Topical DAILY  balsam peru-castor oil (VENELEX) [id number] mg/gram ointment   Topical TID  lidocaine (LIDODERM) 5 % patch 2 Patch  2 Patch TransDERmal Q24H  
 acetaminophen (TYLENOL) tablet 650 mg  650 mg Oral Q8H  
 sodium chloride (NS) flush 5-40 mL  5-40 mL IntraVENous Q8H  
 sodium chloride (NS) flush 5-40 mL  5-40 mL IntraVENous PRN  
 heparin (porcine) injection 5,000 Units  5,000 Units SubCUTAneous Q8H  
 hydrALAZINE (APRESOLINE) 20 mg/mL injection 10 mg  10 mg IntraVENous Q6H PRN  
 amLODIPine (NORVASC) tablet 10 mg  10 mg Oral DAILY  metoprolol tartrate (LOPRESSOR) tablet 12.5 mg  12.5 mg Oral BID  therapeutic multivitamin (THERAGRAN) tablet 1 Tab  1 Tab Oral DAILY  sertraline (ZOLOFT) tablet 25 mg  25 mg Oral QPM  
 albuterol-ipratropium (DUO-NEB) 2.5 MG-0.5 MG/3 ML  3 mL Nebulization Q6H RT  
 
______________________________________________________________________ EXPECTED LENGTH OF STAY: - - - 
ACTUAL LENGTH OF STAY:          0 Evan Borges MD

## 2019-01-18 ENCOUNTER — APPOINTMENT (OUTPATIENT)
Dept: GENERAL RADIOLOGY | Age: 84
DRG: 206 | End: 2019-01-18
Attending: HOSPITALIST
Payer: MEDICARE

## 2019-01-18 LAB
ANION GAP SERPL CALC-SCNC: 6 MMOL/L (ref 5–15)
BASOPHILS # BLD: 0 K/UL (ref 0–0.1)
BASOPHILS NFR BLD: 0 % (ref 0–1)
BUN SERPL-MCNC: 18 MG/DL (ref 6–20)
BUN/CREAT SERPL: 26 (ref 12–20)
CALCIUM SERPL-MCNC: 8.1 MG/DL (ref 8.5–10.1)
CHLORIDE SERPL-SCNC: 107 MMOL/L (ref 97–108)
CO2 SERPL-SCNC: 32 MMOL/L (ref 21–32)
CREAT SERPL-MCNC: 0.7 MG/DL (ref 0.55–1.02)
DIFFERENTIAL METHOD BLD: ABNORMAL
EOSINOPHIL # BLD: 0 K/UL (ref 0–0.4)
EOSINOPHIL NFR BLD: 0 % (ref 0–7)
ERYTHROCYTE [DISTWIDTH] IN BLOOD BY AUTOMATED COUNT: 14 % (ref 11.5–14.5)
GLUCOSE SERPL-MCNC: 141 MG/DL (ref 65–100)
HCT VFR BLD AUTO: 37.1 % (ref 35–47)
HGB BLD-MCNC: 11.4 G/DL (ref 11.5–16)
IMM GRANULOCYTES # BLD AUTO: 0.1 K/UL (ref 0–0.04)
IMM GRANULOCYTES NFR BLD AUTO: 1 % (ref 0–0.5)
LYMPHOCYTES # BLD: 0.5 K/UL (ref 0.8–3.5)
LYMPHOCYTES NFR BLD: 7 % (ref 12–49)
MCH RBC QN AUTO: 30.2 PG (ref 26–34)
MCHC RBC AUTO-ENTMCNC: 30.7 G/DL (ref 30–36.5)
MCV RBC AUTO: 98.1 FL (ref 80–99)
MONOCYTES # BLD: 0.4 K/UL (ref 0–1)
MONOCYTES NFR BLD: 6 % (ref 5–13)
NEUTS SEG # BLD: 6 K/UL (ref 1.8–8)
NEUTS SEG NFR BLD: 86 % (ref 32–75)
NRBC # BLD: 0 K/UL (ref 0–0.01)
NRBC BLD-RTO: 0 PER 100 WBC
PLATELET # BLD AUTO: 264 K/UL (ref 150–400)
PMV BLD AUTO: 10.9 FL (ref 8.9–12.9)
POTASSIUM SERPL-SCNC: 3.4 MMOL/L (ref 3.5–5.1)
RBC # BLD AUTO: 3.78 M/UL (ref 3.8–5.2)
SODIUM SERPL-SCNC: 145 MMOL/L (ref 136–145)
WBC # BLD AUTO: 6.9 K/UL (ref 3.6–11)

## 2019-01-18 PROCEDURE — 77010033678 HC OXYGEN DAILY

## 2019-01-18 PROCEDURE — 94640 AIRWAY INHALATION TREATMENT: CPT

## 2019-01-18 PROCEDURE — 71045 X-RAY EXAM CHEST 1 VIEW: CPT

## 2019-01-18 PROCEDURE — 80048 BASIC METABOLIC PNL TOTAL CA: CPT

## 2019-01-18 PROCEDURE — 74011250637 HC RX REV CODE- 250/637: Performed by: FAMILY MEDICINE

## 2019-01-18 PROCEDURE — 74011250636 HC RX REV CODE- 250/636: Performed by: INTERNAL MEDICINE

## 2019-01-18 PROCEDURE — 74011250636 HC RX REV CODE- 250/636: Performed by: FAMILY MEDICINE

## 2019-01-18 PROCEDURE — 74011250636 HC RX REV CODE- 250/636: Performed by: HOSPITALIST

## 2019-01-18 PROCEDURE — 97116 GAIT TRAINING THERAPY: CPT

## 2019-01-18 PROCEDURE — 94762 N-INVAS EAR/PLS OXIMTRY CONT: CPT

## 2019-01-18 PROCEDURE — 97530 THERAPEUTIC ACTIVITIES: CPT

## 2019-01-18 PROCEDURE — 74011000250 HC RX REV CODE- 250: Performed by: INTERNAL MEDICINE

## 2019-01-18 PROCEDURE — 36415 COLL VENOUS BLD VENIPUNCTURE: CPT

## 2019-01-18 PROCEDURE — 74011250637 HC RX REV CODE- 250/637: Performed by: INTERNAL MEDICINE

## 2019-01-18 PROCEDURE — 65660000000 HC RM CCU STEPDOWN

## 2019-01-18 PROCEDURE — 93306 TTE W/DOPPLER COMPLETE: CPT

## 2019-01-18 PROCEDURE — 74011000250 HC RX REV CODE- 250: Performed by: FAMILY MEDICINE

## 2019-01-18 PROCEDURE — 85025 COMPLETE CBC W/AUTO DIFF WBC: CPT

## 2019-01-18 RX ORDER — FUROSEMIDE 10 MG/ML
40 INJECTION INTRAMUSCULAR; INTRAVENOUS ONCE
Status: COMPLETED | OUTPATIENT
Start: 2019-01-18 | End: 2019-01-18

## 2019-01-18 RX ORDER — FUROSEMIDE 10 MG/ML
20 INJECTION INTRAMUSCULAR; INTRAVENOUS ONCE
Status: COMPLETED | OUTPATIENT
Start: 2019-01-18 | End: 2019-01-18

## 2019-01-18 RX ORDER — POTASSIUM CHLORIDE 1.5 G/1.77G
40 POWDER, FOR SOLUTION ORAL ONCE
Status: COMPLETED | OUTPATIENT
Start: 2019-01-18 | End: 2019-01-18

## 2019-01-18 RX ADMIN — SERTRALINE HYDROCHLORIDE 25 MG: 50 TABLET ORAL at 18:00

## 2019-01-18 RX ADMIN — IPRATROPIUM BROMIDE AND ALBUTEROL SULFATE 3 ML: .5; 3 SOLUTION RESPIRATORY (INHALATION) at 14:05

## 2019-01-18 RX ADMIN — IPRATROPIUM BROMIDE AND ALBUTEROL SULFATE 3 ML: .5; 3 SOLUTION RESPIRATORY (INHALATION) at 08:39

## 2019-01-18 RX ADMIN — CASTOR OIL AND BALSAM, PERU: 788; 87 OINTMENT TOPICAL at 22:31

## 2019-01-18 RX ADMIN — IPRATROPIUM BROMIDE AND ALBUTEROL SULFATE 3 ML: .5; 3 SOLUTION RESPIRATORY (INHALATION) at 01:44

## 2019-01-18 RX ADMIN — HEPARIN SODIUM 5000 UNITS: 5000 INJECTION INTRAVENOUS; SUBCUTANEOUS at 00:04

## 2019-01-18 RX ADMIN — Medication 10 ML: at 22:27

## 2019-01-18 RX ADMIN — METOPROLOL TARTRATE 12.5 MG: 25 TABLET ORAL at 08:56

## 2019-01-18 RX ADMIN — POTASSIUM CHLORIDE 40 MEQ: 1.5 POWDER, FOR SOLUTION ORAL at 08:57

## 2019-01-18 RX ADMIN — IPRATROPIUM BROMIDE AND ALBUTEROL SULFATE 3 ML: .5; 3 SOLUTION RESPIRATORY (INHALATION) at 19:51

## 2019-01-18 RX ADMIN — CASTOR OIL AND BALSAM, PERU: 788; 87 OINTMENT TOPICAL at 17:34

## 2019-01-18 RX ADMIN — THERA TABS 1 TABLET: TAB at 08:56

## 2019-01-18 RX ADMIN — CASTOR OIL AND BALSAM, PERU: 788; 87 OINTMENT TOPICAL at 09:06

## 2019-01-18 RX ADMIN — Medication 10 ML: at 06:55

## 2019-01-18 RX ADMIN — ACETAMINOPHEN 1000 MG: 500 TABLET ORAL at 06:55

## 2019-01-18 RX ADMIN — AMLODIPINE BESYLATE 10 MG: 5 TABLET ORAL at 09:00

## 2019-01-18 RX ADMIN — HEPARIN SODIUM 5000 UNITS: 5000 INJECTION INTRAVENOUS; SUBCUTANEOUS at 08:57

## 2019-01-18 RX ADMIN — DAKIN'S SOLUTION 0.125% (QUARTER STRENGTH): 0.12 SOLUTION at 09:06

## 2019-01-18 RX ADMIN — FUROSEMIDE 20 MG: 10 INJECTION, SOLUTION INTRAMUSCULAR; INTRAVENOUS at 17:32

## 2019-01-18 RX ADMIN — FAMOTIDINE 20 MG: 20 TABLET ORAL at 08:56

## 2019-01-18 RX ADMIN — HEPARIN SODIUM 5000 UNITS: 5000 INJECTION INTRAVENOUS; SUBCUTANEOUS at 17:32

## 2019-01-18 RX ADMIN — ACETAMINOPHEN 1000 MG: 500 TABLET ORAL at 14:44

## 2019-01-18 RX ADMIN — HYDRALAZINE HYDROCHLORIDE 10 MG: 20 INJECTION INTRAMUSCULAR; INTRAVENOUS at 17:32

## 2019-01-18 RX ADMIN — FUROSEMIDE 40 MG: 10 INJECTION, SOLUTION INTRAMUSCULAR; INTRAVENOUS at 08:57

## 2019-01-18 RX ADMIN — ACETAMINOPHEN 1000 MG: 500 TABLET ORAL at 22:26

## 2019-01-18 RX ADMIN — COLLAGENASE SANTYL: 250 OINTMENT TOPICAL at 09:06

## 2019-01-18 RX ADMIN — Medication 10 ML: at 14:00

## 2019-01-18 RX ADMIN — METOPROLOL TARTRATE 12.5 MG: 25 TABLET ORAL at 22:26

## 2019-01-18 NOTE — PROGRESS NOTES
0745 Bedside and Verbal shift change report given to Elda White RN (oncoming nurse) by Hiren Fountain RN (offgoing nurse). Report included the following information SBAR, Kardex, Intake/Output, MAR and Recent Results.

## 2019-01-18 NOTE — PROGRESS NOTES
[facility] ConsultPulmonary, Critical Care, and Sleep Medicine Name: Jhony Couch MRN: [id number] : 1925 Hospital: [address] Date: 2019 IMPRESSION:  
1. Bibasilar atx / modest effusions L >R; effusions new from 10/2018 2. hypoxia 3. S/p fall with r rib fx's 
4. Initial rib detail suggested ? r apical ptx- not apparent subsequent CXRs 5. No prior pulmary history 6. osteoporosis 7. DNR  
  
RECOMMENDATIONS:  
· Push pulm toilet IS/ flutter · Nebs · Monitor saturations · Can consider BiPAP support if needed/tolerated · Mobilize as able up to chair etc 
· She is not labored in her breathing, suggest monitoring and conservative therapy; consider thoracentesis only if she becomes symptomatic or the pleural effusions is > 50% of the hemithorax · Judicious pain control · DVT prophylaxis · PRN over the weekend Radiology ( personally reviewed) CXR reviewed ABG No results for input(s): PHI, PO2I, PCO2I in the last 72 hours. Subjective/Interval History:  
 
 Unlabored breathing  Asked to see this pleasanr 79 yo lade for abnml CXR S/p fall with rib fx Known osteoporosis with prior vert comp fx Now with oxygen needs Sl progressive atx L> R on CXR No sign PTX \" collapse\" = atx New hypoxia- needs stable so far Has soreness from fall Scant cough Has IS--> not using regularly No prior pulm issues/ 
dx Patient Active Problem List  
Diagnosis Code  Acute bronchitis J20.9  Bronchitis J40  Hypoxia R09.02  Vertebral fracture [id number]  Hypokalemia E87.6  
 SOB (shortness of breath) R06.02 Past Medical History:  
Diagnosis Date  Arthritis  Breast lump 2018  
 right breast lump x a few days  HTN (hypertension)  Hypercholesteremia  Hyperlipidemia  Osteoporosis  Stroke ([facility] [address] CVA, TIA  TIA (transient ischemic attack)  Vertebral fracture Past Surgical History:  
Procedure Laterality Date  HX HIP REPLACEMENT  2009  
 left  HX ORTHOPAEDIC    
 left hip replacement Prior to Admission Medications Prescriptions Last Dose Informant Patient Reported? Taking?  
acetaminophen (TYLENOL) 325 mg tablet   Yes Yes Sig: Take 650 mg by mouth every four (4) hours as needed (back pain). albuterol (PROVENTIL VENTOLIN) 2.5 mg /3 mL (0.083 %) nebulizer solution   Yes Yes Si.5 mg by Nebulization route every six (6) hours as needed for Shortness of Breath. albuterol (VENTOLIN HFA) 90 mcg/actuation inhaler   Yes Yes Sig: Take 2 Puffs by inhalation every four (4) hours as needed for Shortness of Breath. alendronate (FOSAMAX) 70 mg tablet 2019  Yes Yes Sig: Take 70 mg by mouth Every Friday. amLODIPine (NORVASC) 10 mg tablet 1/15/2019 at am  Yes Yes Sig: Take 10 mg by mouth daily. balsam peru-castor oil (VENELEX) [id number] mg/gram ointment 1/15/2019 at am  No Yes Sig: Apply  to affected area two (2) times a day. calcium-vitamin D (OYSTER SHELL) 500 mg(1,250mg) -200 unit per tablet 1/15/2019 at Unknown time  Yes Yes Sig: Take 1 Tab by mouth two (2) times daily (with meals). collagenase (SANTYL) 250 unit/gram ointment   Yes Yes Sig: Apply  to affected area daily. Apply to left heel  
furosemide (LASIX) 20 mg tablet 1/15/2019 at am  Yes Yes Sig: Take 20 mg by mouth daily. metoprolol tartrate (LOPRESSOR) 25 mg tablet 1/15/2019 at am  Yes Yes Sig: Take 12.5 mg by mouth two (2) times a day. multivitamin (ONE A DAY) tablet 1/15/2019 at am  Yes Yes Sig: Take 1 Tab by mouth daily. raNITIdine (ZANTAC) 150 mg tablet 1/15/2019 at am  Yes Yes Sig: Take 150 mg by mouth two (2) times a day. sertraline (ZOLOFT) 25 mg tablet 2019 at pm  No Yes Sig: Take 1 Tab by mouth every evening. traMADol (ULTRAM) 50 mg tablet 1/15/2019 at am  Yes Yes Sig: Take 25 mg by mouth daily. umeclidinium-vilanterol (ANORO ELLIPTA) 62.5-25 mcg/actuation inhaler 1/15/2019 at am  Yes Yes Sig: Take 1 Puff by inhalation daily. Facility-Administered Medications: None Allergies Allergen Reactions  Hydrochlorothiazide Nausea and Vomiting Prior to Admission Medications Prescriptions Last Dose Informant Patient Reported? Taking?  
acetaminophen (TYLENOL) 325 mg tablet   Yes Yes Sig: Take 650 mg by mouth every four (4) hours as needed (back pain). albuterol (PROVENTIL VENTOLIN) 2.5 mg /3 mL (0.083 %) nebulizer solution   Yes Yes Si.5 mg by Nebulization route every six (6) hours as needed for Shortness of Breath. albuterol (VENTOLIN HFA) 90 mcg/actuation inhaler   Yes Yes Sig: Take 2 Puffs by inhalation every four (4) hours as needed for Shortness of Breath. alendronate (FOSAMAX) 70 mg tablet 2019  Yes Yes Sig: Take 70 mg by mouth Every Friday. amLODIPine (NORVASC) 10 mg tablet 1/15/2019 at am  Yes Yes Sig: Take 10 mg by mouth daily. balsam peru-castor oil (VENELEX) [id number] mg/gram ointment 1/15/2019 at am  No Yes Sig: Apply  to affected area two (2) times a day. calcium-vitamin D (OYSTER SHELL) 500 mg(1,250mg) -200 unit per tablet 1/15/2019 at Unknown time  Yes Yes Sig: Take 1 Tab by mouth two (2) times daily (with meals). collagenase (SANTYL) 250 unit/gram ointment   Yes Yes Sig: Apply  to affected area daily. Apply to left heel  
furosemide (LASIX) 20 mg tablet 1/15/2019 at am  Yes Yes Sig: Take 20 mg by mouth daily. metoprolol tartrate (LOPRESSOR) 25 mg tablet 1/15/2019 at am  Yes Yes Sig: Take 12.5 mg by mouth two (2) times a day. multivitamin (ONE A DAY) tablet 1/15/2019 at am  Yes Yes Sig: Take 1 Tab by mouth daily. raNITIdine (ZANTAC) 150 mg tablet 1/15/2019 at am  Yes Yes Sig: Take 150 mg by mouth two (2) times a day. sertraline (ZOLOFT) 25 mg tablet 2019 at pm  No Yes Sig: Take 1 Tab by mouth every evening. traMADol (ULTRAM) 50 mg tablet 1/15/2019 at am  Yes Yes Sig: Take 25 mg by mouth daily. umeclidinium-vilanterol (ANORO ELLIPTA) 62.5-25 mcg/actuation inhaler 1/15/2019 at am  Yes Yes Sig: Take 1 Puff by inhalation daily. Facility-Administered Medications: None Social History Socioeconomic History  Marital status:  Spouse name: Not on file  Number of children: Not on file  Years of education: Not on file  Highest education level: Not on file Social Needs  Financial resource strain: Not on file  Food insecurity - worry: Not on file  Food insecurity - inability: Not on file  Transportation needs - medical: Not on file  Transportation needs - non-medical: Not on file Occupational History  Not on file Tobacco Use  Smoking status: Former Smoker Years: 40.00 Last attempt to quit: 1986 Years since quittin.2  Smokeless tobacco: Never Used Substance and Sexual Activity  Alcohol use: Yes  Drug use: No  
 Sexual activity: Not on file Other Topics Concern  Not on file Social History Narrative  Not on file History reviewed. No pertinent family history. Objective: 
 
 
Vital Signs:   
Patient Vitals for the past 24 hrs: 
 Temp Pulse Resp BP SpO2  
19 0833     95 % 19 0745 98.3 °F (36.8 °C) 74 26 180/62 95 % 19 0324 98 °F (36.7 °C) 82 28 136/73 94 % 19 0144     94 % 19 2317 98.2 °F (36.8 °C) (!) 101  169/80 93 % 19 2113     94 % 19 1947 98.6 °F (37 °C) 91 (!) 35 171/57 95 % 19 1611 98.3 °F (36.8 °C) 80 (!) 33 152/56 95 % 19 1448    137/43   
19 1425     96 % 19 1304 98.8 °F (37.1 °C) 71 30 181/56 94 % Intake/Output:  
Last shift:        
Last 3 shifts: No intake/output data recorded. Tru Nixon Intake/Output Summary (Last 24 hours) at 2019 6262 Last data filed at 2019 9698 Gross per 24 hour Intake 480 ml Output 201 ml Net 279 ml EXAM: 
 
Pleasant frail pale female \"my back and side hurts\" Shallow RR effort NC oxygen NC./AT 
EOMI/ sclera anicteric Moist MM Neck supple Back kyphotic CV s1s2 RRR Chest decreased bases L >R; no wheeze Abd soft, benign LE no edema Heel ulcer Non focal 
 
Data I have personally reviewed data, flowsheets for the last 24 hours. Labs: 
Recent Labs  
  01/18/19 
0256 01/17/19 
0335 01/15/19 
1121 WBC 6.9 7.0 7.4 HGB 11.4* 10.8* 12.2 HCT 37.1 35.4 40.6  234 269 Recent Labs  
  01/18/19 
0256 01/17/19 
0335 01/15/19 
1121  142 144  
K 3.4* 3.2* 3.8  106 106 CO2 32 31 34* * 137* 191* BUN 18 17 15 CREA 0.70 0.65 0.75 CA 8.1* 8.1* 9.0 ALB  --   --  3.1*  
SGOT  --   --  23 ALT  --   --  25 ABRIL Reyes 
[facility]

## 2019-01-18 NOTE — PROGRESS NOTES
Met with patient and family at the bedside. They have decided not to return to [address] [facility]. This CM left list of facilities for them to make other choices. CM will follow.  
 
Cesilia Sears RN CRM

## 2019-01-18 NOTE — PROGRESS NOTES
1930: Bedside shift change report given to Marylen Deis, RN (oncoming nurse) by Nii Williamson RN (offgoing nurse). Report included the following information SBAR, Kardex, Intake/Output, MAR, Recent Results and Cardiac Rhythm NSR. Problem: Pressure Injury - Risk of 
Goal: *Prevention of pressure injury Document Mohit Scale and appropriate interventions in the flowsheet. Outcome: Progressing Towards Goal 
Pressure Injury Interventions: 
Sensory Interventions: Assess changes in LOC, Assess need for specialty bed, Check visual cues for pain, Discuss PT/OT consult with provider, Float heels, Keep linens dry and wrinkle-free, Maintain/enhance activity level, Minimize linen layers, Pressure redistribution bed/mattress (bed type), Turn and reposition approx. every two hours (pillows and wedges if needed) Moisture Interventions: Absorbent underpads, Apply protective barrier, creams and emollients, Assess need for specialty bed, Check for incontinence Q2 hours and as needed, Internal/External urinary devices, Limit adult briefs, Maintain skin hydration (lotion/cream), Minimize layers Activity Interventions: Assess need for specialty bed, Increase time out of bed, Pressure redistribution bed/mattress(bed type), PT/OT evaluation Mobility Interventions: Assess need for specialty bed, Float heels, HOB 30 degrees or less, Pressure redistribution bed/mattress (bed type), PT/OT evaluation, Turn and reposition approx. every two hours(pillow and wedges) Nutrition Interventions: Document food/fluid/supplement intake Friction and Shear Interventions: Apply protective barrier, creams and emollients, Foam dressings/transparent film/skin sealants, HOB 30 degrees or less, Lift sheet, Lift team/patient mobility team, Sit at 90-degree angle, Transferring/repositioning devices Problem: Falls - Risk of 
Goal: *Absence of Falls Document Hamp Priti Fall Risk and appropriate interventions in the flowsheet. Outcome: Progressing Towards Goal 
Fall Risk Interventions: 
Mobility Interventions: Bed/chair exit alarm, Communicate number of staff needed for ambulation/transfer, OT consult for ADLs, Patient to call before getting OOB, PT Consult for mobility concerns, Strengthening exercises (ROM-active/passive), PT Consult for assist device competence, Utilize walker, cane, or other assistive device Mentation Interventions: Adequate sleep, hydration, pain control, Bed/chair exit alarm, Door open when patient unattended, Evaluate medications/consider consulting pharmacy, Eyeglasses and hearing aids, Increase mobility, More frequent rounding, Reorient patient, Room close to nurse's station, Self-releasing belt, Toileting rounds, Update white board Medication Interventions: Assess postural VS orthostatic hypotension, Bed/chair exit alarm, Evaluate medications/consider consulting pharmacy, Patient to call before getting OOB, Teach patient to arise slowly Elimination Interventions: Bed/chair exit alarm, Call light in reach, Patient to call for help with toileting needs, Toilet paper/wipes in reach, Toileting schedule/hourly rounds History of Falls Interventions: Bed/chair exit alarm, Consult care management for discharge planning, Evaluate medications/consider consulting pharmacy, Door open when patient unattended, Room close to nurse's station, Investigate reason for fall Problem: Breathing Pattern - Ineffective Goal: *Absence of hypoxia Outcome: Progressing Towards Goal 
Pt on 3L NC. Pt maintaining SpO2 >90%. Pt receiving prednisone and nebulizer tx. Will continue to monitor

## 2019-01-18 NOTE — PROGRESS NOTES
Problem: Pressure Injury - Risk of 
Goal: *Prevention of pressure injury Document Mohit Scale and appropriate interventions in the flowsheet. Outcome: Progressing Towards Goal 
Pressure Injury Interventions: 
Sensory Interventions: Assess changes in LOC, Avoid rigorous massage over bony prominences, Discuss PT/OT consult with provider, Float heels, Keep linens dry and wrinkle-free, Maintain/enhance activity level, Minimize linen layers, Monitor skin under medical devices, Suspension boots, Turn and reposition approx. every two hours (pillows and wedges if needed) Moisture Interventions: Absorbent underpads, Apply protective barrier, creams and emollients, Internal/External urinary devices, Maintain skin hydration (lotion/cream), Minimize layers, Check for incontinence Q2 hours and as needed Activity Interventions: Increase time out of bed, Pressure redistribution bed/mattress(bed type), PT/OT evaluation Mobility Interventions: Float heels, HOB 30 degrees or less, Pressure redistribution bed/mattress (bed type), PT/OT evaluation, Suspension boots, Turn and reposition approx. every two hours(pillow and wedges) Nutrition Interventions: Document food/fluid/supplement intake Friction and Shear Interventions: Apply protective barrier, creams and emollients, HOB 30 degrees or less, Lift sheet, Lift team/patient mobility team, Minimize layers Problem: Falls - Risk of 
Goal: *Absence of Falls Document Iredell Rank Fall Risk and appropriate interventions in the flowsheet. Outcome: Progressing Towards Goal 
Fall Risk Interventions: 
Mobility Interventions: Bed/chair exit alarm, Communicate number of staff needed for ambulation/transfer, Patient to call before getting OOB, PT Consult for mobility concerns, Strengthening exercises (ROM-active/passive) Mentation Interventions: Adequate sleep, hydration, pain control, Bed/chair exit alarm, Door open when patient unattended, Evaluate medications/consider consulting pharmacy, Reorient patient, More frequent rounding, Room close to nurse's station, Toileting rounds Medication Interventions: Bed/chair exit alarm, Evaluate medications/consider consulting pharmacy, Patient to call before getting OOB Elimination Interventions: Bed/chair exit alarm, Call light in reach, Patient to call for help with toileting needs, Toileting schedule/hourly rounds History of Falls Interventions: Bed/chair exit alarm, Door open when patient unattended, Evaluate medications/consider consulting pharmacy, Room close to nurse's station Problem: Breathing Pattern - Ineffective Goal: *Absence of hypoxia Outcome: Progressing Towards Goal 
Monitor pt vitals to ensure adequate oxygenation - SpO2 90-95%  And higher. Patient Vitals for the past 12 hrs: 
 Temp Pulse Resp BP SpO2  
01/18/19 0324 98 °F (36.7 °C) 82 28 136/73 94 % 01/18/19 0144     94 % 01/17/19 2317 98.2 °F (36.8 °C) (!) 101  169/80 93 % 01/17/19 2113     94 % 01/17/19 1947 98.6 °F (37 °C) 91 (!) 35 171/57 95 % Teach pt to use pursed lip breathing when feeling short of breath or when O2 had gone down.

## 2019-01-18 NOTE — PROGRESS NOTES
Problem: Mobility Impaired (Adult and Pediatric) Goal: *Acute Goals and Plan of Care (Insert Text) Physical Therapy Goals Initiated 1/16/2019 1. Patient will move from supine to sit and sit to supine  and roll side to side in bed with minimal assistance/contact guard assist within 7 day(s). 2.  Patient will transfer from bed to chair and chair to bed with moderate assistance  using the least restrictive device within 7 day(s). 3.  Patient will perform sit to stand with moderate assistance  within 7 day(s). 4.  Patient will ambulate with moderate assistance  for at least 10 feet with the least restrictive device within 7 day(s). physical Therapy TREATMENT Patient: Duarte Calderon [de-identified]80 y.o. female) Date: 1/18/2019 Diagnosis: SOB (shortness of breath) SOB (shortness of breath) SOB (shortness of breath) Precautions: Fall, Skin Chart, physical therapy assessment, plan of care and goals were reviewed. ASSESSMENT: Patient remains confused. Following most commands with increased response time. O2 sats dropped to 86% with transfer to chair and again with short ambulation. Recovers to 90's within 1 minute with seated rest. Ambulation remains limited secondary to quick fatigue and also drop in O2 sats. Pending progress patient will need 24/7 assist if she returns home and HHPT vs SNF for rehab. Progression toward goals: 
[]    Improving appropriately and progressing toward goals [x]    Improving slowly and progressing toward goals 
[]    Not making progress toward goals and plan of care will be adjusted PLAN: 
Patient continues to benefit from skilled intervention to address the above impairments. Continue treatment per established plan of care. Discharge Recommendations:  Rehab and Home Health Further Equipment Recommendations for Discharge: will continue to assess SUBJECTIVE:  
Patient stated I will sit.  OBJECTIVE DATA SUMMARY:  
Critical Behavior: Neurologic State: Alert, Confused(periodic confusion) Orientation Level: Oriented to person, Oriented to time, Disoriented to place, Disoriented to situation Cognition: Follows commands Safety/Judgement: Decreased awareness of environment, Decreased awareness of need for assistance, Decreased awareness of need for safety, Decreased insight into deficits, Fall prevention Functional Mobility Training: 
Bed Mobility: 
 Supine to Sit: Moderate assistance;Assist x1 Transfers: 
Sit to Stand: Minimum assistance(from bed and from chair) Stand to Sit: Contact guard assistance Bed to Chair: Minimum assistance(a few steps using a walker) Balance: 
Sitting: Impaired Sitting - Static: Good (unsupported) Sitting - Dynamic: Fair (occasional) Standing: Impaired Standing - Static: Fair Standing - Dynamic : FairAmbulation/Gait Training: 
 Assistive Device: Gait belt;Walker, rolling Ambulation - Level of Assistance: Minimal assistance Gait Abnormalities: Decreased step clearance;Shuffling gait Base of Support: Narrowed Speed/Nyasia: Pace decreased (<100 feet/min) Step Length: Left shortened;Right shortened Therapeutic Exercises: Ankle pumps Pain: No complaint Activity Tolerance:  
O2 sats 95 to 86% on 3l O2 HR 70's to 80's with activity RR mid 20's to 30 with activity Please refer to the flowsheet for vital signs taken during this treatment. After treatment:  
[x]    Patient left in no apparent distress sitting up in chair 
[]    Patient left in no apparent distress in bed 
[x]    Call bell left within reach [x]    Nursing notified 
[]    Caregiver present [x]    Bed alarm activated COMMUNICATION/COLLABORATION:  
The patients plan of care was discussed with: Registered Nurse Tania Robledo, PT Time Calculation: 23 mins

## 2019-01-18 NOTE — PROGRESS NOTES
Hospitalist Progress Note Jamie Garcia MD 
Answering service: [phone] OR 36 from in house phone Cell: [phone] Date of Service:  2019 NAME:  Olivier Arredondo :  1925 MRN:  [id number] Admission Summary:  
 
80-year-old female with a past medical history of gait abnormalities, multiple falls, left heel decubitus prior to arrival to the hospital, history of acute hypoxic and hypercapnic respiratory failure, community-acquired pneumonia, UTI, multiple compression fractures and hypernatremia, who presents to the hospital after a fall. The patient apparently lives at [place] at [facility]. She had a fall yesterday. The patient reports that she did not hit her head or her neck, but fell on the right side, immediately started having pain on the right side of her chest.  The patient also reports that she had some pain in her right arm. She had multiple x-rays done at the facility and they showed some rib fracture. Patient continued to be in pain, was also having some shortness of breath. Family got concerned and the patient was transferred to [address] [place] [address].  The patient reports that she did not lose consciousness. It was a purely mechanical fall. She is wearing a boot for the decubitus ulcer on her left foot and \"the boot got tangled,\" which caused her to fall. Patient was here in October after a fall and had multiple thoracic fractures and since then, has been living at [place] at [facility]. The daughter also reports that the patient is oxygen dependent since being discharged from the hospital in October. Interval history / Subjective:  
F/u Fall Patient stating she is not SOB. Pain when she moves. No new issues Assessment & Plan:  
 
Right Fourth rib fracture with possible pneumothorax 
-CXR 1/15 Right fourth rib fracture with possible small apical pneumothorax -patient currently admitted to the [facility] 
-CXR 1/16 No pneumothorax. Small to moderate bilateral pleural effusions mostly posteriorly at the lung bases. Left is larger than the right. The majority left lower lobe is collapsed. Follow-up to resolution is suggested 
-CXR 1/17 Moderate-sized right-sided pleural effusion. Mild to moderate pulmonary edema. No definite pneumothorax identified, however study is limited by technique, as described above. Nonspecific patchy opacity in the right mid and lower lung zones, which could be related to atelectasis or airspace disease 
-Appreciate Pulmonary input, prn therapeutic thoracentesis. Continue lasix 
-provide oxygen support, pain control - Tylenol scheduled with PRN oxy 
-incentive spirometry  
-PT/OT 
-Will consider thoracentesis if worsening respiratory status Pleural Effusions: S/P IV Lasix. 
-resumed home lasix Fall 
-likely mechanical 
-CT head 1/15 No acute intracranial process seen. 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 
-would not do any further work up regarding that Lung collapse, see above. Chronic hypoxic respiratory failure 
-on baseline oxygen Subjective fevers: Patient has not had any fever inpatient. 
-culture if febrile 
-no abx Compression deformity 
-CT cervical spine Stable severe T6 compression deformity in patient with known osteoporosis. C5 retrolisthesis. Multifactorial moderate left C4-5 and right C6-7 as well as severe right C5-6 
-Outpatient follow up Hypertension, suboptimally controlled. Some component could be due to pain.  
-continue and adjust home meds as needed Hyperglycemia, A1C at 5.5. PT/OT SNF Code status: DNR 
DVT prophylaxis: heparin PTA: Janett of [employer] Plan: Spoke to the daughter. The patient seems to be very close to discharge. She does not want the patient to go to [address] [facility]. CM informed Care Plan discussed with: Patient/Family and Nurse Disposition: TBD  
 
 Hospital Problems  Date Reviewed: 1/15/2019 Codes Class Noted POA * (Principal) SOB (shortness of breath) ICD-10-CM: R06.02 
ICD-9-CM: 786.05  1/15/2019 Unknown Review of Systems:  
Pertinent items are noted in HPI. Vital Signs:  
 Last 24hrs VS reviewed since prior progress note. Most recent are: 
Visit Vitals /62 (BP 1 Location: Right arm, BP Patient Position: At rest) Pulse 74 Temp 98.3 °F (36.8 °C) Resp 26 Ht 4' 10\" (1.473 m) Wt 52.8 kg (116 lb 6.5 oz) SpO2 95% Breastfeeding? No  
BMI 24.33 kg/m² Intake/Output Summary (Last 24 hours) at 1/18/2019 6737 Last data filed at 1/17/2019 8229 Gross per 24 hour Intake 480 ml Output 201 ml Net 279 ml Physical Examination:  
 
 
Constitutional:  No acute distress, cooperative, pleasant   
ENT:  Neck supple Resp:  Decreased in bases. No accessory muscle use CV:  Regular rhythm, normal rate GI:  Soft, non distended, non tender Musculoskeletal:  No edema, warm Neurologic:  Moves all extremities. Alert Data Review:  
 Review and/or order of clinical lab test 
Review and/or order of tests in the radiology section of CPT Review and/or order of tests in the medicine section of CPT Labs:  
 
Recent Labs  
  01/18/19 
0256 01/17/19 
0335 WBC 6.9 7.0 HGB 11.4* 10.8* HCT 37.1 35.4  234 Recent Labs  
  01/18/19 
0256 01/17/19 
0335 01/15/19 
1121  142 144  
K 3.4* 3.2* 3.8  106 106 CO2 32 31 34* BUN 18 17 15 CREA 0.70 0.65 0.75 * 137* 191* CA 8.1* 8.1* 9.0 Recent Labs  
  01/15/19 
1121 SGOT 23 ALT 25 AP 80 TBILI 0.5 TP 6.5 ALB 3.1*  
GLOB 3.4 No results for input(s): INR, PTP, APTT in the last 72 hours. No lab exists for component: INREXT, INREXT No results for input(s): FE, TIBC, PSAT, FERR in the last 72 hours. No results found for: FOL, RBCF No results for input(s): PH, PCO2, PO2 in the last 72 hours. No results for input(s): CPK, CKNDX, TROIQ in the last 72 hours. No lab exists for component: CPKMB Lab Results Component Value Date/Time Cholesterol, total 168 05/21/2010 03:00 AM  
 HDL Cholesterol 53 05/21/2010 03:00 AM  
 LDL, calculated 95.4 05/21/2010 03:00 AM  
 Triglyceride 98 05/21/2010 03:00 AM  
 CHOL/HDL Ratio 3.2 05/21/2010 03:00 AM  
 
Lab Results Component Value Date/Time Glucose (POC) 120 (H) 10/27/2018 06:05 AM  
 Glucose (POC) 133 (H) 10/27/2018 12:18 AM  
 Glucose (POC) 177 (H) 10/26/2018 06:21 PM  
 Glucose (POC) 222 (H) 10/26/2018 11:51 AM  
 Glucose (POC) 119 (H) 10/26/2018 05:52 AM  
 
Lab Results Component Value Date/Time Color YELLOW/STRAW 01/16/2019 04:08 PM  
 Appearance CLOUDY (A) 01/16/2019 04:08 PM  
 Specific gravity 1.017 01/16/2019 04:08 PM  
 Specific gravity 1.005 03/07/2017 01:54 PM  
 pH (UA) 5.5 01/16/2019 04:08 PM  
 Protein 30 (A) 01/16/2019 04:08 PM  
 Glucose NEGATIVE  01/16/2019 04:08 PM  
 Ketone NEGATIVE  01/16/2019 04:08 PM  
 Bilirubin NEGATIVE  01/16/2019 04:08 PM  
 Urobilinogen 0.2 01/16/2019 04:08 PM  
 Nitrites NEGATIVE  01/16/2019 04:08 PM  
 Leukocyte Esterase SMALL (A) 01/16/2019 04:08 PM  
 Epithelial cells FEW 10/24/2018 02:52 PM  
 Bacteria 4+ (A) 10/24/2018 02:52 PM  
 WBC  10/24/2018 02:52 PM  
 RBC 0-5 10/24/2018 02:52 PM  
 
 
 
Medications Reviewed:  
 
Current Facility-Administered Medications Medication Dose Route Frequency  furosemide (LASIX) injection 40 mg  40 mg IntraVENous ONCE  potassium chloride (KLOR-CON) packet for solution 40 mEq  40 mEq Oral ONCE  
 oxyCODONE (ROXICODONE) 5 mg/5 mL oral solution 2.5 mg  2.5 mg Oral Q4H PRN  
 acetaminophen (TYLENOL) tablet 1,000 mg  1,000 mg Oral Q8H  
 famotidine (PEPCID) tablet 20 mg  20 mg Oral DAILY  sodium hypochlorite (QUARTER STRENGTH DAKIN'S) 0.125% irrigation (bottle)   Topical DAILY  collagenase (SANTYL) 250 unit/gram ointment   Topical DAILY  balsam peru-castor oil (VENELEX) [id number] mg/gram ointment   Topical TID  lidocaine (LIDODERM) 5 % patch 2 Patch  2 Patch TransDERmal Q24H  
 sodium chloride (NS) flush 5-40 mL  5-40 mL IntraVENous Q8H  
 sodium chloride (NS) flush 5-40 mL  5-40 mL IntraVENous PRN  
 heparin (porcine) injection 5,000 Units  5,000 Units SubCUTAneous Q8H  
 hydrALAZINE (APRESOLINE) 20 mg/mL injection 10 mg  10 mg IntraVENous Q6H PRN  
 amLODIPine (NORVASC) tablet 10 mg  10 mg Oral DAILY  metoprolol tartrate (LOPRESSOR) tablet 12.5 mg  12.5 mg Oral BID  therapeutic multivitamin (THERAGRAN) tablet 1 Tab  1 Tab Oral DAILY  sertraline (ZOLOFT) tablet 25 mg  25 mg Oral QPM  
 albuterol-ipratropium (DUO-NEB) 2.5 MG-0.5 MG/3 ML  3 mL Nebulization Q6H RT  
 
______________________________________________________________________ EXPECTED LENGTH OF STAY: - - - 
ACTUAL LENGTH OF STAY:          1 Lupe Flores MD

## 2019-01-19 LAB
T3FREE SERPL-MCNC: 2 PG/ML (ref 2.2–4)
TSH SERPL DL<=0.05 MIU/L-ACNC: 0.45 UIU/ML (ref 0.36–3.74)

## 2019-01-19 PROCEDURE — 84481 FREE ASSAY (FT-3): CPT

## 2019-01-19 PROCEDURE — 94640 AIRWAY INHALATION TREATMENT: CPT

## 2019-01-19 PROCEDURE — 65660000000 HC RM CCU STEPDOWN

## 2019-01-19 PROCEDURE — 84443 ASSAY THYROID STIM HORMONE: CPT

## 2019-01-19 PROCEDURE — 74011000250 HC RX REV CODE- 250: Performed by: INTERNAL MEDICINE

## 2019-01-19 PROCEDURE — 94762 N-INVAS EAR/PLS OXIMTRY CONT: CPT

## 2019-01-19 PROCEDURE — 94664 DEMO&/EVAL PT USE INHALER: CPT

## 2019-01-19 PROCEDURE — 74011250637 HC RX REV CODE- 250/637: Performed by: FAMILY MEDICINE

## 2019-01-19 PROCEDURE — 74011250636 HC RX REV CODE- 250/636: Performed by: FAMILY MEDICINE

## 2019-01-19 PROCEDURE — 36415 COLL VENOUS BLD VENIPUNCTURE: CPT

## 2019-01-19 PROCEDURE — 74011250637 HC RX REV CODE- 250/637: Performed by: INTERNAL MEDICINE

## 2019-01-19 PROCEDURE — 77010033678 HC OXYGEN DAILY

## 2019-01-19 PROCEDURE — 74011000250 HC RX REV CODE- 250: Performed by: FAMILY MEDICINE

## 2019-01-19 RX ADMIN — CASTOR OIL AND BALSAM, PERU: 788; 87 OINTMENT TOPICAL at 21:00

## 2019-01-19 RX ADMIN — DAKIN'S SOLUTION 0.125% (QUARTER STRENGTH): 0.12 SOLUTION at 08:23

## 2019-01-19 RX ADMIN — AMLODIPINE BESYLATE 10 MG: 5 TABLET ORAL at 08:22

## 2019-01-19 RX ADMIN — HEPARIN SODIUM 5000 UNITS: 5000 INJECTION INTRAVENOUS; SUBCUTANEOUS at 02:12

## 2019-01-19 RX ADMIN — Medication 10 ML: at 06:29

## 2019-01-19 RX ADMIN — ACETAMINOPHEN 1000 MG: 500 TABLET ORAL at 21:00

## 2019-01-19 RX ADMIN — Medication 10 ML: at 15:31

## 2019-01-19 RX ADMIN — ACETAMINOPHEN 1000 MG: 500 TABLET ORAL at 06:29

## 2019-01-19 RX ADMIN — IPRATROPIUM BROMIDE AND ALBUTEROL SULFATE 3 ML: .5; 3 SOLUTION RESPIRATORY (INHALATION) at 01:09

## 2019-01-19 RX ADMIN — ACETAMINOPHEN 1000 MG: 500 TABLET ORAL at 15:26

## 2019-01-19 RX ADMIN — HEPARIN SODIUM 5000 UNITS: 5000 INJECTION INTRAVENOUS; SUBCUTANEOUS at 08:22

## 2019-01-19 RX ADMIN — FAMOTIDINE 20 MG: 20 TABLET ORAL at 08:22

## 2019-01-19 RX ADMIN — CASTOR OIL AND BALSAM, PERU: 788; 87 OINTMENT TOPICAL at 17:46

## 2019-01-19 RX ADMIN — METOPROLOL TARTRATE 12.5 MG: 25 TABLET ORAL at 21:00

## 2019-01-19 RX ADMIN — IPRATROPIUM BROMIDE AND ALBUTEROL SULFATE 3 ML: .5; 3 SOLUTION RESPIRATORY (INHALATION) at 11:25

## 2019-01-19 RX ADMIN — Medication 10 ML: at 21:01

## 2019-01-19 RX ADMIN — HEPARIN SODIUM 5000 UNITS: 5000 INJECTION INTRAVENOUS; SUBCUTANEOUS at 17:52

## 2019-01-19 RX ADMIN — CASTOR OIL AND BALSAM, PERU: 788; 87 OINTMENT TOPICAL at 08:23

## 2019-01-19 RX ADMIN — METOPROLOL TARTRATE 12.5 MG: 25 TABLET ORAL at 08:26

## 2019-01-19 RX ADMIN — THERA TABS 1 TABLET: TAB at 08:22

## 2019-01-19 RX ADMIN — IPRATROPIUM BROMIDE AND ALBUTEROL SULFATE 3 ML: .5; 3 SOLUTION RESPIRATORY (INHALATION) at 21:48

## 2019-01-19 RX ADMIN — COLLAGENASE SANTYL: 250 OINTMENT TOPICAL at 08:23

## 2019-01-19 RX ADMIN — SERTRALINE HYDROCHLORIDE 25 MG: 50 TABLET ORAL at 17:45

## 2019-01-19 NOTE — PROGRESS NOTES
Problem: Falls - Risk of 
Goal: *Absence of Falls Document Carolina Camilo Fall Risk and appropriate interventions in the flowsheet. Outcome: Progressing Towards Goal 
Fall Risk Interventions: 
Mobility Interventions: Bed/chair exit alarm, Communicate number of staff needed for ambulation/transfer, Patient to call before getting OOB, PT Consult for mobility concerns, OT consult for ADLs, PT Consult for assist device competence Mentation Interventions: Adequate sleep, hydration, pain control, Door open when patient unattended, More frequent rounding, Reorient patient Medication Interventions: Evaluate medications/consider consulting pharmacy, Patient to call before getting OOB Elimination Interventions: Call light in reach, Patient to call for help with toileting needs, Toileting schedule/hourly rounds History of Falls Interventions: Bed/chair exit alarm, Door open when patient unattended, Evaluate medications/consider consulting pharmacy, Investigate reason for fall, Room close to nurse's station Problem: Breathing Pattern - Ineffective Goal: *Absence of hypoxia Outcome: Progressing Towards Goal 
Patient O2 saturations >92% on 2L NC. Does get tachypenic on exertion. Bedside shift [facility] by Tawny Lopez (offgoing nurse). Report included the following information SBAR, Kardex, ED Summary, Intake/Output, MAR, Recent Results and Cardiac Rhythm NSR.

## 2019-01-19 NOTE — PROGRESS NOTES
Hospitalist Progress Note Vani Simon MD 
Answering service: [phone] OR 36 from in house phone Cell: [phone] Date of Service:  2019 NAME:  Merlin Angel :  1925 MRN:  [id number] Admission Summary:  
 
26-year-old female with a past medical history of gait abnormalities, multiple falls, left heel decubitus prior to arrival to the hospital, history of acute hypoxic and hypercapnic respiratory failure, community-acquired pneumonia, UTI, multiple compression fractures and hypernatremia, who presents to the hospital after a fall. The patient apparently lives at [id number] [place] [address] at [facility]. She had a fall yesterday. The patient reports that she did not hit her head or her neck, but fell on the right side, immediately started having pain on the right side of her chest.  The patient also reports that she had some pain in her right arm. She had multiple x-rays done at the facility and they showed some rib fracture. Patient continued to be in pain, was also having some shortness of breath. Family got concerned and the patient was transferred to [facility].  The patient reports that she did not lose consciousness. It was a purely mechanical fall. She is wearing a boot for the decubitus ulcer on her left foot and \"the boot got tangled,\" which caused her to fall. Patient was here in October after a fall and had multiple thoracic fractures and since then, has been living at [address] [place] [address] at [facility]. The daughter also reports that the patient is oxygen dependent since being discharged from the hospital in October. Interval history / Subjective:  
F/u Fall Patient stating she is not SOB. Pain when she moves. No new issues Assessment & Plan:  
 
Right Fourth rib fracture with possible pneumothorax 
-CXR 1/15 Right fourth rib fracture with possible small apical pneumothorax -patient currently admitted to the [facility] 
-CXR 1/16 No pneumothorax. Small to moderate bilateral pleural effusions mostly posteriorly at the lung bases. Left is larger than the right. The majority left lower lobe is collapsed. Follow-up to resolution is suggested 
-CXR 1/17 Moderate-sized right-sided pleural effusion. Mild to moderate pulmonary edema. No definite pneumothorax identified, however study is limited by technique, as described above. Nonspecific patchy opacity in the right mid and lower lung zones, which could be related to atelectasis or airspace disease 
-Appreciate Pulmonary input, prn therapeutic thoracentesis. Continue lasix 
-provide oxygen support, pain control - Tylenol scheduled with PRN oxy 
-incentive spirometry  
-PT/OT 
-Will consider thoracentesis if worsening respiratory status Pleural Effusions:  
-sec to ? Fall vs hypoalbuminemia 
-Echo with EF 75% with no RWMA 
-S/P IV Lasix. 
-resumed home lasix Fall 
-likely mechanical 
-CT head 1/15 No acute intracranial process seen. 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 
-would not do any further work up regarding that 
-PT/OT Lung collapse, see above. 
-Now resolved Chronic hypoxic respiratory failure 
-on baseline oxygen Subjective fevers:  
-Patient has not had any fever inpatient. 
-culture if febrile 
-no abx Compression deformity 
-CT cervical spine Stable severe T6 compression deformity in patient with known osteoporosis. C5 retrolisthesis. Multifactorial moderate left C4-5 and right C6-7 as well as severe right C5-6 
-Outpatient follow up Hypertension, suboptimally controlled. Some component could be due to pain.  
-continue and adjust home meds as needed Hyperglycemia, A1C at 5.5. PT/OT SNF Code status: DNR 
DVT prophylaxis: heparin PTA: Janett of [employer] Plan: Spoke to the daughter.  The patient seems to be very close to discharge. She does not want the patient to go to [facility]. CM aware Care Plan discussed with: Patient/Family and Nurse Disposition: TBD Transfer to remote [facility] Problems  Date Reviewed: 1/15/2019 Codes Class Noted POA * (Principal) SOB (shortness of breath) ICD-10-CM: R06.02 
ICD-9-CM: 786.05  1/15/2019 Unknown Review of Systems:  
Pertinent items are noted in HPI. Vital Signs:  
 Last 24hrs VS reviewed since prior progress note. Most recent are: 
Visit Vitals /58 (BP 1 Location: Right arm, BP Patient Position: At rest) Pulse 87 Temp 97.8 °F (36.6 °C) Resp 24 Ht 4' 10\" (1.473 m) Wt 56.9 kg (125 lb 6.4 oz) SpO2 97% Breastfeeding? No  
BMI 26.21 kg/m² Intake/Output Summary (Last 24 hours) at 1/19/2019 1009 Last data filed at 1/19/2019 5794 Gross per 24 hour Intake 480 ml Output 1100 ml Net -620 ml Physical Examination:  
 
 
Constitutional:  No acute distress, cooperative, pleasant   
ENT:  Neck supple Resp:  Decreased in bases. No accessory muscle use CV:  Regular rhythm, normal rate GI:  Soft, non distended, non tender Musculoskeletal:  No edema, warm Neurologic:  Moves all extremities. Alert Data Review:  
 Review and/or order of clinical lab test 
Review and/or order of tests in the radiology section of CPT Review and/or order of tests in the medicine section of CPT Labs:  
 
Recent Labs  
  01/18/19 
0256 01/17/19 
0335 WBC 6.9 7.0 HGB 11.4* 10.8* HCT 37.1 35.4  234 Recent Labs  
  01/18/19 
0256 01/17/19 
0335  142  
K 3.4* 3.2*  
 106 CO2 32 31 BUN 18 17 CREA 0.70 0.65 * 137* CA 8.1* 8.1* No results for input(s): SGOT, GPT, ALT, AP, TBIL, TBILI, TP, ALB, GLOB, GGT, AML, LPSE in the last 72 hours. No lab exists for component: AMYP, HLPSE No results for input(s): INR, PTP, APTT in the last 72 hours. No lab exists for component: INREXT, INREXT No results for input(s): FE, TIBC, PSAT, FERR in the last 72 hours. No results found for: FOL, RBCF No results for input(s): PH, PCO2, PO2 in the last 72 hours. No results for input(s): CPK, CKNDX, TROIQ in the last 72 hours. No lab exists for component: CPKMB Lab Results Component Value Date/Time Cholesterol, total 168 05/21/2010 03:00 AM  
 HDL Cholesterol 53 05/21/2010 03:00 AM  
 LDL, calculated 95.4 05/21/2010 03:00 AM  
 Triglyceride 98 05/21/2010 03:00 AM  
 CHOL/HDL Ratio 3.2 05/21/2010 03:00 AM  
 
Lab Results Component Value Date/Time Glucose (POC) 120 (H) 10/27/2018 06:05 AM  
 Glucose (POC) 133 (H) 10/27/2018 12:18 AM  
 Glucose (POC) 177 (H) 10/26/2018 06:21 PM  
 Glucose (POC) 222 (H) 10/26/2018 11:51 AM  
 Glucose (POC) 119 (H) 10/26/2018 05:52 AM  
 
Lab Results Component Value Date/Time Color YELLOW/STRAW 01/16/2019 04:08 PM  
 Appearance CLOUDY (A) 01/16/2019 04:08 PM  
 Specific gravity 1.017 01/16/2019 04:08 PM  
 Specific gravity 1.005 03/07/2017 01:54 PM  
 pH (UA) 5.5 01/16/2019 04:08 PM  
 Protein 30 (A) 01/16/2019 04:08 PM  
 Glucose NEGATIVE  01/16/2019 04:08 PM  
 Ketone NEGATIVE  01/16/2019 04:08 PM  
 Bilirubin NEGATIVE  01/16/2019 04:08 PM  
 Urobilinogen 0.2 01/16/2019 04:08 PM  
 Nitrites NEGATIVE  01/16/2019 04:08 PM  
 Leukocyte Esterase SMALL (A) 01/16/2019 04:08 PM  
 Epithelial cells FEW 10/24/2018 02:52 PM  
 Bacteria 4+ (A) 10/24/2018 02:52 PM  
 WBC  10/24/2018 02:52 PM  
 RBC 0-5 10/24/2018 02:52 PM  
 
 
 
Medications Reviewed:  
 
Current Facility-Administered Medications Medication Dose Route Frequency  oxyCODONE (ROXICODONE) 5 mg/5 mL oral solution 2.5 mg  2.5 mg Oral Q4H PRN  
 acetaminophen (TYLENOL) tablet 1,000 mg  1,000 mg Oral Q8H  
 famotidine (PEPCID) tablet 20 mg  20 mg Oral DAILY  sodium hypochlorite (QUARTER STRENGTH DAKIN'S) 0.125% irrigation (bottle)   Topical DAILY  collagenase (SANTYL) 250 unit/gram ointment   Topical DAILY  balsam peru-castor oil (VENELEX) [id number] mg/gram ointment   Topical TID  lidocaine (LIDODERM) 5 % patch 2 Patch  2 Patch TransDERmal Q24H  
 sodium chloride (NS) flush 5-40 mL  5-40 mL IntraVENous Q8H  
 sodium chloride (NS) flush 5-40 mL  5-40 mL IntraVENous PRN  
 heparin (porcine) injection 5,000 Units  5,000 Units SubCUTAneous Q8H  
 hydrALAZINE (APRESOLINE) 20 mg/mL injection 10 mg  10 mg IntraVENous Q6H PRN  
 amLODIPine (NORVASC) tablet 10 mg  10 mg Oral DAILY  metoprolol tartrate (LOPRESSOR) tablet 12.5 mg  12.5 mg Oral BID  therapeutic multivitamin (THERAGRAN) tablet 1 Tab  1 Tab Oral DAILY  sertraline (ZOLOFT) tablet 25 mg  25 mg Oral QPM  
 albuterol-ipratropium (DUO-NEB) 2.5 MG-0.5 MG/3 ML  3 mL Nebulization Q6H RT  
 
______________________________________________________________________ EXPECTED LENGTH OF STAY: 3d 9h 
ACTUAL LENGTH OF STAY:          2 Jarett Haney MD

## 2019-01-19 NOTE — PROGRESS NOTES
Problem: Falls - Risk of 
Goal: *Absence of Falls Document Nadine End Fall Risk and appropriate interventions in the flowsheet. Outcome: Progressing Towards Goal 
Fall Risk Interventions: 
Mobility Interventions: Bed/chair exit alarm, Communicate number of staff needed for ambulation/transfer, Patient to call before getting OOB, PT Consult for mobility concerns, OT consult for ADLs, PT Consult for assist device competence Mentation Interventions: Adequate sleep, hydration, pain control, Door open when patient unattended, More frequent rounding, Reorient patient Medication Interventions: Evaluate medications/consider consulting pharmacy, Patient to call before getting OOB Elimination Interventions: Call light in reach, Patient to call for help with toileting needs, Toileting schedule/hourly rounds History of Falls Interventions: Bed/chair exit alarm, Door open when patient unattended, Evaluate medications/consider consulting pharmacy, Investigate reason for fall, Room close to nurse's station

## 2019-01-20 LAB
ANION GAP SERPL CALC-SCNC: 4 MMOL/L (ref 5–15)
BASOPHILS # BLD: 0 K/UL (ref 0–0.1)
BASOPHILS NFR BLD: 1 % (ref 0–1)
BUN SERPL-MCNC: 20 MG/DL (ref 6–20)
BUN/CREAT SERPL: 32 (ref 12–20)
CALCIUM SERPL-MCNC: 8.8 MG/DL (ref 8.5–10.1)
CHLORIDE SERPL-SCNC: 107 MMOL/L (ref 97–108)
CO2 SERPL-SCNC: 33 MMOL/L (ref 21–32)
CREAT SERPL-MCNC: 0.62 MG/DL (ref 0.55–1.02)
DIFFERENTIAL METHOD BLD: ABNORMAL
EOSINOPHIL # BLD: 0 K/UL (ref 0–0.4)
EOSINOPHIL NFR BLD: 1 % (ref 0–7)
ERYTHROCYTE [DISTWIDTH] IN BLOOD BY AUTOMATED COUNT: 14.1 % (ref 11.5–14.5)
GLUCOSE SERPL-MCNC: 110 MG/DL (ref 65–100)
HCT VFR BLD AUTO: 38.4 % (ref 35–47)
HGB BLD-MCNC: 11.3 G/DL (ref 11.5–16)
IMM GRANULOCYTES # BLD AUTO: 0.1 K/UL (ref 0–0.04)
IMM GRANULOCYTES NFR BLD AUTO: 1 % (ref 0–0.5)
LYMPHOCYTES # BLD: 0.9 K/UL (ref 0.8–3.5)
LYMPHOCYTES NFR BLD: 15 % (ref 12–49)
MCH RBC QN AUTO: 29.1 PG (ref 26–34)
MCHC RBC AUTO-ENTMCNC: 29.4 G/DL (ref 30–36.5)
MCV RBC AUTO: 99 FL (ref 80–99)
MONOCYTES # BLD: 0.5 K/UL (ref 0–1)
MONOCYTES NFR BLD: 8 % (ref 5–13)
NEUTS SEG # BLD: 4.5 K/UL (ref 1.8–8)
NEUTS SEG NFR BLD: 75 % (ref 32–75)
NRBC # BLD: 0 K/UL (ref 0–0.01)
NRBC BLD-RTO: 0 PER 100 WBC
PLATELET # BLD AUTO: 307 K/UL (ref 150–400)
PMV BLD AUTO: 10.8 FL (ref 8.9–12.9)
POTASSIUM SERPL-SCNC: 3.6 MMOL/L (ref 3.5–5.1)
RBC # BLD AUTO: 3.88 M/UL (ref 3.8–5.2)
SODIUM SERPL-SCNC: 144 MMOL/L (ref 136–145)
WBC # BLD AUTO: 6 K/UL (ref 3.6–11)

## 2019-01-20 PROCEDURE — 94640 AIRWAY INHALATION TREATMENT: CPT

## 2019-01-20 PROCEDURE — 74011250636 HC RX REV CODE- 250/636: Performed by: FAMILY MEDICINE

## 2019-01-20 PROCEDURE — 36415 COLL VENOUS BLD VENIPUNCTURE: CPT

## 2019-01-20 PROCEDURE — 65660000000 HC RM CCU STEPDOWN

## 2019-01-20 PROCEDURE — 85025 COMPLETE CBC W/AUTO DIFF WBC: CPT

## 2019-01-20 PROCEDURE — 94762 N-INVAS EAR/PLS OXIMTRY CONT: CPT

## 2019-01-20 PROCEDURE — 94664 DEMO&/EVAL PT USE INHALER: CPT

## 2019-01-20 PROCEDURE — 77010033678 HC OXYGEN DAILY

## 2019-01-20 PROCEDURE — 74011000250 HC RX REV CODE- 250: Performed by: FAMILY MEDICINE

## 2019-01-20 PROCEDURE — 80048 BASIC METABOLIC PNL TOTAL CA: CPT

## 2019-01-20 PROCEDURE — 74011250637 HC RX REV CODE- 250/637: Performed by: INTERNAL MEDICINE

## 2019-01-20 PROCEDURE — 74011250637 HC RX REV CODE- 250/637: Performed by: FAMILY MEDICINE

## 2019-01-20 RX ORDER — FUROSEMIDE 40 MG/1
40 TABLET ORAL DAILY
Status: DISCONTINUED | OUTPATIENT
Start: 2019-01-20 | End: 2019-01-23 | Stop reason: HOSPADM

## 2019-01-20 RX ADMIN — Medication 10 ML: at 13:33

## 2019-01-20 RX ADMIN — HYDRALAZINE HYDROCHLORIDE 10 MG: 20 INJECTION INTRAMUSCULAR; INTRAVENOUS at 00:35

## 2019-01-20 RX ADMIN — FUROSEMIDE 40 MG: 40 TABLET ORAL at 10:43

## 2019-01-20 RX ADMIN — CASTOR OIL AND BALSAM, PERU: 788; 87 OINTMENT TOPICAL at 08:50

## 2019-01-20 RX ADMIN — HEPARIN SODIUM 5000 UNITS: 5000 INJECTION INTRAVENOUS; SUBCUTANEOUS at 08:40

## 2019-01-20 RX ADMIN — ACETAMINOPHEN 1000 MG: 500 TABLET ORAL at 21:16

## 2019-01-20 RX ADMIN — DAKIN'S SOLUTION 0.125% (QUARTER STRENGTH): 0.12 SOLUTION at 08:51

## 2019-01-20 RX ADMIN — CASTOR OIL AND BALSAM, PERU: 788; 87 OINTMENT TOPICAL at 16:01

## 2019-01-20 RX ADMIN — METOPROLOL TARTRATE 12.5 MG: 25 TABLET ORAL at 21:00

## 2019-01-20 RX ADMIN — HEPARIN SODIUM 5000 UNITS: 5000 INJECTION INTRAVENOUS; SUBCUTANEOUS at 17:08

## 2019-01-20 RX ADMIN — IPRATROPIUM BROMIDE AND ALBUTEROL SULFATE 3 ML: .5; 3 SOLUTION RESPIRATORY (INHALATION) at 02:00

## 2019-01-20 RX ADMIN — METOPROLOL TARTRATE 12.5 MG: 25 TABLET ORAL at 08:40

## 2019-01-20 RX ADMIN — HEPARIN SODIUM 5000 UNITS: 5000 INJECTION INTRAVENOUS; SUBCUTANEOUS at 00:35

## 2019-01-20 RX ADMIN — IPRATROPIUM BROMIDE AND ALBUTEROL SULFATE 3 ML: .5; 3 SOLUTION RESPIRATORY (INHALATION) at 13:46

## 2019-01-20 RX ADMIN — COLLAGENASE SANTYL: 250 OINTMENT TOPICAL at 08:50

## 2019-01-20 RX ADMIN — CASTOR OIL AND BALSAM, PERU: 788; 87 OINTMENT TOPICAL at 21:32

## 2019-01-20 RX ADMIN — AMLODIPINE BESYLATE 10 MG: 5 TABLET ORAL at 08:39

## 2019-01-20 RX ADMIN — SERTRALINE HYDROCHLORIDE 25 MG: 50 TABLET ORAL at 17:08

## 2019-01-20 RX ADMIN — FAMOTIDINE 20 MG: 20 TABLET ORAL at 08:40

## 2019-01-20 RX ADMIN — THERA TABS 1 TABLET: TAB at 08:40

## 2019-01-20 RX ADMIN — Medication 2.5 MG: at 18:23

## 2019-01-20 RX ADMIN — ACETAMINOPHEN 1000 MG: 500 TABLET ORAL at 05:50

## 2019-01-20 RX ADMIN — Medication 10 ML: at 21:16

## 2019-01-20 RX ADMIN — IPRATROPIUM BROMIDE AND ALBUTEROL SULFATE 3 ML: .5; 3 SOLUTION RESPIRATORY (INHALATION) at 08:26

## 2019-01-20 RX ADMIN — IPRATROPIUM BROMIDE AND ALBUTEROL SULFATE 3 ML: .5; 3 SOLUTION RESPIRATORY (INHALATION) at 21:49

## 2019-01-20 NOTE — PROGRESS NOTES
Problem: Falls - Risk of 
Goal: *Absence of Falls Document Anza Miranda Fall Risk and appropriate interventions in the flowsheet. Outcome: Progressing Towards Goal 
Fall Risk Interventions: 
Mobility Interventions: Bed/chair exit alarm, Communicate number of staff needed for ambulation/transfer, Patient to call before getting OOB Mentation Interventions: Adequate sleep, hydration, pain control, Bed/chair exit alarm, Door open when patient unattended, More frequent rounding, Reorient patient, Room close to nurse's station, Toileting rounds, Update white board Medication Interventions: Bed/chair exit alarm Elimination Interventions: Bed/chair exit alarm, Call light in reach, Toilet paper/wipes in reach, Toileting schedule/hourly rounds, Urinal in reach History of Falls Interventions: Bed/chair exit alarm, Door open when patient unattended, Room close to nurse's station

## 2019-01-20 NOTE — PROGRESS NOTES
Hospitalist Progress Note Stanley Roque MD 
Answering service: [phone] OR 1985 from in house phone Cell: [phone] Date of Service:  2019 NAME:  Duarte Calderon :  1925 MRN:  [id number] Admission Summary:  
 
79-year-old female with a past medical history of gait abnormalities, multiple falls, left heel decubitus prior to arrival to the hospital, history of acute hypoxic and hypercapnic respiratory failure, community-acquired pneumonia, UTI, multiple compression fractures and hypernatremia, who presents to the hospital after a fall. The patient apparently lives at [place] at [facility]. She had a fall yesterday. The patient reports that she did not hit her head or her neck, but fell on the right side, immediately started having pain on the right side of her chest.  The patient also reports that she had some pain in her right arm. She had multiple x-rays done at the facility and they showed some rib fracture. Patient continued to be in pain, was also having some shortness of breath. Family got concerned and the patient was transferred to [facility].  The patient reports that she did not lose consciousness. It was a purely mechanical fall. She is wearing a boot for the decubitus ulcer on her left foot and \"the boot got tangled,\" which caused her to fall. Patient was here in October after a fall and had multiple thoracic fractures and since then, has been living at [place] at [facility]. The daughter also reports that the patient is oxygen dependent since being discharged from the hospital in October. Interval history / Subjective: No new issues. Denies pain and SOB. Restarted home Lasix. Awaiting placement. Assessment & Plan:  
 
Right Fourth rib fracture with possible pneumothorax 
-CXR 1/15 Right fourth rib fracture with possible small apical pneumothorax -patient currently admitted to the [facility] 
-CXR 1/16 No pneumothorax. Small to moderate bilateral pleural effusions mostly posteriorly at the lung bases. Left is larger than the right. The majority left lower lobe is collapsed. Follow-up to resolution is suggested 
-CXR 1/17 Moderate-sized right-sided pleural effusion. Mild to moderate pulmonary edema. No definite pneumothorax identified, however study is limited by technique, as described above. Nonspecific patchy opacity in the right mid and lower lung zones, which could be related to atelectasis or airspace disease 
-Appreciate Pulmonary input, prn therapeutic thoracentesis. Continue lasix 
-provide oxygen support, pain control - Tylenol scheduled with PRN oxy 
-incentive spirometry  
-PT/OT 
-Will consider thoracentesis if worsening respiratory status Pleural Effusions:  
-sec to ? Fall vs hypoalbuminemia 
-Echo with EF 75% with no RWMA 
-S/P IV Lasix 
-resume PO lasix Fall 
-likely mechanical 
-CT head 1/15 No acute intracranial process seen. 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 
-would not do any further work up regarding that 
-PT/OT Lung collapse, see above. 
-Now resolved Chronic hypoxic respiratory failure 
-on baseline oxygen Subjective fevers:  
-Patient has not had any fever inpatient. 
-culture if febrile 
-no abx Compression deformity 
-CT cervical spine Stable severe T6 compression deformity in patient with known osteoporosis. C5 retrolisthesis. Multifactorial moderate left C4-5 and right C6-7 as well as severe right C5-6 
-Outpatient follow up Hypertension, suboptimally controlled. Some component could be due to pain.  
-continue and adjust home meds as needed Hyperglycemia, A1C at 5.5. PT/OT SNF Code status: DNR 
DVT prophylaxis: heparin PTA: Janett of [employer] Care Plan discussed with: Patient/Family and Nurse Disposition: TBD Awaiting placement Hospital Problems  Date Reviewed: 1/15/2019 Codes Class Noted POA * (Principal) SOB (shortness of breath) ICD-10-CM: R06.02 
ICD-9-CM: 786.05  1/15/2019 Unknown Review of Systems:  
Pertinent items are noted in HPI. Vital Signs:  
 Last 24hrs VS reviewed since prior progress note. Most recent are: 
Visit Vitals /65 Pulse 92 Temp 98.7 °F (37.1 °C) Resp 25 Ht 4' 10\" (1.473 m) Wt 46.2 kg (101 lb 14.4 oz) SpO2 93% Breastfeeding? No  
BMI 21.30 kg/m² Intake/Output Summary (Last 24 hours) at 1/20/2019 6800 Last data filed at 1/19/2019 1752 Gross per 24 hour Intake 480 ml Output  Net 480 ml Physical Examination:  
 
 
Constitutional:  No acute distress, cooperative, pleasant   
ENT:  Neck supple Resp:  Decreased in bases. No accessory muscle use CV:  Regular rhythm, normal rate GI:  Soft, non distended, non tender Musculoskeletal:  No edema, warm Neurologic:  Moves all extremities. Alert Data Review:  
 Review and/or order of clinical lab test 
Review and/or order of tests in the radiology section of CPT Review and/or order of tests in the medicine section of CPT Labs:  
 
Recent Labs  
  01/20/19 
0136 01/18/19 
0256 WBC 6.0 6.9 HGB 11.3* 11.4* HCT 38.4 37.1  264 Recent Labs  
  01/20/19 
0136 01/18/19 
0256  145  
K 3.6 3.4*  
 107 CO2 33* 32 BUN 20 18 CREA 0.62 0.70 * 141* CA 8.8 8.1* No results for input(s): SGOT, GPT, ALT, AP, TBIL, TBILI, TP, ALB, GLOB, GGT, AML, LPSE in the last 72 hours. No lab exists for component: AMYP, HLPSE No results for input(s): INR, PTP, APTT in the last 72 hours. No lab exists for component: INREXT, INREXT No results for input(s): FE, TIBC, PSAT, FERR in the last 72 hours. No results found for: FOL, RBCF No results for input(s): PH, PCO2, PO2 in the last 72 hours. No results for input(s): CPK, CKNDX, TROIQ in the last 72 hours. No lab exists for component: CPKMB Lab Results Component Value Date/Time Cholesterol, total 168 05/21/2010 03:00 AM  
 HDL Cholesterol 53 05/21/2010 03:00 AM  
 LDL, calculated 95.4 05/21/2010 03:00 AM  
 Triglyceride 98 05/21/2010 03:00 AM  
 CHOL/HDL Ratio 3.2 05/21/2010 03:00 AM  
 
Lab Results Component Value Date/Time Glucose (POC) 120 (H) 10/27/2018 06:05 AM  
 Glucose (POC) 133 (H) 10/27/2018 12:18 AM  
 Glucose (POC) 177 (H) 10/26/2018 06:21 PM  
 Glucose (POC) 222 (H) 10/26/2018 11:51 AM  
 Glucose (POC) 119 (H) 10/26/2018 05:52 AM  
 
Lab Results Component Value Date/Time Color YELLOW/STRAW 01/16/2019 04:08 PM  
 Appearance CLOUDY (A) 01/16/2019 04:08 PM  
 Specific gravity 1.017 01/16/2019 04:08 PM  
 Specific gravity 1.005 03/07/2017 01:54 PM  
 pH (UA) 5.5 01/16/2019 04:08 PM  
 Protein 30 (A) 01/16/2019 04:08 PM  
 Glucose NEGATIVE  01/16/2019 04:08 PM  
 Ketone NEGATIVE  01/16/2019 04:08 PM  
 Bilirubin NEGATIVE  01/16/2019 04:08 PM  
 Urobilinogen 0.2 01/16/2019 04:08 PM  
 Nitrites NEGATIVE  01/16/2019 04:08 PM  
 Leukocyte Esterase SMALL (A) 01/16/2019 04:08 PM  
 Epithelial cells FEW 10/24/2018 02:52 PM  
 Bacteria 4+ (A) 10/24/2018 02:52 PM  
 WBC  10/24/2018 02:52 PM  
 RBC 0-5 10/24/2018 02:52 PM  
 
 
 
Medications Reviewed:  
 
Current Facility-Administered Medications Medication Dose Route Frequency  oxyCODONE (ROXICODONE) 5 mg/5 mL oral solution 2.5 mg  2.5 mg Oral Q4H PRN  
 acetaminophen (TYLENOL) tablet 1,000 mg  1,000 mg Oral Q8H  
 famotidine (PEPCID) tablet 20 mg  20 mg Oral DAILY  sodium hypochlorite (QUARTER STRENGTH DAKIN'S) 0.125% irrigation (bottle)   Topical DAILY  collagenase (SANTYL) 250 unit/gram ointment   Topical DAILY  balsam peru-castor oil (VENELEX) [id number] mg/gram ointment   Topical TID  lidocaine (LIDODERM) 5 % patch 2 Patch  2 Patch TransDERmal Q24H  sodium chloride (NS) flush 5-40 mL  5-40 mL IntraVENous Q8H  
 sodium chloride (NS) flush 5-40 mL  5-40 mL IntraVENous PRN  
 heparin (porcine) injection 5,000 Units  5,000 Units SubCUTAneous Q8H  
 hydrALAZINE (APRESOLINE) 20 mg/mL injection 10 mg  10 mg IntraVENous Q6H PRN  
 amLODIPine (NORVASC) tablet 10 mg  10 mg Oral DAILY  metoprolol tartrate (LOPRESSOR) tablet 12.5 mg  12.5 mg Oral BID  therapeutic multivitamin (THERAGRAN) tablet 1 Tab  1 Tab Oral DAILY  sertraline (ZOLOFT) tablet 25 mg  25 mg Oral QPM  
 albuterol-ipratropium (DUO-NEB) 2.5 MG-0.5 MG/3 ML  3 mL Nebulization Q6H RT  
 
______________________________________________________________________ EXPECTED LENGTH OF STAY: 3d 9h 
ACTUAL LENGTH OF STAY:          3 Akbar Phan MD

## 2019-01-20 NOTE — PROGRESS NOTES
Problem: Pressure Injury - Risk of 
Goal: *Prevention of pressure injury Document Mohit Scale and appropriate interventions in the flowsheet. Outcome: Progressing Towards Goal 
Pressure Injury Interventions: 
Sensory Interventions: Assess changes in LOC, Assess need for specialty bed, Check visual cues for pain, Float heels, Keep linens dry and wrinkle-free, Minimize linen layers, Monitor skin under medical devices, Pad between skin to skin, Pressure redistribution bed/mattress (bed type) Moisture Interventions: Absorbent underpads, Apply protective barrier, creams and emollients, Internal/External urinary devices, Minimize layers, Moisture barrier Activity Interventions: PT/OT evaluation Mobility Interventions: HOB 30 degrees or less, Pressure redistribution bed/mattress (bed type), PT/OT evaluation Nutrition Interventions: Document food/fluid/supplement intake Friction and Shear Interventions: Apply protective barrier, creams and emollients, Feet elevated on foot rest, HOB 30 degrees or less, Lift team/patient mobility team, Minimize layers Problem: Falls - Risk of 
Goal: *Absence of Falls Document Flex Venegas Fall Risk and appropriate interventions in the flowsheet. Outcome: Progressing Towards Goal 
Fall Risk Interventions: 
Mobility Interventions: Bed/chair exit alarm, Communicate number of staff needed for ambulation/transfer, Patient to call before getting OOB Mentation Interventions: Adequate sleep, hydration, pain control, Bed/chair exit alarm, Door open when patient unattended, More frequent rounding, Toileting rounds Medication Interventions: Bed/chair exit alarm Elimination Interventions: Bed/chair exit alarm, Call light in reach History of Falls Interventions: Bed/chair exit alarm Problem: Falls - Risk of 
Goal: *Absence of Falls Document Flex Venegas Fall Risk and appropriate interventions in the flowsheet.  
Outcome: Progressing Towards Goal 
 Fall Risk Interventions: 
Mobility Interventions: Bed/chair exit alarm, Communicate number of staff needed for ambulation/transfer, Patient to call before getting OOB Mentation Interventions: Adequate sleep, hydration, pain control, Bed/chair exit alarm, Door open when patient unattended, More frequent rounding, Toileting rounds Medication Interventions: Bed/chair exit alarm Elimination Interventions: Bed/chair exit alarm, Call light in reach History of Falls Interventions: Bed/chair exit alarm

## 2019-01-20 NOTE — PROGRESS NOTES
TRANSFER - IN REPORT: 
 
Verbal report received from Diandra Patel RN(name) on [facility]  being received from [facility] (unit) for routine progression of care Report consisted of patients Situation, Background, Assessment and  
Recommendations(SBAR). Information from the following report(s) SBAR, Kardex, Intake/Output, MAR, Recent Results and Cardiac Rhythm SR was reviewed with the receiving nurse. Opportunity for questions and clarification was provided. Assessment completed upon patients arrival to unit and care assumed.

## 2019-01-20 NOTE — PROGRESS NOTES
Bedside and Verbal shift change report given to [address], RN (oncoming nurse) by Adrian Holly RN (offgoing nurse).  Report included the following information SBAR, Kardex, ED Summary, Procedure Summary, Intake/Output, MAR, Recent Results and Cardiac Rhythm SR.

## 2019-01-20 NOTE — PROGRESS NOTES
TRANSFER - OUT REPORT: 
 
Verbal report given to Jose Miguel PATEL(name) on Mely Koehler  being transferred to [facility](unit) for routine progression of care Report consisted of patients Situation, Background, Assessment and  
Recommendations(SBAR). Information from the following report(s) SBAR, Kardex, ED Summary, Intake/Output, MAR, Recent Results and Cardiac Rhythm NSR was reviewed with the receiving nurse. Lines:  
Peripheral IV 01/17/19 Anterior;Distal;Left Forearm (Active) Site Assessment Clean, dry, & intact 1/19/2019  8:45 PM  
Phlebitis Assessment 0 1/19/2019  8:45 PM  
Infiltration Assessment 0 1/19/2019  8:45 PM  
Dressing Status Clean, dry, & intact 1/19/2019  8:45 PM  
Dressing Type Transparent;Tape;Gauze 1/19/2019  8:45 PM  
Hub Color/Line Status Blue;Capped 1/19/2019  8:45 PM  
Action Taken Open ports on tubing capped 1/19/2019  8:45 PM  
Alcohol Cap Used Yes 1/19/2019  8:45 PM  
  
 
Opportunity for questions and clarification was provided. Patient transported with: 
 O2 @ 2 liters Patient-specific medications from Pharmacy Registered Nurses

## 2019-01-20 NOTE — PROGRESS NOTES
01/20/19 1800 Vitals Temp 99.2 °F (37.3 °C) Temp Source Oral  
Pulse (Heart Rate) 99 Heart Rate Source Monitor Resp Rate (!) 35  
O2 Sat (%) 93 % Level of Consciousness Alert /56 MAP (Calculated) 83 BP 1 Location Right arm BP 1 Method Automatic  
BP Patient Position At rest  
Cardiac Rhythm NSR  
MEWS Score 3 Oxygen Therapy O2 Device Nasal cannula O2 Flow Rate (L/min) 2 l/min Pain 1 Pain Scale 1 Numeric (0 - 10) Pain Intensity 1 0 Patient Stated Pain Goal 0  
mews 3= pt has rib fx, unable to take deep breaths, pt does not co of sob, dr Nai Nieves notified, states to administer pain med

## 2019-01-20 NOTE — PROGRESS NOTES
Bedside shift change report given to [facility] (oncoming nurse) by Katelyn (offgoing nurse). Report included the following information SBAR, Kardex, Procedure Summary and Recent Results.

## 2019-01-20 NOTE — PROGRESS NOTES
Problem: Pressure Injury - Risk of 
Goal: *Prevention of pressure injury Document Mohit Scale and appropriate interventions in the flowsheet. Outcome: Progressing Towards Goal 
Pressure Injury Interventions: 
Sensory Interventions: Assess changes in LOC, Assess need for specialty bed, Keep linens dry and wrinkle-free, Turn and reposition approx. every two hours (pillows and wedges if needed) Moisture Interventions: Internal/External urinary devices, Absorbent underpads, Apply protective barrier, creams and emollients, Assess need for specialty bed Activity Interventions: Pressure redistribution bed/mattress(bed type), PT/OT evaluation, Assess need for specialty bed Mobility Interventions: Pressure redistribution bed/mattress (bed type), PT/OT evaluation, Turn and reposition approx. every two hours(pillow and wedges), Assess need for specialty bed Nutrition Interventions: Document food/fluid/supplement intake, Discuss nutritional consult with provider, Offer support with meals,snacks and hydration Friction and Shear Interventions: Apply protective barrier, creams and emollients, Lift sheet, Minimize layers, Lift team/patient mobility team

## 2019-01-21 PROCEDURE — 94640 AIRWAY INHALATION TREATMENT: CPT

## 2019-01-21 PROCEDURE — 97530 THERAPEUTIC ACTIVITIES: CPT

## 2019-01-21 PROCEDURE — 74011250636 HC RX REV CODE- 250/636: Performed by: FAMILY MEDICINE

## 2019-01-21 PROCEDURE — 77010033678 HC OXYGEN DAILY

## 2019-01-21 PROCEDURE — 65660000000 HC RM CCU STEPDOWN

## 2019-01-21 PROCEDURE — 74011000250 HC RX REV CODE- 250: Performed by: FAMILY MEDICINE

## 2019-01-21 PROCEDURE — 94762 N-INVAS EAR/PLS OXIMTRY CONT: CPT

## 2019-01-21 PROCEDURE — 74011000250 HC RX REV CODE- 250: Performed by: INTERNAL MEDICINE

## 2019-01-21 PROCEDURE — 74011250637 HC RX REV CODE- 250/637: Performed by: FAMILY MEDICINE

## 2019-01-21 PROCEDURE — 74011250637 HC RX REV CODE- 250/637: Performed by: INTERNAL MEDICINE

## 2019-01-21 RX ADMIN — ACETAMINOPHEN 1000 MG: 500 TABLET ORAL at 22:04

## 2019-01-21 RX ADMIN — Medication 10 ML: at 05:32

## 2019-01-21 RX ADMIN — DAKIN'S SOLUTION 0.125% (QUARTER STRENGTH): 0.12 SOLUTION at 08:20

## 2019-01-21 RX ADMIN — Medication 10 ML: at 13:34

## 2019-01-21 RX ADMIN — IPRATROPIUM BROMIDE AND ALBUTEROL SULFATE 3 ML: .5; 3 SOLUTION RESPIRATORY (INHALATION) at 13:36

## 2019-01-21 RX ADMIN — METOPROLOL TARTRATE 12.5 MG: 25 TABLET ORAL at 22:04

## 2019-01-21 RX ADMIN — CASTOR OIL AND BALSAM, PERU: 788; 87 OINTMENT TOPICAL at 08:20

## 2019-01-21 RX ADMIN — HEPARIN SODIUM 5000 UNITS: 5000 INJECTION INTRAVENOUS; SUBCUTANEOUS at 16:50

## 2019-01-21 RX ADMIN — CASTOR OIL AND BALSAM, PERU: 788; 87 OINTMENT TOPICAL at 16:00

## 2019-01-21 RX ADMIN — HEPARIN SODIUM 5000 UNITS: 5000 INJECTION INTRAVENOUS; SUBCUTANEOUS at 00:07

## 2019-01-21 RX ADMIN — THERA TABS 1 TABLET: TAB at 08:18

## 2019-01-21 RX ADMIN — Medication 10 ML: at 22:05

## 2019-01-21 RX ADMIN — HEPARIN SODIUM 5000 UNITS: 5000 INJECTION INTRAVENOUS; SUBCUTANEOUS at 08:17

## 2019-01-21 RX ADMIN — ACETAMINOPHEN 1000 MG: 500 TABLET ORAL at 05:32

## 2019-01-21 RX ADMIN — IPRATROPIUM BROMIDE AND ALBUTEROL SULFATE 3 ML: .5; 3 SOLUTION RESPIRATORY (INHALATION) at 08:45

## 2019-01-21 RX ADMIN — METOPROLOL TARTRATE 12.5 MG: 25 TABLET ORAL at 08:18

## 2019-01-21 RX ADMIN — ACETAMINOPHEN 1000 MG: 500 TABLET ORAL at 13:34

## 2019-01-21 RX ADMIN — FUROSEMIDE 40 MG: 40 TABLET ORAL at 08:17

## 2019-01-21 RX ADMIN — AMLODIPINE BESYLATE 10 MG: 5 TABLET ORAL at 08:17

## 2019-01-21 RX ADMIN — COLLAGENASE SANTYL: 250 OINTMENT TOPICAL at 08:19

## 2019-01-21 RX ADMIN — FAMOTIDINE 20 MG: 20 TABLET ORAL at 08:18

## 2019-01-21 RX ADMIN — SERTRALINE HYDROCHLORIDE 25 MG: 50 TABLET ORAL at 18:25

## 2019-01-21 RX ADMIN — IPRATROPIUM BROMIDE AND ALBUTEROL SULFATE 3 ML: .5; 3 SOLUTION RESPIRATORY (INHALATION) at 20:04

## 2019-01-21 RX ADMIN — CASTOR OIL AND BALSAM, PERU: 788; 87 OINTMENT TOPICAL at 22:04

## 2019-01-21 NOTE — PROGRESS NOTES
CM received a call from pt's daughter, Susanne Saunders ([phone]) to discuss pt's plan at d/c. She does not want this pt going back to the [address] [place] [address] Avenue. She would like referrals to go to [facility] and [facility]. She also stated that she is going to [address] [place] [address] to apply for long term care medicaid. Cm will need to complete a UAI. CM sent referrals to these facilities in [employer]. Willie Andrade

## 2019-01-21 NOTE — PROGRESS NOTES
ALIZA called pt's daughter, Lavern Iyer ([phone]) and left her a voice mail asking her to call this CM. Juan Quezada

## 2019-01-21 NOTE — PROGRESS NOTES
NUTRITION COMPLETE ASSESSMENT 
 
RECOMMENDATIONS:  
1. Continue current diet, Supplements (specify) 2. Continue to document po intake in flow sheets; encourage supplements 3. Weekly weights Interventions/Plan:  
Food/Nutrient Delivery:           RD to follow po intake, wt status; add flavor preference of supplement Assessment:  
Reason for Assessment:  
[x]BPA/MST Referral  
 
Diet:  Cardiac Supplements: Ensure TID Nutritionally Significant Medications: [x] Reviewed & Includes: Pepcid, Lasix, Orin Charles Meal Intake:  
Patient Vitals for the past 100 hrs: 
 % Diet Eaten 01/21/19 0900 75 % 01/19/19 1752 90 % 01/19/19 1222 80 % 01/18/19 1926 40 % 01/18/19 1230 40 % 01/18/19 0830 40 % 01/17/19 1630 40 % 01/17/19 1230 30 % Subjective: 
Pt sleeping soundly Objective: 
Pt seen for pressure injury. Pt admitted with SOB. PMHx: gait abnormalities, multiple falls, left heel decubitus prior to arrival to the hospital, acute hypoxic and hypercapnic respiratory failure, community-acquired pneumonia, UTI, multiple compression fractures and hypernatremia. Pt consuming 75-90% meals over past few days; noted pt receiving Ensure TID which provides 1050 kcal, 60g protein meeting 73% caloric, 87% protein needs if 100% consumed (which will aide in wound healing of stage II L heel); noted pt on Ensure PTA (prefers chocolate). RD to follow po intake, wt status. Estimated Nutrition Needs:  
Kcals/day: 1435 Kcals/day(8670-8574 kcal/day (25-28 kcal/kg)) Protein: 69 g(1.2g/kg) Fluid: 1500 ml(1mL/kcal) Based On: Kcal/kg - specify (Comment)(25-28 kcal/kg) Weight Used:   
 
Pt expected to meet estimated nutrient needs:  []   Yes     []  No  [x] Unable to predict at this time Nutrition Diagnosis:  
1. Increased nutrient needs (protein) related to pressure injury as evidenced by pt with stage II pressure injury on L heel Goals: Pt will consume >50% meals, supplements over next 5-7 days Monitoring & Evaluation: - Total energy intake, Liquid meal replacement - Weight/weight change -   
 
Previous Nutrition Goals Met: N/A Previous Recommendations: N/A Education & Discharge Needs: 
 [x] None Identified 
 [] Identified and addressed  
 [] Participated in care plan, discharge planning, and/or interdisciplinary rounds Cultural, Mormonism and ethnic food preferences identified: 
 None Skin Integrity: []Intact  [x]Other- Stage 2 pressure injury L heel Edema: [x]None []Other Last BM: 1/19/2019 Food Allergies: [x]None []Other Anthropometrics:   
Weight Loss Metrics 1/21/2019 11/27/2018 11/2/2018 10/30/2018 10/19/2018 3/12/2017 1/12/2017 Today's Wt 126 lb 9.6 oz 125 lb 123 lb 116 lb 13.5 oz 124 lb 124 lb 9 oz 117 lb BMI 26.46 kg/m2 26.13 kg/m2 25.71 kg/m2 22.82 kg/m2 24.22 kg/m2 24.33 kg/m2 21.4 kg/m2 Last 3 Recorded Weights in this Encounter 01/19/19 0348 01/20/19 0130 01/21/19 0200 Weight: 56.9 kg (125 lb 6.4 oz) 46.2 kg (101 lb 14.4 oz) 57.4 kg (126 lb 9.6 oz) Weight Source: Bed Height: 4' 10\" (147.3 cm), Body mass index is 26.46 kg/m². ,   
 ,   
 
Labs:   
Lab Results Component Value Date/Time Sodium 144 01/20/2019 01:36 AM  
 Potassium 3.6 01/20/2019 01:36 AM  
 Chloride 107 01/20/2019 01:36 AM  
 CO2 33 (H) 01/20/2019 01:36 AM  
 Glucose 110 (H) 01/20/2019 01:36 AM  
 BUN 20 01/20/2019 01:36 AM  
 Creatinine 0.62 01/20/2019 01:36 AM  
 Calcium 8.8 01/20/2019 01:36 AM  
 Magnesium 2.7 (H) 10/30/2018 04:13 AM  
 Phosphorus 2.3 (L) 03/09/2017 03:49 AM  
 Albumin 3.1 (L) 01/15/2019 11:21 AM  
 
Lab Results Component Value Date/Time Hemoglobin A1c 5.5 01/15/2019 11:21 AM  
 
Lab Results Component Value Date/Time Glucose 110 (H) 01/20/2019 01:36 AM  
 Glucose (POC) 120 (H) 10/27/2018 06:05 AM  
  
Lab Results Component Value Date/Time  ALT (SGPT) 25 01/15/2019 11:21 AM  
 AST (SGOT) 23 01/15/2019 11:21 AM  
 Alk. phosphatase 80 01/15/2019 11:21 AM  
 Bilirubin, direct 0.2 03/09/2017 11:06 AM  
 Bilirubin, total 0.5 01/15/2019 11:21 AM  
  
 
Bridgette Carlisle RD

## 2019-01-21 NOTE — PROGRESS NOTES
Problem: Mobility Impaired (Adult and Pediatric) Goal: *Acute Goals and Plan of Care (Insert Text) Physical Therapy Goals Initiated 1/16/2019 1. Patient will move from supine to sit and sit to supine  and roll side to side in bed with minimal assistance/contact guard assist within 7 day(s). 2.  Patient will transfer from bed to chair and chair to bed with moderate assistance  using the least restrictive device within 7 day(s). 3.  Patient will perform sit to stand with moderate assistance  within 7 day(s). 4.  Patient will ambulate with moderate assistance  for at least 10 feet with the least restrictive device within 7 day(s). physical Therapy TREATMENT Patient: Haim Cornejo [de-identified]80 y.o. female) Date: 1/21/2019 Diagnosis: SOB (shortness of breath) SOB (shortness of breath) SOB (shortness of breath) Precautions: Fall, Skin Chart, physical therapy assessment, plan of care and goals were reviewed. ASSESSMENT: 
Pt received supine in bed and agreeable to therapy. Pt was on 2L of oxygen during session. Pt required mod A for supine to sit EOB, sit<>stand with min A x 2 and additional time. Pt ambulated a short distance for bed to chair transfer with min-mod A x 2 and bilateral HHA. Pt with reports of pain in side due to history of rib fxs. Pt encouraged to remain seated in chair to improve tolerance to activity. Pt will benefit from SNF placement upon discharge to continue therapy efforts and reach highest level of independence. Progression toward goals: 
[]    Improving appropriately and progressing toward goals [x]    Improving slowly and progressing toward goals 
[]    Not making progress toward goals and plan of care will be adjusted PLAN: 
Patient continues to benefit from skilled intervention to address the above impairments. Continue treatment per established plan of care. Discharge Recommendations:  Alejandro Fregoso Further Equipment Recommendations for Discharge:  tbd SUBJECTIVE:  
Patient stated I am so weak.  OBJECTIVE DATA SUMMARY:  
Critical Behavior: 
Neurologic State: Alert, Appropriate for age Orientation Level: Oriented to person, Oriented to place, Oriented to situation, Disoriented to time Cognition: Appropriate for age attention/concentration Safety/Judgement: Decreased awareness of environment, Decreased awareness of need for assistance, Decreased awareness of need for safety, Decreased insight into deficits, Fall prevention Functional Mobility Training: 
Bed Mobility: 
  
Supine to Sit: Moderate assistance Scooting: Moderate assistance Transfers: 
Sit to Stand: Minimum assistance;Assist x2 Stand to Sit: Minimum assistance;Assist x2 
    x 2 transfers from seated EOB, x 2 transfers from bedside chair Bed to Chair: Moderate assistance;Minimum assistance;Assist x2 Balance: 
Sitting: Impaired Sitting - Static: Good (unsupported) Sitting - Dynamic: Fair (occasional) Standing: Impaired Standing - Static: Constant support; Fair 
Standing - Dynamic : Fair Pt required mod-max assist for seated balance initially and progressed to min-CGA Ambulation/Gait Training: 
  
Assistive Device: Gait belt Ambulation - Level of Assistance: Minimal assistance; Moderate assistance;Assist x2 Gait Abnormalities: Decreased step clearance;Shuffling gait Base of Support: Narrowed; Center of gravity altered Speed/Nyasia: Pace decreased (<100 feet/min) Step Length: Right shortened;Left shortened 
 airs: 
  
  
   
 
Neuro Re-Education: 
 
Therapeutic Exercises:  
 
Pain: 
Pain Scale 1: Numeric (0 - 10) Pain Intensity 1: 3 Pain Location 1: Back;Rib cage Pain Orientation 1: Lower; Posterior;Right;Lateral;Anterior Pain Description 1: Aching Pain Intervention(s) 1: Medication (see MAR); Repositioned;Rest;Other (comment)(Lidocaine patch applied to lower back and right ribcage) Activity Tolerance:  
Fair. Pt with increased respiratory rate upon initial sitting EOB, but improved with verbal cues to slow breaths and pursed lip breathing Please refer to the flowsheet for vital signs taken during this treatment. After treatment:  
[x]    Patient left in no apparent distress sitting up in chair 
[]    Patient left in no apparent distress in bed 
[x]    Call bell left within reach [x]    Nursing notified 
[]    Caregiver present [x]    Bed alarm activated COMMUNICATION/COLLABORATION:  
The patients plan of care was discussed with: Registered Nurse Eva Charles, PT, DPT Time Calculation: 23 mins

## 2019-01-21 NOTE — PROGRESS NOTES
Problem: Falls - Risk of 
Goal: *Absence of Falls Document Alec Mcdermotts Fall Risk and appropriate interventions in the flowsheet. Outcome: Progressing Towards Goal 
Fall Risk Interventions: 
Mobility Interventions: Bed/chair exit alarm, OT consult for ADLs, Patient to call before getting OOB, PT Consult for mobility concerns, PT Consult for assist device competence Mentation Interventions: Door open when patient unattended, Increase mobility, More frequent rounding, Adequate sleep, hydration, pain control, Bed/chair exit alarm Medication Interventions: Bed/chair exit alarm, Evaluate medications/consider consulting pharmacy, Patient to call before getting OOB, Teach patient to arise slowly Elimination Interventions: Bed/chair exit alarm, Call light in reach, Toileting schedule/hourly rounds History of Falls Interventions: Bed/chair exit alarm, Door open when patient unattended, Room close to nurse's station

## 2019-01-21 NOTE — PROGRESS NOTES
Problem: Pressure Injury - Risk of 
Goal: *Prevention of pressure injury Document Mohit Scale and appropriate interventions in the flowsheet. Outcome: Progressing Towards Goal 
Pressure Injury Interventions: 
Sensory Interventions: Minimize linen layers, Monitor skin under medical devices, Pad between skin to skin, Pressure redistribution bed/mattress (bed type), Turn and reposition approx. every two hours (pillows and wedges if needed), Float heels, Discuss PT/OT consult with provider Moisture Interventions: Apply protective barrier, creams and emollients, Internal/External urinary devices, Minimize layers, Maintain skin hydration (lotion/cream), Moisture barrier Activity Interventions: PT/OT evaluation, Pressure redistribution bed/mattress(bed type), Increase time out of bed Mobility Interventions: PT/OT evaluation, Pressure redistribution bed/mattress (bed type), HOB 30 degrees or less Nutrition Interventions: Offer support with meals,snacks and hydration, Discuss nutritional consult with provider Friction and Shear Interventions: Lift sheet, Lift team/patient mobility team 
 
  
 
 
 
 
Problem: Falls - Risk of 
Goal: *Absence of Falls Document Sydnie Hargrove Fall Risk and appropriate interventions in the flowsheet. Outcome: Progressing Towards Goal 
Fall Risk Interventions: 
Mobility Interventions: Bed/chair exit alarm, Assess mobility with egress test, PT Consult for mobility concerns, PT Consult for assist device competence, Strengthening exercises (ROM-active/passive), Utilize walker, cane, or other assistive device Mentation Interventions: Bed/chair exit alarm, Adequate sleep, hydration, pain control, Eyeglasses and hearing aids, More frequent rounding, Increase mobility Medication Interventions: Teach patient to arise slowly, Patient to call before getting OOB Elimination Interventions: Call light in reach, Toileting schedule/hourly rounds, Toilet paper/wipes in reach, Bed/chair exit alarm History of Falls Interventions: Investigate reason for fall, Evaluate medications/consider consulting pharmacy, Door open when patient unattended, Bed/chair exit alarm, Consult care management for discharge planning Problem: Falls - Risk of 
Goal: *Absence of Falls Document Courtney Benavides Fall Risk and appropriate interventions in the flowsheet. Outcome: Progressing Towards Goal 
Fall Risk Interventions: 
Mobility Interventions: Bed/chair exit alarm, Assess mobility with egress test, PT Consult for mobility concerns, PT Consult for assist device competence, Strengthening exercises (ROM-active/passive), Utilize walker, cane, or other assistive device Mentation Interventions: Bed/chair exit alarm, Adequate sleep, hydration, pain control, Eyeglasses and hearing aids, More frequent rounding, Increase mobility Medication Interventions: Teach patient to arise slowly, Patient to call before getting OOB Elimination Interventions: Call light in reach, Toileting schedule/hourly rounds, Toilet paper/wipes in reach, Bed/chair exit alarm History of Falls Interventions: Investigate reason for fall, Evaluate medications/consider consulting pharmacy, Door open when patient unattended, Bed/chair exit alarm, Consult care management for discharge planning

## 2019-01-21 NOTE — PROGRESS NOTES
01/21/19 1800 Vital Signs Pulse (Heart Rate) 90 Heart Rate Source Monitor Cardiac Rhythm NSR  
O2 Sat (%) 94 % (nasal cannula 2 liters) Level of Consciousness Alert /64 (left arm. 157/57 on Right arm.)  
MAP (Calculated) 86 BP 1 Method Automatic  
BP 1 Location Left arm BP Patient Position Sitting Box Number 909 Electrodes Replaced No  
 
 
BP differences in arms while in same position. Left lower than right

## 2019-01-21 NOTE — PROGRESS NOTES
Hospitalist Progress Note Briseyda Coats MD 
Answering service: [phone] OR [phone] from in house phone Cell: [phone] Date of Service:  2019 NAME:  Uri Swift :  1925 MRN:  [id number] Admission Summary:  
 
77-year-old female with a past medical history of gait abnormalities, multiple falls, left heel decubitus prior to arrival to the hospital, history of acute hypoxic and hypercapnic respiratory failure, community-acquired pneumonia, UTI, multiple compression fractures and hypernatremia, who presents to the hospital after a fall. The patient apparently lives at [place] at [facility]. She had a fall yesterday. The patient reports that she did not hit her head or her neck, but fell on the right side, immediately started having pain on the right side of her chest.  The patient also reports that she had some pain in her right arm. She had multiple x-rays done at the facility and they showed some rib fracture. Patient continued to be in pain, was also having some shortness of breath. Family got concerned and the patient was transferred to [address] [place] [address].  The patient reports that she did not lose consciousness. It was a purely mechanical fall. She is wearing a boot for the decubitus ulcer on her left foot and \"the boot got tangled,\" which caused her to fall. Patient was here in October after a fall and had multiple thoracic fractures and since then, has been living at [place] at [facility]. The daughter also reports that the patient is oxygen dependent since being discharged from the hospital in October. Interval history / Subjective: No new issues. Denies pain and SOB despite shallow breathing. Discussed with nursing. Awaiting placement.   
 
Assessment & Plan:  
 
Right Fourth rib fracture with possible pneumothorax 
-CXR 1/15 Right fourth rib fracture with possible small apical pneumothorax 
-patient currently admitted to the [facility] 
-CXR 1/16 No pneumothorax. Small to moderate bilateral pleural effusions mostly posteriorly at the lung bases. Left is larger than the right. The majority left lower lobe is collapsed. Follow-up to resolution is suggested 
-CXR 1/17 Moderate-sized right-sided pleural effusion. Mild to moderate pulmonary edema. No definite pneumothorax identified, however study is limited by technique, as described above. Nonspecific patchy opacity in the right mid and lower lung zones, which could be related to atelectasis or airspace disease 
-continue home lasix 
-provide oxygen support, pain control - Tylenol scheduled with PRN oxy 
-incentive spirometry  
-PT/OT - SNF 
-Pulm will consider thoracentesis if worsening respiratory status Pleural Effusions:  
-sec to ? Fall vs hypoalbuminemia 
-Echo with EF 75% with no RWMA 
-S/P IV Lasix 
-resume PO lasix Fall 
-likely mechanical 
-CT head 1/15 No acute intracranial process seen. 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 
-would not do any further work up 
-PT/OT Lung collapse, see above. Chronic hypoxic respiratory failure, was reportedly on oxygen prior to admission. Subjective fevers:  
-Patient has not had any fever inpatient 
-culture if febrile 
-no abx for now Compression deformity 
-CT cervical spine Stable severe T6 compression deformity in patient with known osteoporosis. C5 retrolisthesis. Multifactorial moderate left C4-5 and right C6-7 as well as severe right C5-6 
-Outpatient follow up Hypertension, suboptimally controlled. Some component could be due to pain.  
-continue and adjust home meds as needed Hyperglycemia, A1C at 5.5. PT/OT SNF Code status: DNR 
DVT prophylaxis: heparin PTA: [facility] Care Plan discussed with: Patient/Family and Nurse Disposition: TBD. Awaiting placement Hospital Problems  Date Reviewed: 1/15/2019 Codes Class Noted POA * (Principal) SOB (shortness of breath) ICD-10-CM: R06.02 
ICD-9-CM: 786.05  1/15/2019 Unknown Review of Systems:  
Pertinent items are noted in HPI. Vital Signs:  
 Last 24hrs VS reviewed since prior progress note. Most recent are: 
Visit Vitals /68 (BP 1 Location: Right arm, BP Patient Position: At rest) Pulse 86 Temp 97.8 °F (36.6 °C) Resp 23 Ht 4' 10\" (1.473 m) Wt 57.4 kg (126 lb 9.6 oz) SpO2 95% Breastfeeding? No  
BMI 26.46 kg/m² Intake/Output Summary (Last 24 hours) at 1/21/2019 1344 Last data filed at 1/21/2019 0900 Gross per 24 hour Intake 600 ml Output 1260 ml Net -660 ml Physical Examination:  
 
 
Constitutional:  No acute distress, cooperative, pleasant   
ENT:  Neck supple Resp:  Decreased in bases. No accessory muscle use. Shallow breaths CV:  Regular rhythm, normal rate GI:  Soft, non distended, non tender Musculoskeletal:  No edema, warm Neurologic:  Moves all extremities. Alert Data Review:  
 Review and/or order of clinical lab test 
Review and/or order of tests in the radiology section of CPT Review and/or order of tests in the medicine section of CPT Labs:  
 
Recent Labs  
  01/20/19 
0136 WBC 6.0 HGB 11.3* HCT 38.4  Recent Labs  
  01/20/19 
0136   
K 3.6  CO2 33*  
BUN 20  
CREA 0.62 * CA 8.8 No results for input(s): SGOT, GPT, ALT, AP, TBIL, TBILI, TP, ALB, GLOB, GGT, AML, LPSE in the last 72 hours. No lab exists for component: AMYP, HLPSE No results for input(s): INR, PTP, APTT in the last 72 hours. No lab exists for component: INREXT, INREXT No results for input(s): FE, TIBC, PSAT, FERR in the last 72 hours. No results found for: FOL, RBCF No results for input(s): PH, PCO2, PO2 in the last 72 hours. No results for input(s): CPK, CKNDX, TROIQ in the last 72 hours. No lab exists for component: CPKMB Lab Results Component Value Date/Time Cholesterol, total 168 05/21/2010 03:00 AM  
 HDL Cholesterol 53 05/21/2010 03:00 AM  
 LDL, calculated 95.4 05/21/2010 03:00 AM  
 Triglyceride 98 05/21/2010 03:00 AM  
 CHOL/HDL Ratio 3.2 05/21/2010 03:00 AM  
 
Lab Results Component Value Date/Time Glucose (POC) 120 (H) 10/27/2018 06:05 AM  
 Glucose (POC) 133 (H) 10/27/2018 12:18 AM  
 Glucose (POC) 177 (H) 10/26/2018 06:21 PM  
 Glucose (POC) 222 (H) 10/26/2018 11:51 AM  
 Glucose (POC) 119 (H) 10/26/2018 05:52 AM  
 
Lab Results Component Value Date/Time Color YELLOW/STRAW 01/16/2019 04:08 PM  
 Appearance CLOUDY (A) 01/16/2019 04:08 PM  
 Specific gravity 1.017 01/16/2019 04:08 PM  
 Specific gravity 1.005 03/07/2017 01:54 PM  
 pH (UA) 5.5 01/16/2019 04:08 PM  
 Protein 30 (A) 01/16/2019 04:08 PM  
 Glucose NEGATIVE  01/16/2019 04:08 PM  
 Ketone NEGATIVE  01/16/2019 04:08 PM  
 Bilirubin NEGATIVE  01/16/2019 04:08 PM  
 Urobilinogen 0.2 01/16/2019 04:08 PM  
 Nitrites NEGATIVE  01/16/2019 04:08 PM  
 Leukocyte Esterase SMALL (A) 01/16/2019 04:08 PM  
 Epithelial cells FEW 10/24/2018 02:52 PM  
 Bacteria 4+ (A) 10/24/2018 02:52 PM  
 WBC  10/24/2018 02:52 PM  
 RBC 0-5 10/24/2018 02:52 PM  
 
 
 
Medications Reviewed:  
 
Current Facility-Administered Medications Medication Dose Route Frequency  furosemide (LASIX) tablet 40 mg  40 mg Oral DAILY  oxyCODONE (ROXICODONE) 5 mg/5 mL oral solution 2.5 mg  2.5 mg Oral Q4H PRN  
 acetaminophen (TYLENOL) tablet 1,000 mg  1,000 mg Oral Q8H  
 famotidine (PEPCID) tablet 20 mg  20 mg Oral DAILY  sodium hypochlorite (QUARTER STRENGTH DAKIN'S) 0.125% irrigation (bottle)   Topical DAILY  collagenase (SANTYL) 250 unit/gram ointment   Topical DAILY  balsam peru-castor oil (VENELEX) [id number] mg/gram ointment   Topical TID  lidocaine (LIDODERM) 5 % patch 2 Patch  2 Patch TransDERmal Q24H  sodium chloride (NS) flush 5-40 mL  5-40 mL IntraVENous Q8H  
 sodium chloride (NS) flush 5-40 mL  5-40 mL IntraVENous PRN  
 heparin (porcine) injection 5,000 Units  5,000 Units SubCUTAneous Q8H  
 hydrALAZINE (APRESOLINE) 20 mg/mL injection 10 mg  10 mg IntraVENous Q6H PRN  
 amLODIPine (NORVASC) tablet 10 mg  10 mg Oral DAILY  metoprolol tartrate (LOPRESSOR) tablet 12.5 mg  12.5 mg Oral BID  therapeutic multivitamin (THERAGRAN) tablet 1 Tab  1 Tab Oral DAILY  sertraline (ZOLOFT) tablet 25 mg  25 mg Oral QPM  
 albuterol-ipratropium (DUO-NEB) 2.5 MG-0.5 MG/3 ML  3 mL Nebulization Q6H RT  
 
______________________________________________________________________ EXPECTED LENGTH OF STAY: 3d 9h 
ACTUAL LENGTH OF STAY:          4 Ana North MD

## 2019-01-21 NOTE — PROGRESS NOTES
Bedside and Verbal shift change report given to Benjamin Simon (oncoming nurse) by Taylor Cohen RN (offgoing nurse). Report included the following information SBAR, Kardex, Intake/Output, MAR, Recent Results and Cardiac Rhythm NSR.

## 2019-01-21 NOTE — PROGRESS NOTES
[facility] Progress Note Pulmonary, Critical Care, and Sleep Medicine Name: Leopold Bowels MRN: [id number] : 1925 Hospital: [facility]  Date: 2019 IMPRESSION:  
1. Bibasilar atx / modest effusions L >R; effusions new from 10/2018 2. hypoxia 3. S/p fall with r rib fx's 
4. Initial rib detail suggested ? r apical ptx- not apparent subsequent CXRs 5. No prior pulmary history 6. osteoporosis 7. DNR  
  
RECOMMENDATIONS:  
· Push pulm toilet IS/ flutter · Nebs · Monitor saturations · Mobilize as able up to chair etc 
· She is not labored in her breathing, suggest monitoring and conservative therapy; consider thoracentesis only if she becomes symptomatic or the pleural effusions is > 50% of the hemithorax · Judicious pain control · DVT prophylaxis Radiology ( personally reviewed) CXR reviewed ABG No results for input(s): PHI, PO2I, PCO2I in the last 72 hours. Subjective/Interval History:  
 
 Holding on thoracentesis currently Nothing acute reported  Unlabored breathing  Asked to see this pleasanr 81 yo lade for abnml CXR S/p fall with rib fx Known osteoporosis with prior vert comp fx Now with oxygen needs Sl progressive atx L> R on CXR No sign PTX \" collapse\" = atx New hypoxia- needs stable so far Has soreness from fall Scant cough Has IS--> not using regularly No prior pulm issues/ 
dx Patient Active Problem List  
Diagnosis Code  Acute bronchitis J20.9  Bronchitis J40  Hypoxia R09.02  Vertebral fracture [id number]  Hypokalemia E87.6  
 SOB (shortness of breath) R06.02 Past Medical History:  
Diagnosis Date  Arthritis  Breast lump 2018  
 right breast lump x a few days  HTN (hypertension)  Hypercholesteremia  Hyperlipidemia  Osteoporosis  Stroke ([address] [address] CVA, TIA  TIA (transient ischemic attack)  Vertebral fracture Past Surgical History:  
Procedure Laterality Date  HX HIP REPLACEMENT  2009  
 left  HX ORTHOPAEDIC    
 left hip replacement Prior to Admission Medications Prescriptions Last Dose Informant Patient Reported? Taking?  
acetaminophen (TYLENOL) 325 mg tablet   Yes Yes Sig: Take 650 mg by mouth every four (4) hours as needed (back pain). albuterol (PROVENTIL VENTOLIN) 2.5 mg /3 mL (0.083 %) nebulizer solution   Yes Yes Si.5 mg by Nebulization route every six (6) hours as needed for Shortness of Breath. albuterol (VENTOLIN HFA) 90 mcg/actuation inhaler   Yes Yes Sig: Take 2 Puffs by inhalation every four (4) hours as needed for Shortness of Breath. alendronate (FOSAMAX) 70 mg tablet 2019  Yes Yes Sig: Take 70 mg by mouth Every Friday. amLODIPine (NORVASC) 10 mg tablet 1/15/2019 at am  Yes Yes Sig: Take 10 mg by mouth daily. balsam peru-castor oil (VENELEX) [id number] mg/gram ointment 1/15/2019 at am  No Yes Sig: Apply  to affected area two (2) times a day. calcium-vitamin D (OYSTER SHELL) 500 mg(1,250mg) -200 unit per tablet 1/15/2019 at Unknown time  Yes Yes Sig: Take 1 Tab by mouth two (2) times daily (with meals). collagenase (SANTYL) 250 unit/gram ointment   Yes Yes Sig: Apply  to affected area daily. Apply to left heel  
furosemide (LASIX) 20 mg tablet 1/15/2019 at am  Yes Yes Sig: Take 20 mg by mouth daily. metoprolol tartrate (LOPRESSOR) 25 mg tablet 1/15/2019 at am  Yes Yes Sig: Take 12.5 mg by mouth two (2) times a day. multivitamin (ONE A DAY) tablet 1/15/2019 at am  Yes Yes Sig: Take 1 Tab by mouth daily. raNITIdine (ZANTAC) 150 mg tablet 1/15/2019 at am  Yes Yes Sig: Take 150 mg by mouth two (2) times a day. sertraline (ZOLOFT) 25 mg tablet 2019 at pm  No Yes Sig: Take 1 Tab by mouth every evening. traMADol (ULTRAM) 50 mg tablet 1/15/2019 at am  Yes Yes Sig: Take 25 mg by mouth daily. umeclidinium-vilanterol (ANORO ELLIPTA) 62.5-25 mcg/actuation inhaler 1/15/2019 at am  Yes Yes Sig: Take 1 Puff by inhalation daily. Facility-Administered Medications: None Allergies Allergen Reactions  Hydrochlorothiazide Nausea and Vomiting Prior to Admission Medications Prescriptions Last Dose Informant Patient Reported? Taking?  
acetaminophen (TYLENOL) 325 mg tablet   Yes Yes Sig: Take 650 mg by mouth every four (4) hours as needed (back pain). albuterol (PROVENTIL VENTOLIN) 2.5 mg /3 mL (0.083 %) nebulizer solution   Yes Yes Si.5 mg by Nebulization route every six (6) hours as needed for Shortness of Breath. albuterol (VENTOLIN HFA) 90 mcg/actuation inhaler   Yes Yes Sig: Take 2 Puffs by inhalation every four (4) hours as needed for Shortness of Breath. alendronate (FOSAMAX) 70 mg tablet 2019  Yes Yes Sig: Take 70 mg by mouth Every Friday. amLODIPine (NORVASC) 10 mg tablet 1/15/2019 at am  Yes Yes Sig: Take 10 mg by mouth daily. balsam peru-castor oil (VENELEX) [id number] mg/gram ointment 1/15/2019 at am  No Yes Sig: Apply  to affected area two (2) times a day. calcium-vitamin D (OYSTER SHELL) 500 mg(1,250mg) -200 unit per tablet 1/15/2019 at Unknown time  Yes Yes Sig: Take 1 Tab by mouth two (2) times daily (with meals). collagenase (SANTYL) 250 unit/gram ointment   Yes Yes Sig: Apply  to affected area daily. Apply to left heel  
furosemide (LASIX) 20 mg tablet 1/15/2019 at am  Yes Yes Sig: Take 20 mg by mouth daily. metoprolol tartrate (LOPRESSOR) 25 mg tablet 1/15/2019 at am  Yes Yes Sig: Take 12.5 mg by mouth two (2) times a day. multivitamin (ONE A DAY) tablet 1/15/2019 at am  Yes Yes Sig: Take 1 Tab by mouth daily. raNITIdine (ZANTAC) 150 mg tablet 1/15/2019 at am  Yes Yes Sig: Take 150 mg by mouth two (2) times a day. sertraline (ZOLOFT) 25 mg tablet 2019 at pm  No Yes Sig: Take 1 Tab by mouth every evening. traMADol (ULTRAM) 50 mg tablet 1/15/2019 at am  Yes Yes Sig: Take 25 mg by mouth daily. umeclidinium-vilanterol (ANORO ELLIPTA) 62.5-25 mcg/actuation inhaler 1/15/2019 at am  Yes Yes Sig: Take 1 Puff by inhalation daily. Facility-Administered Medications: None Social History Socioeconomic History  Marital status:  Spouse name: Not on file  Number of children: Not on file  Years of education: Not on file  Highest education level: Not on file Social Needs  Financial resource strain: Not on file  Food insecurity - worry: Not on file  Food insecurity - inability: Not on file  Transportation needs - medical: Not on file  Transportation needs - non-medical: Not on file Occupational History  Not on file Tobacco Use  Smoking status: Former Smoker Years: 40.00 Last attempt to quit: 1986 Years since quittin.2  Smokeless tobacco: Never Used Substance and Sexual Activity  Alcohol use: Yes  Drug use: No  
 Sexual activity: Not on file Other Topics Concern  Not on file Social History Narrative  Not on file History reviewed. No pertinent family history. Objective: 
 
 
Vital Signs:   
Patient Vitals for the past 24 hrs: 
 Temp Pulse Resp BP SpO2  
19 0846     94 % 19 0740  91 30 165/59 97 % 19 0600 97.6 °F (36.4 °C) 91 21 168/74 94 % 19 0200 98.9 °F (37.2 °C) 83 20 154/53 95 % 19 2150     94 % 19 1800 99.2 °F (37.3 °C) 99 (!) 35 136/56 93 % 19 1400 97.7 °F (36.5 °C) 97 30 145/64 92 % 19 1347     94 % Intake/Output:  
Last shift:        
Last 3 shifts:  0701 -  1900 In: 480 [P.O.:480] Out: 400 [Urine:400]RRIOLAST3 Intake/Output Summary (Last 24 hours) at 2019 1020 Last data filed at 2019 0900 Gross per 24 hour Intake 600 ml Output 1760 ml Net -1160 ml EXAM: 
 
Pleasant frail pale female \"my back and side hurts\" Shallow RR effort NC oxygen NC./AT 
EOMI/ sclera anicteric Moist MM Neck supple Back kyphotic CV s1s2 RRR Chest decreased bases L >R; no wheeze Abd soft, benign LE no edema Heel ulcer Non focal 
 
Data I have personally reviewed data, flowsheets for the last 24 hours. Labs: 
Recent Labs  
  01/20/19 
0136 WBC 6.0 HGB 11.3* HCT 38.4  Recent Labs  
  01/20/19 0136   
K 3.6  CO2 33* * BUN 20  
CREA 0.62  
CA 8.8 ABRIL Reyes 
[facility]

## 2019-01-21 NOTE — PROGRESS NOTES
2000 Bedside and Verbal shift change report given to Clara Cherry (oncoming nurse) by Ronnie Quinn RN (offgoing nurse). Report included the following information SBAR, Kardex, Intake/Output, MAR, Cardiac Rhythm NSR and Alarm Parameters .

## 2019-01-22 ENCOUNTER — APPOINTMENT (OUTPATIENT)
Dept: GENERAL RADIOLOGY | Age: 84
DRG: 206 | End: 2019-01-22
Attending: INTERNAL MEDICINE
Payer: MEDICARE

## 2019-01-22 LAB
ANION GAP SERPL CALC-SCNC: 3 MMOL/L (ref 5–15)
BASOPHILS # BLD: 0.1 K/UL (ref 0–0.1)
BASOPHILS NFR BLD: 1 % (ref 0–1)
BUN SERPL-MCNC: 23 MG/DL (ref 6–20)
BUN/CREAT SERPL: 40 (ref 12–20)
CALCIUM SERPL-MCNC: 9 MG/DL (ref 8.5–10.1)
CHLORIDE SERPL-SCNC: 104 MMOL/L (ref 97–108)
CO2 SERPL-SCNC: 38 MMOL/L (ref 21–32)
CREAT SERPL-MCNC: 0.57 MG/DL (ref 0.55–1.02)
DIFFERENTIAL METHOD BLD: ABNORMAL
EOSINOPHIL # BLD: 0.1 K/UL (ref 0–0.4)
EOSINOPHIL NFR BLD: 1 % (ref 0–7)
ERYTHROCYTE [DISTWIDTH] IN BLOOD BY AUTOMATED COUNT: 14.1 % (ref 11.5–14.5)
GLUCOSE SERPL-MCNC: 129 MG/DL (ref 65–100)
HCT VFR BLD AUTO: 38.3 % (ref 35–47)
HGB BLD-MCNC: 11.5 G/DL (ref 11.5–16)
IMM GRANULOCYTES # BLD AUTO: 0.1 K/UL (ref 0–0.04)
IMM GRANULOCYTES NFR BLD AUTO: 2 % (ref 0–0.5)
LYMPHOCYTES # BLD: 1 K/UL (ref 0.8–3.5)
LYMPHOCYTES NFR BLD: 15 % (ref 12–49)
MCH RBC QN AUTO: 30.1 PG (ref 26–34)
MCHC RBC AUTO-ENTMCNC: 30 G/DL (ref 30–36.5)
MCV RBC AUTO: 100.3 FL (ref 80–99)
MONOCYTES # BLD: 0.6 K/UL (ref 0–1)
MONOCYTES NFR BLD: 8 % (ref 5–13)
NEUTS SEG # BLD: 4.8 K/UL (ref 1.8–8)
NEUTS SEG NFR BLD: 74 % (ref 32–75)
NRBC # BLD: 0 K/UL (ref 0–0.01)
NRBC BLD-RTO: 0 PER 100 WBC
PLATELET # BLD AUTO: 311 K/UL (ref 150–400)
PMV BLD AUTO: 10.4 FL (ref 8.9–12.9)
POTASSIUM SERPL-SCNC: 4.1 MMOL/L (ref 3.5–5.1)
RBC # BLD AUTO: 3.82 M/UL (ref 3.8–5.2)
SODIUM SERPL-SCNC: 145 MMOL/L (ref 136–145)
WBC # BLD AUTO: 6.5 K/UL (ref 3.6–11)

## 2019-01-22 PROCEDURE — 74011000250 HC RX REV CODE- 250: Performed by: FAMILY MEDICINE

## 2019-01-22 PROCEDURE — 77010033678 HC OXYGEN DAILY

## 2019-01-22 PROCEDURE — 85025 COMPLETE CBC W/AUTO DIFF WBC: CPT

## 2019-01-22 PROCEDURE — 80048 BASIC METABOLIC PNL TOTAL CA: CPT

## 2019-01-22 PROCEDURE — 74011250636 HC RX REV CODE- 250/636: Performed by: FAMILY MEDICINE

## 2019-01-22 PROCEDURE — 94640 AIRWAY INHALATION TREATMENT: CPT

## 2019-01-22 PROCEDURE — 97116 GAIT TRAINING THERAPY: CPT

## 2019-01-22 PROCEDURE — 36415 COLL VENOUS BLD VENIPUNCTURE: CPT

## 2019-01-22 PROCEDURE — 97535 SELF CARE MNGMENT TRAINING: CPT | Performed by: OCCUPATIONAL THERAPIST

## 2019-01-22 PROCEDURE — 74011250637 HC RX REV CODE- 250/637: Performed by: FAMILY MEDICINE

## 2019-01-22 PROCEDURE — 94762 N-INVAS EAR/PLS OXIMTRY CONT: CPT

## 2019-01-22 PROCEDURE — 65660000000 HC RM CCU STEPDOWN

## 2019-01-22 PROCEDURE — 71045 X-RAY EXAM CHEST 1 VIEW: CPT

## 2019-01-22 PROCEDURE — 97530 THERAPEUTIC ACTIVITIES: CPT

## 2019-01-22 PROCEDURE — 74011250637 HC RX REV CODE- 250/637: Performed by: INTERNAL MEDICINE

## 2019-01-22 RX ADMIN — HEPARIN SODIUM 5000 UNITS: 5000 INJECTION INTRAVENOUS; SUBCUTANEOUS at 02:16

## 2019-01-22 RX ADMIN — CASTOR OIL AND BALSAM, PERU: 788; 87 OINTMENT TOPICAL at 08:27

## 2019-01-22 RX ADMIN — SERTRALINE HYDROCHLORIDE 25 MG: 50 TABLET ORAL at 18:00

## 2019-01-22 RX ADMIN — ACETAMINOPHEN 1000 MG: 500 TABLET ORAL at 06:47

## 2019-01-22 RX ADMIN — COLLAGENASE SANTYL: 250 OINTMENT TOPICAL at 09:00

## 2019-01-22 RX ADMIN — IPRATROPIUM BROMIDE AND ALBUTEROL SULFATE 3 ML: .5; 3 SOLUTION RESPIRATORY (INHALATION) at 02:06

## 2019-01-22 RX ADMIN — Medication 10 ML: at 06:47

## 2019-01-22 RX ADMIN — FAMOTIDINE 20 MG: 20 TABLET ORAL at 08:26

## 2019-01-22 RX ADMIN — METOPROLOL TARTRATE 12.5 MG: 25 TABLET ORAL at 21:33

## 2019-01-22 RX ADMIN — IPRATROPIUM BROMIDE AND ALBUTEROL SULFATE 3 ML: .5; 3 SOLUTION RESPIRATORY (INHALATION) at 14:01

## 2019-01-22 RX ADMIN — Medication 10 ML: at 21:34

## 2019-01-22 RX ADMIN — CASTOR OIL AND BALSAM, PERU: 788; 87 OINTMENT TOPICAL at 21:34

## 2019-01-22 RX ADMIN — HEPARIN SODIUM 5000 UNITS: 5000 INJECTION INTRAVENOUS; SUBCUTANEOUS at 08:27

## 2019-01-22 RX ADMIN — METOPROLOL TARTRATE 12.5 MG: 25 TABLET ORAL at 08:26

## 2019-01-22 RX ADMIN — ACETAMINOPHEN 1000 MG: 500 TABLET ORAL at 21:33

## 2019-01-22 RX ADMIN — Medication 10 ML: at 13:28

## 2019-01-22 RX ADMIN — IPRATROPIUM BROMIDE AND ALBUTEROL SULFATE 3 ML: .5; 3 SOLUTION RESPIRATORY (INHALATION) at 19:27

## 2019-01-22 RX ADMIN — DAKIN'S SOLUTION 0.125% (QUARTER STRENGTH): 0.12 SOLUTION at 09:00

## 2019-01-22 RX ADMIN — THERA TABS 1 TABLET: TAB at 08:26

## 2019-01-22 RX ADMIN — HEPARIN SODIUM 5000 UNITS: 5000 INJECTION INTRAVENOUS; SUBCUTANEOUS at 17:38

## 2019-01-22 RX ADMIN — AMLODIPINE BESYLATE 10 MG: 5 TABLET ORAL at 08:26

## 2019-01-22 RX ADMIN — CASTOR OIL AND BALSAM, PERU: 788; 87 OINTMENT TOPICAL at 15:24

## 2019-01-22 RX ADMIN — ACETAMINOPHEN 1000 MG: 500 TABLET ORAL at 13:28

## 2019-01-22 RX ADMIN — FUROSEMIDE 40 MG: 40 TABLET ORAL at 08:26

## 2019-01-22 RX ADMIN — IPRATROPIUM BROMIDE AND ALBUTEROL SULFATE 3 ML: .5; 3 SOLUTION RESPIRATORY (INHALATION) at 07:29

## 2019-01-22 NOTE — PROGRESS NOTES
Problem: Pressure Injury - Risk of 
Goal: *Prevention of pressure injury Document Mohit Scale and appropriate interventions in the flowsheet. Outcome: Progressing Towards Goal 
Pressure Injury Interventions: 
Sensory Interventions: Assess changes in LOC, Assess need for specialty bed, Avoid rigorous massage over bony prominences, Check visual cues for pain, Discuss PT/OT consult with provider, Float heels, Keep linens dry and wrinkle-free, Maintain/enhance activity level, Minimize linen layers, Monitor skin under medical devices, Pad between skin to skin, Pressure redistribution bed/mattress (bed type), Sit a 90-degree angle/use footstool if needed, Suspension boots, Turn and reposition approx. every two hours (pillows and wedges if needed), Use 30-degree side-lying position Moisture Interventions: Absorbent underpads, Apply protective barrier, creams and emollients, Assess need for specialty bed, Check for incontinence Q2 hours and as needed, Internal/External urinary devices, Limit adult briefs, Maintain skin hydration (lotion/cream), Minimize layers, Moisture barrier Activity Interventions: Assess need for specialty bed, Increase time out of bed, Pressure redistribution bed/mattress(bed type), PT/OT evaluation Mobility Interventions: Assess need for specialty bed, Float heels, HOB 30 degrees or less, Pressure redistribution bed/mattress (bed type), PT/OT evaluation, Suspension boots, Turn and reposition approx. every two hours(pillow and wedges) Nutrition Interventions: Document food/fluid/supplement intake Friction and Shear Interventions: Apply protective barrier, creams and emollients, Foam dressings/transparent film/skin sealants, HOB 30 degrees or less, Lift sheet, Lift team/patient mobility team, Minimize layers, Sit at 90-degree angle, Transfer aides:transfer board/[place]domero lift/ceiling lift, Transferring/repositioning devices Problem: Falls - Risk of 
Goal: *Absence of Falls Document Tricia Curry Fall Risk and appropriate interventions in the flowsheet. Outcome: Progressing Towards Goal 
Fall Risk Interventions: 
Mobility Interventions: Assess mobility with egress test, Bed/chair exit alarm, Communicate number of staff needed for ambulation/transfer, OT consult for ADLs, Patient to call before getting OOB, PT Consult for mobility concerns, PT Consult for assist device competence, Strengthening exercises (ROM-active/passive), Utilize walker, cane, or other assistive device Mentation Interventions: Adequate sleep, hydration, pain control, Bed/chair exit alarm, Door open when patient unattended, Evaluate medications/consider consulting pharmacy, Eyeglasses and hearing aids, Familiar objects from home, Gait belt with transfers/ambulation, HELP ([address]) if available, Increase mobility, Reorient patient, More frequent rounding, Room close to nurse's station, Toileting rounds, Update white board Medication Interventions: Bed/chair exit alarm, Evaluate medications/consider consulting pharmacy, Patient to call before getting OOB, Teach patient to arise slowly, Utilize gait belt for transfers/ambulation Elimination Interventions: Bed/chair exit alarm, Call light in reach, Patient to call for help with toileting needs, Toileting schedule/hourly rounds History of Falls Interventions: Bed/chair exit alarm, Consult care management for discharge planning, Door open when patient unattended, Evaluate medications/consider consulting pharmacy, Investigate reason for fall, Room close to nurse's station, Utilize gait belt for transfer/ambulation Problem: Falls - Risk of 
Goal: *Absence of Falls Document Tricia Curry Fall Risk and appropriate interventions in the flowsheet.  
Outcome: Progressing Towards Goal 
Fall Risk Interventions: 
Mobility Interventions: Assess mobility with egress test, Bed/chair exit alarm, Communicate number of staff needed for ambulation/transfer, OT consult for ADLs, Patient to call before getting OOB, PT Consult for mobility concerns, PT Consult for assist device competence, Strengthening exercises (ROM-active/passive), Utilize walker, cane, or other assistive device Mentation Interventions: Adequate sleep, hydration, pain control, Bed/chair exit alarm, Door open when patient unattended, Evaluate medications/consider consulting pharmacy, Eyeglasses and hearing aids, Familiar objects from home, Gait belt with transfers/ambulation, HELP ([address]) if available, Increase mobility, Reorient patient, More frequent rounding, Room close to nurse's station, Toileting rounds, Update white board Medication Interventions: Bed/chair exit alarm, Evaluate medications/consider consulting pharmacy, Patient to call before getting OOB, Teach patient to arise slowly, Utilize gait belt for transfers/ambulation Elimination Interventions: Bed/chair exit alarm, Call light in reach, Patient to call for help with toileting needs, Toileting schedule/hourly rounds History of Falls Interventions: Bed/chair exit alarm, Consult care management for discharge planning, Door open when patient unattended, Evaluate medications/consider consulting pharmacy, Investigate reason for fall, Room close to nurse's station, Utilize gait belt for transfer/ambulation

## 2019-01-22 NOTE — PROGRESS NOTES
Problem: Mobility Impaired (Adult and Pediatric) Goal: *Acute Goals and Plan of Care (Insert Text) Physical Therapy Goals Initiated 1/16/2019 1. Patient will move from supine to sit and sit to supine  and roll side to side in bed with minimal assistance/contact guard assist within 7 day(s). 2.  Patient will transfer from bed to chair and chair to bed with moderate assistance  using the least restrictive device within 7 day(s). 3.  Patient will perform sit to stand with moderate assistance  within 7 day(s). 4.  Patient will ambulate with moderate assistance  for at least 10 feet with the least restrictive device within 7 day(s). physical Therapy TREATMENT Patient: Uri Swift [de-identified]80 y.o. female) Date: 1/22/2019 Diagnosis: SOB (shortness of breath) SOB (shortness of breath) SOB (shortness of breath) Precautions: Fall, Skin Chart, physical therapy assessment, plan of care and goals were reviewed. ASSESSMENT: 
Pt received supine in bed and requesting to go to the bathroom. Pt tolerated session fairly and making progress towrads goals. Pt completed bed mobility with mod A x 2 and additional time and effort. Pt performed sit<>stand from the bed and BSC x 6 trials with and without RW with min-mod A x 2. Pt ambulated a short distance ~5 feet with RW with min A x 2 to the chair. Pt continues to be limited by decreased strength, decreased tolerance to activity and impaired balance an gait. Pt will benefit from returning to SNF upon discharge to continue therapy efforts. Recommend patient remain OOB to chair for all meals and utilize [facility] for toileting needs. Progression toward goals: 
[]    Improving appropriately and progressing toward goals [x]    Improving slowly and progressing toward goals 
[]    Not making progress toward goals and plan of care will be adjusted PLAN: 
Patient continues to benefit from skilled intervention to address the above impairments. Continue treatment per established plan of care. Discharge Recommendations:  Alejandro Fregoso Further Equipment Recommendations for Discharge:  tbd SUBJECTIVE:  
Patient stated That was two days work.  OBJECTIVE DATA SUMMARY:  
Critical Behavior: 
Neurologic State: Alert Orientation Level: Oriented X4 Cognition: Decreased command following, Decreased attention/concentration, Poor safety awareness Safety/Judgement: Decreased awareness of environment, Decreased awareness of need for assistance, Decreased awareness of need for safety, Decreased insight into deficits, Fall prevention Functional Mobility Training: 
Bed Mobility: 
  
Supine to Sit: Moderate assistance;Assist x2 Scooting: Moderate assistance Transfers: 
Sit to Stand: Minimum assistance; Moderate assistance; Additional time; Other (comment)(max cues and did not follow for hand placement) Bed to Chair: Minimum assistance; Adaptive equipment; Additional time;Assist x2 Balance: 
Sitting: Intact Sitting - Static: Good (unsupported) Sitting - Dynamic: Good (unsupported) Standing: Impaired Standing - Static: Constant support; Fair 
Standing - Dynamic : FairAmbulation/Gait Training: 
Distance (ft): 5 Feet (ft) Assistive Device: Gait belt;Walker, rolling Ambulation - Level of Assistance: Minimal assistance;Assist x2 Gait Abnormalities: Decreased step clearance;Shuffling gait;Trunk sway increased Base of Support: Narrowed Speed/Nyasia: Pace decreased (<100 feet/min); Shuffled Step Length: Right shortened;Left shortened Therapeutic Exercises:  
Sit<>stand transfers x 6 trials with RW with min-mod A; verbal cues for proper hand placement--pt unable to carry over Pain: 
Pain Scale 1: Numeric (0 - 10) Pain Intensity 1: 0 Activity Tolerance:  
Fair. Pt remained on 2L of oxygen during session.  spO2 desaturated to 88% and respiratory rate increased to >40's during activity Please refer to the flowsheet for vital signs taken during this treatment. After treatment:  
[x]    Patient left in no apparent distress sitting up in chair 
[]    Patient left in no apparent distress in bed 
[x]    Call bell left within reach [x]    Nursing notified 
[]    Caregiver present [x]    Bed alarm activated COMMUNICATION/COLLABORATION:  
The patients plan of care was discussed with: Occupational Therapist and Registered Nurse Nury Rao, PT, DPT Time Calculation: 23 mins

## 2019-01-22 NOTE — PROGRESS NOTES
Hospitalist Progress Note Negin Recinos MD 
Answering service: [phone] OR [phone] from in house phone Cell: [phone] Date of Service:  2019 NAME:  Zully Parish :  1925 MRN:  [id number] Admission Summary:  
 
26-year-old female with a past medical history of gait abnormalities, multiple falls, left heel decubitus prior to arrival to the hospital, history of acute hypoxic and hypercapnic respiratory failure, community-acquired pneumonia, UTI, multiple compression fractures and hypernatremia, presented to the hospital after a fall. The patient resided  at [address] [place] [address] at [facility] where she sustained a recurrent fall. The patient reported that she did not hit her head or her neck, but fell on the right side, immediately started having pain on the right side of her chest. The patient also reported that she had some pain in her right arm. She had multiple x-rays done at the facility and they showed some rib fractures. Patient continued to be in pain, was also having some shortness of breath. Family got concerned and the patient was transferred to [address] [place] [address]. The patient reports that she did not lose consciousness. It was a purely mechanical fall. She was wearing a boot for the decubitus ulcer on her left heel and \"the boot got tangled,\" which caused her to fall. Patient was here in October after a fall and had multiple thoracic fractures and since then, has been living at [address] [place] [address] at [facility]. The daughter also reports that the patient is oxygen dependent since being discharged from the hospital in 2018. Interval history / Subjective:  
 
Patient resting in bed comfortably. Denied any new complaints. Appeared slightly dyspneic even with oxygen on. Assessment & Plan:  
 
Right Fourth rib fracture with possible pneumothorax -CXR 1/15 Right fourth rib fracture with possible small apical pneumothorax 
-[facility] 
-CXR 1/16 No pneumothorax. Small to moderate bilateral pleural effusions mostly posteriorly at the lung bases. Left is larger than the right. The majority left lower lobe is collapsed. Follow-up to resolution is suggested 
-CXR 1/17 Moderate-sized right-sided pleural effusion. Mild to moderate pulmonary edema. No definite pneumothorax identified, however study is limited by technique, as described above. Nonspecific patchy opacity in the right mid and lower lung zones, which could be related to atelectasis or airspace disease 
-continue home lasix 
-provide oxygen support, pain control - Tylenol scheduled with PRN oxy 
-incentive spirometry  
-PT/OT - SNF 
-Pulm will consider thoracentesis if worsening respiratory status - For a F/U CXR today. Pleural Effusions:  
-sec to ? Fall vs hypoalbuminemia 
-Echo with EF 75% with no RWMA 
-S/P IV Lasix - Continue PO lasix Fall 
-likely mechanical 
-CT head 1/15 No acute intracranial process seen. 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 
-would not do any further work up 
-PT/OT Lung collapse, see above. Chronic hypoxic respiratory failure, was reportedly on oxygen prior to admission. Subjective fevers:  
-Patient has not had any fever inpatient 
-culture if febrile 
-no abx for now Compression deformity 
-CT cervical spine Stable severe T6 compression deformity in patient with known osteoporosis. C5 retrolisthesis. Multifactorial moderate left C4-5 and right C6-7 as well as severe right C5-6 
-Outpatient follow up Hypertension, suboptimally controlled. Some component could be due to pain.  
-continue and adjust home meds as needed Hyperglycemia, A1C at 5.5. Daily wound care to left heel ulcer present on admission PT/OT SNF Code status: DNR. D/W patient and family. DVT prophylaxis: heparin Plan: Anticipate D/C to [facility] [address] for ongoing care in 1-2 days. D/W patient, patient's daughter [facility] and Care management Hospital Problems  Date Reviewed: 1/15/2019 Codes Class Noted POA * (Principal) SOB (shortness of breath) ICD-10-CM: R06.02 
ICD-9-CM: 786.05  1/15/2019 Unknown Review of Systems:  
Pertinent items are noted in HPI. Vital Signs:  
 Last 24hrs VS reviewed since prior progress note. Most recent are: 
Visit Vitals /57 (BP 1 Location: Right arm, BP Patient Position: At rest) Pulse 71 Temp 97.1 °F (36.2 °C) Resp 26 Ht 4' 10\" (1.473 m) Wt 57.7 kg (127 lb 4.8 oz) SpO2 92% Breastfeeding? No  
BMI 26.61 kg/m² Intake/Output Summary (Last 24 hours) at 1/22/2019 1252 Last data filed at 1/22/2019 1852 Gross per 24 hour Intake 1200 ml Output 600 ml Net 600 ml Physical Examination:  
 
 
Constitutional:  No acute distress, cooperative, pleasant   
ENT:  Neck supple Resp:  Decreased in bases. No accessory muscle use. Shallow breaths CV:  Regular rhythm, normal rate GI:  Soft, non distended, non tender Musculoskeletal:  No edema, warm Skin: Healing left heel ulcer present on admission. Neurologic:  Moves all extremities. Alert Data Review:  
 Review and/or order of clinical lab test 
Review and/or order of tests in the radiology section of CPT Review and/or order of tests in the medicine section of CPT Labs:  
 
Recent Labs  
  01/22/19 
0415 01/20/19 
0136 WBC 6.5 6.0 HGB 11.5 11.3* HCT 38.3 38.4  307 Recent Labs  
  01/22/19 
0415 01/20/19 
0136  144  
K 4.1 3.6  107 CO2 38* 33*  
BUN 23* 20  
CREA 0.57 0.62 * 110* CA 9.0 8.8 No results for input(s): SGOT, GPT, ALT, AP, TBIL, TBILI, TP, ALB, GLOB, GGT, AML, LPSE in the last 72 hours. No lab exists for component: AMYP, HLPSE No results for input(s): INR, PTP, APTT in the last 72 hours. No lab exists for component: INREXT, INREXT No results for input(s): FE, TIBC, PSAT, FERR in the last 72 hours. No results found for: FOL, RBCF No results for input(s): PH, PCO2, PO2 in the last 72 hours. No results for input(s): CPK, CKNDX, TROIQ in the last 72 hours. No lab exists for component: CPKMB Lab Results Component Value Date/Time Cholesterol, total 168 05/21/2010 03:00 AM  
 HDL Cholesterol 53 05/21/2010 03:00 AM  
 LDL, calculated 95.4 05/21/2010 03:00 AM  
 Triglyceride 98 05/21/2010 03:00 AM  
 CHOL/HDL Ratio 3.2 05/21/2010 03:00 AM  
 
Lab Results Component Value Date/Time Glucose (POC) 120 (H) 10/27/2018 06:05 AM  
 Glucose (POC) 133 (H) 10/27/2018 12:18 AM  
 Glucose (POC) 177 (H) 10/26/2018 06:21 PM  
 Glucose (POC) 222 (H) 10/26/2018 11:51 AM  
 Glucose (POC) 119 (H) 10/26/2018 05:52 AM  
 
Lab Results Component Value Date/Time Color YELLOW/STRAW 01/16/2019 04:08 PM  
 Appearance CLOUDY (A) 01/16/2019 04:08 PM  
 Specific gravity 1.017 01/16/2019 04:08 PM  
 Specific gravity 1.005 03/07/2017 01:54 PM  
 pH (UA) 5.5 01/16/2019 04:08 PM  
 Protein 30 (A) 01/16/2019 04:08 PM  
 Glucose NEGATIVE  01/16/2019 04:08 PM  
 Ketone NEGATIVE  01/16/2019 04:08 PM  
 Bilirubin NEGATIVE  01/16/2019 04:08 PM  
 Urobilinogen 0.2 01/16/2019 04:08 PM  
 Nitrites NEGATIVE  01/16/2019 04:08 PM  
 Leukocyte Esterase SMALL (A) 01/16/2019 04:08 PM  
 Epithelial cells FEW 10/24/2018 02:52 PM  
 Bacteria 4+ (A) 10/24/2018 02:52 PM  
 WBC  10/24/2018 02:52 PM  
 RBC 0-5 10/24/2018 02:52 PM  
 
 
 
Medications Reviewed:  
 
Current Facility-Administered Medications Medication Dose Route Frequency  furosemide (LASIX) tablet 40 mg  40 mg Oral DAILY  oxyCODONE (ROXICODONE) 5 mg/5 mL oral solution 2.5 mg  2.5 mg Oral Q4H PRN  
 acetaminophen (TYLENOL) tablet 1,000 mg  1,000 mg Oral Q8H  
  famotidine (PEPCID) tablet 20 mg  20 mg Oral DAILY  sodium hypochlorite (QUARTER STRENGTH DAKIN'S) 0.125% irrigation (bottle)   Topical DAILY  collagenase (SANTYL) 250 unit/gram ointment   Topical DAILY  balsam peru-castor oil (VENELEX) [id number] mg/gram ointment   Topical TID  lidocaine (LIDODERM) 5 % patch 2 Patch  2 Patch TransDERmal Q24H  
 sodium chloride (NS) flush 5-40 mL  5-40 mL IntraVENous Q8H  
 sodium chloride (NS) flush 5-40 mL  5-40 mL IntraVENous PRN  
 heparin (porcine) injection 5,000 Units  5,000 Units SubCUTAneous Q8H  
 hydrALAZINE (APRESOLINE) 20 mg/mL injection 10 mg  10 mg IntraVENous Q6H PRN  
 amLODIPine (NORVASC) tablet 10 mg  10 mg Oral DAILY  metoprolol tartrate (LOPRESSOR) tablet 12.5 mg  12.5 mg Oral BID  therapeutic multivitamin (THERAGRAN) tablet 1 Tab  1 Tab Oral DAILY  sertraline (ZOLOFT) tablet 25 mg  25 mg Oral QPM  
 albuterol-ipratropium (DUO-NEB) 2.5 MG-0.5 MG/3 ML  3 mL Nebulization Q6H RT  
 
______________________________________________________________________ EXPECTED LENGTH OF STAY: 3d 9h 
ACTUAL LENGTH OF STAY:          5 Rafaela Shook MD

## 2019-01-22 NOTE — PROGRESS NOTES
[facility] Progress NotePulmonary, Critical Care, and Sleep Medicine Name: Duarte Calderon MRN: [id number] : 1925 Hospital: [place] [address] Date: 2019 IMPRESSION:  
1. Bibasilar atx / modest effusions L >R; effusions new from 10/2018 2. hypoxia 3. S/p fall with r rib fx's 
4. Initial rib detail suggested ? r apical ptx- not apparent subsequent CXRs 5. No prior pulmary history 6. osteoporosis 7. DNR  
  
RECOMMENDATIONS:  
· Push pulm toilet IS/ flutter · Nebs · Monitor saturations · Mobilize as able up to chair etc 
· She is not labored in her breathing, suggest monitoring and conservative therapy; consider thoracentesis only if she becomes symptomatic or the pleural effusions is > 50% of the hemithorax · Judicious pain control · DVT prophylaxis · We will be available again to see if needed Radiology ( personally reviewed) CXR reviewed ABG No results for input(s): PHI, PO2I, PCO2I in the last 72 hours. Subjective/Interval History:  
 
 No resp distress noted  Holding on thoracentesis currently Nothing acute reported  Unlabored breathing  Asked to see this pleasanr 79 yo lade for abnml CXR S/p fall with rib fx Known osteoporosis with prior vert comp fx Now with oxygen needs Sl progressive atx L> R on CXR No sign PTX \" collapse\" = atx New hypoxia- needs stable so far Has soreness from fall Scant cough Has IS--> not using regularly No prior pulm issues/ 
dx Patient Active Problem List  
Diagnosis Code  Acute bronchitis J20.9  Bronchitis J40  Hypoxia R09.02  Vertebral fracture [id number]  Hypokalemia E87.6  
 SOB (shortness of breath) R06.02 Past Medical History:  
Diagnosis Date  Arthritis  Breast lump 2018  
 right breast lump x a few days  HTN (hypertension)  Hypercholesteremia  Hyperlipidemia  Osteoporosis  Stroke ([facility] [address] CVA, TIA  TIA (transient ischemic attack)  Vertebral fracture Past Surgical History:  
Procedure Laterality Date  HX HIP REPLACEMENT  2009  
 left  HX ORTHOPAEDIC    
 left hip replacement Prior to Admission Medications Prescriptions Last Dose Informant Patient Reported? Taking?  
acetaminophen (TYLENOL) 325 mg tablet   Yes Yes Sig: Take 650 mg by mouth every four (4) hours as needed (back pain). albuterol (PROVENTIL VENTOLIN) 2.5 mg /3 mL (0.083 %) nebulizer solution   Yes Yes Si.5 mg by Nebulization route every six (6) hours as needed for Shortness of Breath. albuterol (VENTOLIN HFA) 90 mcg/actuation inhaler   Yes Yes Sig: Take 2 Puffs by inhalation every four (4) hours as needed for Shortness of Breath. alendronate (FOSAMAX) 70 mg tablet 2019  Yes Yes Sig: Take 70 mg by mouth Every Friday. amLODIPine (NORVASC) 10 mg tablet 1/15/2019 at am  Yes Yes Sig: Take 10 mg by mouth daily. balsam peru-castor oil (VENELEX) [id number] mg/gram ointment 1/15/2019 at am  No Yes Sig: Apply  to affected area two (2) times a day. calcium-vitamin D (OYSTER SHELL) 500 mg(1,250mg) -200 unit per tablet 1/15/2019 at Unknown time  Yes Yes Sig: Take 1 Tab by mouth two (2) times daily (with meals). collagenase (SANTYL) 250 unit/gram ointment   Yes Yes Sig: Apply  to affected area daily. Apply to left heel  
furosemide (LASIX) 20 mg tablet 1/15/2019 at am  Yes Yes Sig: Take 20 mg by mouth daily. metoprolol tartrate (LOPRESSOR) 25 mg tablet 1/15/2019 at am  Yes Yes Sig: Take 12.5 mg by mouth two (2) times a day. multivitamin (ONE A DAY) tablet 1/15/2019 at am  Yes Yes Sig: Take 1 Tab by mouth daily. raNITIdine (ZANTAC) 150 mg tablet 1/15/2019 at am  Yes Yes Sig: Take 150 mg by mouth two (2) times a day. sertraline (ZOLOFT) 25 mg tablet 2019 at pm  No Yes Sig: Take 1 Tab by mouth every evening. traMADol (ULTRAM) 50 mg tablet 1/15/2019 at am  Yes Yes Sig: Take 25 mg by mouth daily. umeclidinium-vilanterol (ANORO ELLIPTA) 62.5-25 mcg/actuation inhaler 1/15/2019 at am  Yes Yes Sig: Take 1 Puff by inhalation daily. Facility-Administered Medications: None Allergies Allergen Reactions  Hydrochlorothiazide Nausea and Vomiting Prior to Admission Medications Prescriptions Last Dose Informant Patient Reported? Taking?  
acetaminophen (TYLENOL) 325 mg tablet   Yes Yes Sig: Take 650 mg by mouth every four (4) hours as needed (back pain). albuterol (PROVENTIL VENTOLIN) 2.5 mg /3 mL (0.083 %) nebulizer solution   Yes Yes Si.5 mg by Nebulization route every six (6) hours as needed for Shortness of Breath. albuterol (VENTOLIN HFA) 90 mcg/actuation inhaler   Yes Yes Sig: Take 2 Puffs by inhalation every four (4) hours as needed for Shortness of Breath. alendronate (FOSAMAX) 70 mg tablet 2019  Yes Yes Sig: Take 70 mg by mouth Every Friday. amLODIPine (NORVASC) 10 mg tablet 1/15/2019 at am  Yes Yes Sig: Take 10 mg by mouth daily. balsam peru-castor oil (VENELEX) [id number] mg/gram ointment 1/15/2019 at am  No Yes Sig: Apply  to affected area two (2) times a day. calcium-vitamin D (OYSTER SHELL) 500 mg(1,250mg) -200 unit per tablet 1/15/2019 at Unknown time  Yes Yes Sig: Take 1 Tab by mouth two (2) times daily (with meals). collagenase (SANTYL) 250 unit/gram ointment   Yes Yes Sig: Apply  to affected area daily. Apply to left heel  
furosemide (LASIX) 20 mg tablet 1/15/2019 at am  Yes Yes Sig: Take 20 mg by mouth daily. metoprolol tartrate (LOPRESSOR) 25 mg tablet 1/15/2019 at am  Yes Yes Sig: Take 12.5 mg by mouth two (2) times a day. multivitamin (ONE A DAY) tablet 1/15/2019 at am  Yes Yes Sig: Take 1 Tab by mouth daily. raNITIdine (ZANTAC) 150 mg tablet 1/15/2019 at am  Yes Yes Sig: Take 150 mg by mouth two (2) times a day. sertraline (ZOLOFT) 25 mg tablet 2019 at pm  No Yes Sig: Take 1 Tab by mouth every evening. traMADol (ULTRAM) 50 mg tablet 1/15/2019 at am  Yes Yes Sig: Take 25 mg by mouth daily. umeclidinium-vilanterol (ANORO ELLIPTA) 62.5-25 mcg/actuation inhaler 1/15/2019 at am  Yes Yes Sig: Take 1 Puff by inhalation daily. Facility-Administered Medications: None Social History Socioeconomic History  Marital status:  Spouse name: Not on file  Number of children: Not on file  Years of education: Not on file  Highest education level: Not on file Social Needs  Financial resource strain: Not on file  Food insecurity - worry: Not on file  Food insecurity - inability: Not on file  Transportation needs - medical: Not on file  Transportation needs - non-medical: Not on file Occupational History  Not on file Tobacco Use  Smoking status: Former Smoker Years: 40.00 Last attempt to quit: 1986 Years since quittin.2  Smokeless tobacco: Never Used Substance and Sexual Activity  Alcohol use: Yes  Drug use: No  
 Sexual activity: Not on file Other Topics Concern  Not on file Social History Narrative  Not on file History reviewed. No pertinent family history. Objective: 
 
 
Vital Signs:   
Patient Vitals for the past 24 hrs: 
 Temp Pulse Resp BP SpO2  
19 0826  92  154/71   
19 0730     94 % 19 0644 98.2 °F (36.8 °C) 81 22 164/69 93 % 19 0214 98.1 °F (36.7 °C) 77 25 155/66 96 % 19 0206     96 % 19 2201 98 °F (36.7 °C) 97 27 150/60 97 % 19 2004     92 % 19 1800 98 °F (36.7 °C) 90 18 129/64 94 % 19 1759    157/57   
19 1400 97.8 °F (36.6 °C) 88 22 150/67 92 % 19 1337     95 % 19 1000 97.8 °F (36.6 °C) 86 23 154/68 92 % Intake/Output:  
Last shift:        
Last 3 shifts: No intake/output data recorded. Santino Hwang Intake/Output Summary (Last 24 hours) at 1/22/2019 2603 Last data filed at 1/21/2019 1800 Gross per 24 hour Intake 1440 ml Output 1000 ml Net 440 ml EXAM: 
 
Pleasant frail pale female \"my back and side hurts\" Shallow RR effort NC oxygen NC./AT 
EOMI/ sclera anicteric Moist MM Neck supple Back kyphotic CV s1s2 RRR Chest decreased bases L >R; no wheeze Abd soft, benign LE no edema Heel ulcer Non focal 
 
Data I have personally reviewed data, flowsheets for the last 24 hours. Labs: 
Recent Labs  
  01/22/19 0415 01/20/19 0136 WBC 6.5 6.0 HGB 11.5 11.3* HCT 38.3 38.4  307 Recent Labs  
  01/22/19 0415 01/20/19 0136  144  
K 4.1 3.6  107 CO2 38* 33* * 110* BUN 23* 20  
CREA 0.57 0.62  
CA 9.0 8.8 ABRIL Reyes 
[facility] [place]

## 2019-01-22 NOTE — PROGRESS NOTES
A Spiritual Care Partner Volunteer visited patient in  659 on 1/22/2019. Documented by: 
Chaplain Law MDiv, MS, [address] ([id number])

## 2019-01-22 NOTE — PROGRESS NOTES
ALIZA noted that [facility] have offered this pt a bed when she is ready for d/c. Also, CM colleague, Hyland Duane, MSW, has completed a medicaid pre-screening ([employer]) and submitted it to [facility] for processing. Aliza called pt's daughter, Ruthie Santiago and left her a voice mail asking her to call this CM. Francia Patel

## 2019-01-22 NOTE — PROGRESS NOTES
Problem: Self Care Deficits Care Plan (Adult) Goal: *Acute Goals and Plan of Care (Insert Text) Occupational Therapy Goals Initiated 1/16/2019 1. Patient will perform grooming sitting unsupported with minimal SOB with supervision/set-up within 7 day(s). 2.  Patient will perform upper body ADLs sitting unsupported with minimal SOB with supervision/set-up within 7 day(s). 3.  Patient will perform lower body ADLs using AE PRN sitting unsupported with minimal SOB with moderate assistance  within 7 day(s). 4.  Patient will perform toilet transfers to [facility] with moderate assistance  within 7 day(s). 5.  Patient will perform all aspects of toileting with minimal assistance/contact guard assist within 7 day(s). 6.  Patient will participate in upper extremity therapeutic exercise/activities with supervision/set-up for 5 minutes within 7 day(s). 7.  Patient will utilize energy conservation techniques during functional activities with verbal cues within 7 day(s). Occupational Therapy TREATMENT Patient: Ashley Boyer [de-identified]80 y.o. female) Date: 1/22/2019 Diagnosis: SOB (shortness of breath) SOB (shortness of breath) SOB (shortness of breath) Precautions: Fall, Skin Chart, occupational therapy assessment, plan of care, and goals were reviewed. ASSESSMENT: 
Patient with increased activity tolerance and without complaint of pain this date. Patient with decreased command following and safety awareness during bedside commode transfers. Sit to stand with min assist of 2 and did not follow instruction to place hands on armrests of commode and commode slipping back and patient without insight to safety concern. Will continue to follow, recommend back to rehab. Progression toward goals: 
[]       Improving appropriately and progressing toward goals [x]       Improving slowly and progressing toward goals 
[]       Not making progress toward goals and plan of care will be adjusted PLAN: 
 Patient continues to benefit from skilled intervention to address the above impairments. Continue treatment per established plan of care. Discharge Recommendations:  Alejandro Fregoso Further Equipment Recommendations for Discharge: SUBJECTIVE:  
Patient stated I think I'm done.  OBJECTIVE DATA SUMMARY:  
Cognitive/Behavioral Status: 
Neurologic State: Alert Orientation Level: Oriented X4 Cognition: Decreased command following;Decreased attention/concentration;Poor safety awareness Functional Mobility and Transfers for ADLs:Bed Mobility: 
  
 
Transfers: 
Sit to Stand: Minimum assistance; Moderate assistance; Additional time; Other (comment)(max cues and did not follow for hand placement) Functional Transfers Toilet Transfer : Minimum assistance; Additional time; Adaptive equipment;Assist x2 Bed to Chair: Minimum assistance; Adaptive equipment; Additional time;Assist x2 Balance: 
Sitting: Intact Sitting - Static: Good (unsupported) Sitting - Dynamic: Good (unsupported) Standing: Impaired Standing - Static: Constant support; Fair 
Standing - Dynamic : Fair ADL Intervention: 
  
 
Grooming Grooming Assistance: Minimum assistance Brushing/Combing Hair: Moderate assistance(decreased attention to task) 
 
  instructed to brace pillow if cough or sneeze for pain management - Reeducated on use of call bell Toileting Toileting Assistance: Maximum assistance Bladder Hygiene: Contact guard assistance Bowel Hygiene: Moderate assistance; Compensatory technique training(cues for hygiene to wipe anterior to posterior) Clothing Management: Maximum assistance Adaptive Equipment: Walker(bedside commode) Pain: No complaint Activity Tolerance: Fair Please refer to the flowsheet for vital signs taken during this treatment. After treatment:  
[x] Patient left in no apparent distress sitting up in chair 
[] Patient left in no apparent distress in bed 
[x] Call bell left within reach [x] Nursing notified 
[] Caregiver present 
[] Bed alarm activated COMMUNICATION/COLLABORATION:  
The patients plan of care was discussed with: Physical Therapist and Registered Nurse Martha López OTR/L Time Calculation: 19 mins

## 2019-01-22 NOTE — PROGRESS NOTES
ALIZA met with pt's daughter, Nitza Castorena. CM informed her of the 2 available beds. She stated that she wants pt to go to [facility] when she is medically ready. CM informed [facility] and he can accept this pt when she is medically ready for d/c. Efren Real

## 2019-01-23 VITALS
RESPIRATION RATE: 25 BRPM | WEIGHT: 126.4 LBS | DIASTOLIC BLOOD PRESSURE: 66 MMHG | BODY MASS INDEX: 26.53 KG/M2 | HEART RATE: 75 BPM | TEMPERATURE: 97.8 F | SYSTOLIC BLOOD PRESSURE: 156 MMHG | HEIGHT: 58 IN | OXYGEN SATURATION: 94 %

## 2019-01-23 PROCEDURE — 74011000250 HC RX REV CODE- 250: Performed by: INTERNAL MEDICINE

## 2019-01-23 PROCEDURE — 74011250637 HC RX REV CODE- 250/637: Performed by: FAMILY MEDICINE

## 2019-01-23 PROCEDURE — 74011000250 HC RX REV CODE- 250: Performed by: FAMILY MEDICINE

## 2019-01-23 PROCEDURE — 77010033678 HC OXYGEN DAILY

## 2019-01-23 PROCEDURE — 94640 AIRWAY INHALATION TREATMENT: CPT

## 2019-01-23 PROCEDURE — 74011250636 HC RX REV CODE- 250/636: Performed by: FAMILY MEDICINE

## 2019-01-23 PROCEDURE — 74011250637 HC RX REV CODE- 250/637: Performed by: INTERNAL MEDICINE

## 2019-01-23 PROCEDURE — 94762 N-INVAS EAR/PLS OXIMTRY CONT: CPT

## 2019-01-23 RX ORDER — FUROSEMIDE 40 MG/1
40 TABLET ORAL DAILY
Qty: 30 TAB | Refills: 0 | Status: ON HOLD | OUTPATIENT
Start: 2019-01-23 | End: 2020-01-01 | Stop reason: SDUPTHER

## 2019-01-23 RX ORDER — TRAMADOL HYDROCHLORIDE 50 MG/1
25 TABLET ORAL
Qty: 20 TAB | Refills: 0 | Status: SHIPPED | OUTPATIENT
Start: 2019-01-23 | End: 2019-01-01

## 2019-01-23 RX ORDER — LIDOCAINE 50 MG/G
PATCH TOPICAL
Qty: 12 EACH | Refills: 0 | Status: SHIPPED | OUTPATIENT
Start: 2019-01-23 | End: 2019-01-01

## 2019-01-23 RX ORDER — AMOXICILLIN 250 MG
1 CAPSULE ORAL
Qty: 20 TAB | Refills: 0 | Status: SHIPPED | OUTPATIENT
Start: 2019-01-23 | End: 2019-01-01

## 2019-01-23 RX ORDER — IPRATROPIUM BROMIDE AND ALBUTEROL SULFATE 2.5; .5 MG/3ML; MG/3ML
3 SOLUTION RESPIRATORY (INHALATION)
Qty: 30 NEBULE | Refills: 1 | Status: SHIPPED | OUTPATIENT
Start: 2019-01-23 | End: 2019-01-01

## 2019-01-23 RX ADMIN — COLLAGENASE SANTYL: 250 OINTMENT TOPICAL at 08:58

## 2019-01-23 RX ADMIN — HEPARIN SODIUM 5000 UNITS: 5000 INJECTION INTRAVENOUS; SUBCUTANEOUS at 08:57

## 2019-01-23 RX ADMIN — ACETAMINOPHEN 1000 MG: 500 TABLET ORAL at 13:25

## 2019-01-23 RX ADMIN — CASTOR OIL AND BALSAM, PERU: 788; 87 OINTMENT TOPICAL at 08:58

## 2019-01-23 RX ADMIN — FAMOTIDINE 20 MG: 20 TABLET ORAL at 08:57

## 2019-01-23 RX ADMIN — IPRATROPIUM BROMIDE AND ALBUTEROL SULFATE 3 ML: .5; 3 SOLUTION RESPIRATORY (INHALATION) at 13:48

## 2019-01-23 RX ADMIN — IPRATROPIUM BROMIDE AND ALBUTEROL SULFATE 3 ML: .5; 3 SOLUTION RESPIRATORY (INHALATION) at 01:43

## 2019-01-23 RX ADMIN — METOPROLOL TARTRATE 12.5 MG: 25 TABLET ORAL at 08:57

## 2019-01-23 RX ADMIN — HEPARIN SODIUM 5000 UNITS: 5000 INJECTION INTRAVENOUS; SUBCUTANEOUS at 01:47

## 2019-01-23 RX ADMIN — Medication 10 ML: at 05:44

## 2019-01-23 RX ADMIN — THERA TABS 1 TABLET: TAB at 08:57

## 2019-01-23 RX ADMIN — Medication 2.5 MG: at 10:53

## 2019-01-23 RX ADMIN — DAKIN'S SOLUTION 0.125% (QUARTER STRENGTH): 0.12 SOLUTION at 08:58

## 2019-01-23 RX ADMIN — IPRATROPIUM BROMIDE AND ALBUTEROL SULFATE 3 ML: .5; 3 SOLUTION RESPIRATORY (INHALATION) at 07:16

## 2019-01-23 RX ADMIN — FUROSEMIDE 40 MG: 40 TABLET ORAL at 08:57

## 2019-01-23 RX ADMIN — ACETAMINOPHEN 1000 MG: 500 TABLET ORAL at 05:44

## 2019-01-23 RX ADMIN — AMLODIPINE BESYLATE 10 MG: 5 TABLET ORAL at 08:57

## 2019-01-23 NOTE — DISCHARGE SUMMARY
Discharge Summary PATIENT ID: Olivier Arredondo MRN: [id number] YOB: 1925 DATE OF ADMISSION: 1/15/2019 11:07 AM   
DATE OF DISCHARGE: 1/23/2019 PRIMARY CARE PROVIDER: Jsoafat Ferrer  
 
ATTENDING PHYSICIAN: Parris Rodriguez MD 
DISCHARGING PROVIDER: Parris Rodriguez MD   
To contact this individual call [phone] and ask the  to page. If unavailable ask to be transferred the Adult Hospitalist Department. CONSULTATIONS: ED CONSULT TO SENIOR SERVICES CASE MANAGEMENT 
ED CONSULT TO Valerie Ann 
ED CONSULT TO SENIOR SERVICES PHYSICAL THERAPY IP CONSULT TO HOSPITALIST 
IP CONSULT TO PULMONOLOGY PROCEDURES/SURGERIES: None [address] COURSE:  
80-year-old female with a past medical history of gait abnormalities, multiple falls, left heel decubitus prior to arrival to the hospital, history of acute hypoxic and hypercapnic respiratory failure, community-acquired pneumonia, UTI, multiple compression fractures and hypernatremia, presented to the hospital after a fall. The patient resided  at [facility] where she sustained a recurrent fall. The patient reported that she did not hit her head or her neck, but fell on the right side, immediately started having pain on the right side of her chest. The patient also reported that she had some pain in her right arm.  She had multiple x-rays done at the facility and they showed some rib fractures.  Patient continued to be in pain, was also having some shortness of breath.  Family got concerned and the patient was transferred to [address] [place] [address]. The patient reports that she did not lose consciousness. Flavio Gerard was a purely mechanical fall.  She was wearing a boot for the decubitus ulcer on her left heel and \"the boot got tangled,\" which caused her to fall.  Patient was here in October after a fall and had multiple thoracic fractures and since then, has been living at [address] [facility] at [address] daughter also reports that the patient was oxygen dependent since being discharged from the hospital in October 2018.   
Right Fourth rib fracture with possible pneumothorax 
-CXR 1/15 Right fourth rib fracture with possible small apical pneumothorax 
-[facility] 
-CXR 1/16 No pneumothorax. Small to moderate bilateral pleural effusions mostly posteriorly at the lung bases. Left is larger than the right. The majority left lower lobe is collapsed. Follow-up to resolution is suggested 
-CXR 1/17 Moderate-sized right-sided pleural effusion. Mild to moderate pulmonary edema. No definite pneumothorax identified, however study is limited by technique, as described above. Nonspecific patchy opacity in the right mid and lower lung zones, which could be related to atelectasis or airspace disease. -CXR 1/22 with stable bilateral pleural effusions and no pneumonia. 
-continue home lasix 
-provide oxygen support, pain control - Tylenol scheduled with PRN Tramadol. 
-aggressive incentive spirometry  
-PT/OT as tolerated 
  
Pleural Effusions:  
- probably due to Fall vs hypoalbuminemia 
-Echo with EF 75% with no RWMA 
-S/P IV Lasix - Continue PO lasix 
  
Fall 
-likely mechanical 
-CT head 1/15 No acute intracranial process seen. 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 
-would not do any further work up 
-PT/OT 
  
  
Chronic hypoxic respiratory failure, was reportedly on oxygen prior to admission. 
  
Subjective fevers:  
-Patient has not had any fever inpatient Compression deformity 
-CT cervical spine Stable severe T6 compression deformity in patient with known osteoporosis. C5 retrolisthesis. Multifactorial moderate left C4-5 and right C6-7 as well as severe right C5-6 
-Outpatient follow up 
  
Hypertension, suboptimally controlled. Some component could be due to pain. -continue and adjust home meds as needed 
  
Hyperglycemia, A1C at 5.5. 
  
Daily wound care to left heel ulcer present on admission 
  
PT/OT as tolerated 
  
Code status: DNR. D/W patient and family. 
Gelacio Sides: Patient stable for transfer to [facility] [address] [place] [address] for ongoing therapies on 1/23/19.   
  
          
Hospital Problems  Date Reviewed: 1/15/2019  
        Codes Class Noted POA  
  * (Principal) SOB (shortness of breath) ICD-10-CM: R06.02 
ICD-9-CM: 786.05   1/15/2019 Unknown  
     
   
  
  
  
  
  
Vital Signs:  
 Last 24hrs VS reviewed since prior progress note. Most recent are: 
Visit Vitals /57 (BP 1 Location: Right arm, BP Patient Position: At rest) Pulse 71 Temp 97.1 °F (36.2 °C) Resp 26 Ht 4' 10\" (1.473 m) Wt 57.7 kg (127 lb 4.8 oz) SpO2 92% Breastfeeding? No  
BMI 26.61 kg/m²  
  
  
  
Intake/Output Summary (Last 24 hours) at 1/22/2019 1252 Last data filed at 1/22/2019 9736 Gross per 24 hour Intake 1200 ml Output 600 ml Net 600 ml  
  
  
Physical Examination:  
  
  
Constitutional:  No acute distress, cooperative, pleasant   
ENT:  Neck supple Resp: Decreased air entry bibasilarly. No added sounds. CV:  Regular rhythm, normal rate GI:  Soft, non distended, non tender Musculoskeletal:  No edema, warm Skin: Healing left heel ulcer present on admission. Neurologic:  Moves all extremities. Alert Data Review:  
 
  
Labs:  
  
    
Recent Labs  
  01/22/19 
0415 01/20/19 
0136 WBC 6.5 6.0 HGB 11.5 11.3* HCT 38.3 38.4  307  
  
Recent Labs  
  01/22/19 
0415 01/20/19 
0136  144  
K 4.1 3.6  107 CO2 38* 33*  
BUN 23* 20  
CREA 0.57 0.62 * 110* CA 9.0 8.8  
  
     
Lab Results Component Value Date/Time  
  Cholesterol, total 168 05/21/2010 03:00 AM  
  HDL Cholesterol 53 05/21/2010 03:00 AM  
  LDL, calculated 95.4 05/21/2010 03:00 AM  
  Triglyceride 98 05/21/2010 03:00 AM  
   CHOL/HDL Ratio 3.2 05/21/2010 03:00 AM  
  
     
Lab Results Component Value Date/Time  
  Glucose (POC) 120 (H) 10/27/2018 06:05 AM  
  Glucose (POC) 133 (H) 10/27/2018 12:18 AM  
  Glucose (POC) 177 (H) 10/26/2018 06:21 PM  
  Glucose (POC) 222 (H) 10/26/2018 11:51 AM  
  Glucose (POC) 119 (H) 10/26/2018 05:52 AM  
  
     
Lab Results Component Value Date/Time  
  Color YELLOW/STRAW 01/16/2019 04:08 PM  
  Appearance CLOUDY (A) 01/16/2019 04:08 PM  
  Specific gravity 1.017 01/16/2019 04:08 PM  
  Specific gravity 1.005 03/07/2017 01:54 PM  
  pH (UA) 5.5 01/16/2019 04:08 PM  
  Protein 30 (A) 01/16/2019 04:08 PM  
  Glucose NEGATIVE  01/16/2019 04:08 PM  
  Ketone NEGATIVE  01/16/2019 04:08 PM  
  Bilirubin NEGATIVE  01/16/2019 04:08 PM  
  Urobilinogen 0.2 01/16/2019 04:08 PM  
  Nitrites NEGATIVE  01/16/2019 04:08 PM  
  Leukocyte Esterase SMALL (A) 01/16/2019 04:08 PM  
  Epithelial cells FEW 10/24/2018 02:52 PM  
  Bacteria 4+ (A) 10/24/2018 02:52 PM  
  WBC  10/24/2018 02:52 PM  
  RBC 0-5 10/24/2018 02:52 PM  
  
  
  
Medications Reviewed:  
  
      
  
     
      
      
      
      
      
      
      
      
      
      
      
      
      
      
      
      
      
  
__________________________________________________________________ Mateo Soares MD  
 
  
  
 
 
 
 
 
DISCHARGE MEDICATIONS: 
Current Discharge Medication List  
  
START taking these medications Details  
albuterol-ipratropium (DUO-NEB) 2.5 mg-0.5 mg/3 ml nebu 3 mL by Nebulization route every six (6) hours as needed. Qty: 30 Nebule, Refills: 1  
  
lidocaine (LIDODERM) 5 % Apply patch to the affected area for 12 hours a day and remove for 12 hours a day. Qty: 12 Each, Refills: 0  
  
senna-docusate (PERICOLACE) 8.6-50 mg per tablet Take 1 Tab by mouth two (2) times daily as needed for Constipation. Qty: 20 Tab, Refills: 0 CONTINUE these medications which have CHANGED Details  
furosemide (LASIX) 40 mg tablet Take 1 Tab by mouth daily. Qty: 30 Tab, Refills: 0  
  
traMADol (ULTRAM) 50 mg tablet Take 0.5 Tabs by mouth every six (6) hours as needed for Pain (For severe pain. Please hold for any somnolence or sedation. ). Max Daily Amount: 100 mg. Qty: 20 Tab, Refills: 0 Associated Diagnoses: Closed fracture of one rib of right side, initial encounter CONTINUE these medications which have NOT CHANGED Details  
amLODIPine (NORVASC) 10 mg tablet Take 10 mg by mouth daily. umeclidinium-vilanterol (ANORO ELLIPTA) 62.5-25 mcg/actuation inhaler Take 1 Puff by inhalation daily. raNITIdine (ZANTAC) 150 mg tablet Take 150 mg by mouth two (2) times a day. collagenase (SANTYL) 250 unit/gram ointment Apply  to affected area daily. Apply to left heel  
  
albuterol (VENTOLIN HFA) 90 mcg/actuation inhaler Take 2 Puffs by inhalation every four (4) hours as needed for Shortness of Breath. calcium-vitamin D (OYSTER SHELL) 500 mg(1,250mg) -200 unit per tablet Take 1 Tab by mouth two (2) times daily (with meals). balsam peru-castor oil (VENELEX) [id number] mg/gram ointment Apply  to affected area two (2) times a day. Qty: 60 g, Refills: 0  
  
sertraline (ZOLOFT) 25 mg tablet Take 1 Tab by mouth every evening. Qty: 30 Tab, Refills: 0  
  
acetaminophen (TYLENOL) 325 mg tablet Take 650 mg by mouth every four (4) hours as needed (back pain). metoprolol tartrate (LOPRESSOR) 25 mg tablet Take 12.5 mg by mouth two (2) times a day. alendronate (FOSAMAX) 70 mg tablet Take 70 mg by mouth Every Friday. multivitamin (ONE A DAY) tablet Take 1 Tab by mouth daily. PENDING TEST RESULTS:  
At the time of discharge the following test results are still pending: None FOLLOW UP APPOINTMENTS:   
Follow-up Information Follow up With Specialties Details Why Contact Info [address]  skilled rehab. [address] [facility] [address] ABRIL Hopkins Physician Assistant   [address] [facility] [address] [place] [address] 
[phone] ABRIL Hopkins Physician Assistant In 1 week  [address] [facility] [address] [place] [address] 
[phone] ADDITIONAL CARE RECOMMENDATIONS: 
 
DIET: Regular diet as tolerated ACTIVITY: Daily PT/OT as tolerated WOUND CARE: Daily wound care to left heel decubitus ulcer present on admission. EQUIPMENT needed: [employer] NOTIFY YOUR PHYSICIAN FOR ANY OF THE FOLLOWING:  
Fever over 101 degrees for 24 hours. Chest pain, shortness of breath, fever, chills, nausea, vomiting, diarrhea, change in mentation, falling, weakness, bleeding. Severe pain or pain not relieved by medications. Or, any other signs or symptoms that you may have questions about. DISPOSITION: 
  Home With: 
 OT  PT  [facility]  RN  
  
 SNF(Name) Inpatient Rehab(name) Independent/assisted living(name) Hospice Other:  
 
 
PATIENT CONDITION AT DISCHARGE:  
 
Functional status Deconditioned Cognition Daysi Exon Catheter/Lines(indications) Horn None Code status DNR  
 
  
 
 
 
CHRONIC MEDICAL DIAGNOSES: 
Problem List as of 1/23/2019 Date Reviewed: 1/15/2019 Codes Class Noted - Resolved * (Principal) SOB (shortness of breath) ICD-10-CM: R06.02 
ICD-9-CM: 786.05  1/15/2019 - Present Hypoxia ICD-10-CM: R09.02 
ICD-9-CM: 799.02  10/24/2018 - Present Vertebral fracture (Chronic) ICD-10-CM: LBV1256 ICD-9-CM: 805.8  10/24/2018 - Present Hypokalemia ICD-10-CM: E87.6 ICD-9-CM: 276.8  10/24/2018 - Present Bronchitis ICD-10-CM: J40 ICD-9-CM: 877  1/13/2017 - Present Acute bronchitis ICD-10-CM: J20.9 ICD-9-CM: 466.0  1/12/2017 - Present RESOLVED: Intractable back pain ICD-10-CM: M54.9 ICD-9-CM: 724.5  10/24/2018 - 10/31/2018 RESOLVED: CAP (community acquired pneumonia) ICD-10-CM: J18.9 ICD-9-CM: 527  10/24/2018 - 10/31/2018 RESOLVED: Chest pain ICD-10-CM: R07.9 ICD-9-CM: 786.50  3/7/2017 - 3/12/2017 RESOLVED: Cholecystitis ICD-10-CM: K81.9 ICD-9-CM: 575.10  3/7/2017 - 3/12/2017 RESOLVED: Nephrolithiasis ICD-10-CM: N20.0 ICD-9-CM: 592.0  3/7/2017 - 3/12/2017 Greater than  30 minutes were spent with the patient, patient's two daughters, patient's RN and care Management on counseling and coordination of care Signed:  
Anibal Young MD 
1/23/2019 
11:32 AM 
 
 .

## 2019-01-23 NOTE — PROGRESS NOTES
Bedside and Verbal shift change report given to Cisco Huntley RN (oncoming nurse) by Milad Colon RN (offgoing nurse). Report included the following information SBAR, Kardex, MAR, Recent Results and Cardiac Rhythm NSR.

## 2019-01-23 NOTE — PROGRESS NOTES
ALIZA received a call from pt's daughter, Ruthie Santiago. She now wants this pt to go to [facility]. She had been accepted at [facility] yesterday. ALIZA called Nuris with admissions at [facility] to see if they can accept this pt and she confirmed that they can accept this pt. ALIZA met with attending and she is going to d/c pt to [facility] today. ALIZA met with pt and both daughters to discuss and they are in agreement with this plan. ALIZA set up ambulance with [facility] for this pt's transport to [place] at 2:30pm today. ALIZA also called Kevin Gutiérrez in admissions at [facility] to let him know that pt is not coming there. UAI was completed and printed out. An envelope containing pt's kardex, MAR, AVS and UAI will be sent with this pt to [facility] today. Also, pt's nurse will call report. DWIGHT Layton

## 2019-01-23 NOTE — PROGRESS NOTES
Pharmacist Discharge Medication Reconciliation Discharging Provider: Dr. Allayne Apley Significant PMH:  
Past Medical History:  
Diagnosis Date  Arthritis  Breast lump 06/13/2018  
 right breast lump x a few days  HTN (hypertension)  Hypercholesteremia  Hyperlipidemia  Osteoporosis  Stroke ([address] CVA, TIA  TIA (transient ischemic attack)  Vertebral fracture Chief Complaint for this Admission: Chief Complaint Patient presents with  Rib Pain Allergies: Hydrochlorothiazide Discharge Medications:  
Current Discharge Medication List  
  
 
START taking these medications Details  
albuterol-ipratropium (DUO-NEB) 2.5 mg-0.5 mg/3 ml nebu 3 mL by Nebulization route every six (6) hours as needed. Qty: 30 Nebule, Refills: 1  
  
lidocaine (LIDODERM) 5 % Apply patch to the affected area for 12 hours a day and remove for 12 hours a day. Qty: 12 Each, Refills: 0  
  
senna-docusate (PERICOLACE) 8.6-50 mg per tablet Take 1 Tab by mouth two (2) times daily as needed for Constipation. Qty: 20 Tab, Refills: 0 CONTINUE these medications which have CHANGED Details  
furosemide (LASIX) 40 mg tablet Take 1 Tab by mouth daily. Qty: 30 Tab, Refills: 0  
  
traMADol (ULTRAM) 50 mg tablet Take 0.5 Tabs by mouth every six (6) hours as needed for Pain (For severe pain. Please hold for any somnolence or sedation. ). Max Daily Amount: 100 mg. Qty: 20 Tab, Refills: 0 Associated Diagnoses: Closed fracture of one rib of right side, initial encounter CONTINUE these medications which have NOT CHANGED Details  
amLODIPine (NORVASC) 10 mg tablet Take 10 mg by mouth daily. umeclidinium-vilanterol (ANORO ELLIPTA) 62.5-25 mcg/actuation inhaler Take 1 Puff by inhalation daily. raNITIdine (ZANTAC) 150 mg tablet Take 150 mg by mouth two (2) times a day. collagenase (SANTYL) 250 unit/gram ointment Apply  to affected area daily. Apply to left heel albuterol (VENTOLIN HFA) 90 mcg/actuation inhaler Take 2 Puffs by inhalation every four (4) hours as needed for Shortness of Breath. calcium-vitamin D (OYSTER SHELL) 500 mg(1,250mg) -200 unit per tablet Take 1 Tab by mouth two (2) times daily (with meals). balsam peru-castor oil (VENELEX) [id number] mg/gram ointment Apply  to affected area two (2) times a day. Qty: 60 g, Refills: 0  
  
sertraline (ZOLOFT) 25 mg tablet Take 1 Tab by mouth every evening. Qty: 30 Tab, Refills: 0  
  
acetaminophen (TYLENOL) 325 mg tablet Take 650 mg by mouth every four (4) hours as needed (back pain). metoprolol tartrate (LOPRESSOR) 25 mg tablet Take 12.5 mg by mouth two (2) times a day. alendronate (FOSAMAX) 70 mg tablet Take 70 mg by mouth Every Friday. multivitamin (ONE A DAY) tablet Take 1 Tab by mouth daily. The patient's chart, MAR and AVS were reviewed by Dionisio Cunningham.

## 2019-01-23 NOTE — DISCHARGE INSTRUCTIONS
Patient to follow a regular diet as tolerated. Patient to do daily PT/OT as tolerated. Patient to do Incentive spirometry every four hours whilst awake. Recommendations for patient to have a follow up CXR in 1-2 weeks to monitor pleural effusions . Patient to follow up with PCP in 1 week. Patient to follow up with Pulmonary In 2-3 weeks. Patient has a durable DNR on record.

## 2019-01-23 NOTE — PROGRESS NOTES
Bedside shift change report given to Josh Rutledge RN (oncoming nurse) by Darcie Lakhani RN (offgoing nurse). Report included the following information SBAR, Kardex, Procedure Summary, Intake/Output, MAR, Recent Results and Cardiac Rhythm NSR.

## 2019-01-23 NOTE — WOUND CARE
WOCN Note:  
  
Follow up assessment of left heel and sacrum. Daughter at the bedside for assessment. Verbal consent received for photography. 
  
Chart reviewed. Admitted DX:  SOB (shortness of breath) Past Medical History:  history of gait abnormalities, multiple falls, left heel decubitus prior to arrival to the hospital, history of acute hypoxic and hypercapnic respiratory failure, community-acquired pneumonia, UTI, multiple compression fractures and hypernatremia Admitted from [facility]. 
  
Assessment:  
Patient is A&O x 3, communicative and requires assist with repositioning. Bed: envision on versacare Patient wearing briefs for incontinence. Patient reports pain to the left heel. Patient repositioned on left side with pillow. Heels offloaded on prevalon boots. 
  
1. POA Left heel, Pressure Injury Unstageable:  0.7 x 1.2 x 0.5 cm; 80% yellow 20% red; small serous exudate; no odor present; mild blanching pink erythema. 2.  POA Sacrum Pressure Injury Stage 1: 0.5 x 0.5 x 0 cm. Blanching pink and hyperpigmented dark dyschromia present to periwound. Recommendations:   
prevalon boots Turn team 
  
1. Left heel:  Daily cleanse with salione, apply nickel depth amount of Santyl and saline moist 2 x 2 gauze; cover with dry dressing. 2.  Sacrum and buttocks:  Venelex tid 
  
Skin Care & Pressure Prevention: 
Minimize layers of linen/pads under patient to optimize support surface. Turn/reposition approximately every 2 hours and offload heels. Manage incontinence / promote continence Specialty bed: Ina Manzano on Versacare ordered via [facility] Use only flat sheet and one incontinence pad. Please call Andreas Nickerson if not delivered. Ref# [id number]. 
  
Discussed above plan with patient, daughter and Cortez Randall RN. 
  
Transition of Care: Plan to follow as needed while admitted to hospital. 
  
EMILY Hutchins RN [facility], [facility]. Wound Care Office [phone] Pager [phone]

## 2019-01-23 NOTE — PROGRESS NOTES
Problem: Falls - Risk of 
Goal: *Absence of Falls Document Hamp Priti Fall Risk and appropriate interventions in the flowsheet. Outcome: Progressing Towards Goal 
Fall Risk Interventions: 
Mobility Interventions: Communicate number of staff needed for ambulation/transfer, OT consult for ADLs Mentation Interventions: Adequate sleep, hydration, pain control Medication Interventions: Evaluate medications/consider consulting pharmacy, Bed/chair exit alarm, Patient to call before getting OOB Elimination Interventions: Call light in reach, Patient to call for help with toileting needs History of Falls Interventions: Bed/chair exit alarm, Door open when patient unattended Comments: Pt knows to call before getting OOB

## 2019-01-23 NOTE — PROGRESS NOTES
D/c instructions printed and given to family. NICO Reed, D/c instructions, and signed prescriptions placed in a folder for [place]. Report called to facility, s/w Ximena. PIV removed.

## 2019-01-24 ENCOUNTER — PATIENT OUTREACH (OUTPATIENT)
Dept: CASE MANAGEMENT | Age: 84
End: 2019-01-24

## 2019-01-25 NOTE — PROGRESS NOTES
Community Care Team documentation for patient in [facility] Initial Follow Up Patient was discharged to [facility], [facility]. Information included in this progress note has been provided to SNF. Hospital Admission and Diagnosis: [facility] 1/15-1/23 Right Fourth rib fracture with possible pneumothorax RRAT Score: 17 Advance Care Planning: Advance Dreictive and POA on file Follow up appointments SNF:  Nelsy Torres MD in 2-3 weeks;  follow up CXR in 1-2 weeks to monitor pleural effusions Prior to Admission:  Admitted to hosp from skilled therepy at The [id number] [place] [address] where plan had been for [facility] placement once wound healed enough for [facility]; Per inpt CM notes, patients daughter is going to [address] to apply for long term care Medicaid and CM completed UAI Prev Home Health used: unknown PCP : ABRIL Early Spoke with SNF team. Ensured patient arrived to SNF safely with admission packet in order. PT/OT evaluations to be completed today 1/24. Wound care provided. CCT will provide UAI from hospital CM to SNF. SNF staff to discus Medicaid application with daughter. Disposition/LOS TBD. Community Care Team will follow up weekly with [facility] until discharge. Medications were not reconciled and general patient assessment was not completed during this [facility] outreach. Anupama De Oliveira, MSN, RN, ACNS-BC, [facility] Nurse Navigator, [address] [place] [address] [phone]

## 2019-01-29 ENCOUNTER — PATIENT OUTREACH (OUTPATIENT)
Dept: CASE MANAGEMENT | Age: 84
End: 2019-01-29

## 2019-01-29 NOTE — PROGRESS NOTES
Community Care Team Documentation for Patient in [facility] Subsequent Follow up Patient remains at [facility] ([facility]). See previous [facility] Team notes. PCP : ABRIL Singh Spoke with SNF team.  PT/OT providing skilled therapy in SNF. Barrier: Low motivation. LOS TBD. Plan to return to [facility] vs transition to LTC. Medications were not reconciled and general patient assessment was not completed during this skilled nursing facility outreach.

## 2019-02-05 ENCOUNTER — PATIENT OUTREACH (OUTPATIENT)
Dept: CASE MANAGEMENT | Age: 84
End: 2019-02-05

## 2019-02-12 ENCOUNTER — PATIENT OUTREACH (OUTPATIENT)
Dept: CASE MANAGEMENT | Age: 84
End: 2019-02-12

## 2019-02-12 NOTE — PROGRESS NOTES
Community Care Team Documentation for Patient in [facility] Subsequent Follow up Patient remains at [facility] ([facility]). See previous [facility] Team notes. PCP : ABRIL Early Spoke with SNF team.  PT/OT/SLP continue. Ambulating 50ft with RW and min assist with verbal cuing. Stand by assist with transfers and sit to stand. 2 weeks LOS anticipated. [place] [place] [address] has LTC [facility] beds available. Medications were not reconciled and general patient assessment was not completed during this skilled nursing facility outreach.

## 2019-02-19 ENCOUNTER — PATIENT OUTREACH (OUTPATIENT)
Dept: CASE MANAGEMENT | Age: 84
End: 2019-02-19

## 2019-02-19 NOTE — PROGRESS NOTES
Community Care Team Documentation for Patient in [facility] Subsequent Follow up Patient remains at [facility] ([facility]). See previous [facility] Team notes. PCP : ABRIL Mejía Spoke with SNF team. Pt exhausted skilled therapy Medicare Part A days 2/15 and transitioned to private pay 2/16 for 30 days while receiving skilled therapy under part B. Plan to transition to LTC at [address] [place] [address] vs return to [facility]. CCT to sign off at this time. Medications were not reconciled and general patient assessment was not completed during this skilled nursing facility outreach.

## 2019-10-28 NOTE — ED TRIAGE NOTES
Pt arrives with EMS from [facility] for c/o GLF while trying to answer phone. Pt unsure if she hit her head. Denies LOC. Staff was next door and heard fall. Pt c/o RIGHT hip and wrist pain.

## 2019-10-28 NOTE — PROGRESS NOTES
Admission Medication Reconciliation: 
 
Information obtained from:  [facility] RxQuery data available¹:  YES Comments/Recommendations: Updated PTA meds/reviewed patient's allergies. 1)  Reviewed patient's order summary report from [facility]. 2)  Medication changes (since last review): 
 
Removed - Albuterol 90mcg/actuation inhaler 2puffs q4h prn - Albuterol-ipratropium 2.5-0.5mg/3mL nebulization q6h prn 
- Venelex ointment 
- Santyl ointment 
- Senna-docusate 8.6-50mg 1tab po daily prn 
- Tramadol 50mg 1/2tab po q6h prn ¹RxQuery pharmacy benefit data reflects medications filled and processed through the patient's insurance, however  
this data does NOT capture whether the medication was picked up or is currently being taken by the patient. Allergies:  Hydrochlorothiazide Significant PMH/Disease States:  
Past Medical History:  
Diagnosis Date Arthritis Breast lump 06/13/2018  
 right breast lump x a few days HTN (hypertension) Hypercholesteremia Hyperlipidemia Osteoporosis Stroke [facility]) CVA, TIA  
 TIA (transient ischemic attack) Vertebral fracture Chief Complaint for this Admission: Chief Complaint Patient presents with Fall Hip Pain  
  right Prior to Admission Medications:  
Prior to Admission Medications Prescriptions Last Dose Informant Patient Reported? Taking?  
acetaminophen (TYLENOL) 325 mg tablet   Yes Yes Sig: Take 650 mg by mouth every six (6) hours as needed (back pain). alendronate (FOSAMAX) 70 mg tablet   Yes Yes Sig: Take 70 mg by mouth Every Friday. amLODIPine (NORVASC) 10 mg tablet   Yes Yes Sig: Take 10 mg by mouth daily. calcium-vitamin D (OYSTER SHELL) 500 mg(1,250mg) -200 unit per tablet   Yes Yes Sig: Take 1 Tab by mouth two (2) times daily (with meals). furosemide (LASIX) 40 mg tablet   No Yes Sig: Take 1 Tab by mouth daily. metoprolol tartrate (LOPRESSOR) 25 mg tablet   Yes Yes Sig: Take 12.5 mg by mouth two (2) times a day. multivitamin (ONE A DAY) tablet   Yes Yes Sig: Take 1 Tab by mouth daily. raNITIdine (ZANTAC) 150 mg tablet   Yes Yes Sig: Take 150 mg by mouth two (2) times a day. sertraline (ZOLOFT) 25 mg tablet   No Yes Sig: Take 1 Tab by mouth every evening. umeclidinium-vilanterol (ANORO ELLIPTA) 62.5-25 mcg/actuation inhaler   Yes Yes Sig: Take 1 Puff by inhalation daily. Facility-Administered Medications: None Please contact the main inpatient pharmacy with any questions or concerns at [phone] and we will direct you to the clinical pharmacist covering this patient's care while in-house.   
CINDY LouD

## 2019-10-28 NOTE — PROGRESS NOTES
[facility] Physical Therapy Consult received and appreciated. Patient evaluated by physical therapy. Patient was able to transition supine<->sit on her own, demonstrates good sitting balance, required CGA for bed<->WC transfer due to RLE giving way due to pain when weight bearing. Patient is appropriate to discharge back to the [facility] with increased staff assistance primarily for transfers and dressing. Her daughter's voiced concern re: the [facility] staff not responding to patient's calls for assistance. One of her daughters plans to spend the night with her at the [facility] and will talk to facility management in the morning re: this issue. Care management met with patient and her daughters. CM reached out to the facility, waiting to hear back. Report provided to nursing to provide to the attending. Home health order placed for physical therapy. Full evaluation to follow.

## 2019-10-28 NOTE — PROGRESS NOTES
Physical Therapy Screening: A referral to physical therapy order is indicated when the patient is medically stable. A screening was performed on this patient's chart based on their entrance into the emergency department and potential need for physical therapy identified. The patients chart was reviewed and the patient interviewed/screened and found to be appropriate for a skilled therapy evaluation. Please consult for physical therapy if you would like an evaluation to be completed. Thank you. 1636 - Met with the patient and her two daughters. Patient resides at [facility]. At baseline she is wheelchair bound, able to perform her own transfers (bed<->wc and wc<->toilet) and gets herself dressed. She and her daughter voiced that staff are supposed to assist patient with transfers and dressing. States they do not respond to pateint's calls for assistance leaving patient to manage on her own. They are looking to transition patient to another facility. Patient fell today when attempting to get out of her bed to reach her cell phone. She was attempting to stand and walk despite being wheelchair bound. 1700 - Noted SSED PT consult placed and appreciated. Report provided to care management.

## 2019-10-28 NOTE — ED PROVIDER NOTES
80 y.o. female with past medical history significant for HTN, hypercholesteremia, TIA, vertebral fracture, stroke, osteoporosis, breast lump, hyperlipidemia, and arthritis who presents from [facility] via EMS with chief complaint of fall. EMS reports the patient was trying to get out of bed to answer the phone when she fell. Patient states she lost her balance resulting in the fall EMS reports the fall was unwitnessed but heard by staff in the room next door. Patient told EMS that she had right hip pain secondary to right wrist pain, but does not complain of these in the ED. Patient states she did hit her head but has no pain localizing to her head. EMS states the patients BP was 166/59, HR was 63 bpm, and is 97% on 2L of O2 (baseline). Patient denies any blood thinners. There are no other acute medical concerns at this time. Social hx: Former smoker PCP: ABRIL German Note written by Miriam Taveras, as dictated by Stephon Squires MD 2:19 PM  
 
 
 
The history is provided by the patient and the EMS personnel. No  was used. Past Medical History:  
Diagnosis Date  Arthritis  Breast lump 06/13/2018  
 right breast lump x a few days  HTN (hypertension)  Hypercholesteremia  Hyperlipidemia  Osteoporosis  Stroke ([address] CVA, TIA  TIA (transient ischemic attack)  Vertebral fracture Past Surgical History:  
Procedure Laterality Date  HX HIP REPLACEMENT  2009  
 left  HX ORTHOPAEDIC    
 left hip replacement No family history on file. Social History Socioeconomic History  Marital status:  Spouse name: Not on file  Number of children: Not on file  Years of education: Not on file  Highest education level: Not on file Occupational History  Not on file Social Needs  Financial resource strain: Not on file  Food insecurity:  
  Worry: Not on file Inability: Not on file  Transportation needs:  
  Medical: Not on file Non-medical: Not on file Tobacco Use  Smoking status: Former Smoker Years: 40.00 Last attempt to quit: 1986 Years since quittin.9  Smokeless tobacco: Never Used Substance and Sexual Activity  Alcohol use: Yes  Drug use: No  
 Sexual activity: Not on file Lifestyle  Physical activity:  
  Days per week: Not on file Minutes per session: Not on file  Stress: Not on file Relationships  Social connections:  
  Talks on phone: Not on file Gets together: Not on file Attends Voodoo service: Not on file Active member of club or organization: Not on file Attends meetings of clubs or organizations: Not on file Relationship status: Not on file  Intimate partner violence:  
  Fear of current or ex partner: Not on file Emotionally abused: Not on file Physically abused: Not on file Forced sexual activity: Not on file Other Topics Concern  Not on file Social History Narrative  Not on file ALLERGIES: Hydrochlorothiazide Review of Systems Constitutional: Negative for fever. HENT: Negative for facial swelling. Eyes: Negative for visual disturbance. Respiratory: Negative for chest tightness. Cardiovascular: Negative for chest pain. Gastrointestinal: Negative for abdominal pain. Genitourinary: Negative for difficulty urinating and dysuria. Musculoskeletal: Negative for arthralgias. Skin: Negative for rash. Neurological: Negative for headaches. Hematological: Negative for adenopathy. Psychiatric/Behavioral: Negative for suicidal ideas. There were no vitals filed for this visit. Physical Exam  
Constitutional: She is oriented to person, place, and time. She appears well-developed and well-nourished. No distress. Awake and alert HENT:  
Head: Normocephalic and atraumatic.   
Mouth/Throat: Oropharynx is clear and moist.  
 Eyes: Pupils are equal, round, and reactive to light. No scleral icterus. Neck: Normal range of motion. Neck supple. No thyromegaly present. Cardiovascular: Normal rate, regular rhythm, normal heart sounds and intact distal pulses. No murmur heard. Pulmonary/Chest: Effort normal and breath sounds normal. No respiratory distress. Abdominal: Soft. Bowel sounds are normal. She exhibits no distension. There is no tenderness. Musculoskeletal: Normal range of motion. She exhibits no edema. Moving all extremities Neurological: She is oriented to person, place, and time. Skin: Skin is warm and dry. No rash noted. She is not diaphoretic. Nursing note and vitals reviewed. Note written by Miriam Saunders, as dictated by Poly Medina MD 2:19 PM  
 
 
MDM Number of Diagnoses or Management Options Closed head injury, initial encounter:  
Fall, initial encounter:  
Inferior pubic ramus fracture, right, closed, initial encounter [facility]):  
 
  
Pt seen by [facility]. Not ambulatory at baseline. Stable for discharge home. Arrangements being made for home health. Procedures

## 2019-10-28 NOTE — PROGRESS NOTES
CM notified of SSED consult. CM met with pt and family at bedside to discuss CM role and to assess pt needs. Pt's family reports concerns at [facility]. CM inquired if family has reached out to express concerns; family reports awaiting return call from facility. CM verified demographic information including insurance and emergency contact information. Family reports pt has been at facility since March 2019. Pt uses 2L oxygen at home and wheelchair for DME and needs assistance with some ADLs. Family verified PCP. Family reports pt has had experience at rehab facilities including [place] [id number] and [address] [place] [address]. CM contacted [facility] to speak with SW; SW not in at this time.  in house,  [facility] [employer] number for return call; awaiting response. Jenn Cupl RN, BSN Care Management Department 1834: CM contacted [facility] to notify of plan for [facility] on discharge. Amado PATEL aware and request for CM to fax orders for [facility] to facility for facility to follow-up tomorrow. CM contacted facility for fax numbers: [phone]. CM to fax orders to RN. Jenn Culp RN, BSN- Care Management 8537: Fax confirmation received. Jenn Culp RN, BSN- Care Management [phone]: CM notified of needed transportation assistance. CM arranged transport via [facility]. ETA 1 hour. CM to place ED visit summary, facesheet, and PCS Form on chart. Nursing to call report to [phone]. Jenn Culp RN, BSN- Care Management 1937: CM notified by RN that family transported pt home. CM cancelled [facility] transport. Jenn Culp RN, BSN- Care Management

## 2020-01-01 ENCOUNTER — HOSPITAL ENCOUNTER (INPATIENT)
Age: 85
LOS: 2 days | End: 2020-03-19
Attending: INTERNAL MEDICINE | Admitting: INTERNAL MEDICINE

## 2020-01-01 ENCOUNTER — APPOINTMENT (OUTPATIENT)
Dept: CT IMAGING | Age: 85
DRG: 602 | End: 2020-01-01
Attending: NURSE PRACTITIONER
Payer: MEDICARE

## 2020-01-01 ENCOUNTER — APPOINTMENT (OUTPATIENT)
Dept: CT IMAGING | Age: 85
DRG: 602 | End: 2020-01-01
Attending: FAMILY MEDICINE
Payer: MEDICARE

## 2020-01-01 ENCOUNTER — APPOINTMENT (OUTPATIENT)
Dept: GENERAL RADIOLOGY | Age: 85
DRG: 602 | End: 2020-01-01
Attending: INTERNAL MEDICINE
Payer: MEDICARE

## 2020-01-01 ENCOUNTER — APPOINTMENT (OUTPATIENT)
Dept: GENERAL RADIOLOGY | Age: 85
DRG: 602 | End: 2020-01-01
Attending: FAMILY MEDICINE
Payer: MEDICARE

## 2020-01-01 ENCOUNTER — HOSPICE ADMISSION (OUTPATIENT)
Dept: HOSPICE | Facility: HOSPICE | Age: 85
End: 2020-01-01
Payer: MEDICARE

## 2020-01-01 ENCOUNTER — APPOINTMENT (OUTPATIENT)
Dept: VASCULAR SURGERY | Age: 85
DRG: 602 | End: 2020-01-01
Attending: FAMILY MEDICINE
Payer: MEDICARE

## 2020-01-01 ENCOUNTER — APPOINTMENT (OUTPATIENT)
Dept: NON INVASIVE DIAGNOSTICS | Age: 85
DRG: 602 | End: 2020-01-01
Attending: INTERNAL MEDICINE
Payer: MEDICARE

## 2020-01-01 ENCOUNTER — APPOINTMENT (OUTPATIENT)
Dept: VASCULAR SURGERY | Age: 85
DRG: 602 | End: 2020-01-01
Attending: INTERNAL MEDICINE
Payer: MEDICARE

## 2020-01-01 ENCOUNTER — HOSPITAL ENCOUNTER (INPATIENT)
Age: 85
LOS: 6 days | Discharge: HOSPICE | DRG: 602 | End: 2020-03-17
Attending: EMERGENCY MEDICINE | Admitting: FAMILY MEDICINE
Payer: MEDICARE

## 2020-01-01 ENCOUNTER — APPOINTMENT (OUTPATIENT)
Dept: GENERAL RADIOLOGY | Age: 85
DRG: 602 | End: 2020-01-01
Attending: EMERGENCY MEDICINE
Payer: MEDICARE

## 2020-01-01 VITALS
SYSTOLIC BLOOD PRESSURE: 96 MMHG | DIASTOLIC BLOOD PRESSURE: 56 MMHG | RESPIRATION RATE: 20 BRPM | OXYGEN SATURATION: 84 % | HEART RATE: 76 BPM | TEMPERATURE: 98 F

## 2020-01-01 VITALS
HEIGHT: 59 IN | RESPIRATION RATE: 33 BRPM | BODY MASS INDEX: 23.73 KG/M2 | DIASTOLIC BLOOD PRESSURE: 59 MMHG | SYSTOLIC BLOOD PRESSURE: 113 MMHG | WEIGHT: 117.73 LBS | OXYGEN SATURATION: 97 % | HEART RATE: 72 BPM | TEMPERATURE: 97.7 F

## 2020-01-01 DIAGNOSIS — R06.4 LABORED BREATHING: ICD-10-CM

## 2020-01-01 DIAGNOSIS — I95.9 HYPOTENSION, UNSPECIFIED HYPOTENSION TYPE: ICD-10-CM

## 2020-01-01 DIAGNOSIS — K11.7 INCREASED OROPHARYNGEAL SECRETIONS: ICD-10-CM

## 2020-01-01 DIAGNOSIS — L97.512 SKIN ULCER OF RIGHT FOOT WITH FAT LAYER EXPOSED (HCC): ICD-10-CM

## 2020-01-01 DIAGNOSIS — R45.1 RESTLESSNESS: ICD-10-CM

## 2020-01-01 DIAGNOSIS — R52 DIFFUSE PAIN: ICD-10-CM

## 2020-01-01 DIAGNOSIS — G93.41 METABOLIC ENCEPHALOPATHY: ICD-10-CM

## 2020-01-01 DIAGNOSIS — J96.01 ACUTE RESPIRATORY FAILURE WITH HYPOXIA AND HYPERCARBIA (HCC): ICD-10-CM

## 2020-01-01 DIAGNOSIS — J42 CHRONIC BRONCHITIS, UNSPECIFIED CHRONIC BRONCHITIS TYPE (HCC): ICD-10-CM

## 2020-01-01 DIAGNOSIS — Z71.89 GOALS OF CARE, COUNSELING/DISCUSSION: ICD-10-CM

## 2020-01-01 DIAGNOSIS — R41.89 DECREASED RESPONSIVENESS: ICD-10-CM

## 2020-01-01 DIAGNOSIS — R06.02 SOB (SHORTNESS OF BREATH): ICD-10-CM

## 2020-01-01 DIAGNOSIS — R09.02 HYPOXIA: ICD-10-CM

## 2020-01-01 DIAGNOSIS — J96.02 ACUTE RESPIRATORY FAILURE WITH HYPOXIA AND HYPERCARBIA (HCC): ICD-10-CM

## 2020-01-01 DIAGNOSIS — F41.9 ANXIETY: ICD-10-CM

## 2020-01-01 DIAGNOSIS — L03.115 CELLULITIS OF RIGHT LOWER EXTREMITY: Primary | ICD-10-CM

## 2020-01-01 LAB
ALBUMIN SERPL-MCNC: 2.9 G/DL (ref 3.5–5)
ALBUMIN SERPL-MCNC: 3.1 G/DL (ref 3.5–5)
ALBUMIN/GLOB SERPL: 0.9 {RATIO} (ref 1.1–2.2)
ALBUMIN/GLOB SERPL: 0.9 {RATIO} (ref 1.1–2.2)
ALP SERPL-CCNC: 65 U/L (ref 45–117)
ALP SERPL-CCNC: 67 U/L (ref 45–117)
ALT SERPL-CCNC: 25 U/L (ref 12–78)
ALT SERPL-CCNC: 26 U/L (ref 12–78)
ANION GAP SERPL CALC-SCNC: 1 MMOL/L (ref 5–15)
ANION GAP SERPL CALC-SCNC: 2 MMOL/L (ref 5–15)
ANION GAP SERPL CALC-SCNC: 2 MMOL/L (ref 5–15)
ANION GAP SERPL CALC-SCNC: 3 MMOL/L (ref 5–15)
ANION GAP SERPL CALC-SCNC: 5 MMOL/L (ref 5–15)
ANION GAP SERPL CALC-SCNC: 5 MMOL/L (ref 5–15)
APPEARANCE UR: CLEAR
APTT PPP: 24.3 SEC (ref 22.1–32)
ARTERIAL PATENCY WRIST A: ABNORMAL
ARTERIAL PATENCY WRIST A: YES
AST SERPL-CCNC: 19 U/L (ref 15–37)
AST SERPL-CCNC: 19 U/L (ref 15–37)
AV VELOCITY RATIO: 0.68
AVAPS, IAVAPS: YES
BACTERIA SPEC CULT: NORMAL
BACTERIA URNS QL MICRO: NEGATIVE /HPF
BASE EXCESS BLD CALC-SCNC: 15 MMOL/L
BASE EXCESS BLD CALC-SCNC: 15 MMOL/L
BASE EXCESS BLD CALC-SCNC: 16 MMOL/L
BASE EXCESS BLD CALC-SCNC: 19 MMOL/L
BASOPHILS # BLD: 0 K/UL (ref 0–0.1)
BASOPHILS # BLD: 0.1 K/UL (ref 0–0.1)
BASOPHILS NFR BLD: 0 % (ref 0–1)
BASOPHILS NFR BLD: 1 % (ref 0–1)
BDY SITE: ABNORMAL
BILIRUB SERPL-MCNC: 0.2 MG/DL (ref 0.2–1)
BILIRUB SERPL-MCNC: 0.3 MG/DL (ref 0.2–1)
BILIRUB UR QL: NEGATIVE
BUN SERPL-MCNC: 11 MG/DL (ref 6–20)
BUN SERPL-MCNC: 12 MG/DL (ref 6–20)
BUN SERPL-MCNC: 12 MG/DL (ref 6–20)
BUN SERPL-MCNC: 16 MG/DL (ref 6–20)
BUN SERPL-MCNC: 18 MG/DL (ref 6–20)
BUN SERPL-MCNC: 24 MG/DL (ref 6–20)
BUN/CREAT SERPL: 21 (ref 12–20)
BUN/CREAT SERPL: 21 (ref 12–20)
BUN/CREAT SERPL: 23 (ref 12–20)
BUN/CREAT SERPL: 23 (ref 12–20)
BUN/CREAT SERPL: 24 (ref 12–20)
BUN/CREAT SERPL: 25 (ref 12–20)
BUN/CREAT SERPL: 29 (ref 12–20)
BUN/CREAT SERPL: 31 (ref 12–20)
CA-I BLD-SCNC: 1.21 MMOL/L (ref 1.12–1.32)
CA-I BLD-SCNC: 1.22 MMOL/L (ref 1.12–1.32)
CA-I BLD-SCNC: 1.26 MMOL/L (ref 1.12–1.32)
CA-I BLD-SCNC: 1.27 MMOL/L (ref 1.12–1.32)
CA-I BLD-SCNC: 1.29 MMOL/L (ref 1.12–1.32)
CA-I BLD-SCNC: 1.29 MMOL/L (ref 1.12–1.32)
CA-I BLD-SCNC: 1.3 MMOL/L (ref 1.12–1.32)
CA-I BLD-SCNC: 1.3 MMOL/L (ref 1.12–1.32)
CALCIUM SERPL-MCNC: 8.2 MG/DL (ref 8.5–10.1)
CALCIUM SERPL-MCNC: 8.4 MG/DL (ref 8.5–10.1)
CALCIUM SERPL-MCNC: 8.7 MG/DL (ref 8.5–10.1)
CALCIUM SERPL-MCNC: 8.9 MG/DL (ref 8.5–10.1)
CALCIUM SERPL-MCNC: 9 MG/DL (ref 8.5–10.1)
CALCIUM SERPL-MCNC: 9 MG/DL (ref 8.5–10.1)
CALCIUM SERPL-MCNC: 9.1 MG/DL (ref 8.5–10.1)
CALCIUM SERPL-MCNC: 9.4 MG/DL (ref 8.5–10.1)
CHLORIDE SERPL-SCNC: 101 MMOL/L (ref 97–108)
CHLORIDE SERPL-SCNC: 103 MMOL/L (ref 97–108)
CHLORIDE SERPL-SCNC: 103 MMOL/L (ref 97–108)
CHLORIDE SERPL-SCNC: 105 MMOL/L (ref 97–108)
CHLORIDE SERPL-SCNC: 105 MMOL/L (ref 97–108)
CHLORIDE SERPL-SCNC: 107 MMOL/L (ref 97–108)
CHLORIDE SERPL-SCNC: 108 MMOL/L (ref 97–108)
CHLORIDE SERPL-SCNC: 110 MMOL/L (ref 97–108)
CO2 SERPL-SCNC: 33 MMOL/L (ref 21–32)
CO2 SERPL-SCNC: 33 MMOL/L (ref 21–32)
CO2 SERPL-SCNC: 34 MMOL/L (ref 21–32)
CO2 SERPL-SCNC: 34 MMOL/L (ref 21–32)
CO2 SERPL-SCNC: 35 MMOL/L (ref 21–32)
CO2 SERPL-SCNC: 37 MMOL/L (ref 21–32)
CO2 SERPL-SCNC: 41 MMOL/L (ref 21–32)
CO2 SERPL-SCNC: 42 MMOL/L (ref 21–32)
COLOR UR: ABNORMAL
COMMENT, HOLDF: NORMAL
CREAT SERPL-MCNC: 0.45 MG/DL (ref 0.55–1.02)
CREAT SERPL-MCNC: 0.52 MG/DL (ref 0.55–1.02)
CREAT SERPL-MCNC: 0.57 MG/DL (ref 0.55–1.02)
CREAT SERPL-MCNC: 0.62 MG/DL (ref 0.55–1.02)
CREAT SERPL-MCNC: 0.72 MG/DL (ref 0.55–1.02)
CREAT SERPL-MCNC: 0.75 MG/DL (ref 0.55–1.02)
CREAT SERPL-MCNC: 0.77 MG/DL (ref 0.55–1.02)
CREAT SERPL-MCNC: 0.8 MG/DL (ref 0.55–1.02)
D DIMER PPP FEU-MCNC: 1.4 MG/L FEU (ref 0–0.65)
DATE LAST DOSE: ABNORMAL
DIFFERENTIAL METHOD BLD: ABNORMAL
ECHO AO ROOT DIAM: 2.78 CM
ECHO AV AREA PEAK VELOCITY: 2.7 CM2
ECHO AV PEAK GRADIENT: 11.8 MMHG
ECHO AV PEAK VELOCITY: 171.4 CM/S
ECHO IVC SNIFF: 1.96 CM
ECHO LA AREA 4C: 26.6 CM2
ECHO LA MAJOR AXIS: 3.91 CM
ECHO LA TO AORTIC ROOT RATIO: 1.41
ECHO LA VOL 2C: 106.57 ML (ref 22–52)
ECHO LA VOL 4C: 92.55 ML (ref 22–52)
ECHO LA VOL BP: 107.83 ML (ref 22–52)
ECHO LA VOL/BSA BIPLANE: 71.51 ML/M2 (ref 16–28)
ECHO LA VOLUME INDEX A2C: 70.68 ML/M2 (ref 16–28)
ECHO LA VOLUME INDEX A4C: 61.38 ML/M2 (ref 16–28)
ECHO LV E' LATERAL VELOCITY: 6.08 CM/S
ECHO LV E' SEPTAL VELOCITY: 7.87 CM/S
ECHO LV INTERNAL DIMENSION DIASTOLIC: 3.75 CM (ref 3.9–5.3)
ECHO LV INTERNAL DIMENSION SYSTOLIC: 2.62 CM
ECHO LV IVSD: 1.09 CM (ref 0.6–0.9)
ECHO LV MASS 2D: 149.1 G (ref 67–162)
ECHO LV MASS INDEX 2D: 98.9 G/M2 (ref 43–95)
ECHO LV POSTERIOR WALL DIASTOLIC: 1.1 CM (ref 0.6–0.9)
ECHO LVOT DIAM: 2.23 CM
ECHO LVOT PEAK GRADIENT: 5.4 MMHG
ECHO LVOT PEAK VELOCITY: 116.7 CM/S
ECHO MV A VELOCITY: 122.69 CM/S
ECHO MV AREA PHT: 3.1 CM2
ECHO MV E DECELERATION TIME (DT): 278.1 MS
ECHO MV E VELOCITY: 69.41 CM/S
ECHO MV E/A RATIO: 0.57
ECHO MV E/E' LATERAL: 11.42
ECHO MV E/E' RATIO (AVERAGED): 10.12
ECHO MV E/E' SEPTAL: 8.82
ECHO MV MAX VELOCITY: 125.12 CM/S
ECHO MV MEAN GRADIENT: 1.7 MMHG
ECHO MV MEAN INFLOW VELOCITY: 0.59 M/S
ECHO MV PEAK GRADIENT: 6.3 MMHG
ECHO MV PRESSURE HALF TIME (PHT): 71.7 MS
ECHO MV VTI: 21.85 CM
ECHO PV MAX VELOCITY: 69.57 CM/S
ECHO PV PEAK GRADIENT: 1.9 MMHG
ECHO RV TAPSE: 1.81 CM (ref 1.5–2)
ECHO TV REGURGITANT MAX VELOCITY: 304.32 CM/S
ECHO TV REGURGITANT PEAK GRADIENT: 37 MMHG
EOSINOPHIL # BLD: 0 K/UL (ref 0–0.4)
EOSINOPHIL # BLD: 0.1 K/UL (ref 0–0.4)
EOSINOPHIL NFR BLD: 0 % (ref 0–7)
EOSINOPHIL NFR BLD: 1 % (ref 0–7)
EPITH CASTS URNS QL MICRO: ABNORMAL /LPF
ERYTHROCYTE [DISTWIDTH] IN BLOOD BY AUTOMATED COUNT: 13.9 % (ref 11.5–14.5)
ERYTHROCYTE [DISTWIDTH] IN BLOOD BY AUTOMATED COUNT: 14 % (ref 11.5–14.5)
ERYTHROCYTE [DISTWIDTH] IN BLOOD BY AUTOMATED COUNT: 14 % (ref 11.5–14.5)
ERYTHROCYTE [DISTWIDTH] IN BLOOD BY AUTOMATED COUNT: 14.1 % (ref 11.5–14.5)
ERYTHROCYTE [DISTWIDTH] IN BLOOD BY AUTOMATED COUNT: 14.2 % (ref 11.5–14.5)
ERYTHROCYTE [DISTWIDTH] IN BLOOD BY AUTOMATED COUNT: 15 % (ref 11.5–14.5)
GAS FLOW.O2 O2 DELIVERY SYS: ABNORMAL L/MIN
GAS FLOW.O2 SETTING OXYMISER: 15 L/M
GAS FLOW.O2 SETTING OXYMISER: 4 L/M
GAS FLOW.O2 SETTING OXYMISER: 4 L/M
GLOBULIN SER CALC-MCNC: 3.3 G/DL (ref 2–4)
GLOBULIN SER CALC-MCNC: 3.5 G/DL (ref 2–4)
GLUCOSE BLD STRIP.AUTO-MCNC: 102 MG/DL (ref 65–100)
GLUCOSE BLD STRIP.AUTO-MCNC: 103 MG/DL (ref 65–100)
GLUCOSE SERPL-MCNC: 108 MG/DL (ref 65–100)
GLUCOSE SERPL-MCNC: 109 MG/DL (ref 65–100)
GLUCOSE SERPL-MCNC: 120 MG/DL (ref 65–100)
GLUCOSE SERPL-MCNC: 126 MG/DL (ref 65–100)
GLUCOSE SERPL-MCNC: 142 MG/DL (ref 65–100)
GLUCOSE SERPL-MCNC: 150 MG/DL (ref 65–100)
GLUCOSE SERPL-MCNC: 227 MG/DL (ref 65–100)
GLUCOSE SERPL-MCNC: 98 MG/DL (ref 65–100)
GLUCOSE UR STRIP.AUTO-MCNC: NEGATIVE MG/DL
HCO3 BLD-SCNC: 40.9 MMOL/L (ref 22–26)
HCO3 BLD-SCNC: 41.9 MMOL/L (ref 22–26)
HCO3 BLD-SCNC: 42.9 MMOL/L (ref 22–26)
HCO3 BLD-SCNC: 43.8 MMOL/L (ref 22–26)
HCT VFR BLD AUTO: 39.9 % (ref 35–47)
HCT VFR BLD AUTO: 40.3 % (ref 35–47)
HCT VFR BLD AUTO: 41.6 % (ref 35–47)
HCT VFR BLD AUTO: 42.6 % (ref 35–47)
HCT VFR BLD AUTO: 43.3 % (ref 35–47)
HCT VFR BLD AUTO: 44.1 % (ref 35–47)
HCT VFR BLD AUTO: 44.2 % (ref 35–47)
HCT VFR BLD AUTO: 47 % (ref 35–47)
HGB BLD-MCNC: 11.3 G/DL (ref 11.5–16)
HGB BLD-MCNC: 12 G/DL (ref 11.5–16)
HGB BLD-MCNC: 12.2 G/DL (ref 11.5–16)
HGB BLD-MCNC: 12.4 G/DL (ref 11.5–16)
HGB BLD-MCNC: 12.5 G/DL (ref 11.5–16)
HGB BLD-MCNC: 12.6 G/DL (ref 11.5–16)
HGB BLD-MCNC: 13.3 G/DL (ref 11.5–16)
HGB BLD-MCNC: 13.4 G/DL (ref 11.5–16)
HGB UR QL STRIP: ABNORMAL
IMM GRANULOCYTES # BLD AUTO: 0 K/UL (ref 0–0.04)
IMM GRANULOCYTES # BLD AUTO: 0 K/UL (ref 0–0.04)
IMM GRANULOCYTES # BLD AUTO: 0.1 K/UL (ref 0–0.04)
IMM GRANULOCYTES # BLD AUTO: 0.1 K/UL (ref 0–0.04)
IMM GRANULOCYTES NFR BLD AUTO: 0 % (ref 0–0.5)
IMM GRANULOCYTES NFR BLD AUTO: 0 % (ref 0–0.5)
IMM GRANULOCYTES NFR BLD AUTO: 1 % (ref 0–0.5)
IMM GRANULOCYTES NFR BLD AUTO: 1 % (ref 0–0.5)
KETONES UR QL STRIP.AUTO: NEGATIVE MG/DL
LACTATE SERPL-SCNC: 0.9 MMOL/L (ref 0.4–2)
LACTATE SERPL-SCNC: 1.9 MMOL/L (ref 0.4–2)
LEFT ABI: 0.58
LEFT ABI: 0.61
LEFT ANTERIOR TIBIAL: 89 MMHG
LEFT ANTERIOR TIBIAL: 93 MMHG
LEFT ARM BP: 90 MMHG
LEFT CFA PROX SYS PSV: 170.7 CM/S
LEFT DIST ATA VELOCITY: 11.1 CM/S
LEFT DIST PTA PSV: 49.9 CM/S
LEFT POPLITEAL MID SYS PSV: 38.1 CM/S
LEFT POSTERIOR TIBIAL: 101 MMHG
LEFT POSTERIOR TIBIAL: 106 MMHG
LEFT PROX PFA A PSV: 112.1 CM/S
LEFT SFA PROX VEL RATIO: 0.31
LEFT SUPER FEMORAL DIST SYS PSV: 54.2 CM/S
LEFT SUPER FEMORAL PROX SYS PSV: 53.6 CM/S
LEFT TBI: 0.28
LEFT TBI: 0.55
LEFT TOE PRESSURE: 48 MMHG
LEFT TOE PRESSURE: 96 MMHG
LEUKOCYTE ESTERASE UR QL STRIP.AUTO: ABNORMAL
LVFS 2D: 30.23 %
LYMPHOCYTES # BLD: 0.4 K/UL (ref 0.8–3.5)
LYMPHOCYTES # BLD: 0.6 K/UL (ref 0.8–3.5)
LYMPHOCYTES # BLD: 0.6 K/UL (ref 0.8–3.5)
LYMPHOCYTES # BLD: 1.5 K/UL (ref 0.8–3.5)
LYMPHOCYTES NFR BLD: 14 % (ref 12–49)
LYMPHOCYTES NFR BLD: 4 % (ref 12–49)
LYMPHOCYTES NFR BLD: 8 % (ref 12–49)
LYMPHOCYTES NFR BLD: 9 % (ref 12–49)
MAGNESIUM SERPL-MCNC: 2.2 MG/DL (ref 1.6–2.4)
MCH RBC QN AUTO: 29.1 PG (ref 26–34)
MCH RBC QN AUTO: 29.2 PG (ref 26–34)
MCH RBC QN AUTO: 29.4 PG (ref 26–34)
MCH RBC QN AUTO: 29.4 PG (ref 26–34)
MCH RBC QN AUTO: 29.6 PG (ref 26–34)
MCH RBC QN AUTO: 29.6 PG (ref 26–34)
MCH RBC QN AUTO: 29.7 PG (ref 26–34)
MCH RBC QN AUTO: 29.8 PG (ref 26–34)
MCHC RBC AUTO-ENTMCNC: 28.1 G/DL (ref 30–36.5)
MCHC RBC AUTO-ENTMCNC: 28.3 G/DL (ref 30–36.5)
MCHC RBC AUTO-ENTMCNC: 28.5 G/DL (ref 30–36.5)
MCHC RBC AUTO-ENTMCNC: 28.9 G/DL (ref 30–36.5)
MCHC RBC AUTO-ENTMCNC: 29.3 G/DL (ref 30–36.5)
MCHC RBC AUTO-ENTMCNC: 29.6 G/DL (ref 30–36.5)
MCHC RBC AUTO-ENTMCNC: 29.8 G/DL (ref 30–36.5)
MCHC RBC AUTO-ENTMCNC: 30.1 G/DL (ref 30–36.5)
MCV RBC AUTO: 100 FL (ref 80–99)
MCV RBC AUTO: 101 FL (ref 80–99)
MCV RBC AUTO: 102.8 FL (ref 80–99)
MCV RBC AUTO: 102.9 FL (ref 80–99)
MCV RBC AUTO: 103.8 FL (ref 80–99)
MCV RBC AUTO: 104 FL (ref 80–99)
MCV RBC AUTO: 97.8 FL (ref 80–99)
MCV RBC AUTO: 99.3 FL (ref 80–99)
MONOCYTES # BLD: 0.4 K/UL (ref 0–1)
MONOCYTES # BLD: 0.6 K/UL (ref 0–1)
MONOCYTES # BLD: 0.6 K/UL (ref 0–1)
MONOCYTES # BLD: 1 K/UL (ref 0–1)
MONOCYTES NFR BLD: 5 % (ref 5–13)
MONOCYTES NFR BLD: 7 % (ref 5–13)
MONOCYTES NFR BLD: 8 % (ref 5–13)
MONOCYTES NFR BLD: 9 % (ref 5–13)
MV DEC SLOPE: 2.5
NEUTS SEG # BLD: 5.3 K/UL (ref 1.8–8)
NEUTS SEG # BLD: 6.7 K/UL (ref 1.8–8)
NEUTS SEG # BLD: 8.6 K/UL (ref 1.8–8)
NEUTS SEG # BLD: 9.6 K/UL (ref 1.8–8)
NEUTS SEG NFR BLD: 78 % (ref 32–75)
NEUTS SEG NFR BLD: 83 % (ref 32–75)
NEUTS SEG NFR BLD: 84 % (ref 32–75)
NEUTS SEG NFR BLD: 85 % (ref 32–75)
NITRITE UR QL STRIP.AUTO: NEGATIVE
NRBC # BLD: 0 K/UL (ref 0–0.01)
NRBC BLD-RTO: 0 PER 100 WBC
O2/TOTAL GAS SETTING VFR VENT: 0.7 %
O2/TOTAL GAS SETTING VFR VENT: 70 %
PCO2 BLD: 72.9 MMHG (ref 35–45)
PCO2 BLD: 74.6 MMHG (ref 35–45)
PCO2 BLD: 87.2 MMHG (ref 35–45)
PCO2 BLD: 87.7 MMHG (ref 35–45)
PCO2 BLD: >90 MMHG (ref 35–45)
PEEP RESPIRATORY: 8 CMH2O
PH BLD: 7.14 [PH] (ref 7.35–7.45)
PH BLD: 7.15 [PH] (ref 7.35–7.45)
PH BLD: 7.26 [PH] (ref 7.35–7.45)
PH BLD: 7.27 [PH] (ref 7.35–7.45)
PH BLD: 7.29 [PH] (ref 7.35–7.45)
PH BLD: 7.3 [PH] (ref 7.35–7.45)
PH BLD: 7.35 [PH] (ref 7.35–7.45)
PH BLD: 7.39 [PH] (ref 7.35–7.45)
PH UR STRIP: 5 [PH] (ref 5–8)
PHOSPHATE SERPL-MCNC: 1.9 MG/DL (ref 2.6–4.7)
PIP ISTAT,IPIP: 18
PIP ISTAT,IPIP: 24
PLATELET # BLD AUTO: 252 K/UL (ref 150–400)
PLATELET # BLD AUTO: 264 K/UL (ref 150–400)
PLATELET # BLD AUTO: 269 K/UL (ref 150–400)
PLATELET # BLD AUTO: 285 K/UL (ref 150–400)
PLATELET # BLD AUTO: 287 K/UL (ref 150–400)
PLATELET # BLD AUTO: 298 K/UL (ref 150–400)
PLATELET # BLD AUTO: 311 K/UL (ref 150–400)
PLATELET # BLD AUTO: 335 K/UL (ref 150–400)
PMV BLD AUTO: 10.3 FL (ref 8.9–12.9)
PMV BLD AUTO: 10.4 FL (ref 8.9–12.9)
PMV BLD AUTO: 10.4 FL (ref 8.9–12.9)
PMV BLD AUTO: 10.5 FL (ref 8.9–12.9)
PMV BLD AUTO: 10.7 FL (ref 8.9–12.9)
PMV BLD AUTO: 10.8 FL (ref 8.9–12.9)
PMV BLD AUTO: 10.8 FL (ref 8.9–12.9)
PMV BLD AUTO: 11.2 FL (ref 8.9–12.9)
PO2 BLD: 119 MMHG (ref 80–100)
PO2 BLD: 64 MMHG (ref 80–100)
PO2 BLD: 77 MMHG (ref 80–100)
PO2 BLD: 81 MMHG (ref 80–100)
PO2 BLD: 85 MMHG (ref 80–100)
PO2 BLD: 89 MMHG (ref 80–100)
PO2 BLD: 95 MMHG (ref 80–100)
PO2 BLD: 98 MMHG (ref 80–100)
POTASSIUM SERPL-SCNC: 3 MMOL/L (ref 3.5–5.1)
POTASSIUM SERPL-SCNC: 3.5 MMOL/L (ref 3.5–5.1)
POTASSIUM SERPL-SCNC: 3.8 MMOL/L (ref 3.5–5.1)
POTASSIUM SERPL-SCNC: 4.2 MMOL/L (ref 3.5–5.1)
POTASSIUM SERPL-SCNC: 4.4 MMOL/L (ref 3.5–5.1)
POTASSIUM SERPL-SCNC: 4.5 MMOL/L (ref 3.5–5.1)
POTASSIUM SERPL-SCNC: 4.6 MMOL/L (ref 3.5–5.1)
POTASSIUM SERPL-SCNC: 4.6 MMOL/L (ref 3.5–5.1)
POTASSIUM SERPL-SCNC: 5.9 MMOL/L (ref 3.5–5.1)
PROT SERPL-MCNC: 6.2 G/DL (ref 6.4–8.2)
PROT SERPL-MCNC: 6.6 G/DL (ref 6.4–8.2)
PROT UR STRIP-MCNC: 30 MG/DL
RBC # BLD AUTO: 3.88 M/UL (ref 3.8–5.2)
RBC # BLD AUTO: 4.03 M/UL (ref 3.8–5.2)
RBC # BLD AUTO: 4.12 M/UL (ref 3.8–5.2)
RBC # BLD AUTO: 4.21 M/UL (ref 3.8–5.2)
RBC # BLD AUTO: 4.24 M/UL (ref 3.8–5.2)
RBC # BLD AUTO: 4.29 M/UL (ref 3.8–5.2)
RBC # BLD AUTO: 4.52 M/UL (ref 3.8–5.2)
RBC # BLD AUTO: 4.53 M/UL (ref 3.8–5.2)
RBC #/AREA URNS HPF: ABNORMAL /HPF (ref 0–5)
RBC MORPH BLD: ABNORMAL
REPORTED DOSE,DOSE: ABNORMAL UNITS
REPORTED DOSE/TIME,TMG: ABNORMAL
RIGHT ABI: 0.42
RIGHT ABI: 0.42
RIGHT ANTERIOR TIBIAL: 56 MMHG
RIGHT ANTERIOR TIBIAL: 62 MMHG
RIGHT ARM BP: 173 MMHG
RIGHT ARM BP: 174 MMHG
RIGHT CFA PROX SYS PSV: 59.3 CM/S
RIGHT DIST ATA VELOCITY: 47.4 CM/S
RIGHT DIST PTA PSV: 14.7 CM/S
RIGHT POPLITEAL MID SYS PSV: 27.8 CM/S
RIGHT POSTERIOR TIBIAL: 72 MMHG
RIGHT POSTERIOR TIBIAL: 73 MMHG
RIGHT PROX PFA A PSV: 48.2 CM/S
RIGHT TBI: 0.21
RIGHT TBI: 0.56
RIGHT TOE PRESSURE: 36 MMHG
RIGHT TOE PRESSURE: 97 MMHG
SAMPLES BEING HELD,HOLD: NORMAL
SAO2 % BLD: 88 % (ref 92–97)
SAO2 % BLD: 93 % (ref 92–97)
SAO2 % BLD: 94 % (ref 92–97)
SAO2 % BLD: 98 % (ref 92–97)
SERVICE CMNT-IMP: ABNORMAL
SERVICE CMNT-IMP: ABNORMAL
SERVICE CMNT-IMP: NORMAL
SODIUM SERPL-SCNC: 141 MMOL/L (ref 136–145)
SODIUM SERPL-SCNC: 141 MMOL/L (ref 136–145)
SODIUM SERPL-SCNC: 144 MMOL/L (ref 136–145)
SODIUM SERPL-SCNC: 145 MMOL/L (ref 136–145)
SODIUM SERPL-SCNC: 146 MMOL/L (ref 136–145)
SP GR UR REFRACTOMETRY: 1.02 (ref 1–1.03)
SPECIMEN TYPE: ABNORMAL
THERAPEUTIC RANGE,PTTT: NORMAL SECS (ref 58–77)
TOTAL RESP. RATE, ITRR: 14
TOTAL RESP. RATE, ITRR: 21
TOTAL RESP. RATE, ITRR: 21
TOTAL RESP. RATE, ITRR: 26
TOTAL RESP. RATE, ITRR: 27
TOTAL RESP. RATE, ITRR: 35
TROPONIN I SERPL-MCNC: <0.05 NG/ML
TROPONIN I SERPL-MCNC: <0.05 NG/ML
UROBILINOGEN UR QL STRIP.AUTO: 0.2 EU/DL (ref 0.2–1)
VANCOMYCIN TROUGH SERPL-MCNC: 4.5 UG/ML (ref 5–10)
WBC # BLD AUTO: 10.1 K/UL (ref 3.6–11)
WBC # BLD AUTO: 11 K/UL (ref 3.6–11)
WBC # BLD AUTO: 11.1 K/UL (ref 3.6–11)
WBC # BLD AUTO: 11.2 K/UL (ref 3.6–11)
WBC # BLD AUTO: 6.3 K/UL (ref 3.6–11)
WBC # BLD AUTO: 7.6 K/UL (ref 3.6–11)
WBC # BLD AUTO: 8 K/UL (ref 3.6–11)
WBC # BLD AUTO: 9.3 K/UL (ref 3.6–11)
WBC URNS QL MICRO: ABNORMAL /HPF (ref 0–4)

## 2020-01-01 PROCEDURE — 74011000250 HC RX REV CODE- 250: Performed by: INTERNAL MEDICINE

## 2020-01-01 PROCEDURE — 93925 LOWER EXTREMITY STUDY: CPT

## 2020-01-01 PROCEDURE — 74011250636 HC RX REV CODE- 250/636: Performed by: FAMILY MEDICINE

## 2020-01-01 PROCEDURE — 84100 ASSAY OF PHOSPHORUS: CPT

## 2020-01-01 PROCEDURE — 94660 CPAP INITIATION&MGMT: CPT

## 2020-01-01 PROCEDURE — 85027 COMPLETE CBC AUTOMATED: CPT

## 2020-01-01 PROCEDURE — 71275 CT ANGIOGRAPHY CHEST: CPT

## 2020-01-01 PROCEDURE — 74011000250 HC RX REV CODE- 250: Performed by: NURSE PRACTITIONER

## 2020-01-01 PROCEDURE — 73620 X-RAY EXAM OF FOOT: CPT

## 2020-01-01 PROCEDURE — 71045 X-RAY EXAM CHEST 1 VIEW: CPT

## 2020-01-01 PROCEDURE — 77010033678 HC OXYGEN DAILY

## 2020-01-01 PROCEDURE — 65660000001 HC RM ICU INTERMED STEPDOWN

## 2020-01-01 PROCEDURE — 74011250637 HC RX REV CODE- 250/637: Performed by: INTERNAL MEDICINE

## 2020-01-01 PROCEDURE — 36600 WITHDRAWAL OF ARTERIAL BLOOD: CPT

## 2020-01-01 PROCEDURE — 74011250636 HC RX REV CODE- 250/636: Performed by: NURSE PRACTITIONER

## 2020-01-01 PROCEDURE — G0300 HHS/HOSPICE OF LPN EA 15 MIN: HCPCS

## 2020-01-01 PROCEDURE — 36415 COLL VENOUS BLD VENIPUNCTURE: CPT

## 2020-01-01 PROCEDURE — G0299 HHS/HOSPICE OF RN EA 15 MIN: HCPCS

## 2020-01-01 PROCEDURE — 74011250636 HC RX REV CODE- 250/636: Performed by: INTERNAL MEDICINE

## 2020-01-01 PROCEDURE — 80048 BASIC METABOLIC PNL TOTAL CA: CPT

## 2020-01-01 PROCEDURE — 85730 THROMBOPLASTIN TIME PARTIAL: CPT

## 2020-01-01 PROCEDURE — 65270000032 HC RM SEMIPRIVATE

## 2020-01-01 PROCEDURE — 94640 AIRWAY INHALATION TREATMENT: CPT

## 2020-01-01 PROCEDURE — 74011000258 HC RX REV CODE- 258: Performed by: FAMILY MEDICINE

## 2020-01-01 PROCEDURE — 99285 EMERGENCY DEPT VISIT HI MDM: CPT

## 2020-01-01 PROCEDURE — 74011000258 HC RX REV CODE- 258: Performed by: EMERGENCY MEDICINE

## 2020-01-01 PROCEDURE — 85025 COMPLETE CBC W/AUTO DIFF WBC: CPT

## 2020-01-01 PROCEDURE — 83605 ASSAY OF LACTIC ACID: CPT

## 2020-01-01 PROCEDURE — 80053 COMPREHEN METABOLIC PANEL: CPT

## 2020-01-01 PROCEDURE — 97530 THERAPEUTIC ACTIVITIES: CPT

## 2020-01-01 PROCEDURE — 74011250637 HC RX REV CODE- 250/637: Performed by: FAMILY MEDICINE

## 2020-01-01 PROCEDURE — 74011000250 HC RX REV CODE- 250: Performed by: FAMILY MEDICINE

## 2020-01-01 PROCEDURE — 51798 US URINE CAPACITY MEASURE: CPT

## 2020-01-01 PROCEDURE — 3336500001 HSPC ELECTION

## 2020-01-01 PROCEDURE — P9047 ALBUMIN (HUMAN), 25%, 50ML: HCPCS | Performed by: FAMILY MEDICINE

## 2020-01-01 PROCEDURE — 93922 UPR/L XTREMITY ART 2 LEVELS: CPT

## 2020-01-01 PROCEDURE — 84484 ASSAY OF TROPONIN QUANT: CPT

## 2020-01-01 PROCEDURE — 77030038269 HC DRN EXT URIN PURWCK BARD -A

## 2020-01-01 PROCEDURE — 3336590001 HSPC ROOM AND BOARD

## 2020-01-01 PROCEDURE — 97161 PT EVAL LOW COMPLEX 20 MIN: CPT

## 2020-01-01 PROCEDURE — 0656 HSPC GENERAL INPATIENT

## 2020-01-01 PROCEDURE — 94664 DEMO&/EVAL PT USE INHALER: CPT

## 2020-01-01 PROCEDURE — 99223 1ST HOSP IP/OBS HIGH 75: CPT | Performed by: INTERNAL MEDICINE

## 2020-01-01 PROCEDURE — 0651 HSPC ROUTINE HOME CARE

## 2020-01-01 PROCEDURE — 74011000258 HC RX REV CODE- 258: Performed by: RADIOLOGY

## 2020-01-01 PROCEDURE — 82803 BLOOD GASES ANY COMBINATION: CPT

## 2020-01-01 PROCEDURE — 93306 TTE W/DOPPLER COMPLETE: CPT

## 2020-01-01 PROCEDURE — 5A09357 ASSISTANCE WITH RESPIRATORY VENTILATION, LESS THAN 24 CONSECUTIVE HOURS, CONTINUOUS POSITIVE AIRWAY PRESSURE: ICD-10-PCS | Performed by: NURSE PRACTITIONER

## 2020-01-01 PROCEDURE — 85379 FIBRIN DEGRADATION QUANT: CPT

## 2020-01-01 PROCEDURE — 96365 THER/PROPH/DIAG IV INF INIT: CPT

## 2020-01-01 PROCEDURE — 71250 CT THORAX DX C-: CPT

## 2020-01-01 PROCEDURE — 82962 GLUCOSE BLOOD TEST: CPT

## 2020-01-01 PROCEDURE — 94762 N-INVAS EAR/PLS OXIMTRY CONT: CPT

## 2020-01-01 PROCEDURE — 80202 ASSAY OF VANCOMYCIN: CPT

## 2020-01-01 PROCEDURE — 87040 BLOOD CULTURE FOR BACTERIA: CPT

## 2020-01-01 PROCEDURE — 83735 ASSAY OF MAGNESIUM: CPT

## 2020-01-01 PROCEDURE — 81001 URINALYSIS AUTO W/SCOPE: CPT

## 2020-01-01 PROCEDURE — 70450 CT HEAD/BRAIN W/O DYE: CPT

## 2020-01-01 PROCEDURE — 93970 EXTREMITY STUDY: CPT

## 2020-01-01 PROCEDURE — 65270000029 HC RM PRIVATE

## 2020-01-01 PROCEDURE — 74011636320 HC RX REV CODE- 636/320: Performed by: RADIOLOGY

## 2020-01-01 PROCEDURE — 74011250636 HC RX REV CODE- 250/636: Performed by: EMERGENCY MEDICINE

## 2020-01-01 PROCEDURE — 84132 ASSAY OF SERUM POTASSIUM: CPT

## 2020-01-01 RX ORDER — MORPHINE SULFATE 2 MG/ML
1 INJECTION, SOLUTION INTRAMUSCULAR; INTRAVENOUS
Status: DISCONTINUED | OUTPATIENT
Start: 2020-01-01 | End: 2020-01-01 | Stop reason: HOSPADM

## 2020-01-01 RX ORDER — AMLODIPINE BESYLATE 5 MG/1
5 TABLET ORAL ONCE
Status: DISCONTINUED | OUTPATIENT
Start: 2020-01-01 | End: 2020-01-01

## 2020-01-01 RX ORDER — FUROSEMIDE 40 MG/1
20 TABLET ORAL DAILY
Qty: 30 TAB | Refills: 0 | Status: SHIPPED
Start: 2020-01-01

## 2020-01-01 RX ORDER — SODIUM CHLORIDE 0.9 % (FLUSH) 0.9 %
10 SYRINGE (ML) INJECTION
Status: DISCONTINUED | OUTPATIENT
Start: 2020-01-01 | End: 2020-01-01

## 2020-01-01 RX ORDER — SERTRALINE HYDROCHLORIDE 50 MG/1
25 TABLET, FILM COATED ORAL EVERY EVENING
Status: DISCONTINUED | OUTPATIENT
Start: 2020-01-01 | End: 2020-01-01 | Stop reason: HOSPADM

## 2020-01-01 RX ORDER — ENOXAPARIN SODIUM 100 MG/ML
1 INJECTION SUBCUTANEOUS EVERY 12 HOURS
Status: DISCONTINUED | OUTPATIENT
Start: 2020-01-01 | End: 2020-01-01

## 2020-01-01 RX ORDER — LORAZEPAM 2 MG/ML
0.5 INJECTION INTRAMUSCULAR EVERY 4 HOURS
Status: DISCONTINUED | OUTPATIENT
Start: 2020-01-01 | End: 2020-01-01 | Stop reason: HOSPADM

## 2020-01-01 RX ORDER — METOPROLOL TARTRATE 25 MG/1
12.5 TABLET, FILM COATED ORAL 2 TIMES DAILY
Status: DISCONTINUED | OUTPATIENT
Start: 2020-01-01 | End: 2020-01-01

## 2020-01-01 RX ORDER — SODIUM CHLORIDE 0.9 % (FLUSH) 0.9 %
5-40 SYRINGE (ML) INJECTION AS NEEDED
Status: DISCONTINUED | OUTPATIENT
Start: 2020-01-01 | End: 2020-01-01 | Stop reason: HOSPADM

## 2020-01-01 RX ORDER — BALSAM PERU/CASTOR OIL
OINTMENT (GRAM) TOPICAL 2 TIMES DAILY
Status: DISCONTINUED | OUTPATIENT
Start: 2020-01-01 | End: 2020-01-01 | Stop reason: HOSPADM

## 2020-01-01 RX ORDER — FUROSEMIDE 10 MG/ML
20 INJECTION INTRAMUSCULAR; INTRAVENOUS ONCE
Status: COMPLETED | OUTPATIENT
Start: 2020-01-01 | End: 2020-01-01

## 2020-01-01 RX ORDER — AMOXICILLIN 250 MG
2 CAPSULE ORAL
Status: DISCONTINUED | OUTPATIENT
Start: 2020-01-01 | End: 2020-01-01 | Stop reason: HOSPADM

## 2020-01-01 RX ORDER — SODIUM CHLORIDE 450 MG/100ML
75 INJECTION, SOLUTION INTRAVENOUS CONTINUOUS
Status: DISCONTINUED | OUTPATIENT
Start: 2020-01-01 | End: 2020-01-01

## 2020-01-01 RX ORDER — MORPHINE SULFATE 2 MG/ML
1 INJECTION, SOLUTION INTRAMUSCULAR; INTRAVENOUS EVERY 4 HOURS
Status: DISCONTINUED | OUTPATIENT
Start: 2020-01-01 | End: 2020-01-01 | Stop reason: HOSPADM

## 2020-01-01 RX ORDER — HEPARIN SODIUM 5000 [USP'U]/ML
80 INJECTION, SOLUTION INTRAVENOUS; SUBCUTANEOUS ONCE
Status: DISCONTINUED | OUTPATIENT
Start: 2020-01-01 | End: 2020-01-01

## 2020-01-01 RX ORDER — SODIUM CHLORIDE 0.9 % (FLUSH) 0.9 %
5-40 SYRINGE (ML) INJECTION EVERY 8 HOURS
Status: DISCONTINUED | OUTPATIENT
Start: 2020-01-01 | End: 2020-01-01 | Stop reason: HOSPADM

## 2020-01-01 RX ORDER — AMLODIPINE BESYLATE 5 MG/1
10 TABLET ORAL DAILY
Status: DISCONTINUED | OUTPATIENT
Start: 2020-01-01 | End: 2020-01-01 | Stop reason: HOSPADM

## 2020-01-01 RX ORDER — METOPROLOL TARTRATE 5 MG/5ML
2.5 INJECTION INTRAVENOUS ONCE
Status: COMPLETED | OUTPATIENT
Start: 2020-01-01 | End: 2020-01-01

## 2020-01-01 RX ORDER — IPRATROPIUM BROMIDE AND ALBUTEROL SULFATE 2.5; .5 MG/3ML; MG/3ML
3 SOLUTION RESPIRATORY (INHALATION) 2 TIMES DAILY
Qty: 30 NEBULE | Refills: 0 | Status: SHIPPED
Start: 2020-01-01

## 2020-01-01 RX ORDER — HEPARIN SODIUM 5000 [USP'U]/ML
5000 INJECTION, SOLUTION INTRAVENOUS; SUBCUTANEOUS EVERY 8 HOURS
Status: DISCONTINUED | OUTPATIENT
Start: 2020-01-01 | End: 2020-01-01

## 2020-01-01 RX ORDER — FACIAL-BODY WIPES
10 EACH TOPICAL DAILY PRN
Status: DISCONTINUED | OUTPATIENT
Start: 2020-01-01 | End: 2020-01-01 | Stop reason: HOSPADM

## 2020-01-01 RX ORDER — LORAZEPAM 2 MG/ML
1 INJECTION INTRAMUSCULAR
Status: DISCONTINUED | OUTPATIENT
Start: 2020-01-01 | End: 2020-01-01 | Stop reason: HOSPADM

## 2020-01-01 RX ORDER — AMLODIPINE BESYLATE 5 MG/1
10 TABLET ORAL DAILY
Status: DISCONTINUED | OUTPATIENT
Start: 2020-01-01 | End: 2020-01-01

## 2020-01-01 RX ORDER — SODIUM CHLORIDE 9 MG/ML
75 INJECTION, SOLUTION INTRAVENOUS CONTINUOUS
Status: CANCELLED | OUTPATIENT
Start: 2020-01-01

## 2020-01-01 RX ORDER — CARVEDILOL 6.25 MG/1
6.25 TABLET ORAL 2 TIMES DAILY WITH MEALS
Status: DISCONTINUED | OUTPATIENT
Start: 2020-01-01 | End: 2020-01-01 | Stop reason: HOSPADM

## 2020-01-01 RX ORDER — FUROSEMIDE 20 MG/1
20 TABLET ORAL DAILY
Status: DISCONTINUED | OUTPATIENT
Start: 2020-01-01 | End: 2020-01-01 | Stop reason: HOSPADM

## 2020-01-01 RX ORDER — SULFAMETHOXAZOLE AND TRIMETHOPRIM 800; 160 MG/1; MG/1
1 TABLET ORAL EVERY 12 HOURS
Status: DISCONTINUED | OUTPATIENT
Start: 2020-01-01 | End: 2020-01-01

## 2020-01-01 RX ORDER — POTASSIUM CHLORIDE 750 MG/1
40 TABLET, FILM COATED, EXTENDED RELEASE ORAL EVERY 4 HOURS
Status: COMPLETED | OUTPATIENT
Start: 2020-01-01 | End: 2020-01-01

## 2020-01-01 RX ORDER — ALBUMIN HUMAN 250 G/1000ML
12.5 SOLUTION INTRAVENOUS EVERY 6 HOURS
Status: DISPENSED | OUTPATIENT
Start: 2020-01-01 | End: 2020-01-01

## 2020-01-01 RX ORDER — SODIUM CHLORIDE 0.9 % (FLUSH) 0.9 %
10 SYRINGE (ML) INJECTION
Status: ACTIVE | OUTPATIENT
Start: 2020-01-01 | End: 2020-01-01

## 2020-01-01 RX ORDER — HYDRALAZINE HYDROCHLORIDE 20 MG/ML
5-10 INJECTION INTRAMUSCULAR; INTRAVENOUS
Status: DISCONTINUED | OUTPATIENT
Start: 2020-01-01 | End: 2020-01-01 | Stop reason: HOSPADM

## 2020-01-01 RX ORDER — ENOXAPARIN SODIUM 100 MG/ML
60 INJECTION SUBCUTANEOUS EVERY 12 HOURS
Status: DISCONTINUED | OUTPATIENT
Start: 2020-01-01 | End: 2020-01-01

## 2020-01-01 RX ORDER — BALSAM PERU/CASTOR OIL
OINTMENT (GRAM) TOPICAL 2 TIMES DAILY
Qty: 1 TUBE | Refills: 0 | Status: SHIPPED
Start: 2020-01-01

## 2020-01-01 RX ORDER — IPRATROPIUM BROMIDE AND ALBUTEROL SULFATE 2.5; .5 MG/3ML; MG/3ML
3 SOLUTION RESPIRATORY (INHALATION)
Status: DISCONTINUED | OUTPATIENT
Start: 2020-01-01 | End: 2020-01-01

## 2020-01-01 RX ORDER — CARVEDILOL 6.25 MG/1
6.25 TABLET ORAL 2 TIMES DAILY WITH MEALS
Qty: 30 TAB | Refills: 0 | Status: SHIPPED
Start: 2020-01-01

## 2020-01-01 RX ORDER — HEPARIN SODIUM 10000 [USP'U]/100ML
18-36 INJECTION, SOLUTION INTRAVENOUS
Status: DISCONTINUED | OUTPATIENT
Start: 2020-01-01 | End: 2020-01-01

## 2020-01-01 RX ORDER — ACETAMINOPHEN 325 MG/1
650 TABLET ORAL
Status: DISCONTINUED | OUTPATIENT
Start: 2020-01-01 | End: 2020-01-01 | Stop reason: HOSPADM

## 2020-01-01 RX ORDER — MORPHINE SULFATE 2 MG/ML
1 INJECTION, SOLUTION INTRAMUSCULAR; INTRAVENOUS
Status: DISCONTINUED | OUTPATIENT
Start: 2020-01-01 | End: 2020-01-01

## 2020-01-01 RX ORDER — GLYCOPYRROLATE 0.2 MG/ML
0.2 INJECTION INTRAMUSCULAR; INTRAVENOUS EVERY 4 HOURS
Status: DISCONTINUED | OUTPATIENT
Start: 2020-01-01 | End: 2020-01-01 | Stop reason: HOSPADM

## 2020-01-01 RX ORDER — AMLODIPINE BESYLATE 5 MG/1
5 TABLET ORAL DAILY
Status: DISCONTINUED | OUTPATIENT
Start: 2020-01-01 | End: 2020-01-01

## 2020-01-01 RX ORDER — IPRATROPIUM BROMIDE AND ALBUTEROL SULFATE 2.5; .5 MG/3ML; MG/3ML
3 SOLUTION RESPIRATORY (INHALATION) 2 TIMES DAILY
Status: DISCONTINUED | OUTPATIENT
Start: 2020-01-01 | End: 2020-01-01 | Stop reason: HOSPADM

## 2020-01-01 RX ORDER — SODIUM CHLORIDE 0.9 % (FLUSH) 0.9 %
10 SYRINGE (ML) INJECTION
Status: COMPLETED | OUTPATIENT
Start: 2020-01-01 | End: 2020-01-01

## 2020-01-01 RX ADMIN — IPRATROPIUM BROMIDE AND ALBUTEROL SULFATE 3 ML: .5; 3 SOLUTION RESPIRATORY (INHALATION) at 15:35

## 2020-01-01 RX ADMIN — ENOXAPARIN SODIUM 50 MG: 60 INJECTION SUBCUTANEOUS at 17:55

## 2020-01-01 RX ADMIN — VANCOMYCIN HYDROCHLORIDE 1000 MG: 1 INJECTION, POWDER, LYOPHILIZED, FOR SOLUTION INTRAVENOUS at 00:57

## 2020-01-01 RX ADMIN — MORPHINE SULFATE 1 MG: 2 INJECTION, SOLUTION INTRAMUSCULAR; INTRAVENOUS at 15:08

## 2020-01-01 RX ADMIN — ENOXAPARIN SODIUM 60 MG: 60 INJECTION SUBCUTANEOUS at 04:54

## 2020-01-01 RX ADMIN — ENOXAPARIN SODIUM 50 MG: 60 INJECTION SUBCUTANEOUS at 02:32

## 2020-01-01 RX ADMIN — SULFAMETHOXAZOLE AND TRIMETHOPRIM 1 TABLET: 800; 160 TABLET ORAL at 21:39

## 2020-01-01 RX ADMIN — MORPHINE SULFATE 1 MG: 2 INJECTION, SOLUTION INTRAMUSCULAR; INTRAVENOUS at 18:19

## 2020-01-01 RX ADMIN — CASTOR OIL AND BALSAM, PERU: 788; 87 OINTMENT TOPICAL at 19:28

## 2020-01-01 RX ADMIN — LORAZEPAM 0.5 MG: 2 INJECTION INTRAMUSCULAR; INTRAVENOUS at 00:25

## 2020-01-01 RX ADMIN — COLLAGENASE SANTYL: 250 OINTMENT TOPICAL at 08:30

## 2020-01-01 RX ADMIN — METOPROLOL TARTRATE 2.5 MG: 5 INJECTION INTRAVENOUS at 15:16

## 2020-01-01 RX ADMIN — SERTRALINE HYDROCHLORIDE 25 MG: 50 TABLET ORAL at 17:39

## 2020-01-01 RX ADMIN — Medication 10 ML: at 22:00

## 2020-01-01 RX ADMIN — AMLODIPINE BESYLATE 10 MG: 5 TABLET ORAL at 08:22

## 2020-01-01 RX ADMIN — CARVEDILOL 6.25 MG: 6.25 TABLET, FILM COATED ORAL at 08:22

## 2020-01-01 RX ADMIN — CASTOR OIL AND BALSAM, PERU: 788; 87 OINTMENT TOPICAL at 10:36

## 2020-01-01 RX ADMIN — FUROSEMIDE 20 MG: 20 TABLET ORAL at 11:27

## 2020-01-01 RX ADMIN — FUROSEMIDE 20 MG: 10 INJECTION, SOLUTION INTRAMUSCULAR; INTRAVENOUS at 17:39

## 2020-01-01 RX ADMIN — CASTOR OIL AND BALSAM, PERU: 788; 87 OINTMENT TOPICAL at 09:06

## 2020-01-01 RX ADMIN — CEFEPIME HYDROCHLORIDE 2 G: 2 INJECTION, POWDER, FOR SOLUTION INTRAVENOUS at 01:15

## 2020-01-01 RX ADMIN — HYDRALAZINE HYDROCHLORIDE 5 MG: 20 INJECTION INTRAMUSCULAR; INTRAVENOUS at 10:30

## 2020-01-01 RX ADMIN — GLYCOPYRROLATE 0.2 MG: 0.2 INJECTION, SOLUTION INTRAMUSCULAR; INTRAVENOUS at 00:23

## 2020-01-01 RX ADMIN — IPRATROPIUM BROMIDE AND ALBUTEROL SULFATE 3 ML: .5; 3 SOLUTION RESPIRATORY (INHALATION) at 08:35

## 2020-01-01 RX ADMIN — IPRATROPIUM BROMIDE AND ALBUTEROL SULFATE 3 ML: .5; 3 SOLUTION RESPIRATORY (INHALATION) at 21:05

## 2020-01-01 RX ADMIN — LORAZEPAM 0.5 MG: 2 INJECTION INTRAMUSCULAR; INTRAVENOUS at 07:43

## 2020-01-01 RX ADMIN — Medication 10 ML: at 06:00

## 2020-01-01 RX ADMIN — SODIUM CHLORIDE 750 MG: 900 INJECTION, SOLUTION INTRAVENOUS at 02:32

## 2020-01-01 RX ADMIN — Medication 10 ML: at 01:57

## 2020-01-01 RX ADMIN — POTASSIUM CHLORIDE 40 MEQ: 750 TABLET, FILM COATED, EXTENDED RELEASE ORAL at 10:30

## 2020-01-01 RX ADMIN — AMLODIPINE BESYLATE 10 MG: 5 TABLET ORAL at 11:27

## 2020-01-01 RX ADMIN — MORPHINE SULFATE 1 MG: 2 INJECTION, SOLUTION INTRAMUSCULAR; INTRAVENOUS at 07:44

## 2020-01-01 RX ADMIN — Medication 10 ML: at 15:17

## 2020-01-01 RX ADMIN — CARVEDILOL 6.25 MG: 6.25 TABLET, FILM COATED ORAL at 11:27

## 2020-01-01 RX ADMIN — COLLAGENASE SANTYL: 250 OINTMENT TOPICAL at 09:06

## 2020-01-01 RX ADMIN — IPRATROPIUM BROMIDE AND ALBUTEROL SULFATE 3 ML: .5; 3 SOLUTION RESPIRATORY (INHALATION) at 07:42

## 2020-01-01 RX ADMIN — ENOXAPARIN SODIUM 50 MG: 60 INJECTION SUBCUTANEOUS at 06:04

## 2020-01-01 RX ADMIN — MORPHINE SULFATE 1 MG: 2 INJECTION, SOLUTION INTRAMUSCULAR; INTRAVENOUS at 20:18

## 2020-01-01 RX ADMIN — GLYCOPYRROLATE 0.2 MG: 0.2 INJECTION, SOLUTION INTRAMUSCULAR; INTRAVENOUS at 12:33

## 2020-01-01 RX ADMIN — Medication 10 ML: at 15:56

## 2020-01-01 RX ADMIN — IPRATROPIUM BROMIDE AND ALBUTEROL SULFATE 3 ML: .5; 3 SOLUTION RESPIRATORY (INHALATION) at 13:00

## 2020-01-01 RX ADMIN — ACETAMINOPHEN 650 MG: 325 TABLET ORAL at 06:04

## 2020-01-01 RX ADMIN — MORPHINE SULFATE 1 MG: 2 INJECTION, SOLUTION INTRAMUSCULAR; INTRAVENOUS at 04:12

## 2020-01-01 RX ADMIN — Medication 10 ML: at 14:00

## 2020-01-01 RX ADMIN — LORAZEPAM 0.5 MG: 2 INJECTION INTRAMUSCULAR; INTRAVENOUS at 15:08

## 2020-01-01 RX ADMIN — FUROSEMIDE 20 MG: 20 TABLET ORAL at 08:22

## 2020-01-01 RX ADMIN — Medication 10 ML: at 02:16

## 2020-01-01 RX ADMIN — COLLAGENASE SANTYL: 250 OINTMENT TOPICAL at 11:31

## 2020-01-01 RX ADMIN — Medication 10 ML: at 17:59

## 2020-01-01 RX ADMIN — SERTRALINE HYDROCHLORIDE 25 MG: 50 TABLET ORAL at 17:55

## 2020-01-01 RX ADMIN — CEFEPIME HYDROCHLORIDE 2 G: 2 INJECTION, POWDER, FOR SOLUTION INTRAVENOUS at 00:29

## 2020-01-01 RX ADMIN — Medication 10 ML: at 14:56

## 2020-01-01 RX ADMIN — Medication 10 ML: at 21:39

## 2020-01-01 RX ADMIN — SULFAMETHOXAZOLE AND TRIMETHOPRIM 1 TABLET: 800; 160 TABLET ORAL at 11:27

## 2020-01-01 RX ADMIN — POTASSIUM CHLORIDE 40 MEQ: 750 TABLET, FILM COATED, EXTENDED RELEASE ORAL at 18:03

## 2020-01-01 RX ADMIN — IPRATROPIUM BROMIDE AND ALBUTEROL SULFATE 3 ML: .5; 3 SOLUTION RESPIRATORY (INHALATION) at 04:30

## 2020-01-01 RX ADMIN — Medication 10 ML: at 10:46

## 2020-01-01 RX ADMIN — CASTOR OIL AND BALSAM, PERU: 788; 87 OINTMENT TOPICAL at 18:54

## 2020-01-01 RX ADMIN — CARVEDILOL 6.25 MG: 6.25 TABLET, FILM COATED ORAL at 19:16

## 2020-01-01 RX ADMIN — HYDRALAZINE HYDROCHLORIDE 10 MG: 20 INJECTION INTRAMUSCULAR; INTRAVENOUS at 04:08

## 2020-01-01 RX ADMIN — SODIUM CHLORIDE 750 MG: 900 INJECTION, SOLUTION INTRAVENOUS at 03:33

## 2020-01-01 RX ADMIN — ENOXAPARIN SODIUM 50 MG: 60 INJECTION SUBCUTANEOUS at 03:31

## 2020-01-01 RX ADMIN — IPRATROPIUM BROMIDE AND ALBUTEROL SULFATE 3 ML: .5; 3 SOLUTION RESPIRATORY (INHALATION) at 14:03

## 2020-01-01 RX ADMIN — AMLODIPINE BESYLATE 5 MG: 5 TABLET ORAL at 15:17

## 2020-01-01 RX ADMIN — CASTOR OIL AND BALSAM, PERU: 788; 87 OINTMENT TOPICAL at 08:29

## 2020-01-01 RX ADMIN — LORAZEPAM 1 MG: 2 INJECTION INTRAMUSCULAR; INTRAVENOUS at 09:55

## 2020-01-01 RX ADMIN — LORAZEPAM 0.5 MG: 2 INJECTION INTRAMUSCULAR; INTRAVENOUS at 12:36

## 2020-01-01 RX ADMIN — IPRATROPIUM BROMIDE AND ALBUTEROL SULFATE 3 ML: .5; 3 SOLUTION RESPIRATORY (INHALATION) at 20:42

## 2020-01-01 RX ADMIN — ENOXAPARIN SODIUM 60 MG: 60 INJECTION SUBCUTANEOUS at 18:03

## 2020-01-01 RX ADMIN — SERTRALINE HYDROCHLORIDE 25 MG: 50 TABLET ORAL at 18:03

## 2020-01-01 RX ADMIN — HYDRALAZINE HYDROCHLORIDE 5 MG: 20 INJECTION INTRAMUSCULAR; INTRAVENOUS at 14:56

## 2020-01-01 RX ADMIN — SULFAMETHOXAZOLE AND TRIMETHOPRIM 1 TABLET: 800; 160 TABLET ORAL at 08:22

## 2020-01-01 RX ADMIN — SULFAMETHOXAZOLE AND TRIMETHOPRIM 1 TABLET: 800; 160 TABLET ORAL at 08:58

## 2020-01-01 RX ADMIN — FUROSEMIDE 20 MG: 10 INJECTION, SOLUTION INTRAMUSCULAR; INTRAVENOUS at 08:53

## 2020-01-01 RX ADMIN — GLYCOPYRROLATE 0.2 MG: 0.2 INJECTION, SOLUTION INTRAMUSCULAR; INTRAVENOUS at 15:08

## 2020-01-01 RX ADMIN — GLYCOPYRROLATE 0.2 MG: 0.2 INJECTION, SOLUTION INTRAMUSCULAR; INTRAVENOUS at 04:12

## 2020-01-01 RX ADMIN — SULFAMETHOXAZOLE AND TRIMETHOPRIM 1 TABLET: 800; 160 TABLET ORAL at 21:02

## 2020-01-01 RX ADMIN — MORPHINE SULFATE 1 MG: 2 INJECTION, SOLUTION INTRAMUSCULAR; INTRAVENOUS at 12:36

## 2020-01-01 RX ADMIN — CEFAZOLIN SODIUM 1 G: 1 INJECTION, POWDER, FOR SOLUTION INTRAMUSCULAR; INTRAVENOUS at 19:22

## 2020-01-01 RX ADMIN — CEFEPIME HYDROCHLORIDE 2 G: 2 INJECTION, POWDER, FOR SOLUTION INTRAVENOUS at 01:57

## 2020-01-01 RX ADMIN — SODIUM CHLORIDE 100 ML: 900 INJECTION, SOLUTION INTRAVENOUS at 17:59

## 2020-01-01 RX ADMIN — MORPHINE SULFATE 1 MG: 2 INJECTION, SOLUTION INTRAMUSCULAR; INTRAVENOUS at 00:23

## 2020-01-01 RX ADMIN — ENOXAPARIN SODIUM 50 MG: 60 INJECTION SUBCUTANEOUS at 06:19

## 2020-01-01 RX ADMIN — HYDRALAZINE HYDROCHLORIDE 5 MG: 20 INJECTION INTRAMUSCULAR; INTRAVENOUS at 11:55

## 2020-01-01 RX ADMIN — CASTOR OIL AND BALSAM, PERU: 788; 87 OINTMENT TOPICAL at 11:31

## 2020-01-01 RX ADMIN — COLLAGENASE SANTYL: 250 OINTMENT TOPICAL at 10:36

## 2020-01-01 RX ADMIN — IPRATROPIUM BROMIDE AND ALBUTEROL SULFATE 3 ML: .5; 3 SOLUTION RESPIRATORY (INHALATION) at 09:33

## 2020-01-01 RX ADMIN — IOPAMIDOL 80 ML: 755 INJECTION, SOLUTION INTRAVENOUS at 17:59

## 2020-01-01 RX ADMIN — SODIUM CHLORIDE 75 ML/HR: 450 INJECTION, SOLUTION INTRAVENOUS at 23:03

## 2020-01-01 RX ADMIN — SERTRALINE HYDROCHLORIDE 25 MG: 50 TABLET ORAL at 19:16

## 2020-01-01 RX ADMIN — CASTOR OIL AND BALSAM, PERU: 788; 87 OINTMENT TOPICAL at 18:09

## 2020-01-01 RX ADMIN — Medication 10 ML: at 21:02

## 2020-01-01 RX ADMIN — CASTOR OIL AND BALSAM, PERU: 788; 87 OINTMENT TOPICAL at 18:00

## 2020-01-01 RX ADMIN — SODIUM CHLORIDE 1000 ML: 900 INJECTION, SOLUTION INTRAVENOUS at 23:03

## 2020-01-01 RX ADMIN — IPRATROPIUM BROMIDE AND ALBUTEROL SULFATE 3 ML: .5; 3 SOLUTION RESPIRATORY (INHALATION) at 07:20

## 2020-01-01 RX ADMIN — IPRATROPIUM BROMIDE AND ALBUTEROL SULFATE 3 ML: .5; 3 SOLUTION RESPIRATORY (INHALATION) at 14:41

## 2020-01-01 RX ADMIN — MORPHINE SULFATE 1 MG: 2 INJECTION, SOLUTION INTRAMUSCULAR; INTRAVENOUS at 10:00

## 2020-01-01 RX ADMIN — MORPHINE SULFATE 1 MG: 2 INJECTION, SOLUTION INTRAMUSCULAR; INTRAVENOUS at 07:53

## 2020-01-01 RX ADMIN — CEFEPIME HYDROCHLORIDE 2 G: 2 INJECTION, POWDER, FOR SOLUTION INTRAVENOUS at 23:03

## 2020-01-01 RX ADMIN — METOPROLOL TARTRATE 12.5 MG: 25 TABLET, FILM COATED ORAL at 18:03

## 2020-01-01 RX ADMIN — CARVEDILOL 6.25 MG: 6.25 TABLET, FILM COATED ORAL at 17:54

## 2020-01-01 RX ADMIN — GLYCOPYRROLATE 0.2 MG: 0.2 INJECTION, SOLUTION INTRAMUSCULAR; INTRAVENOUS at 20:17

## 2020-01-01 RX ADMIN — HYDRALAZINE HYDROCHLORIDE 5 MG: 20 INJECTION INTRAMUSCULAR; INTRAVENOUS at 00:29

## 2020-01-01 RX ADMIN — LORAZEPAM 0.5 MG: 2 INJECTION INTRAMUSCULAR; INTRAVENOUS at 20:17

## 2020-01-01 RX ADMIN — ENOXAPARIN SODIUM 50 MG: 60 INJECTION SUBCUTANEOUS at 06:32

## 2020-01-01 RX ADMIN — CASTOR OIL AND BALSAM, PERU: 788; 87 OINTMENT TOPICAL at 19:14

## 2020-01-01 RX ADMIN — Medication 10 ML: at 06:33

## 2020-01-01 RX ADMIN — IPRATROPIUM BROMIDE AND ALBUTEROL SULFATE 3 ML: .5; 3 SOLUTION RESPIRATORY (INHALATION) at 07:43

## 2020-01-01 RX ADMIN — Medication 10 ML: at 06:19

## 2020-01-01 RX ADMIN — COLLAGENASE SANTYL: 250 OINTMENT TOPICAL at 08:59

## 2020-01-01 RX ADMIN — LORAZEPAM 0.5 MG: 2 INJECTION INTRAMUSCULAR; INTRAVENOUS at 04:12

## 2020-01-01 RX ADMIN — SODIUM CHLORIDE 750 MG: 900 INJECTION, SOLUTION INTRAVENOUS at 03:52

## 2020-01-01 RX ADMIN — ALBUMIN (HUMAN) 12.5 G: 0.25 INJECTION, SOLUTION INTRAVENOUS at 06:00

## 2020-01-01 RX ADMIN — SODIUM CHLORIDE 1000 ML: 900 INJECTION, SOLUTION INTRAVENOUS at 19:22

## 2020-01-01 RX ADMIN — GLYCOPYRROLATE 0.2 MG: 0.2 INJECTION, SOLUTION INTRAMUSCULAR; INTRAVENOUS at 07:44

## 2020-01-01 RX ADMIN — IPRATROPIUM BROMIDE AND ALBUTEROL SULFATE 3 ML: .5; 3 SOLUTION RESPIRATORY (INHALATION) at 19:30

## 2020-01-01 RX ADMIN — IPRATROPIUM BROMIDE AND ALBUTEROL SULFATE 3 ML: .5; 3 SOLUTION RESPIRATORY (INHALATION) at 19:48

## 2020-01-01 RX ADMIN — ENOXAPARIN SODIUM 60 MG: 60 INJECTION SUBCUTANEOUS at 17:38

## 2020-01-01 RX ADMIN — IPRATROPIUM BROMIDE AND ALBUTEROL SULFATE 3 ML: .5; 3 SOLUTION RESPIRATORY (INHALATION) at 08:37

## 2020-01-01 RX ADMIN — IPRATROPIUM BROMIDE AND ALBUTEROL SULFATE 3 ML: .5; 3 SOLUTION RESPIRATORY (INHALATION) at 21:08

## 2020-01-01 RX ADMIN — ENOXAPARIN SODIUM 50 MG: 60 INJECTION SUBCUTANEOUS at 19:16

## 2020-01-01 RX ADMIN — CASTOR OIL AND BALSAM, PERU: 788; 87 OINTMENT TOPICAL at 08:59

## 2020-01-01 RX ADMIN — ENOXAPARIN SODIUM 50 MG: 60 INJECTION SUBCUTANEOUS at 15:51

## 2020-01-01 RX ADMIN — IPRATROPIUM BROMIDE AND ALBUTEROL SULFATE 3 ML: .5; 3 SOLUTION RESPIRATORY (INHALATION) at 01:03

## 2020-03-11 PROBLEM — L03.90 CELLULITIS: Status: ACTIVE | Noted: 2020-01-01

## 2020-03-11 NOTE — ED PROVIDER NOTES
80-year-old female was transferred from her care facility accompanied now by her daughter in the room. The patient apparently has had a low-grade fever, recently had a concentrated urine which appeared to be infected and was to start clindamycin, unsure whether she received the first dose. This history is per the daughter. In addition the daughter states that she is got a wound along the right side of her right foot which apparently looks worse than it did in recent past. 
 
The patient denies any headache, visual changes, chest pain or shortness of breath, nausea vomiting, abdominal pain, numbness weakness or paresthesias in her extremities. Further she denies any hematuria, dysuria, or polyuria. No flank tenderness. Past Medical History:  
Diagnosis Date  Arthritis  Breast lump 2018  
 right breast lump x a few days  HTN (hypertension)  Hypercholesteremia  Hyperlipidemia  Osteoporosis  Stroke ([facility] [address] CVA, TIA  TIA (transient ischemic attack)  Vertebral fracture Past Surgical History:  
Procedure Laterality Date  HX HIP REPLACEMENT  2009  
 left  HX ORTHOPAEDIC    
 left hip replacement No family history on file. Social History Socioeconomic History  Marital status:  Spouse name: Not on file  Number of children: Not on file  Years of education: Not on file  Highest education level: Not on file Occupational History  Not on file Social Needs  Financial resource strain: Not on file  Food insecurity Worry: Not on file Inability: Not on file  Transportation needs Medical: Not on file Non-medical: Not on file Tobacco Use  Smoking status: Former Smoker Years: 40.00 Last attempt to quit: 1986 Years since quittin.3  Smokeless tobacco: Never Used Substance and Sexual Activity  Alcohol use: Yes  Drug use: No  
 Sexual activity: Not on file Lifestyle  Physical activity Days per week: Not on file Minutes per session: Not on file  Stress: Not on file Relationships  Social connections Talks on phone: Not on file Gets together: Not on file Attends Druze service: Not on file Active member of club or organization: Not on file Attends meetings of clubs or organizations: Not on file Relationship status: Not on file  Intimate partner violence Fear of current or ex partner: Not on file Emotionally abused: Not on file Physically abused: Not on file Forced sexual activity: Not on file Other Topics Concern  Not on file Social History Narrative  Not on file ALLERGIES: Hydrochlorothiazide Review of Systems Constitutional: Negative for chills, diaphoresis and fever. HENT: Negative for congestion, postnasal drip, rhinorrhea and sore throat. Eyes: Negative for photophobia, discharge, redness and visual disturbance. Respiratory: Negative for cough, chest tightness, shortness of breath and wheezing. Cardiovascular: Negative for chest pain, palpitations and leg swelling. Gastrointestinal: Negative for abdominal distention, abdominal pain, blood in stool, constipation, diarrhea, nausea and vomiting. Genitourinary: Negative for difficulty urinating, dysuria, frequency, hematuria and urgency. Musculoskeletal: Negative for arthralgias, back pain, joint swelling and myalgias. Skin: Negative for color change and rash. Neurological: Negative for dizziness, speech difficulty, weakness, light-headedness, numbness and headaches. Psychiatric/Behavioral: Negative for confusion. The patient is not nervous/anxious. All other systems reviewed and are negative. Vitals:  
 03/11/20 1730 BP: (!) 89/42 Pulse: 67 Resp: 28 Temp: 98.6 °F (37 °C) SpO2: 95% Physical Exam 
Vitals signs and nursing note reviewed. Constitutional: General: She is not in acute distress. Appearance: She is well-developed. She is not diaphoretic. HENT:  
   Head: Normocephalic and atraumatic. Right Ear: External ear normal.  
   Left Ear: External ear normal.  
   Nose: Nose normal.  
Eyes:  
   General: No scleral icterus. Conjunctiva/sclera: Conjunctivae normal.  
   Pupils: Pupils are equal, round, and reactive to light. Neck: Musculoskeletal: Normal range of motion and neck supple. Thyroid: No thyromegaly. Vascular: No JVD. Trachea: No tracheal deviation. Cardiovascular:  
   Rate and Rhythm: Normal rate and regular rhythm. Heart sounds: Normal heart sounds. No murmur. No friction rub. No gallop. Pulmonary:  
   Effort: Pulmonary effort is normal. No respiratory distress. Breath sounds: Normal breath sounds. No wheezing or rales. Chest:  
   Chest wall: No tenderness. Abdominal:  
   General: Bowel sounds are normal. There is no distension. Palpations: Abdomen is soft. There is no mass. Tenderness: There is no abdominal tenderness. There is no guarding or rebound. Musculoskeletal: Normal range of motion. General: No tenderness. Lymphadenopathy:  
   Cervical: No cervical adenopathy. Skin: 
   General: Skin is warm and dry. Findings: No erythema or rash. Neurological:  
   Mental Status: She is alert and oriented to person, place, and time. Cranial Nerves: No cranial nerve deficit. Motor: No tremor, atrophy or abnormal muscle tone. Coordination: Coordination normal.  
   Gait: Gait normal.  
Psychiatric:     
   Behavior: Behavior normal.     
   Thought Content: Thought content normal.     
   Judgment: Judgment normal.  
 
  
 
MDM Number of Diagnoses or Management Options Diagnosis management comments: Impression: 35-year-old female presenting to the emergency department with low-grade fever and an obvious infected right foot with mild erythema no streaking noted. There is an ulcer. Please refer to the picture attached to the record. Plan of care will be baseline labs, will start IV Keflex await the sepsis work-up and will admit to the hospital. 
 
 
 
  
 
Procedures 9:26 PM 
Reviewed labs, lactic wnl. Despite 1L NS her bp remain low. Will give additional ivf and consult for admission. Hospitalist Xiomara Serve for Admission 9:27 PM 
 
ED Room Number: NU21/60 Patient Name and age:  Ocie Spikes 80 y.o.  female Working Diagnosis: 1. Cellulitis of right lower extremity 2. Hypotension, unspecified hypotension type Readmission: no 
Isolation Requirements:  no 
Recommended Level of Care:  telemetry Code Status:  Do Not Resuscitate Department:[facility] - [phone] Other:

## 2020-03-12 NOTE — PROGRESS NOTES
TRANSFER - IN REPORT: 
 
Verbal report received from Trung Barkley RN(name) on [facility]  being received from [address] [place] [address](unit) for change in patient condition(rapid response for hypoxia) Report consisted of patients Situation, Background, Assessment and  
Recommendations(SBAR). Information from the following report(s) SBAR, Kardex, MAR, Med Rec Status and Cardiac Rhythm NSR was reviewed with the receiving nurse. Opportunity for questions and clarification was provided. Assessment completed upon patients arrival to unit and care assumed. Primary Nurse Germaine Hutson RN and Lakisha Travis RN performed a dual skin assessment on this patient Impairment noted- see wound doc flow sheet Mohit score is 17. Patient has blanchable redness on sacrum and a non blanchable pressure injury on her right buttocks. Patient has dry skin, multiple moles and keratosis across her abdomen and back. Wound consult ordered for breakdown on bottom.

## 2020-03-12 NOTE — PROGRESS NOTES
Reason for Admission:   Altered Mental Status RUR Score:       12%-Low Plan for utilizing home health:      TBD 
 
PCP: First and Last name:  ABRIL Fair Name of Practice:  
 Are you a current patient: Yes/No: Yes Approximate date of last visit:  
                 
Current Advanced Directive/Advance Care Plan: Patient is a DNR and AMD is on file. Patient's daughter Sulaiman is POA. Transition of Care Plan:  CM met with patient's daughter Sulaiman and granddaughter to inform of CM role and to assess needs. Patient resides at [facility] but family reports of plans to have patient moved to [place] end of March. Patient uses 2 liters oxygen at baseline (Светлана Klinefelter) and is wheelchair bound. Patient requires assistance with dressing and bathing. Palliative has been consulted. Family expressed no real desire to have patient transition to SNF but was received SNF list provided by CM in case this will need to be further considered. CM to monitor and assist with transitional care planning as needed.   
 
Tyrone Mann, MS

## 2020-03-12 NOTE — PROGRESS NOTES
[address] [facility] Adult  Hospitalist Group Hospitalist Progress Note Damaris Stallings MD 
Answering service: [phone] OR [phone] from in house phone Date of Service:  3/12/2020 NAME:  Nani Avina :  1925 MRN:  [id number] Admission Summary:  
The patient is a 59-year-old female with past medical history of breast lump, arthritis, hypertension, hypercholesteremia, hyperlipidemia, osteoporosis, TIA and vertebral fracture, who presents to the hospital with the above-mentioned symptom. The patient is a poor historian. The history was primarily obtained from the daughter. The daughter reports that the patient started having some low-grade fevers in the last few days and concentrated urine. She thought that the patient was having UTI. She also has ulcer on the base of her fifth toe on the right foot that started getting more swollen and red. The facility where she was thought it was infected, gave her some clindamycin. She became more lethargic today. The daughter reports she was not confused, but just lethargic and sleeping more. She got concerned and decided to bring the patient to the hospital.  In the hospital, the patient was requested to be admitted under the hospitalist service. The patient currently is resting in bed. The daughter reports the patient probably has history of COPD and \"always has high carbon dioxide level. \"  She also wears oxygen at home at around 2-3 liters.   The patient and the daughter deny any complaints of headaches, blurry vision, sore throat, trouble swallowing, trouble with speech, any chest pain, shortness of breath, cough, fever, chills, abdominal pain, constipation, diarrhea, urinary symptoms, focal or generalized neurological weakness, recent travel, sick contacts, falls, injuries, hematemesis, melena, hemoptysis, hematuria or any other concerns or problems. Interval history / Subjective: Follow up R foot cellulitis, hypoxia, hypernatremia. Patient seen and examined at the bedside. Labs, images and notes reviewed Discussed with nursing staff, orders reviewed. Plan discussed with patient/Family Pt is sleepy, on BiPAP. Daughter and granddaughter at bedside. Agreeable with palliative care. ABGs noted. Little improvement on BiPAP. Has seen Arash Rodriguez. Will consult pulm. DNR/DNI reiterated per MPOA/ daughter. No other concerns. Assessment & Plan: # Acute on chronic combined respiratory failure, on BIPAP # R foot cellulitis, r/o PVD # AMS, likely metabolic encephalopathy, hypercarbia related # Decubitus sores POA, wound Cx # Hypotension # Hypernatremia # Dehydration # HLD Plan: 
-Pulm consultation 
-Palliative care consultation 
-ABG, CXR stat 
-CTA chest to r/o ABG 
-Continue current mx Code status: DNR 
DVT prophylaxis: Lovenox full dose Care Plan discussed with: Patient/Family and Nurse Anticipated Disposition: SNF/LTC Anticipated Discharge: Greater than 48 hours Hospital Problems  Date Reviewed: 1/15/2019 Codes Class Noted POA Cellulitis ICD-10-CM: L03.90 ICD-9-CM: 682.9  3/11/2020 Unknown Review of Systems: A comprehensive review of systems was negative except for that written in the HPI. Mainly from family. Vital Signs:  
 Last 24hrs VS reviewed since prior progress note. Most recent are: 
Visit Vitals BP 97/64 (BP 1 Location: Left arm) Pulse 70 Temp 97.6 °F (36.4 °C) Resp 19 Ht 4' 11\" (1.499 m) Wt 52.2 kg (115 lb 1.3 oz) SpO2 95% BMI 23.24 kg/m² No intake or output data in the 24 hours ending 03/12/20 1316 Physical Examination:  
 
GENERAL:  Alert x1, awake, older female, appears to be stated age. HEENT:  Pupils are equal and reactive to light. Dry mucous membranes. Tympanic membranes are clear. NECK:  Supple. CHEST:  Decreased basilar breath sounds. CORONARY:  S1 and S2 are heard. ABDOMEN:  Soft, nontender and nondistended. Bowel sounds are physiological. 
EXTREMITIES:  No clubbing, no cyanosis, no edema. There is a wound at the base of the right foot on the lateral side. The patient does have some surrounding erythema and some redness extending to the base of the leg. NEURO/PSYCH:  Limited exam.  DTRs 1+ x4. Strength could not be tested. Sensory is grossly within normal limits. SKIN:  Warm. Data Review:  
 Review and/or order of clinical lab test 
Review and/or order of tests in the radiology section of CPT Review and/or order of tests in the medicine section of CPT Xr Foot Rt Ap/lat Result Date: 3/11/2020 IMPRESSION: Soft tissue ulceration adjacent to the fifth metatarsal head. No radiodense foreign body or evidence of osteomyelitis. Ct Head Wo Cont Result Date: 3/12/2020 IMPRESSION: No acute findings. Ct Chest Wo Cont Result Date: 3/12/2020 IMPRESSION: 1. Bilateral pleural effusions with bibasilar opacification. 2. Cholelithiasis. 3. Bilateral nephrolithiasis. 4. Multiple thoracic and lumbar compression deformities. 5. Right adrenal adenoma. Xr Chest [facility] Result Date: 3/12/2020 IMPRESSION: 1. Overall decrease in aeration 2. There is persistent pleural effusions with bibasilar atelectasis left greater than right. There is pulmonary vascular congestion. Xr Chest [facility] Result Date: 3/11/2020 IMPRESSION: Increased bibasilar atelectasis and effusion. Labs:  
 
Recent Labs  
  03/12/20 
0335 03/12/20 
0144 WBC 11.2* 11.0 HGB 12.4 13.4 HCT 44.1 47.0  335 Recent Labs  
  03/12/20 
0335 03/12/20 
0144 03/11/20 
1736 * 146* 145  
K 4.2 3.5 3.8 * 108 103 CO2 34* 35* 41*  
BUN 18 18 24* CREA 0.62 0.72 0.77 * 227* 109* CA 8.2* 8.4* 8.9 Recent Labs  
  03/11/20 
1736 SGOT 19 ALT 25 AP 67 TBILI 0.3 TP 6.6 ALB 3.1*  
GLOB 3.5 Recent Labs 03/12/20 
0144 APTT 24.3 No results for input(s): FE, TIBC, PSAT, FERR in the last 72 hours. No results found for: FOL, RBCF No results for input(s): PH, PCO2, PO2 in the last 72 hours. Recent Labs  
  03/12/20 
0335 03/12/20 
0144 TROIQ <0.05 <0.05 Lab Results Component Value Date/Time Cholesterol, total 168 05/21/2010 03:00 AM  
 HDL Cholesterol 53 05/21/2010 03:00 AM  
 LDL, calculated 95.4 05/21/2010 03:00 AM  
 Triglyceride 98 05/21/2010 03:00 AM  
 CHOL/HDL Ratio 3.2 05/21/2010 03:00 AM  
 
Lab Results Component Value Date/Time Glucose (POC) 102 (H) 03/12/2020 12:06 PM  
 Glucose (POC) 103 (H) 03/12/2020 08:26 AM  
 Glucose (POC) 120 (H) 10/27/2018 06:05 AM  
 Glucose (POC) 133 (H) 10/27/2018 12:18 AM  
 Glucose (POC) 177 (H) 10/26/2018 06:21 PM  
 
Lab Results Component Value Date/Time Color YELLOW/STRAW 03/11/2020 07:56 PM  
 Appearance CLEAR 03/11/2020 07:56 PM  
 Specific gravity 1.018 03/11/2020 07:56 PM  
 Specific gravity 1.005 03/07/2017 01:54 PM  
 pH (UA) 5.0 03/11/2020 07:56 PM  
 Protein 30 (A) 03/11/2020 07:56 PM  
 Glucose NEGATIVE  03/11/2020 07:56 PM  
 Ketone NEGATIVE  03/11/2020 07:56 PM  
 Bilirubin NEGATIVE  03/11/2020 07:56 PM  
 Urobilinogen 0.2 03/11/2020 07:56 PM  
 Nitrites NEGATIVE  03/11/2020 07:56 PM  
 Leukocyte Esterase TRACE (A) 03/11/2020 07:56 PM  
 Epithelial cells FEW 03/11/2020 07:56 PM  
 Bacteria NEGATIVE  03/11/2020 07:56 PM  
 WBC 0-4 03/11/2020 07:56 PM  
 RBC 0-5 03/11/2020 07:56 PM  
 
 
 
Medications Reviewed:  
 
Current Facility-Administered Medications Medication Dose Route Frequency  sodium chloride (NS) flush 5-40 mL  5-40 mL IntraVENous Q8H  
 sodium chloride (NS) flush 5-40 mL  5-40 mL IntraVENous PRN  
 acetaminophen (TYLENOL) tablet 650 mg  650 mg Oral Q4H PRN  
 cefepime (MAXIPIME) 2 g in 0.9% sodium chloride (MBP/ADV) 100 mL  2 g IntraVENous Q24H  enoxaparin (LOVENOX) injection 50 mg  1 mg/kg SubCUTAneous Q12H  
 sodium chloride (NS) flush 10 mL  10 mL IntraVENous RAD ONCE  
 sodium chloride 0.9 % bolus infusion 100 mL  100 mL IntraVENous RAD ONCE  
 iopamidoL (ISOVUE-370) 76 % injection 100 mL  100 mL IntraVENous RAD ONCE  
 albuterol-ipratropium (DUO-NEB) 2.5 MG-0.5 MG/3 ML  3 mL Nebulization Q4H RT  
 0.45% sodium chloride infusion  75 mL/hr IntraVENous CONTINUOUS  Vancomycin- pharmacy to dose   Other Rx Dosing/Monitoring  [START ON 3/13/2020] vancomycin (VANCOCIN) 750 mg in 0.9% sodium chloride (MBP/ADV) 250 mL  750 mg IntraVENous Q24H  
 
______________________________________________________________________ EXPECTED LENGTH OF STAY: 4d 16h ACTUAL LENGTH OF STAY:          1 Estuardo Max MD

## 2020-03-12 NOTE — PROGRESS NOTES
Pt transferred from ED for routine progression of care. 0100 Upon assessment, Pt had increased respirations. Nurse elevated the head of the and checked vital. RR 46, O2 68% on 4L/ 71% on 6L /87, T 98.5, HR 96. Charge nurse was notified and a Rapid response was called at [facility]. Pt Transferred to [facility] per MD order.

## 2020-03-12 NOTE — CONSULTS
Name: [facility]: [address]  
: 1925 Admit Date: 3/11/2020 Phone: [phone]  Room: [facility] PCP: Josafat Alejo  MRN: [id number] Date: 3/12/2020  Code: DNR   
   
HPI: 
 
2:22 PM    
 
History was obtained from daughter. I was asked by Ramon Montes MD to see Juana Solis in consultation for a chief complaint of acute respiratory failure. History of Present Illness: 80year old female with past medical as given below presented to [facility] with increased shortness of breath, altered mental status for the past 3 days and fever yesterday. Noted to have cellulitis of the LLE. No cough, sputum. Currently on NIV - AVAPS  
ABG 7. Data reviewed: 
Wbc 11.2 Cr 0.62 Troponin normal. 
Cultures pending. Ct chest bilateral effusion and compressive atelectasis. CXr bilateral effusion. OLD echo 2019 - normal EF. Past Medical History:  
Diagnosis Date  Arthritis  Breast lump 2018  
 right breast lump x a few days  HTN (hypertension)  Hypercholesteremia  Hyperlipidemia  Osteoporosis  Stroke ([address] CVA, TIA  TIA (transient ischemic attack)  Vertebral fracture Past Surgical History:  
Procedure Laterality Date  HX HIP REPLACEMENT  2009  
 left  HX ORTHOPAEDIC    
 left hip replacement History reviewed. No pertinent family history. Social History Tobacco Use  Smoking status: Former Smoker Years: 40.00 Last attempt to quit: 1986 Years since quittin.3  Smokeless tobacco: Never Used Substance Use Topics  Alcohol use: Yes Allergies Allergen Reactions  Hydrochlorothiazide Nausea and Vomiting Current Facility-Administered Medications Medication Dose Route Frequency  sodium chloride (NS) flush 5-40 mL  5-40 mL IntraVENous Q8H  
 sodium chloride (NS) flush 5-40 mL  5-40 mL IntraVENous PRN  
 acetaminophen (TYLENOL) tablet 650 mg  650 mg Oral Q4H PRN  
  cefepime (MAXIPIME) 2 g in 0.9% sodium chloride (MBP/ADV) 100 mL  2 g IntraVENous Q24H  
 enoxaparin (LOVENOX) injection 50 mg  1 mg/kg SubCUTAneous Q12H  
 sodium chloride (NS) flush 10 mL  10 mL IntraVENous RAD ONCE  
 sodium chloride 0.9 % bolus infusion 100 mL  100 mL IntraVENous RAD ONCE  
 iopamidoL (ISOVUE-370) 76 % injection 100 mL  100 mL IntraVENous RAD ONCE  
 albuterol-ipratropium (DUO-NEB) 2.5 MG-0.5 MG/3 ML  3 mL Nebulization Q4H RT  
 0.45% sodium chloride infusion  75 mL/hr IntraVENous CONTINUOUS  Vancomycin- pharmacy to dose   Other Rx Dosing/Monitoring  [START ON 3/13/2020] vancomycin (VANCOCIN) 750 mg in 0.9% sodium chloride (MBP/ADV) 250 mL  750 mg IntraVENous Q24H REVIEW OF SYSTEMS Negative except as stated in the HPI. Physical Exam:  
Visit Vitals BP 97/64 (BP 1 Location: Left arm) Pulse 70 Temp 97.6 °F (36.4 °C) Resp 19 Ht 4' 11\" (1.499 m) Wt 52.2 kg (115 lb 1.3 oz) SpO2 95% BMI 23.24 kg/m² General:  Alert, cooperative. Head:  Normocephalic. Eyes:  Conjunctivae/corneas clear. Nose: Nares normal. Septum midline. Throat: Lips, mucosa, and tongue normal.  
Neck: Supple, symmetrical, trachea midline, no adenopathy. Lungs:   Decreased air entry at the bases. Heart:  Regular rate and rhythm, S1, S2 normal  
Abdomen:   Soft, non-tender. Bowel sounds normal.  
Extremities: Extremities normal, atraumatic, no cyanosis or edema. Pulses: 2+ and symmetric all extremities. Neurologic: Grossly nonfocal  
 
 
Lab Results Component Value Date/Time  Sodium 146 (H) 03/12/2020 03:35 AM  
 Potassium 4.2 03/12/2020 03:35 AM  
 Chloride 110 (H) 03/12/2020 03:35 AM  
 CO2 34 (H) 03/12/2020 03:35 AM  
 BUN 18 03/12/2020 03:35 AM  
 Creatinine 0.62 03/12/2020 03:35 AM  
 Glucose 150 (H) 03/12/2020 03:35 AM  
 Calcium 8.2 (L) 03/12/2020 03:35 AM  
 Magnesium 2.7 (H) 10/30/2018 04:13 AM  
 Phosphorus 2.3 (L) 03/09/2017 03:49 AM  
 Lactic acid 1.9 03/12/2020 01:44 AM  
 
 
Lab Results Component Value Date/Time WBC 11.2 (H) 03/12/2020 03:35 AM  
 HGB 12.4 03/12/2020 03:35 AM  
 PLATELET 005 38/29/1510 03:35 AM  
 .0 (H) 03/12/2020 03:35 AM  
 
 
Lab Results Component Value Date/Time INR 1.0 11/07/2011 08:05 AM  
 aPTT 24.3 03/12/2020 01:44 AM  
 AST (SGOT) 19 03/11/2020 05:36 PM  
 Alk. phosphatase 67 03/11/2020 05:36 PM  
 Protein, total 6.6 03/11/2020 05:36 PM  
 Albumin 3.1 (L) 03/11/2020 05:36 PM  
 Globulin 3.5 03/11/2020 05:36 PM  
 
 
Lab Results Component Value Date/Time TSH 0.45 01/19/2019 02:18 AM  
  
 
Lab Results Component Value Date/Time PHI 7.287 (L) 03/12/2020 01:54 PM  
 PCO2I 87.7 (H) 03/12/2020 01:54 PM  
 PO2I 81 03/12/2020 01:54 PM  
 HCO3I 41.9 (H) 03/12/2020 01:54 PM  
 FIO2I 70 03/12/2020 01:54 PM  
 
 
Lab Results Component Value Date/Time CK 41 03/07/2017 07:11 AM  
 CK-MB Index Cannot be calulated 03/07/2017 07:11 AM  
 Troponin-I, Qt. <0.05 03/12/2020 03:35 AM  
 BNP 85 01/12/2017 06:53 PM  
  
 
Lab Results Component Value Date/Time Culture result: NO GROWTH AFTER 15 HOURS 03/11/2020 05:36 PM  
 Culture result: ESCHERICHIA COLI (A) 10/24/2018 02:52 PM  
 Culture result: NO GROWTH 5 DAYS 10/24/2018 01:22 PM  
 
 
Lab Results Component Value Date/Time CK 41 03/07/2017 07:11 AM  
 CK 77 05/14/2013 08:30 AM  
 
 
Lab Results Component Value Date/Time  Color YELLOW/STRAW 03/11/2020 07:56 PM  
 Appearance CLEAR 03/11/2020 07:56 PM  
 Specific gravity 1.005 03/07/2017 01:54 PM  
 pH (UA) 5.0 03/11/2020 07:56 PM  
 Protein 30 (A) 03/11/2020 07:56 PM  
 Glucose NEGATIVE  03/11/2020 07:56 PM  
 Ketone NEGATIVE  03/11/2020 07:56 PM  
 Bilirubin NEGATIVE  03/11/2020 07:56 PM  
 Blood SMALL (A) 03/11/2020 07:56 PM  
 Urobilinogen 0.2 03/11/2020 07:56 PM  
 Nitrites NEGATIVE  03/11/2020 07:56 PM  
 Leukocyte Esterase TRACE (A) 03/11/2020 07:56 PM  
 WBC 0-4 03/11/2020 07:56 PM  
 RBC 0-5 03/11/2020 07:56 PM  
 Bacteria NEGATIVE  03/11/2020 07:56 PM  
 
 
IMPRESSION: 
=========== 
-acute hypoxemic and hypercarbic respiratory failure. -RLE cellulitis. 
-Altered mental status from hypercarbia. -Bilateral pleural effusions with bibasilar atelectasis - diastolic heart failure? 
-osteoporosis. 
-Hx of fall in the past. 
 
PLAN: 
==== 
-On NIV. On AVAPS. ABG with some improvement. -Recheck echo and RV size and function. 
-Takes lasix at home that was stopped on admission. 
-consider a trial of diuretics to improve lung exchange capacity for CO2 exchange if echo with normal Rv functions. 
-If no improvement in effusion, pleural tap in am. 
-abx per primary team. 
-Patient is  DNR. Thank you for the consult. Limited options. D/w daughter at bedside.  
 
Luz Maria Valdez MD

## 2020-03-12 NOTE — PROGRESS NOTES
Bedside shift change report given to Yenifer Adair RN (oncoming nurse) by Jose Miguel Tamayo RN (offgoing nurse). Report included the following information SBAR, Kardex, MAR and Cardiac Rhythm NSR.

## 2020-03-12 NOTE — PROGRESS NOTES
TRANSFER - IN REPORT: 
 
Verbal report received from Suad Davison RN(name) on [facility]  being received from ED(unit) for routine progression of care Report consisted of patients Situation, Background, Assessment and  
Recommendations(SBAR). Information from the following report(s) SBAR, Kardex, ED Summary, [facility] and Recent Results was reviewed with the receiving nurse. Opportunity for questions and clarification was provided. Assessment completed upon patients arrival to unit and care assumed.

## 2020-03-12 NOTE — PROGRESS NOTES
Cefepime dose adjustment Indication:  skin and soft tissue infection Current regimen:  2gm q8h Abx regimen: vancomycin pharmacy dosing Recent Labs  
  20 
1736 WBC 8.0  
CREA 0.77 BUN 24* Est CrCl: 34 ml/min Temp (24hrs), Av.5 °F (36.9 °C), Min:98.3 °F (36.8 °C), Max:98.6 °F (37 °C) Cultures: pending Plan: change to 2gm q24h. Next dose 3/12/2020 @ 2300 **close i-vent after initial review. Remove this text prior to posting to note.

## 2020-03-12 NOTE — PROGRESS NOTES
Pharmacist Note - Vancomycin Dosing Consult provided for this 80 y.o. female for indication of foot wound. Antibiotic regimen(s): Vanc + Cefepime Recent Labs  
  20 
1736 WBC 8.0  
CREA 0.77 BUN 24* Frequency of BMP: daily Height: 150 cm Weight: 51 kg Est CrCl: 34 ml/min Temp (24hrs), Av.6 °F (37 °C), Min:98.6 °F (37 °C), Max:98.6 °F (37 °C) Goal trough = 10 - 15 mcg/mL Therapy will be initiated with a loading dose of 1000 mg IV x 1 to be followed by a maintenance dose of 750 mg IV every 24 hours. Pharmacy to follow patient daily and order levels / make dose adjustments as appropriate.

## 2020-03-12 NOTE — PROGRESS NOTES
Rapid Response Documentation Name: Rajni Charlton YOB: 1925 MRN: [id number] Admission Date: 3/11/2020  5:10 PM   
 
Date of service: 3/12/2020 Active Diagnoses: 
 
Hospital Problems  Date Reviewed: 1/15/2019 Codes Class Noted POA Cellulitis ICD-10-CM: L03.90 ICD-9-CM: 682.9  3/11/2020 Unknown Chief Complaint: Hypoxia, shortness of breath Clinical Presentation: 
Rapid response called at 0123 for hypoxia, oxygen saturation down to 74% on 2LPM. Supplemental oxygen increased to 6LPM with minimal improvement of oxygen saturation. Patient reports \"tightness\" in chest, shortness of breath. Denies pain/nausea/vomiting/diaphoresis. Admitted for sepsis likely secondary to cellulitis of right lower extremity, started on abx. Received 2L NS in ED for sepsis protocol. Initially hypotensive upon arrival to ED, at present hypertensive 171/80. Physical Exam:  
Physical Exam 
Vitals signs and nursing note reviewed. Constitutional:   
   Appearance: She is ill-appearing. She is not diaphoretic. Eyes:  
   Pupils: Pupils are equal, round, and reactive to light. Cardiovascular:  
   Rate and Rhythm: Normal rate and regular rhythm. Pulses: Normal pulses. Heart sounds: Normal heart sounds. Pulmonary:  
   Comments: Work of breathing, shallow quick breaths Scattered expiratory wheeze throughout Skin: 
   General: Skin is warm. Capillary Refill: Capillary refill takes less than 2 seconds. Neurological:  
   Mental Status: She is alert. HR 91, 171/70, 74% 6LPM NC Data Reviewed: All diagnostic labs and studies have been reviewed. ABG 7.150 CO2 >90.0 Venous duplex lower extremities: 
· Evidence of acute partially occlusive thrombus of the right proximal femoral vein. · Evidence of acute occlusive thrombus present in the right mid femoral vein. · Evidence of acute occlusive thrombus present in the right distal femoral vein.  
CT chest w/o:  
 1. Bilateral pleural effusions with bibasilar opacification. 2. Cholelithiasis. 3. Bilateral nephrolithiasis. 4. Multiple thoracic and lumbar compression deformities. 5. Right adrenal adenoma. Assessment and Plan: 
Acute hypoxic hypercapnic respiratory failure - Transfer to [facility], place on BiPap - Acute lower extremity DVT - will start Lovenox 1mg/kg Q12h 
- Concern for PE - Given hypoxia, hx DVT, positive D-Dimer - CTA Chest with contrast ordered when patient stable, covered on Lovenox if positive PE 
- Recheck ABG after an hour on BiPap 
- Continue Duonebs, one now - Discussed with daughter Alyssa Quiroz over the phone, updated her regarding change in level of care. Daughter is concerned she won't tolerate BiPap, is open to trying. Can use high flow if she does not tolerate BiPap Pardeep RANGEL-C, PA-C 
3/12/2020

## 2020-03-12 NOTE — PROGRESS NOTES
Problem: Breathing Pattern - Ineffective Goal: *Absence of hypoxia Outcome: Progressing Towards Goal 
Pt on continuous bipap. CO2 levels have marginally decreased. Pt continues on bipap. Problem: Falls - Risk of 
Goal: *Absence of Falls Outcome: Progressing Towards Goal 
Pt remains free of falls during admission. Call bell and frequently used items within reach. Bedside table within reach. Pt provided nonskid socks and instructed to call out for nurse when in need of asssitance Problem: Pressure Injury - Risk of 
Goal: *Prevention of pressure injury Outcome: Progressing Towards Goal 
Wound care consulted and evaluation completed. Wound care orders initiated. 1930 Bedside shift change report given to Luz Maria (oncoming nurse) by Jessica Chapman (offgoing nurse). Report included the following information SBAR, Kardex, Procedure Summary, Intake/Output, MAR, Recent Results and Cardiac Rhythm NSR.

## 2020-03-12 NOTE — PROGRESS NOTES
ABG drawn after 5 hourson BIPAP without improvement. PH 7.145, CO2 >90. VT's on BiPAP variable from 200ml-500mls. Discussed with NP. Changed mode to AVAP, MIN [id number], MAX 30 cmH2O, EPAP 8, RR 14, I.T. 1.0 sec. Will continue to monitor and repeat ABG as ordered. Daughter at bedside and understands current intervention.

## 2020-03-12 NOTE — PROGRESS NOTES
Spiritual Care Assessment/Progress Note [address] 
 
 
NAME: Trevon De La Fuente      MRN: [id number] AGE: 80 y.o. SEX: female Congregation Affiliation: Religious  
Language: [place] 3/12/2020     Total Time (in minutes): 20 Spiritual Assessment begun in [address] [place] [address] IMCU through conversation with: 
  
    []Patient        [x] Family    [] Friend(s) Reason for Consult: Palliative Care, Initial/Spiritual Assessment Spiritual beliefs: (Please include comment if needed) [x] Identifies with a azeb tradition:   Religious  
   [] Supported by a azeb community:        
   [] Claims no spiritual orientation:       
   [] Seeking spiritual identity:            
   [] Adheres to an individual form of spirituality:       
   [] Not able to assess:                   
 
    
Identified resources for coping:  
   [] Prayer                           
   [] Music                  [] Guided Imagery [x] Family/friends                 [] Pet visits [] Devotional reading                         [] Unknown 
   [] Other:                                          
 
 
Interventions offered during this visit: (See comments for more details) Family/Friend(s): Affirmation of emotions/emotional suffering, Affirmation of azeb, Catharsis/review of pertinent events in supportive environment, Coping skills reviewed/reinforced, Iconic (affirming the presence of God/Higher Power)(Family) Plan of Care: 
 
 [] Support spiritual and/or cultural needs  
 [] Support AMD and/or advance care planning process    
 [] Support grieving process 
 [] Coordinate Rites and/or Rituals  
 [] Coordination with community clergy [] No spiritual needs identified at this time 
 [] Detailed Plan of Care below (See Comments)  [] Make referral to Music Therapy 
[] Make referral to Pet Therapy    
[] Make referral to Addiction services 
[] Make referral to [facility] 
[] Make referral to Spiritual Care Partner [] No future visits requested       
[x] Follow up visits as needed Comments:  visit for initial spiritual assessment, palliative care consult. Patient resting in bed using Bipap, did not awaken during this visit. Patient's daughter and granddaughter present at bedside. Provided spiritual presence and listening as they spoke of their current thoughts, feelings, and concerns. Spoke about the patient's health. Patient resides at [facility], but family hopes to transfer her to [address] [place] [address]. Spoke briefly of her health and health concerns. Say they are anxious to speak to palliative team to discuss their options on where to go from here in the patient's health care and quality of life. When asked how pastoral care can best serve their needs, they said to simply allow her the time to rest and regain her strength. Informed them of availability of  and pastoral care services. They appeared comforted and encouraged as a result of this visit and expressed gratitude for this visit. Visited by Rev. Deidre Brice MDiv, [facility], [facility]  paging service: [phone])

## 2020-03-12 NOTE — WOUND CARE
Wound Consult:  New Patient Visit. Chart reviewed. Consulted for sacral redness POA, right buttock Stage 1 POA, right forefoot ulcer POA; left heel callus POA. Spoke with patients nurses and in with granddaughter and daughter who are both at bedside. Patient is resting on a [place] bed with Isolfex mattress. She is on BiPap; having some difficulty with mask/comfort and noted redness to bridge of nose that is tender; RT at bedside, placed a piece of Mepilex Transfer to nose and discussed use of full face mask - patient would like to try per granddaughter and nurse notifying Noel Miranda). Assessment: 
Right lateral forefoot - 1.4 x 1 x 0.7 cm, full thickness injury that patient's daughter reports had been covered in callus; home health following in assisted living; yellow/light tan wound bed 100%; some erythema extending on dorsal foot and under to plantar forefoot without crepitus or fluctuance; no odor or purulence, no real drainage noted. Unsure of actual etiology - if pressure or some trauma that may have occurred with wheelchair, etc. 
Left heel - linear ~ 1 x 0.5 cm raised dry light callus; previous area of pressure injury per patient's daughter; no surrounding redness; healing pressure injury of unknown original stage POA. Inner left buttock - 1 cm area of non-blanching dark red 80%/blanching red 20% area of intact skin; pink blanching intact skin surrounding on both buttocks, Stage 1 POA. Sacrum - pink, blanching hypopigmented intact skin. Bridge of nose - blanching, redness of intact skin, tender for patient; has a tiny spot that is darker red, non-blanching - 0.1 cm. Stage 1 to nose. No redness to right heel. Was on bedpan and noted some linear areas of blanching redness along outer buttocks/posterior thighs. Treatment: 
Moistened silver alginate dressing into right forefoot, covered with small Tegaderm. Foam border dressing to buttocks. Moisturized feet. Pure wick was placed while with her with Steph Champagne. Turned and repositioned. Wound Recommendations: 
Right lateral forefoot ulcer: cleanse with saline, apply Santyl to wound bed, pack with strip of Opticell Ag, cover with tegaderm daily. Begin Venelex BID to sacrum/buttocks. Try full face shield for BiPap - if mask continues due to patient tolerating one or other, use Mepilex Transfer under mask. Skin Care Recommendations: 1. Minimize friction/shear: minimize layers of linen/pads under patient. 2. Off load pressure/reposition: continue to turn and reposition approximately every 2 hours; float heels, waffle cushion for chair while sitting. 3. Manage Moisture - keep skin folds dry; incontinence skin care as needed; patient continent at baseline per family; placed pure wick to help keep dry. 4. Continue to monitor nutrition, pain, and skin risk scale, and skin assessment. Plan: 
Spoke with Dr. Teja Kapoor regarding findings and obtained orders for treatment. We will continue to reassess routinely and as needed. Rochelle Okeefe RN,[facility] Wound Healing Office [phone] Pager [phone]

## 2020-03-12 NOTE — H&P
[address] HISTORY AND PHYSICAL Name:  Kristie Vegas 
MR#:  [id number] :  [id number] ACCOUNT #:  [de-identified] ADMIT DATE:  2020 CHIEF COMPLAINT:  Altered mental status. HISTORY OF PRESENT ILLNESS:  The patient is a 77-year-old female with past medical history of breast lump, arthritis, hypertension, hypercholesteremia, hyperlipidemia, osteoporosis, TIA and vertebral fracture, who presents to the hospital with the above-mentioned symptom. The patient is a poor historian. The history was primarily obtained from the daughter. The daughter reports that the patient started having some low-grade fevers in the last few days and concentrated urine. She thought that the patient was having UTI. She also has ulcer on the base of her fifth toe on the right foot that started getting more swollen and red. The facility where she was thought it was infected, gave her some clindamycin. She became more lethargic today. The daughter reports she was not confused, but just lethargic and sleeping more. She got concerned and decided to bring the patient to the hospital.  In the hospital, the patient was requested to be admitted under the hospitalist service. The patient currently is resting in bed. The daughter reports the patient probably has history of COPD and \"always has high carbon dioxide level. \"  She also wears oxygen at home at around 2-3 liters. The patient and the daughter deny any complaints of headaches, blurry vision, sore throat, trouble swallowing, trouble with speech, any chest pain, shortness of breath, cough, fever, chills, abdominal pain, constipation, diarrhea, urinary symptoms, focal or generalized neurological weakness, recent travel, sick contacts, falls, injuries, hematemesis, melena, hemoptysis, hematuria or any other concerns or problems. PAST MEDICAL HISTORY:  See above. HOME MEDICATIONS: 
1. Lasix 40 mg daily. 2.  Amlodipine 10 mg daily. 3.  Sertraline 25 mg daily. 4.  Metoprolol 25 mg b.i.d. SOCIAL HISTORY:  Former smoker. Smoked 1 pack for 40 years. Occasional alcohol. IV drug abuse. ALLERGIES:  TO HYDROCHLOROTHIAZIDE. REVIEW OF SYMPTOMS:  All systems were reviewed and found to be essentially negative except for the symptoms mentioned above. FAMILY HISTORY:  Discussed, was found to be noncontributory, specifically no history of recurrent cellulitis in the family. PHYSICAL EXAMINATION: 
VITAL SIGNS:  Temperature 98.6, pulse 78, respiratory rate 27, blood pressure 92/62, pulse oximetry 93% on 2 liters. GENERAL:  Alert x1, awake, older female, appears to be stated age. HEENT:  Pupils are equal and reactive to light. Dry mucous membranes. Tympanic membranes are clear. NECK:  Supple. CHEST:  Decreased basilar breath sounds. CORONARY:  S1 and S2 are heard. ABDOMEN:  Soft, nontender and nondistended. Bowel sounds are physiological. 
EXTREMITIES:  No clubbing, no cyanosis, no edema. There is a wound at the base of the right foot on the lateral side. The patient does have some surrounding erythema and some redness extending to the base of the leg. NEURO/PSYCH:  Limited exam.  DTRs 1+ x4. Strength could not be tested. Sensory is grossly within normal limits. SKIN:  Warm. LABORATORY DATA:  White count 8.0, hemoglobin 12.5, hematocrit 43.3, and platelets 052. Urine shows no signs of infection. Sodium 145, potassium 3.8, chloride 103, bicarbonate 41, anion gap 1, glucose 109, BUN 24, creatinine 0.77, calcium 8.9, bilirubin total 0.3, ALT 25, AST 19, and alkaline phosphatase 67. X-ray of the chest shows increased bibasilar atelectasis and effusion slightly increased compared to previous chest x-ray. ASSESSMENT AND PLAN: 
1. Cellulitis of the right foot.   The patient will be admitted to the hospital.  We will provide IV antibiotics, gentle IV hydration, pain control, wound care consult, venous duplex, blood cultures, supportive care and close monitoring. Further intervention per hospital course. Reassess as needed. Continue to monitor. We will get an x-ray of the foot. 2.  Hypotension, used to be hypovolemic. Provide gentle IV hydration. We will hold antihypertensives for now. We will treat the underlying infection and continue to monitor. If symptoms persist, may consider further intervention and diagnostics. Continue to closely monitor. We will repeat lactate in 4 hours. 3.  Hypernatremia. The patient is likely hypovolemic. Provide half normal saline. Repeat labs in the morning. Continue to closely monitor. Neurovascular checks. 4.  Dehydration. The patient appears to be dehydrated. Provide gentle IV hydration. Repeat labs in the morning and continue to monitor. Further intervention per hospital course. Reassess as needed. 5.  Hyperlipidemia. Continue statin. 6.  Gastrointestinal and deep venous thrombosis prophylaxis. The patient will be on heparin. 7.  Functional status. The patient is wheelchair bound. Chon Salgado MD 
 
 
[id number] 
D:  03/11/2020 22:09 
T:  03/11/2020 23:43 JOB #:  [id number]

## 2020-03-12 NOTE — PROGRESS NOTES
Results for Angelina Washburn (MRN [id number]) as of 3/12/2020 14:05 Ref. Range 3/12/2020 13:54 Calcium, ionized (POC) Latest Ref Range: 1.12 - 1.32 mmol/L 1.29  
pH (POC) Latest Ref Range: 7.35 - 7.45   7.287 (L)  
pCO2 (POC) Latest Ref Range: 35.0 - 45.0 MMHG 87.7 (H)  
pO2 (POC) Latest Ref Range: 80 - 100 MMHG 81 HCO3 (POC) Latest Ref Range: 22 - 26 MMOL/L 41.9 (H)  
sO2 (POC) Latest Ref Range: 92 - 97 % 93 Base excess (POC) Latest Units: mmol/L 15 FIO2 (POC) Latest Units: % 70 Specimen type (POC) Latest Units:   ARTERIAL Site Latest Units:   LEFT RADIAL Device: Latest Units:   BIPAP Total resp. rate Latest Units:   21  
AVAPS, IAVAPS Latest Units:   YES  
PEEP/CPAP (POC) Latest Units: cmH2O 8 Allens test (POC) Latest Units:   YES  
Dr Drea Ovalle notified, requested bicarb, he stated to await pulm to see and call and request a quicker consult

## 2020-03-13 NOTE — PROGRESS NOTES
Bedside and Verbal shift change report given to Charmaine (oncoming nurse) by Marlys (offgoing nurse). Report included the following information SBAR, Kardex, MAR, Recent Results and Cardiac Rhythm NSR. Patient Vitals for the past 12 hrs: 
 Temp Pulse Resp BP SpO2  
03/13/20 0700 98.3 °F (36.8 °C) 80 16 119/68 100 % 03/13/20 0437 99 °F (37.2 °C) 77 15 114/73 100 % 03/13/20 0000     100 % 03/12/20 2325 97.8 °F (36.6 °C) 69 16 119/64 100 % 03/12/20 2321     100 % 03/12/20 2106     98 % 03/12/20 2000     98 % 03/12/20 1913 98.3 °F (36.8 °C) 76 16 127/69 96 % Both antibiotics have now been administered. Antibiotics late due to IV infiltration prior to administration. Pharmacy has been notified and adjusted next administration time. Problem: Falls - Risk of 
Goal: *Absence of Falls Description: Document Ryann Orona Fall Risk and appropriate interventions in the flowsheet. Outcome: Progressing Towards Goal 
Note: Fall Risk Interventions: 
Mobility Interventions: Patient to call before getting OOB, Strengthening exercises (ROM-active/passive) Mentation Interventions: Adequate sleep, hydration, pain control, Family/sitter at bedside, Reorient patient Medication Interventions: Patient to call before getting OOB Elimination Interventions: Call light in reach, Toileting schedule/hourly rounds History of Falls Interventions: Investigate reason for fall Problem: Pressure Injury - Risk of 
Goal: *Prevention of pressure injury Description: Document Mohit Scale and appropriate interventions in the flowsheet. Outcome: Progressing Towards Goal 
Note: Pressure Injury Interventions: 
Sensory Interventions: Assess changes in LOC, Keep linens dry and wrinkle-free, Minimize linen layers Moisture Interventions: Absorbent underpads, Check for incontinence Q2 hours and as needed, Internal/External urinary devices, Minimize layers Activity Interventions: Pressure redistribution bed/mattress(bed type), Assess need for specialty bed Mobility Interventions: Assess need for specialty bed, HOB 30 degrees or less, Turn and reposition approx. every two hours(pillow and wedges) Nutrition Interventions: Document food/fluid/supplement intake Friction and Shear Interventions: HOB 30 degrees or less, Minimize layers, Apply protective barrier, creams and emollients

## 2020-03-13 NOTE — PROGRESS NOTES
Problem: Falls - Risk of 
Goal: *Absence of Falls Description: Document Suzie Boo Fall Risk and appropriate interventions in the flowsheet. Outcome: Progressing Towards Goal 
Note: Fall Risk Interventions: 
Mobility Interventions: Communicate number of staff needed for ambulation/transfer, Patient to call before getting OOB, OT consult for ADLs, PT Consult for assist device competence, Strengthening exercises (ROM-active/passive) Mentation Interventions: Adequate sleep, hydration, pain control, Evaluate medications/consider consulting pharmacy, Eyeglasses and hearing aids, Family/sitter at bedside, Increase mobility, More frequent rounding, Reorient patient Medication Interventions: Assess postural VS orthostatic hypotension, Evaluate medications/consider consulting pharmacy, Patient to call before getting OOB Elimination Interventions: Call light in reach, Patient to call for help with toileting needs, Stay With Me (per policy) History of Falls Interventions: Door open when patient unattended, Evaluate medications/consider consulting pharmacy, Room close to nurse's station, Utilize gait belt for transfer/ambulation Problem: Patient Education: Go to Patient Education Activity Goal: Patient/Family Education Outcome: Progressing Towards Goal 
  
Problem: Pressure Injury - Risk of 
Goal: *Prevention of pressure injury Description: Document Mohit Scale and appropriate interventions in the flowsheet. Outcome: Progressing Towards Goal 
Note: Pressure Injury Interventions: 
Sensory Interventions: Assess need for specialty bed, Avoid rigorous massage over bony prominences, Discuss PT/OT consult with provider, Float heels, Keep linens dry and wrinkle-free, Minimize linen layers, Monitor skin under medical devices Moisture Interventions: Apply protective barrier, creams and emollients, Check for incontinence Q2 hours and as needed, Limit adult briefs, Maintain skin hydration (lotion/cream), Minimize layers, Offer toileting Q_hr Activity Interventions: Assess need for specialty bed, Increase time out of bed, Pressure redistribution bed/mattress(bed type) Mobility Interventions: Assess need for specialty bed, Float heels, PT/OT evaluation, Turn and reposition approx. every two hours(pillow and wedges) Nutrition Interventions: Document food/fluid/supplement intake, Discuss nutritional consult with provider Friction and Shear Interventions: Apply protective barrier, creams and emollients, Foam dressings/transparent film/skin sealants, HOB 30 degrees or less, Lift team/patient mobility team, Minimize layers Problem: Patient Education: Go to Patient Education Activity Goal: Patient/Family Education Outcome: Progressing Towards Goal 
  
Problem: Breathing Pattern - Ineffective Goal: *Absence of hypoxia Outcome: Progressing Towards Goal 
  
Bedside and Verbal shift change report given to Rupal Wang RN (oncoming nurse) by Anna Grover RN (offgoing nurse). Report included the following information SBAR, Kardex, MAR, Recent Results and Cardiac Rhythm NSR. Patient Vitals for the past 4 hrs: 
 Temp Pulse BP SpO2  
03/13/20 1200    95 % 03/13/20 1147 98.3 °F (36.8 °C) 78 119/68 92 %

## 2020-03-13 NOTE — PROGRESS NOTES
Name: [facility]: [address]  
: 1925 Admit Date: 3/11/2020 Phone: [phone]  Room: [facility]/ PCP: Josafat Blackburn  MRN: [id number] Date: 3/13/2020  Code: DNR   
   
HPI: 
 
3/13 Target tidal volumes were to much for her; above 8ml/kg/predicted weight. I reduced them, but will need to be reduced further if we keep her on this mode More responsive today. Less dyspnea. Discussed with bedside daughters and patient 3/12 
2:22 PM    
 
History was obtained from daughter. I was asked by Diamond Lara MD to see Subhash Lucas in consultation for a chief complaint of acute respiratory failure. History of Present Illness: 80year old female with past medical as given below presented to [facility] with increased shortness of breath, altered mental status for the past 3 days and fever yesterday. Noted to have cellulitis of the LLE. No cough, sputum. Currently on NIV - AVAPS  
ABG 7. /  / 81 Data reviewed: 
Wbc 11.2 Cr 0.62 Troponin normal. 
Cultures pending. Ct chest bilateral effusion and compressive atelectasis. CXr bilateral effusion. OLD echo 2019 - normal EF. Past Medical History:  
Diagnosis Date  Arthritis  Breast lump 2018  
 right breast lump x a few days  HTN (hypertension)  Hypercholesteremia  Hyperlipidemia  Osteoporosis  Stroke ([address] CVA, TIA  TIA (transient ischemic attack)  Vertebral fracture Past Surgical History:  
Procedure Laterality Date  HX HIP REPLACEMENT  2009  
 left  HX ORTHOPAEDIC    
 left hip replacement History reviewed. No pertinent family history. Social History Tobacco Use  Smoking status: Former Smoker Years: 40.00 Last attempt to quit: 1986 Years since quittin.3  Smokeless tobacco: Never Used Substance Use Topics  Alcohol use: Yes Allergies Allergen Reactions  Hydrochlorothiazide Nausea and Vomiting Current Facility-Administered Medications Medication Dose Route Frequency  sertraline (ZOLOFT) tablet 25 mg  25 mg Oral QPM  
 sodium chloride (NS) flush 5-40 mL  5-40 mL IntraVENous Q8H  
 sodium chloride (NS) flush 5-40 mL  5-40 mL IntraVENous PRN  
 acetaminophen (TYLENOL) tablet 650 mg  650 mg Oral Q4H PRN  
 cefepime (MAXIPIME) 2 g in 0.9% sodium chloride (MBP/ADV) 100 mL  2 g IntraVENous Q24H  
 enoxaparin (LOVENOX) injection 50 mg  1 mg/kg SubCUTAneous Q12H  
 balsam peru-castor oiL (VENELEX) ointment   Topical BID  collagenase (SANTYL) 250 unit/gram ointment   Topical DAILY  albuterol-ipratropium (DUO-NEB) 2.5 MG-0.5 MG/3 ML  3 mL Nebulization Q4H RT  
 Vancomycin- pharmacy to dose   Other Rx Dosing/Monitoring  vancomycin (VANCOCIN) 750 mg in 0.9% sodium chloride (MBP/ADV) 250 mL  750 mg IntraVENous Q24H REVIEW OF SYSTEMS Negative except as stated in the HPI. Physical Exam:  
Visit Vitals /73 Pulse 80 Temp 98.3 °F (36.8 °C) Resp 18 Ht 4' 11\" (1.499 m) Wt 56.5 kg (124 lb 9 oz) SpO2 100% BMI 25.16 kg/m² General:  Alert, cooperative. Head:  Normocephalic. Eyes:  Conjunctivae/corneas clear. Nose: Nares normal. Septum midline. Throat: Lips, mucosa, and tongue normal.  
Neck: Supple, symmetrical, trachea midline, no adenopathy. Lungs:   Decreased air entry at the bases. Heart:  Regular rate and rhythm, S1, S2 normal  
Abdomen:   Soft, non-tender. Bowel sounds normal.  
Extremities: Extremities normal, atraumatic, no cyanosis or edema. Pulses: 2+ and symmetric all extremities. Neurologic: Grossly nonfocal  
 
 
Lab Results Component Value Date/Time  Sodium 146 (H) 03/12/2020 03:35 AM  
 Potassium 4.2 03/12/2020 03:35 AM  
 Chloride 110 (H) 03/12/2020 03:35 AM  
 CO2 34 (H) 03/12/2020 03:35 AM  
 BUN 18 03/12/2020 03:35 AM  
 Creatinine 0.62 03/12/2020 03:35 AM  
 Glucose 150 (H) 03/12/2020 03:35 AM  
 Calcium 8.2 (L) 03/12/2020 03:35 AM  
 Magnesium 2.2 03/13/2020 04:39 AM  
 Phosphorus 1.9 (L) 03/13/2020 04:39 AM  
 Lactic acid 1.9 03/12/2020 01:44 AM  
 
 
Lab Results Component Value Date/Time WBC 6.3 03/13/2020 04:39 AM  
 HGB 11.3 (L) 03/13/2020 04:39 AM  
 PLATELET 825 37/21/2564 04:39 AM  
 .8 (H) 03/13/2020 04:39 AM  
 
 
Lab Results Component Value Date/Time INR 1.0 11/07/2011 08:05 AM  
 aPTT 24.3 03/12/2020 01:44 AM  
 AST (SGOT) 19 03/11/2020 05:36 PM  
 Alk. phosphatase 67 03/11/2020 05:36 PM  
 Protein, total 6.6 03/11/2020 05:36 PM  
 Albumin 3.1 (L) 03/11/2020 05:36 PM  
 Globulin 3.5 03/11/2020 05:36 PM  
 
 
Lab Results Component Value Date/Time TSH 0.45 01/19/2019 02:18 AM  
  
 
Lab Results Component Value Date/Time PHI 7.347 (L) 03/12/2020 11:22 PM  
 PCO2I 74.6 (H) 03/12/2020 11:22 PM  
 PO2I 119 (H) 03/12/2020 11:22 PM  
 HCO3I 40.9 (H) 03/12/2020 11:22 PM  
 FIO2I 0.70 03/12/2020 11:22 PM  
 
 
Lab Results Component Value Date/Time CK 41 03/07/2017 07:11 AM  
 CK-MB Index Cannot be calulated 03/07/2017 07:11 AM  
 Troponin-I, Qt. <0.05 03/12/2020 03:35 AM  
 BNP 85 01/12/2017 06:53 PM  
  
 
Lab Results Component Value Date/Time Culture result: NO GROWTH 2 DAYS 03/11/2020 05:36 PM  
 Culture result: ESCHERICHIA COLI (A) 10/24/2018 02:52 PM  
 Culture result: NO GROWTH 5 DAYS 10/24/2018 01:22 PM  
 
 
Lab Results Component Value Date/Time CK 41 03/07/2017 07:11 AM  
 CK 77 05/14/2013 08:30 AM  
 
 
Lab Results Component Value Date/Time  Color YELLOW/STRAW 03/11/2020 07:56 PM  
 Appearance CLEAR 03/11/2020 07:56 PM  
 Specific gravity 1.005 03/07/2017 01:54 PM  
 pH (UA) 5.0 03/11/2020 07:56 PM  
 Protein 30 (A) 03/11/2020 07:56 PM  
 Glucose NEGATIVE  03/11/2020 07:56 PM  
 Ketone NEGATIVE  03/11/2020 07:56 PM  
 Bilirubin NEGATIVE  03/11/2020 07:56 PM  
 Blood SMALL (A) 03/11/2020 07:56 PM  
 Urobilinogen 0.2 03/11/2020 07:56 PM  
 Nitrites NEGATIVE  03/11/2020 07:56 PM  
 Leukocyte Esterase TRACE (A) 03/11/2020 07:56 PM  
 WBC 0-4 03/11/2020 07:56 PM  
 RBC 0-5 03/11/2020 07:56 PM  
 Bacteria NEGATIVE  03/11/2020 07:56 PM  
 
 
IMPRESSION: 
=========== 
-acute hypoxemic and hypercarbic respiratory failure. -RLE cellulitis. 
-Altered mental status from hypercarbia. -Bilateral pleural effusions with bibasilar atelectasis - diastolic heart failure? 
-osteoporosis. 
-Hx of fall in the past. 
 
PLAN: 
==== 
-On NIV. On AVAPS, currently. However it should be noted that this is a mode not recommended in acute situations, only in chronic and maintainance situations should this be used. If she were to worsen I would recommend switching back to Bi-Level pressure support to ventilate her. She cannot maintain and achieve adequate target tidal volumes if she does not have the mental capacity to do so. -ECHO pending  
-diuretics as able. -If no improvement in effusion, pleural tap in am. 
-abx per primary team. 
-Patient is  DNR. -no plans for thoracentesis at this time 
-I discussed with Charmaine the RN to have page come off of NIV for 2 hours then she can be reassessed if she needs to go back on. 
-all questions asked of me were answered to the best of my ability and her apparent satisfaction 
-prn over the weekend Aaron Padgett PA-C

## 2020-03-13 NOTE — PROGRESS NOTES
0400 Spoke to jarret no repeat ABG needed at this time due to downward trend, will defer to day shift. 0000 ABG resulted, trending down 2330 ABG sent to lab 
 
2129 Contacted jarret in regards to ABG order for pt on continuous Bipap.

## 2020-03-13 NOTE — PROGRESS NOTES
Problem: Falls - Risk of 
Goal: *Absence of Falls Description: Document Javi Reas Fall Risk and appropriate interventions in the flowsheet. Outcome: Progressing Towards Goal 
Note: Fall Risk Interventions: 
Mobility Interventions: PT Consult for mobility concerns, PT Consult for assist device competence, Strengthening exercises (ROM-active/passive), Utilize walker, cane, or other assistive device, Utilize gait belt for transfers/ambulation, Communicate number of staff needed for ambulation/transfer, OT consult for ADLs Mentation Interventions: Adequate sleep, hydration, pain control, Door open when patient unattended, Evaluate medications/consider consulting pharmacy, Familiar objects from home, Family/sitter at bedside, Increase mobility, Reorient patient, Room close to nurse's station Medication Interventions: Teach patient to arise slowly, Patient to call before getting OOB Elimination Interventions: Elevated toilet seat, Stay With Me (per policy), Toilet paper/wipes in reach, Toileting schedule/hourly rounds, Bed/chair exit alarm, Call light in reach History of Falls Interventions: Bed/chair exit alarm, Door open when patient unattended, Room close to nurse's station, Utilize gait belt for transfer/ambulation Problem: Pressure Injury - Risk of 
Goal: *Prevention of pressure injury Description: Document Mohit Scale and appropriate interventions in the flowsheet. Outcome: Progressing Towards Goal 
Note: Pressure Injury Interventions: 
Sensory Interventions: Assess changes in LOC, Assess need for specialty bed, Avoid rigorous massage over bony prominences, Keep linens dry and wrinkle-free, Maintain/enhance activity level, Minimize linen layers, Monitor skin under medical devices, Pressure redistribution bed/mattress (bed type), Turn and reposition approx. every two hours (pillows and wedges if needed) Moisture Interventions: Apply protective barrier, creams and emollients, Absorbent underpads, Assess need for specialty bed, Internal/External urinary devices, Maintain skin hydration (lotion/cream), Limit adult briefs, Minimize layers, Moisture barrier Activity Interventions: Pressure redistribution bed/mattress(bed type), Increase time out of bed, PT/OT evaluation Mobility Interventions: Pressure redistribution bed/mattress (bed type), PT/OT evaluation, Assess need for specialty bed Nutrition Interventions: Document food/fluid/supplement intake Friction and Shear Interventions: Apply protective barrier, creams and emollients, Lift sheet, Lift team/patient mobility team, Minimize layers

## 2020-03-13 NOTE — PROGRESS NOTES
Received verbal order from ABRIL Schofield for pt to come off cont. Bipap, requested to call Resp and have them come take her off. Resp  called, stated they would come up shortly and take her off.

## 2020-03-13 NOTE — PROGRESS NOTES
[facility]  Hospitalist Group Hospitalist Progress Note Michelle Rivera MD 
Answering service: [phone] or [phone] from in house phone Date of Service:  3/13/2020 NAME:  Leticia Mcdonnell :  1925 MRN:  [id number] Admission Summary:  
The patient is a 60-year-old female with past medical history of breast lump, arthritis, hypertension, hypercholesteremia, hyperlipidemia, osteoporosis, TIA and vertebral fracture, who presents to the hospital with the above-mentioned symptom. The patient is a poor historian. The history was primarily obtained from the daughter. The daughter reports that the patient started having some low-grade fevers in the last few days and concentrated urine. She thought that the patient was having UTI. She also has ulcer on the base of her fifth toe on the right foot that started getting more swollen and red. The facility where she was thought it was infected, gave her some clindamycin. She became more lethargic today. The daughter reports she was not confused, but just lethargic and sleeping more. She got concerned and decided to bring the patient to the hospital.  In the hospital, the patient was requested to be admitted under the hospitalist service. The patient currently is resting in bed. The daughter reports the patient probably has history of COPD and \"always has high carbon dioxide level. \"  She also wears oxygen at home at around 2-3 liters.   The patient and the daughter deny any complaints of headaches, blurry vision, sore throat, trouble swallowing, trouble with speech, any chest pain, shortness of breath, cough, fever, chills, abdominal pain, constipation, diarrhea, urinary symptoms, focal or generalized neurological weakness, recent travel, sick contacts, falls, injuries, hematemesis, melena, hemoptysis, hematuria or any other concerns or problems. Interval history / Subjective: Follow up R foot cellulitis, hypoxia, hypernatremia. Patient seen and examined at the bedside. Labs, images and notes reviewed Discussed with nursing staff, orders reviewed. Plan discussed with patient/Family Pt more responsive, on BiPAP. Pulm and palliative care on board. No overnight events. ABG better. Pulmonary recommendations noted. No other concerns. Assessment & Plan: # Acute on chronic combined respiratory failure, on BIPAP, improving # R foot cellulitis, r/o PVD, ANGELLA pending # R LE Acute DVT, on full dose lovenox # AMS, likely metabolic encephalopathy, hypercarbia related, improving # Decubitus sores POA, wound Cx # Hypotension, better # Hypernatremia # Dehydration # HLD Plan: 
-Pulm consultation noted. Trial off BIPAP 
-Awaiting palliative care consultation 
-Lasix IV 20mg once now and once later in the day as appropriate 
-I/O monitoring 
-BMP 
-CXR 
-Monitor closely for mentation 
-Continue current mx 
-Consider CTA chest to r/o PE/VTE for clot burden/ right heart strain eval if no clinical improvement 
-Follow Echo result Code status: DNR 
DVT prophylaxis: Lovenox full dose Care Plan discussed with: Patient/Family and Nurse Anticipated Disposition: SNF/LTC Anticipated Discharge: Greater than 48 hours Hospital Problems  Date Reviewed: 1/15/2019 Codes Class Noted POA Cellulitis ICD-10-CM: L03.90 ICD-9-CM: 682.9  3/11/2020 Unknown Review of Systems: A comprehensive review of systems was negative except for that written in the HPI. Mainly from family. Vital Signs:  
 Last 24hrs VS reviewed since prior progress note. Most recent are: 
Visit Vitals /67 (BP 1 Location: Left arm, BP Patient Position: At rest) Pulse 92 Temp 98 °F (36.7 °C) Resp (!) 32 Ht 4' 11\" (1.499 m) Wt 56.5 kg (124 lb 9 oz) SpO2 97% BMI 25.16 kg/m² Intake/Output Summary (Last 24 hours) at 3/13/2020 1634 Last data filed at 3/13/2020 1200 Gross per 24 hour Intake 0 ml Output 1900 ml Net -1900 ml Physical Examination:  
 
GENERAL:  Alert x1, awake, older female, appears to be stated age. HEENT:  Pupils are equal and reactive to light. Dry mucous membranes. Tympanic membranes are clear. NECK:  Supple. CHEST:  Decreased basilar breath sounds. CORONARY:  S1 and S2 are heard. ABDOMEN:  Soft, nontender and nondistended. Bowel sounds are physiological. 
EXTREMITIES:  No clubbing, no cyanosis, no edema. There is a wound at the base of the right foot on the lateral side. The patient does have some surrounding erythema and some redness extending to the base of the leg. NEURO/PSYCH:  Limited exam.  DTRs 1+ x4. Strength could not be tested. Sensory is grossly within normal limits. SKIN:  Warm. Data Review:  
Review and/or order of clinical lab test 
Review and/or order of tests in the radiology section of CPT Review and/or order of tests in the medicine section of CPT Xr Foot Rt Ap/lat Result Date: 3/11/2020 IMPRESSION: Soft tissue ulceration adjacent to the fifth metatarsal head. No radiodense foreign body or evidence of osteomyelitis. Ct Head Wo Cont Result Date: 3/12/2020 IMPRESSION: No acute findings. Ct Chest Wo Cont Result Date: 3/12/2020 IMPRESSION: 1. Bilateral pleural effusions with bibasilar opacification. 2. Cholelithiasis. 3. Bilateral nephrolithiasis. 4. Multiple thoracic and lumbar compression deformities. 5. Right adrenal adenoma. Xr Chest [facility] Result Date: 3/13/2020 IMPRESSION: No significant interval change in bilateral pleural effusions. Bibasilar atelectasis persist but there is some improvement at the left lung base. Xr Chest [facility] Result Date: 3/12/2020 IMPRESSION: 1. Overall decrease in aeration 2.  There is persistent pleural effusions with bibasilar atelectasis left greater than right. There is pulmonary vascular congestion. Xr Chest [facility] Result Date: 3/11/2020 IMPRESSION: Increased bibasilar atelectasis and effusion. Labs:  
 
Recent Labs  
  03/13/20 0439 03/12/20 
0335 WBC 6.3 11.2* HGB 11.3* 12.4 HCT 39.9 44.1  287 Recent Labs  
  03/13/20 0439 03/12/20 0335 03/12/20 
0144 03/11/20 
1736 NA  --  146* 146* 145 K  --  4.2 3.5 3.8 CL  --  110* 108 103 CO2  --  34* 35* 41* BUN  --  18 18 24* CREA  --  0.62 0.72 0.77 GLU  --  150* 227* 109* CA  --  8.2* 8.4* 8.9 MG 2.2  --   --   --   
PHOS 1.9*  --   --   --   
 
Recent Labs  
  03/11/20 1736 SGOT 19 ALT 25 AP 67 TBILI 0.3 TP 6.6 ALB 3.1*  
GLOB 3.5 Recent Labs  
  03/12/20 
0144 APTT 24.3 No results for input(s): FE, TIBC, PSAT, FERR in the last 72 hours. No results found for: FOL, RBCF No results for input(s): PH, PCO2, PO2 in the last 72 hours. Recent Labs  
  03/12/20 0335 03/12/20 
0144 TROIQ <0.05 <0.05 Lab Results Component Value Date/Time Cholesterol, total 168 05/21/2010 03:00 AM  
 HDL Cholesterol 53 05/21/2010 03:00 AM  
 LDL, calculated 95.4 05/21/2010 03:00 AM  
 Triglyceride 98 05/21/2010 03:00 AM  
 CHOL/HDL Ratio 3.2 05/21/2010 03:00 AM  
 
Lab Results Component Value Date/Time Glucose (POC) 102 (H) 03/12/2020 12:06 PM  
 Glucose (POC) 103 (H) 03/12/2020 08:26 AM  
 Glucose (POC) 120 (H) 10/27/2018 06:05 AM  
 Glucose (POC) 133 (H) 10/27/2018 12:18 AM  
 Glucose (POC) 177 (H) 10/26/2018 06:21 PM  
 
Lab Results Component Value Date/Time  Color YELLOW/STRAW 03/11/2020 07:56 PM  
 Appearance CLEAR 03/11/2020 07:56 PM  
 Specific gravity 1.018 03/11/2020 07:56 PM  
 Specific gravity 1.005 03/07/2017 01:54 PM  
 pH (UA) 5.0 03/11/2020 07:56 PM  
 Protein 30 (A) 03/11/2020 07:56 PM  
 Glucose NEGATIVE  03/11/2020 07:56 PM  
 Ketone NEGATIVE  03/11/2020 07:56 PM  
 Bilirubin NEGATIVE  03/11/2020 07:56 PM  
 Urobilinogen 0.2 03/11/2020 07:56 PM  
 Nitrites NEGATIVE  03/11/2020 07:56 PM  
 Leukocyte Esterase TRACE (A) 03/11/2020 07:56 PM  
 Epithelial cells FEW 03/11/2020 07:56 PM  
 Bacteria NEGATIVE  03/11/2020 07:56 PM  
 WBC 0-4 03/11/2020 07:56 PM  
 RBC 0-5 03/11/2020 07:56 PM  
 
 
 
Medications Reviewed:  
 
Current Facility-Administered Medications Medication Dose Route Frequency  enoxaparin (LOVENOX) injection 60 mg  60 mg SubCUTAneous Q12H  sertraline (ZOLOFT) tablet 25 mg  25 mg Oral QPM  
 sodium chloride (NS) flush 5-40 mL  5-40 mL IntraVENous Q8H  
 sodium chloride (NS) flush 5-40 mL  5-40 mL IntraVENous PRN  
 acetaminophen (TYLENOL) tablet 650 mg  650 mg Oral Q4H PRN  
 cefepime (MAXIPIME) 2 g in 0.9% sodium chloride (MBP/ADV) 100 mL  2 g IntraVENous Q24H  
 balsam peru-castor oiL (VENELEX) ointment   Topical BID  collagenase (SANTYL) 250 unit/gram ointment   Topical DAILY  albuterol-ipratropium (DUO-NEB) 2.5 MG-0.5 MG/3 ML  3 mL Nebulization Q4H RT  
 Vancomycin- pharmacy to dose   Other Rx Dosing/Monitoring  vancomycin (VANCOCIN) 750 mg in 0.9% sodium chloride (MBP/ADV) 250 mL  750 mg IntraVENous Q24H  
 
______________________________________________________________________ EXPECTED LENGTH OF STAY: 4d 16h ACTUAL LENGTH OF STAY:          2 Sammie Smith MD

## 2020-03-13 NOTE — CONSULTS
Palliative Medicine Consult Durga: [phone] ([phone]) Patient Name: Reta Dowling YOB: 1925 Date of Initial Consult: 3/13/2020 Reason for Consult: care decisions Requesting Provider: Dr. Eric Hillman Primary Care Physician: Josafat Benavides 
 
 SUMMARY:  
Reta Dowling is a 80 y. o. with a past history of COPD on 2-3L home oxygen, HTN, osteoporosis, multiple falls w injuries, hx CVA/TIA, who was admitted on 3/11/2020 from [facility] with a diagnosis of SOB/ fever, AMS. Current medical issues leading to Palliative Medicine involvement include: acute hypoxic and hypercarbic  (CO2 over 90) respiratory failure on BIPAP off/on since 3/11, now with improved CO2. Patient is . AMD on chart (mp[facility] [address] (dtr) , 2[facility] MAREK), 3[facility] [address] [facility] (dtr)). Baseline function :  WC, nonambulatory, on 2 L oxygen (sats drop easily), enjoys activities at [facility] -- playing Bridge, crafts, appetite good (weight stable) PALLIATIVE DIAGNOSES:  
1. Acute on chronic hypercarbic respiratory failure, COPD 2. Altered mental status -- improved with BIPAP, likely due to CO2 elevation 3. Debility 4. Cellulitis LLE PLAN:  
1. Palliative Medicine services introduced to daughters at bedside, both are nurses, they have good insight into their mom's current condition. 1. They are clear about DNR, and no intubation (ok with BIPAP) 2. They do not want patient to return to [facility] (not happy w care there) and are working on [facility] 3. They are aware that BIPAP is a temporary measure-- hoping it is a bridge to recovery to at or near her baseline. 4. If she were to worsen despite current interventions, they would strongly consider shift to comfort focus. 5. Right now she is making some progress. 6. Recommend we regroup Monday to see how she's done over the weekend. . Will see how she's doing -- it's possible that ILIA won't be high enough level of care for her. We also talked about option of adding hospice support to her care at time of discharge-- will plan to discuss more, and patient may be able to participate during a break off BIPAP 2. Initial consult note routed to primary continuity provider and/or primary health care team members 3. Communicated plan of care with: Palliative Crystal FLEMING 192 Team 
 
 GOALS OF CARE / TREATMENT PREFERENCES:  
 
GOALS OF CARE: 
Patient/Health Care Proxy Stated Goals: Rehabilitation TREATMENT PREFERENCES:  
Code Status: DNR Advance Care Planning: 
[] [facility] Interdisciplinary Team has updated the ACP Navigator with Shann and Patient Capacity Advance Care Planning 3/13/2020 Patient's Healthcare Decision Maker is: -  
Primary Decision Maker Name -  
Primary Decision Maker Phone Number -  
Primary Decision Maker Relationship to Patient -  
Confirm Advance Directive Yes, on file Does the patient have other document types - Medical Interventions: Limited additional interventions Other Instructions: Other: As far as possible, the palliative care team has discussed with patient / health care proxy about goals of care / treatment preferences for patient. HISTORY:  
 
History obtained from: dtrs, chart CHIEF COMPLAINT: SOB, AMS 
 
HPI/SUBJECTIVE: The patient is:  
[] Verbal and participatory [x] Non-participatory due to:  BIPAP 
 
80year old female w hisotry as above At [facility], found to be more sluggish, more short of breath, not responsive like usual 
 
Clinical Pain Assessment (nonverbal scale for severity on nonverbal patients):  
Clinical Pain Assessment Severity: 0 Duration: for how long has pt been experiencing pain (e.g., 2 days, 1 month, years) Frequency: how often pain is an issue (e.g., several times per day, once every few days, constant) FUNCTIONAL ASSESSMENT:  
 
Palliative Performance Scale (PPS): PPS: 30 
 
 
 PSYCHOSOCIAL/SPIRITUAL SCREENING:  
 
Palliative IDT has assessed this patient for cultural preferences / practices and a referral made as appropriate to needs (Cultural Services, Patient Advocacy, Ethics, etc.) Any spiritual / Moravian concerns: 
[] Yes /  [x] No 
 
Caregiver Burnout: 
[] Yes /  [x] No /  [] No Caregiver Present Anticipatory grief assessment:  
[x] Normal  / [] Maladaptive ESAS Anxiety: Anxiety: 0 
 
ESAS Depression:    
 
 
 REVIEW OF SYSTEMS:  
 
Positive and pertinent negative findings in ROS are noted above in HPI. The following systems were [x] reviewed / [] unable to be reviewed as noted in HPI Other findings are noted below. Systems: constitutional, ears/nose/mouth/throat, respiratory, gastrointestinal, genitourinary, musculoskeletal, integumentary, neurologic, psychiatric, endocrine. Positive findings noted below. Modified ESAS Completed by: provider Fatigue: 5 Drowsiness: 0 Pain: 0 Anxiety: 0 Nausea: 0 Anorexia: 0 Dyspnea: 2 Stool Occurrence(s): 1 PHYSICAL EXAM:  
 
From RN flowsheet: 
Wt Readings from Last 3 Encounters:  
03/13/20 124 lb 9 oz (56.5 kg) 01/23/19 126 lb 6.4 oz (57.3 kg)  
11/27/18 125 lb (56.7 kg) Blood pressure 103/67, pulse 92, temperature 98 °F (36.7 °C), resp. rate (!) 32, height 4' 11\" (1.499 m), weight 124 lb 9 oz (56.5 kg), SpO2 97 %. Pain Scale 1: Numeric (0 - 10) Pain Intensity 1: 0 Last bowel movement, if known:  
 
Constitutional: pt initially w BIPAP mask When gets it off, tells us how much she hates that mask, but appreciates that it helped her. Eyes: pupils equal, anicteric Generalized muscle wasting, thin Chest clear Bilat Speech fluent Oriented to name, place, knows her daughters Confused about details of why she's here HISTORY:  
 
Active Problems: 
  Cellulitis (3/11/2020) Past Medical History:  
Diagnosis Date  Arthritis  Breast lump 06/13/2018 right breast lump x a few days  HTN (hypertension)  Hypercholesteremia  Hyperlipidemia  Osteoporosis  Stroke ([address] CVA, TIA  TIA (transient ischemic attack)  Vertebral fracture Past Surgical History:  
Procedure Laterality Date  HX HIP REPLACEMENT  2009  
 left  HX ORTHOPAEDIC    
 left hip replacement History reviewed. No pertinent family history. History reviewed, no pertinent family history. Social History Tobacco Use  Smoking status: Former Smoker Years: 40.00 Last attempt to quit: 1986 Years since quittin.3  Smokeless tobacco: Never Used Substance Use Topics  Alcohol use: Yes Allergies Allergen Reactions  Hydrochlorothiazide Nausea and Vomiting Current Facility-Administered Medications Medication Dose Route Frequency  enoxaparin (LOVENOX) injection 60 mg  60 mg SubCUTAneous Q12H  furosemide (LASIX) injection 20 mg  20 mg IntraVENous ONCE  
 sertraline (ZOLOFT) tablet 25 mg  25 mg Oral QPM  
 sodium chloride (NS) flush 5-40 mL  5-40 mL IntraVENous Q8H  
 sodium chloride (NS) flush 5-40 mL  5-40 mL IntraVENous PRN  
 acetaminophen (TYLENOL) tablet 650 mg  650 mg Oral Q4H PRN  
 cefepime (MAXIPIME) 2 g in 0.9% sodium chloride (MBP/ADV) 100 mL  2 g IntraVENous Q24H  
 balsam peru-castor oiL (VENELEX) ointment   Topical BID  collagenase (SANTYL) 250 unit/gram ointment   Topical DAILY  albuterol-ipratropium (DUO-NEB) 2.5 MG-0.5 MG/3 ML  3 mL Nebulization Q4H RT  
 Vancomycin- pharmacy to dose   Other Rx Dosing/Monitoring  vancomycin (VANCOCIN) 750 mg in 0.9% sodium chloride (MBP/ADV) 250 mL  750 mg IntraVENous Q24H  
 
 
 
 LAB AND IMAGING FINDINGS:  
 
Lab Results Component Value Date/Time WBC 6.3 2020 04:39 AM  
 HGB 11.3 (L) 2020 04:39 AM  
 PLATELET 135  04:39 AM  
 
Lab Results Component Value Date/Time  Sodium 146 (H) 2020 03:35 AM  
 Potassium 4.2 03/12/2020 03:35 AM  
 Chloride 110 (H) 03/12/2020 03:35 AM  
 CO2 34 (H) 03/12/2020 03:35 AM  
 BUN 18 03/12/2020 03:35 AM  
 Creatinine 0.62 03/12/2020 03:35 AM  
 Calcium 8.2 (L) 03/12/2020 03:35 AM  
 Magnesium 2.2 03/13/2020 04:39 AM  
 Phosphorus 1.9 (L) 03/13/2020 04:39 AM  
  
Lab Results Component Value Date/Time AST (SGOT) 19 03/11/2020 05:36 PM  
 Alk. phosphatase 67 03/11/2020 05:36 PM  
 Protein, total 6.6 03/11/2020 05:36 PM  
 Albumin 3.1 (L) 03/11/2020 05:36 PM  
 Globulin 3.5 03/11/2020 05:36 PM  
 
Lab Results Component Value Date/Time INR 1.0 11/07/2011 08:05 AM  
 Prothrombin time 10.6 11/07/2011 08:05 AM  
 aPTT 24.3 03/12/2020 01:44 AM  
  
No results found for: IRON, FE, TIBC, IBCT, PSAT, FERR No results found for: PH, PCO2, PO2 No components found for: [place] Lab Results Component Value Date/Time CK 41 03/07/2017 07:11 AM  
 CK - MB <1.0 03/07/2017 07:11 AM  
  
 
 
   
 
Total time: 50min Counseling / coordination time, spent as noted above: 35 
> 50% counseling / coordination?:  
 
Prolonged service was provided for  []30 min   []75 min in face to face time in the presence of the patient, spent as noted above. Time Start:  
Time End:  
Note: this can only be billed with 25503 (initial) or 04089 (follow up). If multiple start / stop times, list each separately.

## 2020-03-13 NOTE — PROGRESS NOTES
Bedside shift change report given to [address] (oncoming nurse) by Guru Azul RN (offgoing nurse). Report included the following information SBAR, Kardex, MAR and Cardiac Rhythm NSR. Assumed care of patient 1600. Pt in bed dyspneic at rest sats maintained above 90% at 2.5L.   
 
1800: 20G IV placed, pt transported to CT for CTA. Tolerated fairly well pt placed on NRB. 1928: Notified sia Cortes NP of pt BP of 199/63. Pt is asymptomatic and has be hypotensive. Will recheck bp in an hour. Bedside shift change report given to Chet Rice (oncoming nurse) by Saint Corolla RN (offgoing nurse). Report included the following information SBAR.

## 2020-03-13 NOTE — PROGRESS NOTES
Lovenox Monitoring Indication: DVT Recent Labs  
  03/13/20 
0439 03/12/20 
0335 03/12/20 
0144 03/11/20 
1736 HGB 11.3* 12.4 13.4 12.5  287 335 298 CREA  --  0.62 0.72 0.77 Current Weight: 56.5 kg 
Est. CrCl = 37.9 ml/min Current Dose: 50 mg subcutaneously every 12 hours. Plan: Change to 60 mg subcutaneously every 12 hours for change in weight per P&T protocol. Pharmacy will continue to monitor the patient daily and make changes as needed.   
 
Arpita Tolentino, PharmD, BCPS

## 2020-03-14 NOTE — PROGRESS NOTES
[address] [facility] Adult  Hospitalist Group Hospitalist Progress Note Adrianne Hutton MD 
Answering service: [phone] OR [phone] from in house phone Date of Service:  3/14/2020 NAME:  Subhash Lucas :  [id number] MRN:  [id number] Admission Summary:  
The patient is a 80-year-old female with past medical history of breast lump, arthritis, hypertension, hypercholesteremia, hyperlipidemia, osteoporosis, TIA and vertebral fracture, who presents to the hospital with the above-mentioned symptom. The patient is a poor historian. The history was primarily obtained from the daughter. The daughter reports that the patient started having some low-grade fevers in the last few days and concentrated urine. She thought that the patient was having UTI. She also has ulcer on the base of her fifth toe on the right foot that started getting more swollen and red. The facility where she was thought it was infected, gave her some clindamycin. She became more lethargic today. The daughter reports she was not confused, but just lethargic and sleeping more. She got concerned and decided to bring the patient to the hospital.  In the hospital, the patient was requested to be admitted under the hospitalist service. The patient currently is resting in bed. The daughter reports the patient probably has history of COPD and \"always has high carbon dioxide level. \"  She also wears oxygen at home at around 2-3 liters.   The patient and the daughter deny any complaints of headaches, blurry vision, sore throat, trouble swallowing, trouble with speech, any chest pain, shortness of breath, cough, fever, chills, abdominal pain, constipation, diarrhea, urinary symptoms, focal or generalized neurological weakness, recent travel, sick contacts, falls, injuries, hematemesis, melena, hemoptysis, hematuria or any other concerns or problems. Interval history / Subjective: Follow up R foot cellulitis, hypoxia, hypernatremia. Patient seen and examined at the bedside. Labs, images and notes reviewed Discussed with nursing staff, orders reviewed. Plan discussed with patient/Family Patient more awake, responsive. Off BiPAP. Daughter Bianca Lion at bedside. Clearly stated BiPAP use as a temporary measure and not wanting as a continuous need. Has more focus on comfort of the patient. Does not want any invasive procedures. Assessment & Plan: # Acute on chronic combined respiratory failure, on BIPAP, improving # R foot cellulitis, r/o PVD, ANGELLA pending # R LE Acute DVT, on full dose lovenox. No PE per CTA on 3/13 # AMS, likely metabolic encephalopathy, hypercarbia related, improving # Decubitus sores POA, wound Cx # Hypotension, resolved # Hypertension # Hypernatremia, stable at 146 # Dehydration # HLD # Grade 1 diastolic dysfunction per echo 3/13 Plan: 
-Pulm consultation noted. Trial off BIPAP. Will check ABG off BiPAP. -Palliative care consultation noted. Reassess on Monday per palliative care. -Resume metoprolol low-dose, amlodipine half dose at 5 mg 
-Resume Lasix 20 mg p.o. daily from tomorrowhalf dose of home 
-I/O monitoring 
-BMP 
-Monitor closely for mentation 
-Continue current mx 
-CTA chest negative for PE Code status: DNR 
DVT prophylaxis: Lovenox full dose Care Plan discussed with: Patient/Family and Nurse Anticipated Disposition: SNF/LTC Anticipated Discharge: Greater than 48 hours Hospital Problems  Date Reviewed: 1/15/2019 Codes Class Noted POA Cellulitis ICD-10-CM: L03.90 ICD-9-CM: 682.9  3/11/2020 Unknown Review of Systems: A comprehensive review of systems was negative except for that written in the HPI. Mainly from family. Vital Signs:  
 Last 24hrs VS reviewed since prior progress note. Most recent are: 
Visit Vitals /77 Pulse 89 Temp 99.2 °F (37.3 °C) Resp (!) 43 Ht 4' 11\" (1.499 m) Wt 56 kg (123 lb 7.3 oz) SpO2 94% BMI 24.94 kg/m² Intake/Output Summary (Last 24 hours) at 3/14/2020 1740 Last data filed at 3/14/2020 5481 Gross per 24 hour Intake  Output 1400 ml Net -1400 ml Physical Examination:  
 
GENERAL:  Alert x2, awake, older female, appears to be stated age. HEENT:  Pupils are equal and reactive to light. Moist mucous membranes. Tympanic membranes are clear. NECK:  Supple. CHEST:   Improved breath sounds. Coarse breathing. No wheezing. CORONARY:  S1 and S2 are heard. ABDOMEN:  Soft, nontender and nondistended. Bowel sounds are physiological. 
EXTREMITIES:  No clubbing, no cyanosis, no edema. There is a wound at the base of the right foot on the lateral side. The patient does have some surrounding erythema and some redness extending to the base of the leg. NEURO/PSYCH:  Limited exam.  DTRs 1+ x4. Strength could not be tested. Sensory is grossly within normal limits. SKIN:  Warm. Data Review:  
Review and/or order of clinical lab test 
Review and/or order of tests in the radiology section of CPT Review and/or order of tests in the medicine section of CPT Xr Foot Rt Ap/lat Result Date: 3/11/2020 IMPRESSION: Soft tissue ulceration adjacent to the fifth metatarsal head. No radiodense foreign body or evidence of osteomyelitis. Ct Head Wo Cont Result Date: 3/12/2020 IMPRESSION: No acute findings. Ct Chest Wo Cont Result Date: 3/12/2020 IMPRESSION: 1. Bilateral pleural effusions with bibasilar opacification. 2. Cholelithiasis. 3. Bilateral nephrolithiasis. 4. Multiple thoracic and lumbar compression deformities. 5. Right adrenal adenoma. Cta Chest W Or W Wo Cont Result Date: 3/13/2020 IMPRESSION: Moderate bilateral pleural effusions and underlying consolidation have not changed significantly compared to the prior examination. No evidence of pulmonary embolism. Xr Chest [facility] Result Date: 3/13/2020 IMPRESSION: No significant interval change in bilateral pleural effusions. Bibasilar atelectasis persist but there is some improvement at the left lung base. Xr Chest [facility] Result Date: 3/12/2020 IMPRESSION: 1. Overall decrease in aeration 2. There is persistent pleural effusions with bibasilar atelectasis left greater than right. There is pulmonary vascular congestion. Xr Chest [facility] Result Date: 3/11/2020 IMPRESSION: Increased bibasilar atelectasis and effusion. Labs:  
 
Recent Labs  
  03/14/20 
0405 03/13/20 
7256 WBC 7.6 6.3 HGB 12.6 11.3* HCT 42.6 39.9  252 Recent Labs  
  03/14/20 
0348 03/13/20 
0439 03/12/20 
0335 03/12/20 
0144 *  --  146* 146*  
K 3.0*  --  4.2 3.5   --  110* 108 CO2 42*  --  34* 35* BUN 12  --  18 18 CREA 0.52*  --  0.62 0.72 *  --  150* 227* CA 9.0  --  8.2* 8.4* MG  --  2.2  --   --   
PHOS  --  1.9*  --   -- No results for input(s): SGOT, GPT, ALT, AP, TBIL, TBILI, TP, ALB, GLOB, GGT, AML, LPSE in the last 72 hours. No lab exists for component: AMYP, HLPSE Recent Labs  
  03/12/20 0144 APTT 24.3 No results for input(s): FE, TIBC, PSAT, FERR in the last 72 hours. No results found for: FOL, RBCF No results for input(s): PH, PCO2, PO2 in the last 72 hours. Recent Labs  
  03/12/20 0335 03/12/20 0144 TROIQ <0.05 <0.05 Lab Results Component Value Date/Time Cholesterol, total 168 05/21/2010 03:00 AM  
 HDL Cholesterol 53 05/21/2010 03:00 AM  
 LDL, calculated 95.4 05/21/2010 03:00 AM  
 Triglyceride 98 05/21/2010 03:00 AM  
 CHOL/HDL Ratio 3.2 05/21/2010 03:00 AM  
 
Lab Results Component Value Date/Time  Glucose (POC) 102 (H) 03/12/2020 12:06 PM  
 Glucose (POC) 103 (H) 03/12/2020 08:26 AM  
 Glucose (POC) 120 (H) 10/27/2018 06:05 AM  
 Glucose (POC) 133 (H) 10/27/2018 12:18 AM  
 Glucose (POC) 177 (H) 10/26/2018 06:21 PM  
 
Lab Results Component Value Date/Time Color YELLOW/STRAW 03/11/2020 07:56 PM  
 Appearance CLEAR 03/11/2020 07:56 PM  
 Specific gravity 1.018 03/11/2020 07:56 PM  
 Specific gravity 1.005 03/07/2017 01:54 PM  
 pH (UA) 5.0 03/11/2020 07:56 PM  
 Protein 30 (A) 03/11/2020 07:56 PM  
 Glucose NEGATIVE  03/11/2020 07:56 PM  
 Ketone NEGATIVE  03/11/2020 07:56 PM  
 Bilirubin NEGATIVE  03/11/2020 07:56 PM  
 Urobilinogen 0.2 03/11/2020 07:56 PM  
 Nitrites NEGATIVE  03/11/2020 07:56 PM  
 Leukocyte Esterase TRACE (A) 03/11/2020 07:56 PM  
 Epithelial cells FEW 03/11/2020 07:56 PM  
 Bacteria NEGATIVE  03/11/2020 07:56 PM  
 WBC 0-4 03/11/2020 07:56 PM  
 RBC 0-5 03/11/2020 07:56 PM  
 
 
 
Medications Reviewed:  
 
Current Facility-Administered Medications Medication Dose Route Frequency  hydrALAZINE (APRESOLINE) 20 mg/mL injection 5-10 mg  5-10 mg IntraVENous Q6H PRN  potassium chloride SR (KLOR-CON 10) tablet 40 mEq  40 mEq Oral Q4H  
 [START ON 3/15/2020] vancomycin trough on 3/15 @ 04:00 - draw PRIOR to dose administration   Other ONCE  
 amLODIPine (NORVASC) tablet 5 mg  5 mg Oral DAILY  metoprolol tartrate (LOPRESSOR) tablet 12.5 mg  12.5 mg Oral BID  enoxaparin (LOVENOX) injection 60 mg  60 mg SubCUTAneous Q12H  
 albuterol-ipratropium (DUO-NEB) 2.5 MG-0.5 MG/3 ML  3 mL Nebulization TID RT  
 sertraline (ZOLOFT) tablet 25 mg  25 mg Oral QPM  
 sodium chloride (NS) flush 5-40 mL  5-40 mL IntraVENous Q8H  
 sodium chloride (NS) flush 5-40 mL  5-40 mL IntraVENous PRN  
 acetaminophen (TYLENOL) tablet 650 mg  650 mg Oral Q4H PRN  
 cefepime (MAXIPIME) 2 g in 0.9% sodium chloride (MBP/ADV) 100 mL  2 g IntraVENous Q24H  
 balsam peru-castor oiL (VENELEX) ointment   Topical BID  collagenase (SANTYL) 250 unit/gram ointment   Topical DAILY  Vancomycin- pharmacy to dose   Other Rx Dosing/Monitoring  vancomycin (VANCOCIN) 750 mg in 0.9% sodium chloride (MBP/ADV) 250 mL  750 mg IntraVENous Q24H  
 
______________________________________________________________________ EXPECTED LENGTH OF STAY: 4d 16h ACTUAL LENGTH OF STAY:          3 Jocy Golden MD

## 2020-03-15 NOTE — PROGRESS NOTES
Problem: Falls - Risk of 
Goal: *Absence of Falls Description: Document Saad Felix Fall Risk and appropriate interventions in the flowsheet. Outcome: Progressing Towards Goal 
Note: Fall Risk Interventions: 
Mobility Interventions: Strengthening exercises (ROM-active/passive), Communicate number of staff needed for ambulation/transfer Mentation Interventions: Bed/chair exit alarm, Adequate sleep, hydration, pain control, Door open when patient unattended, Familiar objects from home, More frequent rounding, Reorient patient, Room close to nurse's station, Toileting rounds, Update white board Medication Interventions: Patient to call before getting OOB, Teach patient to arise slowly, Bed/chair exit alarm, Evaluate medications/consider consulting pharmacy Elimination Interventions: Call light in reach, Patient to call for help with toileting needs, Toilet paper/wipes in reach, Stay With Me (per policy), Toileting schedule/hourly rounds History of Falls Interventions: Bed/chair exit alarm, Consult care management for discharge planning, Investigate reason for fall, Room close to nurse's station, Assess for delayed presentation/identification of injury for 48 hrs (comment for end date), Vital signs minimum Q4HRs X 24 hrs (comment for end date) Problem: Pressure Injury - Risk of 
Goal: *Prevention of pressure injury Description: Document Mohit Scale and appropriate interventions in the flowsheet. Outcome: Progressing Towards Goal 
Note: Pressure Injury Interventions: 
Sensory Interventions: Assess changes in LOC, Assess need for specialty bed, Check visual cues for pain, Turn and reposition approx. every two hours (pillows and wedges if needed) Moisture Interventions: Apply protective barrier, creams and emollients, Absorbent underpads, Assess need for specialty bed, Internal/External urinary devices, Maintain skin hydration (lotion/cream) Activity Interventions: PT/OT evaluation, Increase time out of bed Mobility Interventions: Float heels, Suspension boots, Turn and reposition approx. every two hours(pillow and wedges), Pressure redistribution bed/mattress (bed type) Nutrition Interventions: Discuss nutritional consult with provider, Document food/fluid/supplement intake, Offer support with meals,snacks and hydration Friction and Shear Interventions: Minimize layers, Apply protective barrier, creams and emollients Problem: Breathing Pattern - Ineffective Goal: *Absence of hypoxia Outcome: Progressing Towards Goal 
  
 
Last 3 Recorded Weights in this Encounter 03/13/20 0871 03/14/20 0325 03/15/20 5513 Weight: 56.5 kg (124 lb 9 oz) 56 kg (123 lb 7.3 oz) 51.6 kg (113 lb 12.1 oz) Weighed patient using the bed scale. Patient weighed with 1 blanket, 1 sheet, 1 pillow, and heel boots removed. Bedside shift change report given to Ellen Kim (oncoming nurse) by Tiffanie Hinojosa (offgoing nurse). Report included the following information SBAR, Kardex, Intake/Output, MAR, Accordion, Recent Results, Med Rec Status, Cardiac Rhythm NSR and Quality Measures.

## 2020-03-15 NOTE — ROUTINE PROCESS
Bedside and Verbal shift change report given to Silvia Grove (oncoming nurse) by Helen Rahman RN (offgoing nurse). Report included the following information SBAR, Kardex, ED Summary, Intake/Output, MAR and Recent Results.

## 2020-03-15 NOTE — CONSULTS
[address] Name:  Rylan Marley 
MR#:  [id number] :  1925 ACCOUNT #:  [de-identified] DATE OF SERVICE:  03/15/2020 REASON FOR CONSULTATION:  Peripheral vascular disease with right foot wound. HISTORY OF PRESENT ILLNESS:  The patient is a 80-year-old female, who was hospitalized 4 days ago with altered mental status. She was found to have significant CO2 retention and has been treated intermittently with BiPAP while in the hospital.  At baseline, she lives at an assisted living facility. She has had a small wound on the right lateral foot that has been managed with home health wound care. Her overall functional status has worsened over the last couple of weeks. Her daughter is here at the hospital with her and is the primary provider of her history. To evaluate her foot, arterial Dopplers were done that show an ankle-brachial index of 0.42 on the right and 0.58 on the left. The patient denies foot pain. She has been getting wound care and antibiotics for her foot since she has been in the hospital with apparently significant improvement in the erythema that she had on admission. PAST MEDICAL HISTORY: 
1. Hypertension. 2.  Debility. ADMISSION MEDICATIONS: 
1. Lasix. 2.  Amlodipine. 3.  Sertraline. 4.  Metoprolol. SOCIAL HISTORY:  The patient lives in the assisted living facility. She has 2 daughters, who are nurses who are involved in her care. FAMILY HISTORY:  Unremarkable. ALLERGIES:  HYDROCHLOROTHIAZIDE. REVIEW OF SYSTEMS:  She has no specific cardiac, GI, , neurologic, hematologic, or psychiatric complaints. PHYSICAL EXAMINATION: 
GENERAL:  She is an elderly female, in no distress. She is awake and responsive and appears comfortable. LUNGS:  Bilateral breath sounds. HEART:  Regular rate and rhythm. ABDOMEN:  Soft and nontender. PERIPHERAL VASCULAR:  Her right femoral pulse is not palpable.   The left femoral pulse is palpable. There are no pulses below the femoral level on either side. There are no wounds on the left foot. There is a 2- to 3-mm punched-out wound on the lateral right fifth metatarsal head area. There is no drainage and no erythema. NEUROLOGIC:  Grossly normal with intact motor and sensory function. IMPRESSION:  The patient has significant peripheral vascular disease with a small improving ulceration on the right lateral aspect of the foot. Based on her age and comorbidities, I would not recommend further vascular evaluation, workup, or intervention. The blood supply to her foot may or may not be adequate to heal her wound, but hopefully, will be adequate to keep it from worsening significantly with appropriate attention and wound care. These issues were discussed with the patient's daughter who also favors a non-interventional approach to managing her peripheral vascular disease. If the wound worsens following discharge from the hospital, she will bring her in for evaluation. Otherwise, she plans to manage the wound expectantly with continued home health as an outpatient. We will see the patient again as needed while in the hospital.  Please call if questions. MD JAZMINE [email or web address] 
D:  03/15/2020 12:26 
T:  03/15/2020 13:10 
JOB #:  [id number]

## 2020-03-15 NOTE — PROGRESS NOTES
[facility]  Hospitalist Group Hospitalist Progress Note Harrold Buerger, MD 
Answering service: [phone] or [phone] from in house phone Date of Service:  3/15/2020 NAME:  Adeline Sam :  1925 MRN:  [id number] Admission Summary:  
The patient is a 70-year-old female with past medical history of breast lump, arthritis, hypertension, hypercholesteremia, hyperlipidemia, osteoporosis, TIA and vertebral fracture, who presents to the hospital with the above-mentioned symptom. The patient is a poor historian. The history was primarily obtained from the daughter. The daughter reports that the patient started having some low-grade fevers in the last few days and concentrated urine. She thought that the patient was having UTI. She also has ulcer on the base of her fifth toe on the right foot that started getting more swollen and red. The facility where she was thought it was infected, gave her some clindamycin. She became more lethargic today. The daughter reports she was not confused, but just lethargic and sleeping more. She got concerned and decided to bring the patient to the hospital.  In the hospital, the patient was requested to be admitted under the hospitalist service. The patient currently is resting in bed. The daughter reports the patient probably has history of COPD and \"always has high carbon dioxide level. \"  She also wears oxygen at home at around 2-3 liters.   The patient and the daughter deny any complaints of headaches, blurry vision, sore throat, trouble swallowing, trouble with speech, any chest pain, shortness of breath, cough, fever, chills, abdominal pain, constipation, diarrhea, urinary symptoms, focal or generalized neurological weakness, recent travel, sick contacts, falls, injuries, hematemesis, melena, hemoptysis, hematuria or any other concerns or problems. Interval history / Subjective: Follow up R foot cellulitis, hypoxia, hypernatremia. Patient seen and examined at the bedside. Labs, images and notes reviewed Discussed with nursing staff, orders reviewed. Plan discussed with patient/Family Patient more awake, responsive. Off BiPAP. Daughter Jaswinder Buys at bedside. BP meds readjusted. Monitor closely for BIPAP needs. May consider ABG if any changes. LEADUS bilaterally. Vasc Sx consultation for PAD. Get labs. No CP/SOB. Intermittent sleepy. Has not worked with PT/OT. Would get PT/OT. [address] conversation per palliative care team. 
 
Assessment & Plan: # Acute on chronic combined respiratory failure, on BIPAP, improving # R foot cellulitis, r/o PVD, ANGELLA abnormal 
# R LE Acute DVT, on full dose lovenox. No PE per CTA on 3/13 # PVD, abnormal ANGELLA # AMS, likely metabolic encephalopathy, hypercarbia related, improving # Decubitus sores POA, wound Cx # Hypotension, resolved # Hypertension, poorly controlled # Hypernatremia, stable at 146 # Dehydration # HLD # Grade 1 diastolic dysfunction per echo 3/13 # Debility Plan: 
-Pulm consultation noted. Trial off BIPAP. Stable ABG off BiPAP. Further per Pulm. -LE Arterial DUS for assessment of PVD given abnormal ANGELLA. R: severe, L: Moderate 
-Vascular surgery consult. Family leaning for not cosnidering surgical intervention but want to know details and meet with Vascular surgery. May facilitate their informed decision in considering Palliative/ hospice care. 
-Palliative care consultation noted. Reassess on Monday per palliative care. 
-Wound care per Wound care team recommendations and nursing 
-Transition IV Cefepime+Vanc to PO Bactrim for braod coverage empirically in absence of any wound cultures 
-Transition metoprolol low-dose to Coreg 6.25mg BID 
-Up amlodipine to 10mg daily 
-Resume Lasix 20 mg p.o. daily 3/15half dose of home 
-I/O monitoring 
-BMP -Monitor closely for mentation 
-PT/OT 
-Continue current mx 
-CTA chest negative for PE Code status: DNR 
DVT prophylaxis: Lovenox full dose Care Plan discussed with: Patient/Family and Nurse Anticipated Disposition: SNF/LTC Anticipated Discharge: Greater than 48 hours Hospital Problems  Date Reviewed: 1/15/2019 Codes Class Noted POA Cellulitis ICD-10-CM: L03.90 ICD-9-CM: 682.9  3/11/2020 Unknown Review of Systems: A comprehensive review of systems was negative except for that written in the HPI. Mainly from family. Vital Signs:  
 Last 24hrs VS reviewed since prior progress note. Most recent are: 
Visit Vitals /57 (BP 1 Location: Right arm, BP Patient Position: At rest) Pulse 87 Temp 97.9 °F (36.6 °C) Resp 28 Ht 4' 11\" (1.499 m) Wt 51.6 kg (113 lb 12.1 oz) SpO2 99% BMI 22.98 kg/m² Intake/Output Summary (Last 24 hours) at 3/15/2020 1042 Last data filed at 3/15/2020 0135 Gross per 24 hour Intake 700 ml Output 650 ml Net 50 ml Physical Examination:  
 
GENERAL:  Alert x2, awake, older female, appears to be stated age. HEENT:  Pupils are equal and reactive to light. Moist mucous membranes. Tympanic membranes are clear. NECK:  Supple. CHEST:   Improved breath sounds. Coarse breathing. No wheezing. CORONARY:  S1 and S2 are heard. ABDOMEN:  Soft, nontender and nondistended. Bowel sounds are physiological. 
EXTREMITIES:  No clubbing, no cyanosis, no edema. There is a wound at the base of the right foot on the lateral side. Erythema better. Dressing clean and dry NEURO/PSYCH:  Limited exam.  DTRs 1+ x4. Strength could not be tested. Sensory is grossly within normal limits. SKIN:  Warm. Data Review:  
Review and/or order of clinical lab test 
Review and/or order of tests in the radiology section of CPT Review and/or order of tests in the medicine section of CPT Xr Foot Rt Ap/lat Result Date: 3/11/2020 IMPRESSION: Soft tissue ulceration adjacent to the fifth metatarsal head. No radiodense foreign body or evidence of osteomyelitis. Ct Head Wo Cont Result Date: 3/12/2020 IMPRESSION: No acute findings. Ct Chest Wo Cont Result Date: 3/12/2020 IMPRESSION: 1. Bilateral pleural effusions with bibasilar opacification. 2. Cholelithiasis. 3. Bilateral nephrolithiasis. 4. Multiple thoracic and lumbar compression deformities. 5. Right adrenal adenoma. Cta Chest W Or W Wo Cont Result Date: 3/13/2020 IMPRESSION: Moderate bilateral pleural effusions and underlying consolidation have not changed significantly compared to the prior examination. No evidence of pulmonary embolism. Xr Chest [facility] Result Date: 3/13/2020 IMPRESSION: No significant interval change in bilateral pleural effusions. Bibasilar atelectasis persist but there is some improvement at the left lung base. Xr Chest [facility] Result Date: 3/12/2020 IMPRESSION: 1. Overall decrease in aeration 2. There is persistent pleural effusions with bibasilar atelectasis left greater than right. There is pulmonary vascular congestion. Xr Chest [facility] Result Date: 3/11/2020 IMPRESSION: Increased bibasilar atelectasis and effusion. Labs:  
 
Recent Labs  
  03/14/20 
0405 03/13/20 
8986 WBC 7.6 6.3 HGB 12.6 11.3* HCT 42.6 39.9  252 Recent Labs  
  03/14/20 
0348 03/13/20 
0439 *  --   
K 3.0*  --   
  --   
CO2 42*  --   
BUN 12  --   
CREA 0.52*  --   
*  --   
CA 9.0  --   
MG  --  2.2 PHOS  --  1.9* No results for input(s): SGOT, GPT, ALT, AP, TBIL, TBILI, TP, ALB, GLOB, GGT, AML, LPSE in the last 72 hours. No lab exists for component: AMYP, HLPSE No results for input(s): INR, PTP, APTT, INREXT, INREXT in the last 72 hours. No results for input(s): FE, TIBC, PSAT, FERR in the last 72 hours. No results found for: FOL, RBCF No results for input(s): PH, PCO2, PO2 in the last 72 hours. No results for input(s): CPK, CKNDX, TROIQ in the last 72 hours. No lab exists for component: CPKMB Lab Results Component Value Date/Time Cholesterol, total 168 05/21/2010 03:00 AM  
 HDL Cholesterol 53 05/21/2010 03:00 AM  
 LDL, calculated 95.4 05/21/2010 03:00 AM  
 Triglyceride 98 05/21/2010 03:00 AM  
 CHOL/HDL Ratio 3.2 05/21/2010 03:00 AM  
 
Lab Results Component Value Date/Time Glucose (POC) 102 (H) 03/12/2020 12:06 PM  
 Glucose (POC) 103 (H) 03/12/2020 08:26 AM  
 Glucose (POC) 120 (H) 10/27/2018 06:05 AM  
 Glucose (POC) 133 (H) 10/27/2018 12:18 AM  
 Glucose (POC) 177 (H) 10/26/2018 06:21 PM  
 
Lab Results Component Value Date/Time Color YELLOW/STRAW 03/11/2020 07:56 PM  
 Appearance CLEAR 03/11/2020 07:56 PM  
 Specific gravity 1.018 03/11/2020 07:56 PM  
 Specific gravity 1.005 03/07/2017 01:54 PM  
 pH (UA) 5.0 03/11/2020 07:56 PM  
 Protein 30 (A) 03/11/2020 07:56 PM  
 Glucose NEGATIVE  03/11/2020 07:56 PM  
 Ketone NEGATIVE  03/11/2020 07:56 PM  
 Bilirubin NEGATIVE  03/11/2020 07:56 PM  
 Urobilinogen 0.2 03/11/2020 07:56 PM  
 Nitrites NEGATIVE  03/11/2020 07:56 PM  
 Leukocyte Esterase TRACE (A) 03/11/2020 07:56 PM  
 Epithelial cells FEW 03/11/2020 07:56 PM  
 Bacteria NEGATIVE  03/11/2020 07:56 PM  
 WBC 0-4 03/11/2020 07:56 PM  
 RBC 0-5 03/11/2020 07:56 PM  
 
 
 
Medications Reviewed:  
 
Current Facility-Administered Medications Medication Dose Route Frequency  enoxaparin (LOVENOX) injection 50 mg  1 mg/kg SubCUTAneous Q12H  
 [START ON 3/16/2020] amLODIPine (NORVASC) tablet 10 mg  10 mg Oral DAILY  amLODIPine (NORVASC) tablet 5 mg  5 mg Oral ONCE  carvediloL (COREG) tablet 6.25 mg  6.25 mg Oral BID WITH MEALS  trimethoprim-sulfamethoxazole (BACTRIM DS, SEPTRA DS) 160-800 mg per tablet 1 Tab  1 Tab Oral Q12H  hydrALAZINE (APRESOLINE) 20 mg/mL injection 5-10 mg  5-10 mg IntraVENous Q6H PRN  
 furosemide (LASIX) tablet 20 mg  20 mg Oral DAILY  albuterol-ipratropium (DUO-NEB) 2.5 MG-0.5 MG/3 ML  3 mL Nebulization TID RT  
 sertraline (ZOLOFT) tablet 25 mg  25 mg Oral QPM  
 sodium chloride (NS) flush 5-40 mL  5-40 mL IntraVENous Q8H  
 sodium chloride (NS) flush 5-40 mL  5-40 mL IntraVENous PRN  
 acetaminophen (TYLENOL) tablet 650 mg  650 mg Oral Q4H PRN  
 balsam peru-castor oiL (VENELEX) ointment   Topical BID  collagenase (SANTYL) 250 unit/gram ointment   Topical DAILY  Vancomycin- pharmacy to dose   Other Rx Dosing/Monitoring  
 
______________________________________________________________________ EXPECTED LENGTH OF STAY: 4d 16h ACTUAL LENGTH OF STAY:          4 Ashley Shore MD

## 2020-03-15 NOTE — PROGRESS NOTES
Problem: Falls - Risk of 
Goal: *Absence of Falls Description: Document Lydia Latin Fall Risk and appropriate interventions in the flowsheet. Outcome: Progressing Towards Goal 
Note: Fall Risk Interventions: 
Mobility Interventions: Assess mobility with egress test, Communicate number of staff needed for ambulation/transfer, OT consult for ADLs, Patient to call before getting OOB, PT Consult for mobility concerns, PT Consult for assist device competence, Utilize walker, cane, or other assistive device, Utilize gait belt for transfers/ambulation, Strengthening exercises (ROM-active/passive) Mentation Interventions: Adequate sleep, hydration, pain control, Door open when patient unattended, Evaluate medications/consider consulting pharmacy, Eyeglasses and hearing aids, Familiar objects from home, Reorient patient, Self-releasing belt, Toileting rounds, Update white board, Room close to nurse's station, Increase mobility, More frequent rounding Medication Interventions: Assess postural VS orthostatic hypotension, Evaluate medications/consider consulting pharmacy, Patient to call before getting OOB, Teach patient to arise slowly, Utilize gait belt for transfers/ambulation Elimination Interventions: Call light in reach, Elevated toilet seat, Patient to call for help with toileting needs, Toilet paper/wipes in reach, Toileting schedule/hourly rounds, Stay With Me (per policy) History of Falls Interventions: Consult care management for discharge planning, Door open when patient unattended, Evaluate medications/consider consulting pharmacy, Investigate reason for fall Problem: Pressure Injury - Risk of 
Goal: *Prevention of pressure injury Description: Document Mohit Scale and appropriate interventions in the flowsheet.  
Outcome: Progressing Towards Goal 
Note: Pressure Injury Interventions: 
Sensory Interventions: Assess changes in LOC, Assess need for specialty bed, Check visual cues for pain, Discuss PT/OT consult with provider, Float heels, Keep linens dry and wrinkle-free, Maintain/enhance activity level, Minimize linen layers, Pad between skin to skin, Turn and reposition approx. every two hours (pillows and wedges if needed) Moisture Interventions: Absorbent underpads, Internal/External urinary devices, Limit adult briefs, Offer toileting Q_hr, Minimize layers, Maintain skin hydration (lotion/cream), Moisture barrier Activity Interventions: Assess need for specialty bed, Pressure redistribution bed/mattress(bed type), PT/OT evaluation, Increase time out of bed Mobility Interventions: Assess need for specialty bed, Float heels, HOB 30 degrees or less, Pressure redistribution bed/mattress (bed type), PT/OT evaluation, Turn and reposition approx. every two hours(pillow and wedges) Nutrition Interventions: Document food/fluid/supplement intake, Discuss nutritional consult with provider, Offer support with meals,snacks and hydration Friction and Shear Interventions: HOB 30 degrees or less, Lift sheet, Minimize layers, Transferring/repositioning devices Bedside shift change report given to [facility] RN (oncoming nurse) by Shmuel Peres (offgoing nurse). Report included the following information SBAR, Kardex, ED Summary, Intake/Output, MAR, Recent Results, Med Rec Status, Cardiac Rhythm NSR, Alarm Parameters , and Quality Measures.

## 2020-03-15 NOTE — PROGRESS NOTES
Problem: Mobility Impaired (Adult and Pediatric) Goal: *Acute Goals and Plan of Care (Insert Text) Description: FUNCTIONAL STATUS PRIOR TO ADMISSION: The patient was functional at the wheelchair level and was modified independent for transfers to the chair using a grab bar to pull from per discussion with pts daughter. HOME SUPPORT PRIOR TO ADMISSION: Resided at an [facility], where per daughter she did not receive the amount of assistance likely needed for her safety. Physical Therapy Goals Initiated 3/15/2020 1. Patient will move from supine to sit and sit to supine , scoot up and down and roll side to side in bed with modified independence within 7 day(s). Additional goals will be established once assessed Outcome: Progressing Towards Goal 
PHYSICAL THERAPY EVALUATION Patient: Katarina Maciel [de-identified]80 y.o. female) Date: 3/15/2020 Primary Diagnosis: Cellulitis [L03.90] Precautions:  DNR, Fall ASSESSMENT Based on the objective data described below, the patient presents with activity intolerance with HTN, tachypnea, and impaired O2 saturation impacting functional mobility. Patient with minor generalized weakness and impaired balance likely from essential bed-rest and low baseline functioning. Despite this, she mobilized with no more than S, although only to EOB where noted rise in BP to as high as 205/58. In contrast with supine tachypnea up to 45 RR, and SPO2 to 86-88% on average on 5L, her pulmonary status improved at EOB with normal levels. Due to rapid rise in BP, alongside c/o dizziness, returned to supine again with only S. Once medical stability is obtained, she should be able to transfer bed<>chair to assess level of assistance necessary and further improve pulmonary function. Current Level of Function Impacting Discharge (mobility/balance): poor activity tolerance Functional Outcome Measure:   The patient scored 40 on the barthel outcome measure which is indicative of impaired ADL's. Other factors to consider for discharge: pts daughter reports she is unhappy with care at pts [facility] Patient will benefit from skilled therapy intervention to address the above noted impairments. PLAN : 
Recommendations and Planned Interventions: bed mobility training, transfer training, gait training, therapeutic exercises, neuromuscular re-education, orthotic/prosthetic training, patient and family training/education, and therapeutic activities Frequency/Duration: Patient will be followed by physical therapy:  4 times a week to address goals. Recommendation for discharge: (in order for the patient to meet his/her long term goals) To be determined: SNF? Return to [facility]? This discharge recommendation: 
Has not yet been discussed the attending provider and/or case management IF patient discharges home will need the following DME: to be determined (TBD) SUBJECTIVE:  
Patient stated I have heard that.  Pt struggled to effectively exhale, even with multi-modal cueing to include 'blow out the candles' only worsening tachypnea. Supportive daughter present throughout visit. OBJECTIVE DATA SUMMARY:  
HISTORY:   
Past Medical History:  
Diagnosis Date Arthritis Breast lump 06/13/2018  
 right breast lump x a few days HTN (hypertension) Hypercholesteremia Hyperlipidemia Osteoporosis Stroke [facility]) CVA, TIA  
 TIA (transient ischemic attack) Vertebral fracture Past Surgical History:  
Procedure Laterality Date HX HIP REPLACEMENT  2009  
 left HX ORTHOPAEDIC    
 left hip replacement Personal factors and/or comorbidities impacting plan of care: see above Home Situation Home Environment: Assisted living One/Two Story Residence: One story Living Alone: No 
Support Systems: Assisted living, Child(sanya), [employer], Family member(s), Friends \ neighbors Patient Expects to be Discharged to[de-identified] Assisted living Current DME Used/Available at Home: Wheelchair EXAMINATION/PRESENTATION/DECISION MAKING:  
Critical Behavior: 
Neurologic State: Alert Orientation Level: Appropriate for age Cognition: Appropriate decision making Safety/Judgement: Awareness of environment Hearing: Auditory Auditory Impairment: Hard of hearing, bilateral 
 
Range Of Motion: 
AROM: Generally decreased, functional 
  
  
  
  
  
  
  
Strength:   
Strength: Generally decreased, functional 
  
  
  
  
  
  
Tone & Sensation:  
  
  
  
  
  
Sensation: Intact Functional Mobility: 
Bed Mobility: 
Rolling: Supervision;Bed Modified Supine to Sit: Supervision;Bed Modified Sit to Supine: Supervision;Bed Modified Balance:  
Sitting: Impaired Sitting - Static: Good (unsupported) Sitting - Dynamic: Fair (occasional) Functional Measure: 
Barthel Index: 
 
Bathin Bladder: 0(purewick) Bowels: 5 Groomin Dressin Feeding: 10 Mobility: 0 Stairs: 0 Toilet Use: 5 Transfer (Bed to Chair and Back): 10 Total: 40/100 The Barthel ADL Index: Guidelines 1. The index should be used as a record of what a patient does, not as a record of what a patient could do. 2. The main aim is to establish degree of independence from any help, physical or verbal, however minor and for whatever reason. 3. The need for supervision renders the patient not independent. 4. A patient's performance should be established using the best available evidence. Asking the patient, friends/relatives and nurses are the usual sources, but direct observation and common sense are also important. However direct testing is not needed. 5. Usually the patient's performance over the preceding 24-48 hours is important, but occasionally longer periods will be relevant. 6. Middle categories imply that the patient supplies over 50 per cent of the effort. 7. Use of aids to be independent is allowed. Tiki Goldstein., Barthel, D.W. (1483). Functional evaluation: the Barthel Index. [address] (14)2. CARLOS ALBERTO Pena Cinda Akin.Steven., Erna, [address] (). Measuring the change indisability after inpatient rehabilitation; comparison of the responsiveness of the Barthel Index and Functional [place] Measure. Journal of Neurology, Neurosurgery, and Psychiatry, 66(4), 088-008. TAMIKO Santiago, KAMERON Galvan, & Gilmer Sanz M.A. (2004.) Assessment of post-stroke quality of life in cost-effectiveness studies: The usefulness of the Barthel Index and the EuroQoL-5D. [facility], 13, 575-77 Physical Therapy Evaluation Charge Determination History Examination Presentation Decision-Making HIGH Complexity :3+ comorbidities / personal factors will impact the outcome/ POC  MEDIUM Complexity : 3 Standardized tests and measures addressing body structure, function, activity limitation and / or participation in recreation  HIGH Complexity : Unstable and unpredictable characteristics  Other outcome measures barthel  MEDIUM Based on the above components, the patient evaluation is determined to be of the following complexity level: MEDIUM Pain Ratin Activity Tolerance:  
Poor Please refer to the flowsheet for vital signs taken during this treatment. After treatment patient left in no apparent distress:  
Supine in bed, Call bell within reach, Caregiver / family present, and heel offloading boots in place B  
 
COMMUNICATION/EDUCATION:  
The patients plan of care was discussed with: Registered nurse. Fall prevention education was provided and the patient/caregiver indicated understanding., Patient/family have participated as able in goal setting and plan of care. , and Patient/family agree to work toward stated goals and plan of care.  
 
Thank you for this referral. 
Anderson Broderick, PT, DPT  
 Board-Certified Geriatric Clinical Specialist  
Certified Exercise Expert for Aging Adults Time Calculation: 45 mins

## 2020-03-15 NOTE — PROGRESS NOTES
1917: Pct assisted patient to bathroom using walker one person assist. Liam Ochoa had some difficulties getting patient back to bed with 1 person assist and walker. Patient had some increased work of breathing using abdominal muscle, BIPAP was placed back on patient and instructed patient to keep it on for a couple of hours. SPO2 95% with RR 16 on BIPAP. Problem: Falls - Risk of 
Goal: *Absence of Falls Description: Document Aurora Beady Fall Risk and appropriate interventions in the flowsheet. Outcome: Progressing Towards Goal 
Note: Fall Risk Interventions: 
Mobility Interventions: Assess mobility with egress test, Communicate number of staff needed for ambulation/transfer, OT consult for ADLs, Patient to call before getting OOB, PT Consult for assist device competence, Strengthening exercises (ROM-active/passive), Utilize walker, cane, or other assistive device, Utilize gait belt for transfers/ambulation, PT Consult for mobility concerns Mentation Interventions: Adequate sleep, hydration, pain control, Family/sitter at bedside, Familiar objects from home, Eyeglasses and hearing aids, More frequent rounding, Reorient patient, Room close to nurse's station, Self-releasing belt, Toileting rounds, Update white board Medication Interventions: Assess postural VS orthostatic hypotension, Evaluate medications/consider consulting pharmacy, Patient to call before getting OOB, Teach patient to arise slowly, Utilize gait belt for transfers/ambulation Elimination Interventions: Call light in reach, Elevated toilet seat, Patient to call for help with toileting needs, Toilet paper/wipes in reach, Stay With Me (per policy), Toileting schedule/hourly rounds History of Falls Interventions: Consult care management for discharge planning, Door open when patient unattended, Evaluate medications/consider consulting pharmacy, Investigate reason for fall Problem: Pressure Injury - Risk of 
 Goal: *Prevention of pressure injury Description: Document Mohit Scale and appropriate interventions in the flowsheet. Outcome: Progressing Towards Goal 
Note: Pressure Injury Interventions: 
Sensory Interventions: Assess need for specialty bed, Assess changes in LOC, Turn and reposition approx. every two hours (pillows and wedges if needed), Minimize linen layers, Maintain/enhance activity level, Float heels Moisture Interventions: Apply protective barrier, creams and emollients, Internal/External urinary devices, Maintain skin hydration (lotion/cream), Minimize layers, Moisture barrier, Offer toileting Q_hr Activity Interventions: Assess need for specialty bed, Increase time out of bed, Pressure redistribution bed/mattress(bed type), PT/OT evaluation Mobility Interventions: Assess need for specialty bed, Float heels, HOB 30 degrees or less, Pressure redistribution bed/mattress (bed type), PT/OT evaluation, Turn and reposition approx. every two hours(pillow and wedges) Nutrition Interventions: Document food/fluid/supplement intake, Discuss nutritional consult with provider, Offer support with meals,snacks and hydration Friction and Shear Interventions: Lift team/patient mobility team, Minimize layers, Transferring/repositioning devices, Transfer aides:transfer board/[place] lift/ceiling lift Bedside shift change report given to [id number] (oncoming nurse) by Sharif Zuleta (offgoing nurse). Report included the following information SBAR, Kardex, ED Summary, Intake/Output, MAR, Recent Results, Med Rec Status, Cardiac Rhythm NSR, Alarm Parameters  and Quality Measures.

## 2020-03-15 NOTE — PROGRESS NOTES
Pharmacist Note - Vancomycin Dosing Therapy day 5 Indication: cellulitis of right foot Current regimen: 750mg q24h A Trough Level resulted at 4.5 mcg/mL which was obtained 24.5 hrs post-dose. The extrapolated \"true\" trough is approximately 4.6 mcg/mL based on the patient's known kinetics. Doses documented as given according to schedule Goal trough: 10 - 15 mcg/mL Plan: Change to 750mg q16h . Pharmacy will continue to monitor this patient daily for changes in clinical status and renal function.

## 2020-03-15 NOTE — PROGRESS NOTES
Vascular: 
 
Elderly female with small right lateral foot wound and PVD. Her foot has improved in appearance while in hospital with decreased redness. She has no pain. Based on ANGELLA's she will not likely heal her wound but it may remain stable with wound care. I would not recommend more aggressive vascular workup and treatment (arteriogram and possible surgery) based on her age and comorbidities. Discussed with her daughter who also favors a conservative approach. Would continue current care while in hospital. Her daughter will bring her to see me as an outpatient if needed post discharge. Will see PRN - please call if questions.

## 2020-03-16 NOTE — CONSULTS
Palliative Medicine Consult Durga: [phone] ([phone]) Patient Name: Irineo Dubose YOB: 1925 Date of Initial Consult: 3/13/2020 Reason for Consult: care decisions Requesting Provider: Dr. Drea Ovalle Primary Care Physician: Josafat Chung 
 
 SUMMARY:  
Irineo Dubose is a 80 y. o. with a past history of COPD on 2-3L home oxygen, HTN, osteoporosis, multiple falls w injuries, hx CVA/TIA, who was admitted on 3/11/2020 from [facility] with a diagnosis of SOB/ fever, AMS. Current medical issues leading to Palliative Medicine involvement include: acute hypoxic and hypercarbic  (CO2 over 90) respiratory failure on BIPAP off/on since 3/11, now with improved CO2. Patient is . AMD on chart (mpoa [address] (dtr) , 2mpoa Negin Edwardsr MAREK), 3mpoa Lasha Galeano (dtr)). Baseline function :  WC, nonambulatory, on 2 L oxygen (sats drop easily), enjoys activities at [facility] -- playing Bridge, crafts, appetite good (weight stable) PALLIATIVE DIAGNOSES:  
1. Acute on chronic hypercarbic respiratory failure, COPD 2. Altered mental status -- improved with BIPAP, likely due to CO2 elevation 3. Debility 4. Cellulitis LLE PLAN:  
1. Discussion with Pinkyemmanuel Bailey at bedside 1. No real gains over this weekend, still on/off bipap with intermittent confusion. 2. Pinky Bailey and I discussed how long term NIV at night is not a great solution for her mom, she agrees it wouldn't work well 3. I think dtrs can feel at peace that they have given aggressive measures a try to see if she could rally this time. (5 days of BIPAP, abx, diuretics) 4. Juliet inclined to consider shift to comfort care, we talked about what that would look like 5. Could consider hospice at one of the daughter's houses, vs. At [facility] vs. LTC. 6. Talked about what hospice provides/ philosophy. 9. Pinky Bailey will talk with her sister Quinton Villalobos when she gets here later this afternoon. 1. If they are in agreement with comfort care, recommend make that switch today, stop BIPAP, focus on comfort, put in comfort orders and get hospice info sess (I have requested hospice info session), and then assess tomorrow about WHERE hospice would be most reasonable ([facility] vs. Home vs facility) 8. Communicated plan of care with: Palliative IDTCrystal 192 Team, case management, attending MD 
 
 GOALS OF CARE / TREATMENT PREFERENCES:  
 
GOALS OF CARE: 
Patient/Health Care Proxy Stated Goals: Rehabilitation TREATMENT PREFERENCES:  
Code Status: DNR Advance Care Planning: 
[] The [facility] Interdisciplinary Team has updated the ACP Navigator with [place] and Patient Capacity Advance Care Planning 3/13/2020 Patient's Healthcare Decision Maker is: -  
Primary Decision Maker Name -  
Primary Decision Maker Phone Number -  
Primary Decision Maker Relationship to Patient -  
Confirm Advance Directive Yes, on file Does the patient have other document types - Medical Interventions: Limited additional interventions Other Instructions: Other: As far as possible, the palliative care team has discussed with patient / health care proxy about goals of care / treatment preferences for patient. HISTORY:  
 
History obtained from: dtrs, chart CHIEF COMPLAINT: SOB, AMS 
 
HPI/SUBJECTIVE: The patient is:  
[] Verbal and participatory [x] Non-participatory due to:  BIPAP 
 
80year old female w hisotry as above At [facility], found to be more sluggish, more short of breath, not responsive like usual 
 
Clinical Pain Assessment (nonverbal scale for severity on nonverbal patients):  
Clinical Pain Assessment Severity: 0 Duration: for how long has pt been experiencing pain (e.g., 2 days, 1 month, years) Frequency: how often pain is an issue (e.g., several times per day, once every few days, constant)  FUNCTIONAL ASSESSMENT:  
 
 Palliative Performance Scale (PPS): PPS: 30 
 
 
 PSYCHOSOCIAL/SPIRITUAL SCREENING:  
 
Palliative IDT has assessed this patient for cultural preferences / practices and a referral made as appropriate to needs (Cultural Services, Patient Advocacy, Ethics, etc.) Any spiritual / Caodaism concerns: 
[] Yes /  [x] No 
 
Caregiver Burnout: 
[] Yes /  [x] No /  [] No Caregiver Present Anticipatory grief assessment:  
[x] Normal  / [] Maladaptive ESAS Anxiety: Anxiety: 0 
 
ESAS Depression:    
 
 
 REVIEW OF SYSTEMS:  
 
Positive and pertinent negative findings in ROS are noted above in HPI. The following systems were [x] reviewed / [] unable to be reviewed as noted in HPI Other findings are noted below. Systems: constitutional, ears/nose/mouth/throat, respiratory, gastrointestinal, genitourinary, musculoskeletal, integumentary, neurologic, psychiatric, endocrine. Positive findings noted below. Modified ESAS Completed by: provider Fatigue: 5 Drowsiness: 0 Pain: 0 Anxiety: 0 Nausea: 0 Anorexia: 0 Dyspnea: 2 Stool Occurrence(s): 1 PHYSICAL EXAM:  
 
From RN flowsheet: 
Wt Readings from Last 3 Encounters:  
03/16/20 117 lb 8.1 oz (53.3 kg) 01/23/19 126 lb 6.4 oz (57.3 kg)  
11/27/18 125 lb (56.7 kg) Blood pressure (!) 140/37, pulse 60, temperature 97.1 °F (36.2 °C), resp. rate 16, height 4' 11\" (1.499 m), weight 117 lb 8.1 oz (53.3 kg), SpO2 95 %. Pain Scale 1: Numeric (0 - 10) Pain Intensity 1: 0 Last bowel movement, if known:  
 
Constitutional: pt comfortable, sleepy not engaging in activity in room NAD HISTORY:  
 
Active Problems: 
  Cellulitis (3/11/2020) Past Medical History:  
Diagnosis Date  Arthritis  Breast lump 06/13/2018  
 right breast lump x a few days  HTN (hypertension)  Hypercholesteremia  Hyperlipidemia  Osteoporosis  Stroke ([facility] [address] CVA, TIA  TIA (transient ischemic attack)  Vertebral fracture Past Surgical History:  
Procedure Laterality Date  HX HIP REPLACEMENT  2009  
 left  HX ORTHOPAEDIC    
 left hip replacement History reviewed. No pertinent family history. History reviewed, no pertinent family history. Social History Tobacco Use  Smoking status: Former Smoker Years: 40.00 Last attempt to quit: 1986 Years since quittin.3  Smokeless tobacco: Never Used Substance Use Topics  Alcohol use: Yes Allergies Allergen Reactions  Hydrochlorothiazide Nausea and Vomiting Current Facility-Administered Medications Medication Dose Route Frequency  enoxaparin (LOVENOX) injection 50 mg  1 mg/kg SubCUTAneous Q12H  carvediloL (COREG) tablet 6.25 mg  6.25 mg Oral BID WITH MEALS  trimethoprim-sulfamethoxazole (BACTRIM DS, SEPTRA DS) 160-800 mg per tablet 1 Tab  1 Tab Oral Q12H  
 amLODIPine (NORVASC) tablet 10 mg  10 mg Oral DAILY  hydrALAZINE (APRESOLINE) 20 mg/mL injection 5-10 mg  5-10 mg IntraVENous Q6H PRN  
 furosemide (LASIX) tablet 20 mg  20 mg Oral DAILY  albuterol-ipratropium (DUO-NEB) 2.5 MG-0.5 MG/3 ML  3 mL Nebulization TID RT  
 sertraline (ZOLOFT) tablet 25 mg  25 mg Oral QPM  
 sodium chloride (NS) flush 5-40 mL  5-40 mL IntraVENous Q8H  
 sodium chloride (NS) flush 5-40 mL  5-40 mL IntraVENous PRN  
 acetaminophen (TYLENOL) tablet 650 mg  650 mg Oral Q4H PRN  
 balsam peru-castor oiL (VENELEX) ointment   Topical BID  collagenase (SANTYL) 250 unit/gram ointment   Topical DAILY  
 
 
 
 LAB AND IMAGING FINDINGS:  
 
Lab Results Component Value Date/Time WBC 11.1 (H) 2020 01:29 AM  
 HGB 12.0 2020 01:29 AM  
 PLATELET 932  01:29 AM  
 
Lab Results Component Value Date/Time  Sodium 144 2020 01:29 AM  
 Potassium 4.4 2020 01:29 AM  
 Chloride 105 2020 01:29 AM  
 CO2 37 (H) 2020 01:29 AM  
 BUN 16 2020 01:29 AM  
 Creatinine 0.75 03/16/2020 01:29 AM  
 Calcium 9.1 03/16/2020 01:29 AM  
 Magnesium 2.2 03/15/2020 12:58 PM  
 Phosphorus 1.9 (L) 03/13/2020 04:39 AM  
  
Lab Results Component Value Date/Time AST (SGOT) 19 03/11/2020 05:36 PM  
 Alk. phosphatase 67 03/11/2020 05:36 PM  
 Protein, total 6.6 03/11/2020 05:36 PM  
 Albumin 3.1 (L) 03/11/2020 05:36 PM  
 Globulin 3.5 03/11/2020 05:36 PM  
 
Lab Results Component Value Date/Time INR 1.0 11/07/2011 08:05 AM  
 Prothrombin time 10.6 11/07/2011 08:05 AM  
 aPTT 24.3 03/12/2020 01:44 AM  
  
No results found for: IRON, FE, TIBC, IBCT, PSAT, FERR No results found for: PH, PCO2, PO2 No components found for: [place] Lab Results Component Value Date/Time CK 41 03/07/2017 07:11 AM  
 CK - MB <1.0 03/07/2017 07:11 AM  
  
 
 
   
 
Total time: 35min Counseling / coordination time, spent as noted above: 25 
> 50% counseling / coordination?:  
 
Prolonged service was provided for  []30 min   []75 min in face to face time in the presence of the patient, spent as noted above. Time Start:  
Time End:  
Note: this can only be billed with 55556 (initial) or 53548 (follow up). If multiple start / stop times, list each separately.

## 2020-03-16 NOTE — PROGRESS NOTES
Problem: Falls - Risk of 
Goal: *Absence of Falls Description: Document Xiomy Garcia Fall Risk and appropriate interventions in the flowsheet. Outcome: Progressing Towards Goal 
Note: Fall Risk Interventions: 
Mobility Interventions: Communicate number of staff needed for ambulation/transfer, OT consult for ADLs, Patient to call before getting OOB, PT Consult for assist device competence Mentation Interventions: Adequate sleep, hydration, pain control, Evaluate medications/consider consulting pharmacy, Eyeglasses and hearing aids, Family/sitter at bedside, Increase mobility, More frequent rounding, Reorient patient, Room close to nurse's station Medication Interventions: Assess postural VS orthostatic hypotension, Evaluate medications/consider consulting pharmacy, Patient to call before getting OOB Elimination Interventions: Call light in reach, Patient to call for help with toileting needs, Stay With Me (per policy), Toilet paper/wipes in reach History of Falls Interventions: Consult care management for discharge planning, Door open when patient unattended, Investigate reason for fall, Room close to nurse's station, Utilize gait belt for transfer/ambulation Problem: Pressure Injury - Risk of 
Goal: *Prevention of pressure injury Description: Document Mohit Scale and appropriate interventions in the flowsheet. Outcome: Progressing Towards Goal 
Note: Pressure Injury Interventions: 
Sensory Interventions: Assess need for specialty bed, Discuss PT/OT consult with provider, Float heels, Keep linens dry and wrinkle-free, Minimize linen layers, Monitor skin under medical devices, Pad between skin to skin, Turn and reposition approx. every two hours (pillows and wedges if needed) Moisture Interventions: Apply protective barrier, creams and emollients, Check for incontinence Q2 hours and as needed, Limit adult briefs, Maintain skin hydration (lotion/cream), Minimize layers, Offer toileting Q_hr 
 
 Activity Interventions: Assess need for specialty bed, Increase time out of bed, PT/OT evaluation Mobility Interventions: Assess need for specialty bed, Float heels, PT/OT evaluation, Turn and reposition approx. every two hours(pillow and wedges) Nutrition Interventions: Document food/fluid/supplement intake, Discuss nutritional consult with provider Friction and Shear Interventions: Apply protective barrier, creams and emollients, Lift sheet, Lift team/patient mobility team, Minimize layers Problem: Patient Education: Go to Patient Education Activity Goal: Patient/Family Education Outcome: Progressing Towards Goal 
  
Bedside and Verbal shift change report given to Baldemar Perdomo RN (oncoming nurse) by Linsey White RN (offgoing nurse). Report included the following information SBAR, Kardex, Recent Results and Cardiac Rhythm NSR to ST. Patient Vitals for the past 12 hrs: 
 Temp Pulse Resp BP SpO2  
03/16/20 1754  73  105/62   
03/16/20 1600     92 % 03/16/20 1522 98.4 °F (36.9 °C) 68 17 (!) 166/39 92 % 03/16/20 1403     93 % 03/16/20 1200     95 % 03/16/20 1119 97.1 °F (36.2 °C) 60 16 (!) 140/37 98 % 03/16/20 0836     98 % 03/16/20 0813 98 °F (36.7 °C) 81 25 183/43 93 % 03/16/20 0800     95 %

## 2020-03-16 NOTE — PROGRESS NOTES
Occupational Therapy Chart reviewed. RN requesting therapy defer at this time. Pt is back on BIPAP. Will continue to follow as appropriate.  Elza Ramos MS, OTR/L

## 2020-03-16 NOTE — HOSPICE
[facility] 
[phone] Patient Name: Kirby Putnam YOB: 1925 Age: 80 y.o. [facility] RN Note:  Hospice consult received, reviewing chart. Will follow up with Unit Nurse and Care Manager to discuss plan of care, patient status and discharge disposition within the hour. 3:30pm: In to meet with patient and her 2 daughters, Mike Pope [phone] and Peg Lion [phone] who are both RNs. Patient is pleasantly confused, dyspneic, and anxious but denies any discomfort. Patient not meeting criteria GIP LOC at this time. Daughters want patient to admit to home hospice however, they are not willing to send her back to [place] where she previously lived and have a week before she can go to her new residence at [address]. Daughter Mike Pope is considering taking her to home and will discuss with her  this evening. [facility] at the routine LOC was also discussed as a possibility until she moves to [place]. We will check in with daughters tomorrow regarding home vs. [facility]. Thank you for the opportunity to be of service to this patient.

## 2020-03-16 NOTE — PROGRESS NOTES
Chart checked, pt not yet cleared for PT today, she is now back on BIPAP. PT will follow and see as appropriate. Thank you, Alex Gray, PT Addendum: per Dr. Chris Sultana, recommendation is for nursing to perform PROM to patient.  Alex Gray, PT

## 2020-03-16 NOTE — PROGRESS NOTES
Problem: Falls - Risk of 
Goal: *Absence of Falls Description: Document Tg Butler Fall Risk and appropriate interventions in the flowsheet. Outcome: Progressing Towards Goal 
Note: Fall Risk Interventions: 
Mobility Interventions: Assess mobility with egress test, Communicate number of staff needed for ambulation/transfer, OT consult for ADLs, Patient to call before getting OOB, PT Consult for assist device competence, PT Consult for mobility concerns, Strengthening exercises (ROM-active/passive) Mentation Interventions: Adequate sleep, hydration, pain control, Door open when patient unattended, Evaluate medications/consider consulting pharmacy, Eyeglasses and hearing aids, Increase mobility, More frequent rounding, Reorient patient, Toileting rounds, Update white board, Room close to nurse's station Medication Interventions: Assess postural VS orthostatic hypotension, Evaluate medications/consider consulting pharmacy, Teach patient to arise slowly, Patient to call before getting OOB Elimination Interventions: Call light in reach, Elevated toilet seat, Patient to call for help with toileting needs, Stay With Me (per policy), Toilet paper/wipes in reach, Toileting schedule/hourly rounds History of Falls Interventions: Door open when patient unattended, Investigate reason for fall, Room close to nurse's station, Assess for delayed presentation/identification of injury for 48 hrs (comment for end date), Utilize gait belt for transfer/ambulation Problem: Pressure Injury - Risk of 
Goal: *Prevention of pressure injury Description: Document Mohit Scale and appropriate interventions in the flowsheet.  
Outcome: Progressing Towards Goal 
Note: Pressure Injury Interventions: 
Sensory Interventions: Assess changes in LOC, Assess need for specialty bed, Check visual cues for pain, Discuss PT/OT consult with provider, Float heels, Keep linens dry and wrinkle-free, Minimize linen layers, Turn and reposition approx. every two hours (pillows and wedges if needed), Maintain/enhance activity level Moisture Interventions: Absorbent underpads, Assess need for specialty bed, Internal/External urinary devices, Limit adult briefs, Maintain skin hydration (lotion/cream), Minimize layers, Check for incontinence Q2 hours and as needed Activity Interventions: Assess need for specialty bed, Chair cushion, Increase time out of bed, Pressure redistribution bed/mattress(bed type), PT/OT evaluation Mobility Interventions: Assess need for specialty bed, Float heels, HOB 30 degrees or less, Pressure redistribution bed/mattress (bed type), PT/OT evaluation, Turn and reposition approx. every two hours(pillow and wedges) Nutrition Interventions: Document food/fluid/supplement intake, Discuss nutritional consult with provider, Offer support with meals,snacks and hydration Friction and Shear Interventions: Apply protective barrier, creams and emollients, Lift sheet, Lift team/patient mobility team, Minimize layers, Transferring/repositioning devices Problem: Breathing Pattern - Ineffective Goal: *Absence of hypoxia Outcome: Progressing Towards Goal 
Note: Pt weaned from 5 L to 4 L NC overnight. Pt tolerated well. Oxygen saturated remained above 90%. Bedside shift change report given to JORGE Webb (oncoming nurse) by Jordan Farris RN (offgoing nurse). Report included the following information SBAR, Kardex, ED Summary, Procedure Summary, Intake/Output, MAR, Recent Results and Cardiac Rhythm NSR.

## 2020-03-16 NOTE — PROGRESS NOTES
JENN 
 
1. CM received consult for hospice information session.  CM completed referral to [facility] and provided daughter Hiren Taylor with full hospice list.  
 
Keyon Jenkins, MS

## 2020-03-16 NOTE — PROGRESS NOTES
Name: [facility]: [address]  
: 1925 Admit Date: 3/11/2020 Phone: [phone]  Room: [facility] PCP: Josafat Preston  MRN: [id number] Date: 3/16/2020  Code: DNR   
   
HPI: 
 
3/16 Confusion noted this am 
Was not on NIV last night CTA without PE 
 
3/13 Target tidal volumes were to much for her; above 8ml/kg/predicted weight. I reduced them, but will need to be reduced further if we keep her on this mode More responsive today. Less dyspnea. Discussed with bedside daughters and patient 3/12 
2:22 PM    
 
History was obtained from daughter. I was asked by Suhas Santillan MD to see Nani Avina in consultation for a chief complaint of acute respiratory failure. History of Present Illness: 80year old female with past medical as given below presented to [facility] with increased shortness of breath, altered mental status for the past 3 days and fever yesterday. Noted to have cellulitis of the LLE. No cough, sputum. Currently on NIV - AVAPS  
ABG 7. Data reviewed: 
Wbc 11.2 Cr 0.62 Troponin normal. 
Cultures pending. Ct chest bilateral effusion and compressive atelectasis. CXr bilateral effusion. OLD echo 2019 - normal EF. Past Medical History:  
Diagnosis Date  Arthritis  Breast lump 2018  
 right breast lump x a few days  HTN (hypertension)  Hypercholesteremia  Hyperlipidemia  Osteoporosis  Stroke ([facility] [address] CVA, TIA  TIA (transient ischemic attack)  Vertebral fracture Past Surgical History:  
Procedure Laterality Date  HX HIP REPLACEMENT  2009  
 left  HX ORTHOPAEDIC    
 left hip replacement History reviewed. No pertinent family history. Social History Tobacco Use  Smoking status: Former Smoker Years: 40.00 Last attempt to quit: 1986 Years since quittin.3  Smokeless tobacco: Never Used Substance Use Topics  Alcohol use: Yes Allergies Allergen Reactions  Hydrochlorothiazide Nausea and Vomiting Current Facility-Administered Medications Medication Dose Route Frequency  enoxaparin (LOVENOX) injection 50 mg  1 mg/kg SubCUTAneous Q12H  carvediloL (COREG) tablet 6.25 mg  6.25 mg Oral BID WITH MEALS  trimethoprim-sulfamethoxazole (BACTRIM DS, SEPTRA DS) 160-800 mg per tablet 1 Tab  1 Tab Oral Q12H  
 amLODIPine (NORVASC) tablet 10 mg  10 mg Oral DAILY  hydrALAZINE (APRESOLINE) 20 mg/mL injection 5-10 mg  5-10 mg IntraVENous Q6H PRN  
 furosemide (LASIX) tablet 20 mg  20 mg Oral DAILY  albuterol-ipratropium (DUO-NEB) 2.5 MG-0.5 MG/3 ML  3 mL Nebulization TID RT  
 sertraline (ZOLOFT) tablet 25 mg  25 mg Oral QPM  
 sodium chloride (NS) flush 5-40 mL  5-40 mL IntraVENous Q8H  
 sodium chloride (NS) flush 5-40 mL  5-40 mL IntraVENous PRN  
 acetaminophen (TYLENOL) tablet 650 mg  650 mg Oral Q4H PRN  
 balsam peru-castor oiL (VENELEX) ointment   Topical BID  collagenase (SANTYL) 250 unit/gram ointment   Topical DAILY REVIEW OF SYSTEMS Negative except as stated in the HPI. Physical Exam:  
Visit Vitals /43 (BP 1 Location: Left arm, BP Patient Position: At rest) Pulse 81 Temp 98 °F (36.7 °C) Resp 25 Ht 4' 11\" (1.499 m) Wt 53.3 kg (117 lb 8.1 oz) SpO2 98% BMI 23.73 kg/m² General:  Alert, cooperative. Head:  Normocephalic. Eyes:  Conjunctivae/corneas clear. Nose: Nares normal. Septum midline. Throat: Lips, mucosa, and tongue normal.  
Neck: Supple, symmetrical, trachea midline, no adenopathy. Lungs:   Decreased air entry at the bases. Heart:  Regular rate and rhythm, S1, S2 normal  
Abdomen:   Soft, non-tender. Bowel sounds normal.  
Extremities: Extremities normal, atraumatic, no cyanosis or edema. Pulses: 2+ and symmetric all extremities. Neurologic: Grossly nonfocal  
 
 
Lab Results Component Value Date/Time  Sodium 144 03/16/2020 01:29 AM  
 Potassium 4.4 03/16/2020 01:29 AM  
 Chloride 105 03/16/2020 01:29 AM  
 CO2 37 (H) 03/16/2020 01:29 AM  
 BUN 16 03/16/2020 01:29 AM  
 Creatinine 0.75 03/16/2020 01:29 AM  
 Glucose 126 (H) 03/16/2020 01:29 AM  
 Calcium 9.1 03/16/2020 01:29 AM  
 Magnesium 2.2 03/15/2020 12:58 PM  
 Phosphorus 1.9 (L) 03/13/2020 04:39 AM  
 Lactic acid 1.9 03/12/2020 01:44 AM  
 
 
Lab Results Component Value Date/Time WBC 11.1 (H) 03/16/2020 01:29 AM  
 HGB 12.0 03/16/2020 01:29 AM  
 PLATELET 935 70/72/3912 01:29 AM  
 .0 (H) 03/16/2020 01:29 AM  
 
 
Lab Results Component Value Date/Time INR 1.0 11/07/2011 08:05 AM  
 aPTT 24.3 03/12/2020 01:44 AM  
 AST (SGOT) 19 03/11/2020 05:36 PM  
 Alk. phosphatase 67 03/11/2020 05:36 PM  
 Protein, total 6.6 03/11/2020 05:36 PM  
 Albumin 3.1 (L) 03/11/2020 05:36 PM  
 Globulin 3.5 03/11/2020 05:36 PM  
 
 
Lab Results Component Value Date/Time TSH 0.45 01/19/2019 02:18 AM  
  
 
No results found for: PH, PHI, PCO2, PCO2I, PO2, PO2I, HCO3, HCO3I, FIO2, FIO2I Lab Results Component Value Date/Time CK 41 03/07/2017 07:11 AM  
 CK-MB Index Cannot be calulated 03/07/2017 07:11 AM  
 Troponin-I, Qt. <0.05 03/12/2020 03:35 AM  
 BNP 85 01/12/2017 06:53 PM  
  
 
Lab Results Component Value Date/Time Culture result: NO GROWTH 5 DAYS 03/11/2020 05:36 PM  
 Culture result: ESCHERICHIA COLI (A) 10/24/2018 02:52 PM  
 Culture result: NO GROWTH 5 DAYS 10/24/2018 01:22 PM  
 
 
Lab Results Component Value Date/Time Vancomycin,trough 4.5 (L) 03/15/2020 03:52 AM  
 CK 41 03/07/2017 07:11 AM  
 CK 77 05/14/2013 08:30 AM  
 
 
Lab Results Component Value Date/Time  Color YELLOW/STRAW 03/11/2020 07:56 PM  
 Appearance CLEAR 03/11/2020 07:56 PM  
 Specific gravity 1.005 03/07/2017 01:54 PM  
 pH (UA) 5.0 03/11/2020 07:56 PM  
 Protein 30 (A) 03/11/2020 07:56 PM  
 Glucose NEGATIVE  03/11/2020 07:56 PM  
 Ketone NEGATIVE  03/11/2020 07:56 PM  
 Bilirubin NEGATIVE  03/11/2020 07:56 PM  
 Blood SMALL (A) 03/11/2020 07:56 PM  
 Urobilinogen 0.2 03/11/2020 07:56 PM  
 Nitrites NEGATIVE  03/11/2020 07:56 PM  
 Leukocyte Esterase TRACE (A) 03/11/2020 07:56 PM  
 WBC 0-4 03/11/2020 07:56 PM  
 RBC 0-5 03/11/2020 07:56 PM  
 Bacteria NEGATIVE  03/11/2020 07:56 PM  
 
 
IMPRESSION: 
=========== 
-acute hypoxemic and hypercarbic respiratory failure. -RLE cellulitis. 
-Altered mental status from hypercarbia. -Bilateral pleural effusions with bibasilar atelectasis - diastolic heart failure? 
-osteoporosis. 
-Hx of fall in the past. 
 
PLAN: 
==== 
-On NIV qhs. On AVAPS, currently. However it should be noted that this is a mode not recommended in acute situations, only in chronic and maintainance situations should this be used. If she were to worsen I would recommend switching back to Bi-Level pressure support to ventilate her. She cannot maintain and achieve adequate target tidal volumes if she does not have the mental capacity to do so.  
-diuretics as able. -If no improvement in effusion, pleural tap in am. 
-abx per primary team. 
-Patient is  DNR. -no plans for thoracentesis at this time Florecita Grant PA-C

## 2020-03-17 NOTE — PROGRESS NOTES
[address] [facility] Adult  Hospitalist Group Hospitalist Progress Note Harrold Buerger, MD 
Answering service: [phone] OR [phone] from in house phone Date of Service:  3/16/2020 NAME:  Adeline Sam :  1925 MRN:  [id number] Admission Summary:  
The patient is a 60-year-old female with past medical history of breast lump, arthritis, hypertension, hypercholesteremia, hyperlipidemia, osteoporosis, TIA and vertebral fracture, who presents to the hospital with the above-mentioned symptom. The patient is a poor historian. The history was primarily obtained from the daughter. The daughter reports that the patient started having some low-grade fevers in the last few days and concentrated urine. She thought that the patient was having UTI. She also has ulcer on the base of her fifth toe on the right foot that started getting more swollen and red. The facility where she was thought it was infected, gave her some clindamycin. She became more lethargic today. The daughter reports she was not confused, but just lethargic and sleeping more. She got concerned and decided to bring the patient to the hospital.  In the hospital, the patient was requested to be admitted under the hospitalist service. The patient currently is resting in bed. The daughter reports the patient probably has history of COPD and \"always has high carbon dioxide level. \"  She also wears oxygen at home at around 2-3 liters.   The patient and the daughter deny any complaints of headaches, blurry vision, sore throat, trouble swallowing, trouble with speech, any chest pain, shortness of breath, cough, fever, chills, abdominal pain, constipation, diarrhea, urinary symptoms, focal or generalized neurological weakness, recent travel, sick contacts, falls, injuries, hematemesis, melena, hemoptysis, hematuria or any other concerns or problems. Interval history / Subjective: Follow up R foot cellulitis, hypoxia, hypernatremia. Patient seen and examined at the bedside. Labs, images and notes reviewed Discussed with nursing staff, orders reviewed. Plan discussed with patient/Family Overnight events noted. Sleepy. Needed BIPAP. Daughters not interested in long term NIV. D/w palliative care and nurse on the floor with daughter Diana Rodríguez. Assessment & Plan: # Acute on chronic combined respiratory failure, on BIPAP, waxing and weaning. Progressing towards NIV dependent status # R foot cellulitis, r/o PVD, ANGELLA abnormal 
# R LE Acute DVT, on full dose lovenox. No PE per CTA on 3/13 # PVD, abnormal ANGELLA # AMS, likely metabolic encephalopathy, hypercarbia related, improving # Decubitus sores POA, wound Cx # Hypotension, resolved # Hypertension, poorly controlled # Hypernatremia, stable at 146 # Dehydration # HLD # Grade 1 diastolic dysfunction per echo 3/13 # Debility Plan: 
-Pulm consultation noted. BIPAP prn. Pulm on board. Recommend nightly BiPAP. -Daughters thinking about hospice, as not interested in long term NIV dependency given the age, co-morbidities and suffering. -LE Arterial DUS for assessment of PVD given abnormal ANGELLA. R: severe, L: Moderate 
-Vascular surgery consult. Family leaning for not cosnidering surgical intervention in meeting with Vascular surgery. PRN follow up 
-Palliative care consultation noted. Hospice care consultation for introductory informative meeting 
-Wound care per Wound care team recommendations and nursing 
-Transition IV Cefepime+Vanc to PO Bactrim for braod coverage empirically in absence of any wound cultures 
-Transition metoprolol low-dose to Coreg 6.25mg BID 
-Up amlodipine to 10mg daily 
-Resume Lasix 20 mg p.o. daily 3/15half dose of home 
-I/O monitoring 
-BMP 
-Monitor closely for mentation 
-PT/OT 
-Continue current mx 
-CTA chest negative for PE Code status: DNR 
 DVT prophylaxis: Lovenox full dose Care Plan discussed with: Patient/Family and Nurse Anticipated Disposition: SNF/LTC Anticipated Discharge: Greater than 48 hours Hospital Problems  Date Reviewed: 1/15/2019 Codes Class Noted POA Cellulitis ICD-10-CM: L03.90 ICD-9-CM: 682.9  3/11/2020 Unknown Review of Systems: A comprehensive review of systems was negative except for that written in the HPI. Mainly from family. Vital Signs:  
 Last 24hrs VS reviewed since prior progress note. Most recent are: 
Visit Vitals BP 90/56 (BP 1 Location: Left arm, BP Patient Position: At rest) Pulse (!) 58 Temp 98.1 °F (36.7 °C) Resp 23 Ht 4' 11\" (1.499 m) Wt 53.3 kg (117 lb 8.1 oz) SpO2 95% BMI 23.73 kg/m² Intake/Output Summary (Last 24 hours) at 3/16/2020 2209 Last data filed at 3/16/2020 6467 Gross per 24 hour Intake  Output 0 ml Net 0 ml Physical Examination:  
 
GENERAL:  Alert x2 with intermittent somnolence and improved mentation, awake, older female, appears to be stated age. HEENT:  Pupils are equal and reactive to light. Moist mucous membranes. Tympanic membranes are clear. NECK:  Supple. CHEST:   Improved breath sounds. Coarse breathing. No wheezing. CORONARY:  S1 and S2 are heard. ABDOMEN:  Soft, nontender and nondistended. Bowel sounds are physiological. 
EXTREMITIES:  No clubbing, no cyanosis, no edema. There is a wound at the base of the right foot on the lateral side. Erythema better. Dressing clean and dry NEURO/PSYCH:  Limited exam.  DTRs 1+ x4. Strength could not be tested. SKIN:  Warm. Data Review:  
Review and/or order of clinical lab test 
Review and/or order of tests in the radiology section of CPT Review and/or order of tests in the medicine section of CPT Xr Foot Rt Ap/lat Result Date: 3/11/2020 IMPRESSION: Soft tissue ulceration adjacent to the fifth metatarsal head. No radiodense foreign body or evidence of osteomyelitis. Ct Head Wo Cont Result Date: 3/12/2020 IMPRESSION: No acute findings. Ct Chest Wo Cont Result Date: 3/12/2020 IMPRESSION: 1. Bilateral pleural effusions with bibasilar opacification. 2. Cholelithiasis. 3. Bilateral nephrolithiasis. 4. Multiple thoracic and lumbar compression deformities. 5. Right adrenal adenoma. Cta Chest W Or W Wo Cont Result Date: 3/13/2020 IMPRESSION: Moderate bilateral pleural effusions and underlying consolidation have not changed significantly compared to the prior examination. No evidence of pulmonary embolism. Xr Chest [facility] Result Date: 3/13/2020 IMPRESSION: No significant interval change in bilateral pleural effusions. Bibasilar atelectasis persist but there is some improvement at the left lung base. Xr Chest [facility] Result Date: 3/12/2020 IMPRESSION: 1. Overall decrease in aeration 2. There is persistent pleural effusions with bibasilar atelectasis left greater than right. There is pulmonary vascular congestion. Xr Chest [facility] Result Date: 3/11/2020 IMPRESSION: Increased bibasilar atelectasis and effusion. Labs:  
 
Recent Labs  
  03/16/20 
0129 03/15/20 
1124 WBC 11.1* 10.1 HGB 12.0 13.3 HCT 40.3 44.2  311 Recent Labs  
  03/16/20 
0129 03/15/20 
1258 03/15/20 
1124 03/15/20 
0352   --  141 146*  
K 4.4 4.5 5.9* 4.6   --  105 107 CO2 37*  --  33* 34* BUN 16  --  12 11 CREA 0.75  --  0.57 0.45* *  --  142* 108* CA 9.1  --  9.0 8.7 MG  --  2.2 2.2  -- No results for input(s): SGOT, GPT, ALT, AP, TBIL, TBILI, TP, ALB, GLOB, GGT, AML, LPSE in the last 72 hours. No lab exists for component: AMYP, HLPSE No results for input(s): INR, PTP, APTT, INREXT, INREXT in the last 72 hours. No results for input(s): FE, TIBC, PSAT, FERR in the last 72 hours.   
No results found for: FOL, RBCF  
 No results for input(s): PH, PCO2, PO2 in the last 72 hours. No results for input(s): CPK, CKNDX, TROIQ in the last 72 hours. No lab exists for component: CPKMB Lab Results Component Value Date/Time Cholesterol, total 168 05/21/2010 03:00 AM  
 HDL Cholesterol 53 05/21/2010 03:00 AM  
 LDL, calculated 95.4 05/21/2010 03:00 AM  
 Triglyceride 98 05/21/2010 03:00 AM  
 CHOL/HDL Ratio 3.2 05/21/2010 03:00 AM  
 
Lab Results Component Value Date/Time Glucose (POC) 102 (H) 03/12/2020 12:06 PM  
 Glucose (POC) 103 (H) 03/12/2020 08:26 AM  
 Glucose (POC) 120 (H) 10/27/2018 06:05 AM  
 Glucose (POC) 133 (H) 10/27/2018 12:18 AM  
 Glucose (POC) 177 (H) 10/26/2018 06:21 PM  
 
Lab Results Component Value Date/Time Color YELLOW/STRAW 03/11/2020 07:56 PM  
 Appearance CLEAR 03/11/2020 07:56 PM  
 Specific gravity 1.018 03/11/2020 07:56 PM  
 Specific gravity 1.005 03/07/2017 01:54 PM  
 pH (UA) 5.0 03/11/2020 07:56 PM  
 Protein 30 (A) 03/11/2020 07:56 PM  
 Glucose NEGATIVE  03/11/2020 07:56 PM  
 Ketone NEGATIVE  03/11/2020 07:56 PM  
 Bilirubin NEGATIVE  03/11/2020 07:56 PM  
 Urobilinogen 0.2 03/11/2020 07:56 PM  
 Nitrites NEGATIVE  03/11/2020 07:56 PM  
 Leukocyte Esterase TRACE (A) 03/11/2020 07:56 PM  
 Epithelial cells FEW 03/11/2020 07:56 PM  
 Bacteria NEGATIVE  03/11/2020 07:56 PM  
 WBC 0-4 03/11/2020 07:56 PM  
 RBC 0-5 03/11/2020 07:56 PM  
 
 
 
Medications Reviewed:  
 
Current Facility-Administered Medications Medication Dose Route Frequency  enoxaparin (LOVENOX) injection 50 mg  1 mg/kg SubCUTAneous Q12H  carvediloL (COREG) tablet 6.25 mg  6.25 mg Oral BID WITH MEALS  trimethoprim-sulfamethoxazole (BACTRIM DS, SEPTRA DS) 160-800 mg per tablet 1 Tab  1 Tab Oral Q12H  
 amLODIPine (NORVASC) tablet 10 mg  10 mg Oral DAILY  hydrALAZINE (APRESOLINE) 20 mg/mL injection 5-10 mg  5-10 mg IntraVENous Q6H PRN  
 furosemide (LASIX) tablet 20 mg  20 mg Oral DAILY  albuterol-ipratropium (DUO-NEB) 2.5 MG-0.5 MG/3 ML  3 mL Nebulization TID RT  
 sertraline (ZOLOFT) tablet 25 mg  25 mg Oral QPM  
 sodium chloride (NS) flush 5-40 mL  5-40 mL IntraVENous Q8H  
 sodium chloride (NS) flush 5-40 mL  5-40 mL IntraVENous PRN  
 acetaminophen (TYLENOL) tablet 650 mg  650 mg Oral Q4H PRN  
 balsam peru-castor oiL (VENELEX) ointment   Topical BID  collagenase (SANTYL) 250 unit/gram ointment   Topical DAILY  
 
______________________________________________________________________ EXPECTED LENGTH OF STAY: 4d 16h ACTUAL LENGTH OF STAY:          5 Governor ServMD claudette

## 2020-03-17 NOTE — PROGRESS NOTES
Transition Plan of Care Discharging to the hospice house today. Daughters I the room and they are in agreement to this disposition. [facility] will transport at 1500. Nursing made aware Tracey PATEL and she will call report to [phone]. [facility]/Hospice will follow patient there. Teresa Oneal RN CRM Ext [phone]

## 2020-03-17 NOTE — PROGRESS NOTES
Name: [facility]: [address]  
: 1925 Admit Date: 3/11/2020 Phone: [phone]  Room: [facility]/ PCP: Ric Fitzpatrick, Tiago Day  MRN: [id number] Date: 3/17/2020  Code: DNR   
   
HPI: 
 
3/17 Confused 3/16 Confusion noted this am 
Was not on NIV last night CTA without PE 
 
3/13 Target tidal volumes were to much for her; above 8ml/kg/predicted weight. I reduced them, but will need to be reduced further if we keep her on this mode More responsive today. Less dyspnea. Discussed with bedside daughters and patient 3/12 
2:22 PM    
 
History was obtained from daughter. I was asked by Ivonne Hammans, MD to see Beau Bianchi in consultation for a chief complaint of acute respiratory failure. History of Present Illness: 80year old female with past medical as given below presented to [facility] with increased shortness of breath, altered mental status for the past 3 days and fever yesterday. Noted to have cellulitis of the LLE. No cough, sputum. Currently on NIV - AVAPS  
ABG 7.28 /  / 81 Data reviewed: 
Wbc 11.2 Cr 0.62 Troponin normal. 
Cultures pending. Ct chest bilateral effusion and compressive atelectasis. CXr bilateral effusion. OLD echo 2019 - normal EF. Past Medical History:  
Diagnosis Date  Arthritis  Breast lump 2018  
 right breast lump x a few days  HTN (hypertension)  Hypercholesteremia  Hyperlipidemia  Osteoporosis  Stroke ([address] CVA, TIA  TIA (transient ischemic attack)  Vertebral fracture Past Surgical History:  
Procedure Laterality Date  HX HIP REPLACEMENT  2009  
 left  HX ORTHOPAEDIC    
 left hip replacement History reviewed. No pertinent family history. Social History Tobacco Use  Smoking status: Former Smoker Years: 40.00 Last attempt to quit: 1986 Years since quittin.3  Smokeless tobacco: Never Used Substance Use Topics  Alcohol use:  Yes  
 
 Allergies Allergen Reactions  Hydrochlorothiazide Nausea and Vomiting Current Facility-Administered Medications Medication Dose Route Frequency  enoxaparin (LOVENOX) injection 50 mg  1 mg/kg SubCUTAneous Q12H  carvediloL (COREG) tablet 6.25 mg  6.25 mg Oral BID WITH MEALS  trimethoprim-sulfamethoxazole (BACTRIM DS, SEPTRA DS) 160-800 mg per tablet 1 Tab  1 Tab Oral Q12H  
 amLODIPine (NORVASC) tablet 10 mg  10 mg Oral DAILY  hydrALAZINE (APRESOLINE) 20 mg/mL injection 5-10 mg  5-10 mg IntraVENous Q6H PRN  
 furosemide (LASIX) tablet 20 mg  20 mg Oral DAILY  albuterol-ipratropium (DUO-NEB) 2.5 MG-0.5 MG/3 ML  3 mL Nebulization TID RT  
 sertraline (ZOLOFT) tablet 25 mg  25 mg Oral QPM  
 sodium chloride (NS) flush 5-40 mL  5-40 mL IntraVENous Q8H  
 sodium chloride (NS) flush 5-40 mL  5-40 mL IntraVENous PRN  
 acetaminophen (TYLENOL) tablet 650 mg  650 mg Oral Q4H PRN  
 balsam peru-castor oiL (VENELEX) ointment   Topical BID  collagenase (SANTYL) 250 unit/gram ointment   Topical DAILY REVIEW OF SYSTEMS Negative except as stated in the HPI. Physical Exam:  
Visit Vitals /60 (BP 1 Location: Left arm, BP Patient Position: At rest) Pulse 70 Temp 98.2 °F (36.8 °C) Resp 28 Ht 4' 11\" (1.499 m) Wt 53.4 kg (117 lb 11.6 oz) SpO2 96% BMI 23.78 kg/m² General:  Alert, cooperative. Head:  Normocephalic. Eyes:  Conjunctivae/corneas clear. Nose: Nares normal. Septum midline. Throat: Lips, mucosa, and tongue normal.  
Neck: Supple, symmetrical, trachea midline, no adenopathy. Lungs:   Decreased air entry at the bases. Heart:  Regular rate and rhythm, S1, S2 normal  
Abdomen:   Soft, non-tender. Bowel sounds normal.  
Extremities: Extremities normal, atraumatic, no cyanosis or edema. Pulses: 2+ and symmetric all extremities. Neurologic: Grossly nonfocal  
 
 
Lab Results Component Value Date/Time Sodium 141 03/17/2020 06:29 AM  
 Potassium 4.6 03/17/2020 06:29 AM  
 Chloride 103 03/17/2020 06:29 AM  
 CO2 33 (H) 03/17/2020 06:29 AM  
 BUN 18 03/17/2020 06:29 AM  
 Creatinine 0.80 03/17/2020 06:29 AM  
 Glucose 98 03/17/2020 06:29 AM  
 Calcium 9.4 03/17/2020 06:29 AM  
 Magnesium 2.2 03/15/2020 12:58 PM  
 Phosphorus 1.9 (L) 03/13/2020 04:39 AM  
 Lactic acid 1.9 03/12/2020 01:44 AM  
 
 
Lab Results Component Value Date/Time WBC 9.3 03/17/2020 06:29 AM  
 HGB 12.2 03/17/2020 06:29 AM  
 PLATELET 931 83/55/7889 06:29 AM  
 .0 (H) 03/17/2020 06:29 AM  
 
 
Lab Results Component Value Date/Time INR 1.0 11/07/2011 08:05 AM  
 aPTT 24.3 03/12/2020 01:44 AM  
 AST (SGOT) 19 03/17/2020 06:29 AM  
 Alk. phosphatase 65 03/17/2020 06:29 AM  
 Protein, total 6.2 (L) 03/17/2020 06:29 AM  
 Albumin 2.9 (L) 03/17/2020 06:29 AM  
 Globulin 3.3 03/17/2020 06:29 AM  
 
 
Lab Results Component Value Date/Time TSH 0.45 01/19/2019 02:18 AM  
  
 
No results found for: PH, PHI, PCO2, PCO2I, PO2, PO2I, HCO3, HCO3I, FIO2, FIO2I Lab Results Component Value Date/Time CK 41 03/07/2017 07:11 AM  
 CK-MB Index Cannot be calulated 03/07/2017 07:11 AM  
 Troponin-I, Qt. <0.05 03/12/2020 03:35 AM  
 BNP 85 01/12/2017 06:53 PM  
  
 
Lab Results Component Value Date/Time Culture result: NO GROWTH 5 DAYS 03/11/2020 05:36 PM  
 Culture result: ESCHERICHIA COLI (A) 10/24/2018 02:52 PM  
 Culture result: NO GROWTH 5 DAYS 10/24/2018 01:22 PM  
 
 
Lab Results Component Value Date/Time Vancomycin,trough 4.5 (L) 03/15/2020 03:52 AM  
 CK 41 03/07/2017 07:11 AM  
 CK 77 05/14/2013 08:30 AM  
 
 
Lab Results Component Value Date/Time  Color YELLOW/STRAW 03/11/2020 07:56 PM  
 Appearance CLEAR 03/11/2020 07:56 PM  
 Specific gravity 1.005 03/07/2017 01:54 PM  
 pH (UA) 5.0 03/11/2020 07:56 PM  
 Protein 30 (A) 03/11/2020 07:56 PM  
 Glucose NEGATIVE  03/11/2020 07:56 PM  
 Ketone NEGATIVE  03/11/2020 07:56 PM  
 Bilirubin NEGATIVE  03/11/2020 07:56 PM  
 Blood SMALL (A) 03/11/2020 07:56 PM  
 Urobilinogen 0.2 03/11/2020 07:56 PM  
 Nitrites NEGATIVE  03/11/2020 07:56 PM  
 Leukocyte Esterase TRACE (A) 03/11/2020 07:56 PM  
 WBC 0-4 03/11/2020 07:56 PM  
 RBC 0-5 03/11/2020 07:56 PM  
 Bacteria NEGATIVE  03/11/2020 07:56 PM  
 
 
IMPRESSION: 
=========== 
-acute hypoxemic and hypercarbic respiratory failure. -RLE cellulitis. 
-Altered mental status from hypercarbia. -Bilateral pleural effusions with bibasilar atelectasis - diastolic heart failure? 
-osteoporosis. 
-Hx of fall in the past. 
 
PLAN: 
==== 
-On NIV qhs. On AVAPS, currently. However it should be noted that this is a mode not recommended in acute situations, only in chronic and maintainance situations should this be used. If she were to worsen I would recommend switching back to Bi-Level pressure support to ventilate her. She cannot maintain and achieve adequate target tidal volumes if she does not have the mental capacity to do so.  
-diuretics as able. -If no improvement in effusion, pleural tap in am. 
-abx per primary team. 
-Patient is  DNR. -no plans for thoracentesis at this time Noted plans for home hospice. That would be the most compassionate thing to do We will be available again to see if needed Milton Vela PA-C

## 2020-03-17 NOTE — PROGRESS NOTES
03/16/20 8007 CPAP/BIPAP  
CPAP/BIPAP Start/Stop On Device Mode AVAP  
$$ Bipap Daily Yes AVAP Set Resp Rate 10 
(decreased from 14) AVAP Set VT (ml) 375 [employer] ID # [id number] Mask Type and Size Full face; Medium Skin Condition WNL  
PIP Observed 23 cm H20 IPAP (cm H2O) (MaxP 30, MinP 18) EPAP (cm H2O) 8 cm H2O Inspiratory Time (sec) 0.8 seconds Vt Spont (ml) 325 ml Ve Observed (l/min) 7.7 l/min Backup Rate 10 Total RR (Spontaneous) 28 breaths per minute Insp Rise Time (sec) 2 Leak (Estimated) 13 L/min Pt only wore Bipap for 5 mins. Pt is very resistant to wearing Bipap.

## 2020-03-17 NOTE — DISCHARGE SUMMARY
Discharge Summary PATIENT ID: Carlos Bryant MRN: [id number] YOB: 1925 DATE OF ADMISSION: 3/11/2020  5:10 PM   
DATE OF DISCHARGE: 3/17/2020 PRIMARY CARE PROVIDER: Josafat Ervin  
 
ATTENDING PHYSICIAN: Ulysses Broussard MD 
DISCHARGING PROVIDER: Ulysses Broussard MD   
To contact this individual call [phone] and ask the  to page. If unavailable ask to be transferred the Adult Hospitalist Department. CONSULTATIONS: IP CONSULT TO HOSPITALIST 
IP CONSULT TO PULMONOLOGY 
IP CONSULT TO PALLIATIVE CARE - PROVIDER 
IP CONSULT TO VASCULAR SURGERY 
 
PROCEDURES/SURGERIES: * No surgery found * [address] COURSE:  
# Hospice care with goals for comfort only # Acute on chronic combined respiratory failure, on BIPAP, waxing and weaning. Progressing towards NIV dependent status # R foot cellulitis, r/o PVD, ANGELLA abnormal 
# R LE Acute DVT, on full dose lovenox. No PE per CTA on 3/13 # PVD, abnormal ANGELLA # AMS, likely metabolic encephalopathy, hypercarbia related, improving # Decubitus sores POA, wound Cx # Hypotension, resolved # Hypertension, poorly controlled # Hypernatremia, stable at 146 # Dehydration # HLD # Grade 1 diastolic dysfunction per echo 3/13 # Debility Admission Summary:  
The patient is a 49-year-old female with past medical history of breast lump, arthritis, hypertension, hypercholesteremia, hyperlipidemia, osteoporosis, TIA and vertebral fracture, who presents to the hospital with the above-mentioned symptom.  The patient is a poor historian. [facility] history was primarily obtained from the daughter. [facility] daughter reports that the patient started having some low-grade fevers in the last few days and concentrated urine.  She thought that the patient was having UTI. Kathlyn Bloch also has ulcer on the base of her fifth toe on the right foot that started getting more swollen and red.  The facility where she was thought it was infected, gave her some clindamycin.  She became more lethargic today.  The daughter reports she was not confused, but just lethargic and sleeping more.  She got concerned and decided to bring the patient to the hospital.  In the hospital, the patient was requested to be admitted under the hospitalist service.  The patient currently is resting in bed.  The daughter reports the patient probably has history of COPD and \"always has high carbon dioxide level. \"  She also wears oxygen at home at around 2-3 liters.  The patient and the daughter deny any complaints of headaches, blurry vision, sore throat, trouble swallowing, trouble with speech, any chest pain, shortness of breath, cough, fever, chills, abdominal pain, constipation, diarrhea, urinary symptoms, focal or generalized neurological weakness, recent travel, sick contacts, falls, injuries, hematemesis, melena, hemoptysis, hematuria or any other concerns or problems. 
  
Interval history / Subjective: Follow up R foot cellulitis, hypoxia, hypernatremia. Patient seen and examined at the bedside. Labs, images and notes reviewed Discussed with nursing staff, orders reviewed. Plan discussed with patient/Family 
   
Overnight events noted. Sleepy and not much arousable. Short term BIPAP given but discontinued as per daughters instruction not to use NIV. Daughters made decision for hosp[facility] at [facility] until further arrangement at [facility] on Monday if stable. Ready for her on comfort care only. No Abx, No anticoagulation, no procedures, no labs, no other measures than that could add to her comfort in non invasive way. Both daughters at bedside in the afternoon. DISCHARGE DIAGNOSES / PLAN:   
 
# Hospice care with goals for comfort only # Acute on chronic combined respiratory failure, on BIPAP, waxing and weaning. Progressing towards NIV dependent status # R foot cellulitis, r/o PVD, ANGELLA abnormal 
 # R LE Acute DVT, on full dose lovenox. No PE per CTA on 3/13 # PVD, abnormal ANGELLA # AMS, likely metabolic encephalopathy, hypercarbia related, improving # Decubitus sores POA, wound Cx # Hypotension, resolved # Hypertension, poorly controlled # Hypernatremia, stable at 146 # Dehydration # HLD # Grade 1 diastolic dysfunction per echo 3/13 # Debility 
  
  
Plan: 
-DC to hospice house with comfort measures only. No antibiotics, anticoagulation. Defer further to hospice team. 
-Comfort measure only. 
  
Code status: DNR 
DVT prophylaxis: none. Comfort measures only 
  
Care Plan discussed with: Patient/Family and Nurse Anticipated Disposition: Hospice house Anticipated Discharge: DC to hospice house today ADDITIONAL CARE RECOMMENDATIONS:  
Per hospice care team 
 
PENDING TEST RESULTS:  
At the time of discharge the following test results are still pending: none FOLLOW UP APPOINTMENTS:   
Follow-up Information Follow up With Specialties Details Why Contact Info Hospice house   For comfort only. Defer to hospice care team for further planning. DIET: Comfort feeding ACTIVITY: as tolerated and desired per comfort WOUND CARE: for comfort per hospice EQUIPMENT needed: for comfort per hospice DISCHARGE MEDICATIONS: 
Current Discharge Medication List  
  
START taking these medications Details  
albuterol-ipratropium (DUO-NEB) 2.5 mg-0.5 mg/3 ml nebu 3 mL by Nebulization route two (2) times a day. For comfort only. Defer to hospice care for further planning. Qty: 30 Nebule, Refills: 0  
  
balsam peru-castor oiL (VENELEX) ointment Apply  to affected area two (2) times a day. For comfort only. Defer to hospice care for further planning. Qty: 1 Tube, Refills: 0  
  
carvediloL (COREG) 6.25 mg tablet Take 1 Tab by mouth two (2) times daily (with meals). For comfort only. Defer to hospice care for further planning. Qty: 30 Tab, Refills: 0 collagenase (SANTYL) 250 unit/gram ointment Apply  to affected area daily. For comfort only. Defer to hospice care for further planning. Qty: 15 g, Refills: 0 CONTINUE these medications which have CHANGED Details  
furosemide (LASIX) 40 mg tablet Take 0.5 Tabs by mouth daily. For comfort only. Defer to hospice care for further planning. Qty: 30 Tab, Refills: 0 CONTINUE these medications which have NOT CHANGED Details  
acetaminophen (TYLENOL) 325 mg tablet Take 650 mg by mouth every six (6) hours as needed (back pain). STOP taking these medications  
  
 amLODIPine (NORVASC) 10 mg tablet Comments:  
Reason for Stopping:   
   
 umeclidinium-vilanterol (ANORO ELLIPTA) 62.5-25 mcg/actuation inhaler Comments:  
Reason for Stopping:   
   
 raNITIdine (ZANTAC) 150 mg tablet Comments:  
Reason for Stopping:   
   
 calcium-vitamin D (OYSTER SHELL) 500 mg(1,250mg) -200 unit per tablet Comments:  
Reason for Stopping:   
   
 sertraline (ZOLOFT) 25 mg tablet Comments:  
Reason for Stopping:   
   
 metoprolol tartrate (LOPRESSOR) 25 mg tablet Comments:  
Reason for Stopping:   
   
 alendronate (FOSAMAX) 70 mg tablet Comments:  
Reason for Stopping:   
   
 multivitamin (ONE A DAY) tablet Comments:  
Reason for Stopping:   
   
  
 
 
 
NOTIFY YOUR PHYSICIAN FOR ANY OF THE FOLLOWING:  
Fever over 101 degrees for 24 hours. Chest pain, shortness of breath, fever, chills, nausea, vomiting, diarrhea, change in mentation, falling, weakness, bleeding. Severe pain or pain not relieved by medications. Or, any other signs or symptoms that you may have questions about. DISPOSITION: 
 Home With: 
 OT  PT  HH  RN  
  
 Long term SNF/Inpatient Rehab Independent/assisted living  
x Hospice: Hospice house Other:  
 
 
PATIENT CONDITION AT DISCHARGE:  
 
Functional status  
x Poor Deconditioned Independent Cognition Chela Nieves Forgetful   
x Dementia Catheters/lines (plus indication) Horn PICC   
 PEG   
x None Code status Full code   
x DNR PHYSICAL EXAMINATION AT DISCHARGE: 
GENERAL:  Sleepy, older female, appears to be stated age. Appears comfortable HEENT:  not checked to avoid discomfort NECK: not checked to avoid discomfort CHEST:   Coarse breathing. No wheezing. CORONARY:  S1 and S2 are heard. ABDOMEN:  Soft, nontender and nondistended.  Limited for comfort EXTREMITIES:  No clubbing, no cyanosis, no edema. Chevis Hallmark is a wound at the base of the right foot on the lateral side.  Erythema better. Dressing clean and dry. Per comfort. NEURO/PSYCH:  Limited exam.  limited for comfort SKIN:  Warm. CHRONIC MEDICAL DIAGNOSES: 
Problem List as of 3/17/2020 Date Reviewed: 1/15/2019 Codes Class Noted - Resolved Cellulitis ICD-10-CM: L03.90 ICD-9-CM: 682.9  3/11/2020 - Present SOB (shortness of breath) ICD-10-CM: R06.02 
ICD-9-CM: 786.05  1/15/2019 - Present Hypoxia ICD-10-CM: R09.02 
ICD-9-CM: 799.02  10/24/2018 - Present Vertebral fracture (Chronic) ICD-10-CM: OSW8824 ICD-9-CM: 805.8  10/24/2018 - Present Hypokalemia ICD-10-CM: E87.6 ICD-9-CM: 276.8  10/24/2018 - Present Bronchitis ICD-10-CM: J40 ICD-9-CM: 661  1/13/2017 - Present Acute bronchitis ICD-10-CM: J20.9 ICD-9-CM: 466.0  1/12/2017 - Present RESOLVED: Intractable back pain ICD-10-CM: M54.9 ICD-9-CM: 724.5  10/24/2018 - 10/31/2018 RESOLVED: CAP (community acquired pneumonia) ICD-10-CM: J18.9 ICD-9-CM: 395  10/24/2018 - 10/31/2018 RESOLVED: Chest pain ICD-10-CM: R07.9 ICD-9-CM: 786.50  3/7/2017 - 3/12/2017 RESOLVED: Cholecystitis ICD-10-CM: K81.9 ICD-9-CM: 575.10  3/7/2017 - 3/12/2017 RESOLVED: Nephrolithiasis ICD-10-CM: N20.0 ICD-9-CM: 592.0  3/7/2017 - 3/12/2017 Xr Foot Rt Ap/lat Result Date: 3/11/2020 IMPRESSION: Soft tissue ulceration adjacent to the fifth metatarsal head. No radiodense foreign body or evidence of osteomyelitis. Ct Head Wo Cont Result Date: 3/12/2020 IMPRESSION: No acute findings. Ct Chest Wo Cont Result Date: 3/12/2020 IMPRESSION: 1. Bilateral pleural effusions with bibasilar opacification. 2. Cholelithiasis. 3. Bilateral nephrolithiasis. 4. Multiple thoracic and lumbar compression deformities. 5. Right adrenal adenoma. Cta Chest W Or W Wo Cont Result Date: 3/13/2020 IMPRESSION: Moderate bilateral pleural effusions and underlying consolidation have not changed significantly compared to the prior examination. No evidence of pulmonary embolism. Xr Chest [facility] Result Date: 3/13/2020 IMPRESSION: No significant interval change in bilateral pleural effusions. Bibasilar atelectasis persist but there is some improvement at the left lung base. Xr Chest [facility] Result Date: 3/12/2020 IMPRESSION: 1. Overall decrease in aeration 2. There is persistent pleural effusions with bibasilar atelectasis left greater than right. There is pulmonary vascular congestion. Xr Chest [facility] Result Date: 3/11/2020 IMPRESSION: Increased bibasilar atelectasis and effusion. Recent Results (from the past 24 hour(s)) CBC W/O DIFF Collection Time: 03/17/20  6:29 AM  
Result Value Ref Range WBC 9.3 3.6 - 11.0 K/uL  
 RBC 4.12 3.80 - 5.20 M/uL  
 HGB 12.2 11.5 - 16.0 g/dL HCT 41.6 35.0 - 47.0 % .0 (H) 80.0 - 99.0 FL  
 MCH 29.6 26.0 - 34.0 PG  
 MCHC 29.3 (L) 30.0 - 36.5 g/dL  
 RDW 14.0 11.5 - 14.5 % PLATELET 221 307 - 750 K/uL MPV 10.7 8.9 - 12.9 FL  
 NRBC 0.0 0  WBC ABSOLUTE NRBC 0.00 0.00 - 0.01 K/uL METABOLIC PANEL, COMPREHENSIVE Collection Time: 03/17/20  6:29 AM  
Result Value Ref Range Sodium 141 136 - 145 mmol/L Potassium 4.6 3.5 - 5.1 mmol/L Chloride 103 97 - 108 mmol/L  
 CO2 33 (H) 21 - 32 mmol/L  Anion gap 5 5 - 15 mmol/L  
 Glucose 98 65 - 100 mg/dL BUN 18 6 - 20 MG/DL Creatinine 0.80 0.55 - 1.02 MG/DL  
 BUN/Creatinine ratio 23 (H) 12 - 20 GFR est AA >60 >60 ml/min/1.73m2 GFR est non-AA >60 >60 ml/min/1.73m2 Calcium 9.4 8.5 - 10.1 MG/DL Bilirubin, total 0.2 0.2 - 1.0 MG/DL  
 ALT (SGPT) 26 12 - 78 U/L  
 AST (SGOT) 19 15 - 37 U/L Alk. phosphatase 65 45 - 117 U/L Protein, total 6.2 (L) 6.4 - 8.2 g/dL Albumin 2.9 (L) 3.5 - 5.0 g/dL Globulin 3.3 2.0 - 4.0 g/dL A-G Ratio 0.9 (L) 1.1 - 2.2 Greater than 40 minutes were spent with the patient on counseling and coordination of care Signed:  
Sagrario Desai MD 
3/17/2020 
2:24 PM

## 2020-03-17 NOTE — HOSPICE
[facility] Good Help to Those in Need 
[phone] Patient Name: Denis Brian YOB: 1925 Age: 80 y.[address]. [facility] LCSW Note:  Hospice consult noted. Chart reviewed. Plan of care discussed with patients nurse & care manager. This LCSW and Quang Loredo met with pt, who was resting, her daughter Светлана Booker primary MPOA, and daughter Doyle Strickland secondary MPOA to discuss hospice services. Pt will be transferred to the [facility] for routine LOC. Family is in agreement to paying room and board costs at [facility]. Consents completed by daughter Светлана Booker MPOA, pt is DEBRA, Stephanie Bonilla  to serve. Transportation to be arranged by CM for 2 pm or after, time has not been confirmed. Pt is in the process of moving from [facility] to [place] on Monday if stable. 14:30 Transportation has been arranged for 3 pm.  
 
Thank you for the opportunity to be of service to Mrs. Hetal Darling and her daughters. Tomasa Kothari [facility] Hospital[facility], MSG [facility] [phone]

## 2020-03-17 NOTE — PROGRESS NOTES
Bedside and Verbal shift change report given to Altagracia Amezcua [phone] (oncoming nurse) by David Manzano (offgoing nurse). Report included the following information SBAR, Kardex, ED Summary, MAR, Accordion and Cardiac Rhythm NSR. Last 3 Recorded Weights in this Encounter 03/15/20 6209 03/16/20 0401 03/17/20 0411 Weight: 51.6 kg (113 lb 12.1 oz) 53.3 kg (117 lb 8.1 oz) 53.4 kg (117 lb 11.6 oz) 0142: Patient was found trying to remove the BIPAP mask, but wasn't able to remove it on her own. When I removed the mask she stated \"thank you\" and that she did not want to wear it. I replaced the nasal canula on her. Also, at the beginning of my shift, her daughters expressed that they did not want her mother to wear the BIPAP mask tonight as well. Problem: Falls - Risk of 
Goal: *Absence of Falls Description: Document Javi Maxwell Fall Risk and appropriate interventions in the flowsheet. Outcome: Progressing Towards Goal 
Note: Fall Risk Interventions: 
Mobility Interventions: Communicate number of staff needed for ambulation/transfer, Patient to call before getting OOB, Strengthening exercises (ROM-active/passive) Mentation Interventions: Adequate sleep, hydration, pain control, Door open when patient unattended, Evaluate medications/consider consulting pharmacy, Familiar objects from home, More frequent rounding, Reorient patient, Room close to nurse's station, Update white board Medication Interventions: Evaluate medications/consider consulting pharmacy, Patient to call before getting OOB, Teach patient to arise slowly Elimination Interventions: Call light in reach, Patient to call for help with toileting needs, Stay With Me (per policy), Toileting schedule/hourly rounds History of Falls Interventions: Consult care management for discharge planning, Door open when patient unattended, Evaluate medications/consider consulting pharmacy, Room close to nurse's station Problem: Pressure Injury - Risk of 
 Goal: *Prevention of pressure injury Description: Document Mohit Scale and appropriate interventions in the flowsheet. Outcome: Progressing Towards Goal 
Note: Pressure Injury Interventions: 
Sensory Interventions: Assess changes in LOC, Assess need for specialty bed, Float heels, Minimize linen layers, Keep linens dry and wrinkle-free, Pressure redistribution bed/mattress (bed type), Turn and reposition approx. every two hours (pillows and wedges if needed), Monitor skin under medical devices Moisture Interventions: Apply protective barrier, creams and emollients, Assess need for specialty bed, Check for incontinence Q2 hours and as needed, Internal/External urinary devices, Maintain skin hydration (lotion/cream), Minimize layers Activity Interventions: Assess need for specialty bed, Increase time out of bed, Pressure redistribution bed/mattress(bed type), PT/OT evaluation Mobility Interventions: Assess need for specialty bed, Float heels, HOB 30 degrees or less, Pressure redistribution bed/mattress (bed type), PT/OT evaluation, Turn and reposition approx. every two hours(pillow and wedges) Nutrition Interventions: Document food/fluid/supplement intake, Discuss nutritional consult with provider Friction and Shear Interventions: Apply protective barrier, creams and emollients, Lift sheet, Lift team/patient mobility team, Minimize layers Problem: Breathing Pattern - Ineffective Goal: *Absence of hypoxia Outcome: Progressing Towards Goal 
Note: Saturating above 90% SPO2 on room air

## 2020-03-17 NOTE — PROGRESS NOTES
1550- Patient admitted to [facility] under routine level of care. Hospice dx COPD.  1600- Patient alert, disoriented to situation. Patient does not state any pain at this time. Patient is on 5 Liters nasal cannula. Bowel sounds are active in all quadrants. 16 [place] hightower placed. Skin is dry and warm. 1800- PRN morphine given once for patient right leg DVT pain. 1900- Report given.

## 2020-03-17 NOTE — PROGRESS NOTES
15:05 TRANSFER - OUT REPORT: 
 
Verbal report given to Dane RN(name) on Denis Brian  being transferred to [facility]  for routine progression of care Report consisted of patients Situation, Background, Assessment and  
Recommendations(SBAR). Information from the following report(s) SBAR, Kardex, MAR, Recent Results and Cardiac Rhythm NSR was reviewed with the receiving nurse. Lines:  
Peripheral IV 03/15/20 Left;Posterior Wrist (Active) Site Assessment Clean, dry, & intact 3/17/2020 12:00 PM  
Phlebitis Assessment 0 3/17/2020 12:00 PM  
Infiltration Assessment 0 3/17/2020 12:00 PM  
Dressing Status Clean, dry, & intact 3/17/2020 12:00 PM  
Dressing Type Tape;Transparent 3/17/2020 12:00 PM  
Hub Color/Line Status Pink;Capped 3/17/2020 12:00 PM  
Action Taken Open ports on tubing capped 3/17/2020 12:00 PM  
Alcohol Cap Used Yes 3/17/2020 12:00 PM  
  
 
Opportunity for questions and clarification was provided. Patient transported with: 
 O2 @ 5 liters Patient Vitals for the past 12 hrs: 
 Temp Pulse Resp BP SpO2  
03/17/20 1200     97 % 03/17/20 1111 97.7 °F (36.5 °C) 72 (!) 33 113/59 96 % 03/17/20 0835     96 % 03/17/20 0800     96 % 03/17/20 0742 98.2 °F (36.8 °C) 70 28 119/60 96 % 03/17/20 0410 98 °F (36.7 °C) 71 23 105/59 96 % Problem: Falls - Risk of 
Goal: *Absence of Falls Description: Document Roselie Estrada Fall Risk and appropriate interventions in the flowsheet. Outcome: Progressing Towards Goal 
Note: Fall Risk Interventions: 
Mobility Interventions: Communicate number of staff needed for ambulation/transfer, OT consult for ADLs, Patient to call before getting OOB, PT Consult for assist device competence Mentation Interventions: Adequate sleep, hydration, pain control, Evaluate medications/consider consulting pharmacy, Eyeglasses and hearing aids, Increase mobility, More frequent rounding, Reorient patient, Room close to nurse's station Medication Interventions: Assess postural VS orthostatic hypotension, Evaluate medications/consider consulting pharmacy, Patient to call before getting OOB Elimination Interventions: Call light in reach, Patient to call for help with toileting needs, Stay With Me (per policy) History of Falls Interventions: Consult care management for discharge planning, Evaluate medications/consider consulting pharmacy, Investigate reason for fall, Utilize gait belt for transfer/ambulation Problem: Pressure Injury - Risk of 
Goal: *Prevention of pressure injury Description: Document Mohit Scale and appropriate interventions in the flowsheet. Outcome: Progressing Towards Goal 
Note: Pressure Injury Interventions: 
Sensory Interventions: Avoid rigorous massage over bony prominences, Discuss PT/OT consult with provider, Float heels, Keep linens dry and wrinkle-free, Maintain/enhance activity level, Monitor skin under medical devices, Suspension boots, Turn and reposition approx. every two hours (pillows and wedges if needed) Moisture Interventions: Apply protective barrier, creams and emollients, Check for incontinence Q2 hours and as needed, Limit adult briefs, Maintain skin hydration (lotion/cream), Minimize layers, Offer toileting Q_hr Activity Interventions: Assess need for specialty bed, Increase time out of bed, PT/OT evaluation Mobility Interventions: Assess need for specialty bed, Chair cushion, PT/OT evaluation, Turn and reposition approx. every two hours(pillow and wedges) Nutrition Interventions: Document food/fluid/supplement intake, Discuss nutritional consult with provider Friction and Shear Interventions: Foam dressings/transparent film/skin sealants, Lift team/patient mobility team, Lift sheet, Apply protective barrier, creams and emollients, Feet elevated on foot rest, Minimize layers Problem: Patient Education: Go to Patient Education Activity Goal: Patient/Family Education Outcome: Progressing Towards Goal

## 2020-03-17 NOTE — DISCHARGE INSTRUCTIONS
Discharge Instructions       PATIENT ID: Madiha Torres  MRN: [id number]   YOB: 1925    DATE OF ADMISSION: 3/11/2020  5:10 PM    DATE OF DISCHARGE: 3/17/2020    PRIMARY CARE PROVIDER: ABRIL Ruiz     ATTENDING PHYSICIAN: Christiano Lyons MD  DISCHARGING PROVIDER: Nemo Ny MD    To contact this individual call [phone] and ask the  to page. If unavailable ask to be transferred the Adult Hospitalist Department. DISCHARGE DIAGNOSES   # Hospice care with goals for comfort only  # Acute on chronic combined respiratory failure, on BIPAP, waxing and weaning. Progressing towards NIV dependent status  # R foot cellulitis, r/o PVD, ANGELLA abnormal  # R LE Acute DVT, on full dose lovenox. No PE per CTA on 3/13  # PVD, abnormal ANGELLA  # AMS, likely metabolic encephalopathy, hypercarbia related, improving  # Decubitus sores POA, wound Cx  # Hypotension, resolved  # Hypertension, poorly controlled  # Hypernatremia, stable at 146  # Dehydration  # HLD  # Grade 1 diastolic dysfunction per echo 3/13  # Debility    CONSULTATIONS: IP CONSULT TO HOSPITALIST  IP CONSULT TO PULMONOLOGY  IP CONSULT TO PALLIATIVE CARE - PROVIDER  IP CONSULT TO VASCULAR SURGERY    PROCEDURES/SURGERIES: * No surgery found *    PENDING TEST RESULTS:   At the time of discharge the following test results are still pending: none    FOLLOW UP APPOINTMENTS:   Follow-up Information     Follow up With Specialties Details Why [facility]   For comfort only. Defer to hospice care team for further planning. ADDITIONAL CARE RECOMMENDATIONS:   Per hospice care team      DIET: Comfort feeding    ACTIVITY: as tolerated and desired per comfort    WOUND CARE: for comfort per hospice    EQUIPMENT needed: for comfort per hospice      DISCHARGE MEDICATIONS:   See Medication Reconciliation Form    · It is important that you take the medication exactly as they are prescribed.    · Keep your medication in the bottles provided by the pharmacist and keep a list of the medication names, dosages, and times to be taken in your wallet. · Do not take other medications without consulting your doctor. NOTIFY YOUR PHYSICIAN FOR ANY OF THE FOLLOWING:   Fever over 101 degrees for 24 hours. Chest pain, shortness of breath, fever, chills, nausea, vomiting, diarrhea, change in mentation, falling, weakness, bleeding. Severe pain or pain not relieved by medications. Or, any other signs or symptoms that you may have questions about. DISPOSITION:    Home With:   OT  PT  HH  RN       SNF/Inpatient Rehab/LTAC    Independent/assisted living   x Hospice: Hospice House    Other:     CDMP Checked:   Yes x     PROBLEM LIST Updated:  Yes x        My Medications      START taking these medications      Instructions Each Dose to Equal Morning Noon Evening Bedtime   albuterol-ipratropium 2.5 mg-0.5 mg/3 ml Nebu  Commonly known as:  DUO-NEB    Your last dose was: Your next dose is:          3 mL by Nebulization route two (2) times a day. For comfort only. Defer to hospice care for further planning. 3 mL                 balsam peru-castor oiL ointment  Commonly known as:  VENELEX    Your last dose was: Your next dose is:          Apply  to affected area two (2) times a day. For comfort only. Defer to hospice care for further planning. carvediloL 6.25 mg tablet  Commonly known as:  COREG    Your last dose was: Your next dose is: Take 1 Tab by mouth two (2) times daily (with meals). For comfort only. Defer to hospice care for further planning. 6.25 mg                 collagenase 250 unit/gram ointment  Commonly known as:  SANTYL  Start taking on:  March 18, 2020    Your last dose was: Your next dose is:          Apply  to affected area daily. For comfort only. Defer to hospice care for further planning.                      CHANGE how you take these medications      Instructions Each Dose to Equal Morning Noon Evening Bedtime   furosemide 40 mg tablet  Commonly known as:  LASIX  What changed:    · how much to take  · additional instructions    Your last dose was: Your next dose is: Take 0.5 Tabs by mouth daily. For comfort only. Defer to hospice care for further planning. 20 mg                    CONTINUE taking these medications      Instructions Each Dose to Equal Morning Noon Evening Bedtime   acetaminophen 325 mg tablet  Commonly known as:  TYLENOL    Your last dose was: Your next dose is: Take 650 mg by mouth every six (6) hours as needed (back pain). 650 mg                    STOP taking these medications    amLODIPine 10 mg tablet  Commonly known as:  NORVASC        calcium-vitamin D 500 mg(1,250mg) -200 unit per tablet  Commonly known as:  OYSTER SHELL        Fosamax 70 mg tablet  Generic drug:  alendronate        metoprolol tartrate 25 mg tablet  Commonly known as:  LOPRESSOR        multivitamin tablet  Commonly known as:  ONE A DAY        raNITIdine 150 mg tablet  Commonly known as:  ZANTAC        sertraline 25 mg tablet  Commonly known as:  ZOLOFT        umeclidinium-vilanteroL 62.5-25 mcg/actuation inhaler  Commonly known as:  ANORO ELLIPTA              Where to Get Your Medications      Information on where to get these meds will be given to you by the nurse or doctor.     Ask your nurse or doctor about these medications  · albuterol-ipratropium 2.5 mg-0.5 mg/3 ml Nebu  · balsam peru-castor oiL ointment  · carvediloL 6.25 mg tablet  · collagenase 250 unit/gram ointment  · furosemide 40 mg tablet         Signed:   Michelle Rivera MD  3/17/2020  2:23 PM

## 2020-03-17 NOTE — PROGRESS NOTES
NAME OF PATIENT:  Katarina Maciel    LEVEL OF CARE:  Routine    REASON FOR GIP:   N/A    *PATIENT REMAINS ELIGIBLE FOR GIP LEVEL OF CARE AS EVIDENCED BY: (MUST BE ADDRESSED OF PATIENT GIP)      REASON FOR RESPITE:  N/A    O2 SAFETY:  Concentrator positioning (6\" from furniture/drapes) and Oxygen sign on the door    FALL INTERVENTIONS PROVIDED:   Implemented/recommended use of non-skid footwear, Implemented/recommended use of fall risk identification flag to all team members, Implemented/recommended resources for alarm system (personal alarm, bed alarm, call bell, etc.) , Implemented/recommended environmental changes (remove hazards, lower bed, improve lighting, etc.) and Implemented/recommended increased supervision/assistance    INTERDISPLINARY COMMUNICATION/COLLABORATION:  Physician, ARNULFO, Saul Merrill RN, CNA and LPN    NEW MEDICATION INITIATION DOCUMENTATION:  N/A    Reason medication is being initiated:  N/A    MD / Provider name consulted re: change in status / initiation of new medication:  N/A    New Symptom(s):  N/A    New Order(s):  N/A    Name of the person notified of the changes:  N/A    Name of person being taught:  N/A    Instructions given:  N/A    Side Effects taught:  N/A    Response to teaching:  N/A    COMFORTABLE DYING MEASURE:  Is Patient/family satisfied with symptom level?  yes    DISCHARGE PLAN:  D/C to facility on Mon. 3/23/20 if symptoms are being managed effectively. 1900 Received report from [place] Patient asleep in bed with HOB elevated. O2 @ 5lpm via NC. Skin warm and dry. No signs of pain or respiratory distress. 1915 Patient remains asleep in bed. She briefly awoke to drink some tea at the bedside. No c/o pain or signs of respiratory distress. 2115 Patient awoken shortly ago & was confused about where she is. Reoriented patient to location & also made her aware that her daughters know she is here.  She does not seem to understand this information so when she is awake we will reinforce this. She denies pain. Patient is compliant with O2 @ 5lpm via NC. Snack and drink offered and accepted. Clear speech. 2315 Patient remains in bed intermittently awake and asleep. No signs of discomfort or respiratory distress. Removes O2 cannula. 0115 Patient tolerated bed bath and was able to assist with with turning. Hightower catheter inserted without issue, patient tolerated well. Medium yellow clear urine present in hightower tubing. Denies any pain. Removes O2 cannula. 1671 Patient remains sleeping in bed, mouth open, eyes closed. O2 remains in place via NC @ 5lpm. No signs of pain. 1913 Patient lying awake in bed with O2 cannula removed. She asked where Forrestine Pulse is and writer told her. Patient does not appear to comprehend answer as she repeated the question twice more. Patient denies pain. 2422 Patent remains awake in bed. Denies pain.

## 2020-03-17 NOTE — PROGRESS NOTES
Problem: Pressure Injury - Risk of  Goal: *Prevention of pressure injury  Description: Document Mohit Scale and appropriate interventions in the flowsheet.   Outcome: Progressing Towards Goal  Note: Pressure Injury Interventions:  Sensory Interventions: Assess changes in LOC, Check visual cues for pain, Float heels    Moisture Interventions: Absorbent underpads, Apply protective barrier, creams and emollients    Activity Interventions: Pressure redistribution bed/mattress(bed type)    Mobility Interventions: Float heels, HOB 30 degrees or less, Pressure redistribution bed/mattress (bed type)    Nutrition Interventions: Document food/fluid/supplement intake    Friction and Shear Interventions: Apply protective barrier, creams and emollients, HOB 30 degrees or less, Lift sheet                Problem: Patient Education: Go to Patient Education Activity  Goal: Patient/Family Education  Outcome: Progressing Towards Goal

## 2020-03-18 NOTE — PROGRESS NOTES
NAME OF PATIENT:  Madiha Torres    LEVEL OF CARE:  [facility]    REASON FOR GIP:   Pain, despite numerous changes in medications, Nausea and vomiting, despite changes to medications, Medication adjustment that must be monitored 24/7 and Stabilizing treatment that cannot take place at home    *PATIENT REMAINS ELIGIBLE FOR [facility] LEVEL OF CARE AS EVIDENCED BY: (MUST BE ADDRESSED OF PATIENT GIP)      REASON FOR RESPITE:  N/A    O2 SAFETY:  Concentrator positioning (6\" from furniture/drapes) and No petroleum based products on face while oxygen in use    FALL INTERVENTIONS PROVIDED:   Implemented/recommended use of non-skid footwear, Implemented/recommended resources for alarm system (personal alarm, bed alarm, call bell, etc.) , Implemented/recommended environmental changes (remove hazards, lower bed, improve lighting, etc.) and Implemented/recommended increased supervision/assistance    INTERDISPLINARY COMMUNICATION/COLLABORATION:  Physician, ARNULFO, Zhane Munoz RN, CNA and N/A    NEW MEDICATION INITIATION DOCUMENTATION:  N/A    Reason medication is being initiated:  N/A    MD / Provider name consulted re: change in status / initiation of new medication:  N/A    New Symptom(s): N/A    New Order(s):  N/A    Name of the person notified of the changes:  N/A    Name of person being taught:  N/A    Instructions given:  N/A    Side Effects taught:  N/A    Response to teaching:  N/A    COMFORTABLE DYING MEASURE:  Is Patient/family satisfied with symptom level?  yes    DISCHARGE PLAN:  Remain at [facility] until symptoms resolved. 1900 Report received from [facility] Patient asleep in bed, unresponsive, eyes closed. Does not respond to verbal or tactile stimuli. Respirations unlabored and no signs of pain. Skin warm and dry. Daughter at bedside. O2 @ 5lpm via NC/   2030 Patient remains asleep, unresponsive and no signs of discomfort. 2130 No acute change in patient status.    2230 Patient remains unresponsive with no signs of discomfort or respiratory distress   2330 No acute change with patient. Remains unresponsive. Respirations even. No signs of pain. 0030 No acute change with patient. 0130 Patient remains asleep and unresponsive. Tolerating turning and repositioning. No signs of pain or respiratory distress. 0230 Patient remains sleeping in bed, unresponsive. Respirations are more shallow, mouth open. 0330 Respirations are more shallow and uneven. Doesn't respond to verbal or tactile stimuli. No signs of pain. 0430 Patient remains asleep with shallow respirations and no signs of pain. Skin warm and dry. Patient remains on O2 @ 5lpm via NC however sleeps with her mouth open. 0530 Patient respirations shallow and uneven. No signs of discomfort. 0630 Patient sleeping with no acute change.    0700 Report given to Hakan Angel

## 2020-03-18 NOTE — PROGRESS NOTES
Problem: Pressure Injury - Risk of  Goal: *Prevention of pressure injury  Description: Document Mohit Scale and appropriate interventions in the flowsheet.   Outcome: Progressing Towards Goal  Note: Pressure Injury Interventions:  Sensory Interventions: Assess changes in LOC, Check visual cues for pain, Float heels    Moisture Interventions: Absorbent underpads, Apply protective barrier, creams and emollients    Activity Interventions: Pressure redistribution bed/mattress(bed type)    Mobility Interventions: Float heels, HOB 30 degrees or less, Pressure redistribution bed/mattress (bed type)    Nutrition Interventions: Document food/fluid/supplement intake    Friction and Shear Interventions: Apply protective barrier, creams and emollients, HOB 30 degrees or less, Lift sheet                Problem: Patient Education: Go to Patient Education Activity  Goal: Patient/Family Education  Outcome: Progressing Towards Goal     Problem: Patient Education: Go to Patient Education Activity  Goal: Patient/Family Education  Outcome: Progressing Towards Goal

## 2020-03-18 NOTE — PROGRESS NOTES
Problem: Pressure Injury - Risk of  Goal: *Prevention of pressure injury  Description: Document Mohit Scale and appropriate interventions in the flowsheet. Outcome: Not Progressing Towards Goal  Note: Pressure Injury Interventions:  Sensory Interventions: Assess changes in LOC    Moisture Interventions: Absorbent underpads, Apply protective barrier, creams and emollients    Activity Interventions: Pressure redistribution bed/mattress(bed type)    Mobility Interventions: Float heels, HOB 30 degrees or less    Nutrition Interventions: Document food/fluid/supplement intake, Offer support with meals,snacks and hydration    Friction and Shear Interventions: Apply protective barrier, creams and emollients, HOB 30 degrees or less                Problem: Patient Education: Go to Patient Education Activity  Goal: Patient/Family Education  Outcome: Progressing Towards Goal     Problem: Falls - Risk of  Goal: *Absence of Falls  Description: Document Nasra Fall Risk and appropriate interventions in the flowsheet.   Outcome: Progressing Towards Goal  Note: Fall Risk Interventions:  Mobility Interventions: Bed/chair exit alarm    Mentation Interventions: Adequate sleep, hydration, pain control, Bed/chair exit alarm, Door open when patient unattended, Evaluate medications/consider consulting pharmacy    Medication Interventions: Bed/chair exit alarm, Evaluate medications/consider consulting pharmacy    Elimination Interventions: Bed/chair exit alarm, Call light in reach    History of Falls Interventions: Bed/chair exit alarm, Door open when patient unattended, Evaluate medications/consider consulting pharmacy

## 2020-03-18 NOTE — PROGRESS NOTES
0912  Phone call to [facility] NP. New orders received. RR labored. 0955  Respirations 32 and  labored. Pt using accessory muscles. Pt medicated with PRN IV lorazepam and Morphine. 1005  Pt's daughter Mike Pope at the bedside. Rn updated her on the disease process.

## 2020-03-18 NOTE — H&P
[facility]  [phone]    Patient Name: Leticia Mcdonnell  YOB: 1925    Date of Provider Hospice Visit: 03/17/20    Level of Care:   [x] General Inpatient (GIP)    [] Routine   [] Respite    Current Location of Care:  [] [address] [place] [address] [] [facility] [] [id number] [] [facility] [x] [facility], patient referred from:  [x] [address] [place] [address] [] [facility] [] [id number] [] [facility] [] Home [] Other:     Date of Original Hospice Admission: 03/17/2020  Hospice Medical Director at time of admission: [address] [place] [address] Diagnosis: Advanced COPD  Diagnoses RELATED to the terminal prognosis: Chronic vertebral fracture due to osteoporosis, RLE acute DVT  Other Diagnoses:  history of breast lump, arthritis, hypertension, PVD, decubitus sores, hypercholesteremia, hyperlipidemia, TIA     HOSPICE SUMMARY   Do not cut and paste chart information other than imaging findings    Leticia Mcdonnell is a 80y.o. year old who was admitted to [facility]. The patient's principle diagnosis has resulted in further progression and a steady decline in function. Pt recently admitted to hospital with cellulitis of right foot requiring IV antibiotics. Pt also developed acute on chronic respiratory failure, worsening nausea, inability to swallow, fluctuating consciousness and cognition, generalised weakness, and debility. Pt became very restless and agitated, mostly confined to bed. Refer to LCD     Functionally, the patient's Karnofsky and/or Palliative Performance Scale has declined over a period of weeks and is estimated at 20%. The patient is dependent on the following ADLs:all    Objective information that support this patients limited prognosis includes:   CT Chest  IMPRESSION: Moderate bilateral pleural effusions and underlying consolidation  have not changed significantly compared to the prior examination. No evidence of  pulmonary embolism.   PCO2 74.6    The patient/family chose comfort measures with the support of Hospice. HOSPICE DIAGNOSES   Active Symptoms:  1. Labored breathing  2. Delirium/decreased responsiveness  3. Restlessness/agitation  4. Pain; non verbal  5. Increased oropharyngeal secretions     PLAN   1. Admit to [facility] as pt is requiring close monitoring and frequent medication administration and titration  2. Robinul 0.2mg IV every 4 hours as needed  3. Lorazepam 0.5mg IV every 15mts as needed  4. Morphine 1mg IV every 15mts as needed  5. Wound care dressings for sacral area and foot/toes wound    6.  and SW to support family needs  7. Disposition: Family desires for her to transfer to [facility] with hospice care once she is stable: unlikely  8. Hospice Plan of care was reviewed in detail and agree with current plan of care    Prognosis estimated based on 03/18/20 clinical assessment is:   [] Hours to Days    [x] Days to Weeks    [] Other:    Communicated plan of care with: Hospice Case Manager; Hospice IDT; Care Team     GOALS OF CARE     Patient/Medical POA stated Goal of Care: comfort    [x] I have reviewed and/or updated ACP information in the Advance Care Planning Navigator. This information is available in the [address] link in the patient's chart header. Primary Decision Maker (Health Care Agent):     Resuscitation Status: DNR  If DNR is there a Durable DNR on file? : [x] Yes [] No (If no, complete Durable DNR)    HISTORY     History obtained from: daughters, staff, chart    CHIEF COMPLAINT: N/A  The patient is:   [] Verbal  [x] Nonverbal  [] Unresponsive    HPI/SUBJECTIVE:  Pt just arrived from hospital and is lethargic. Breathing is labored.  Appears frail and somnolent, little restless       REVIEW OF SYSTEMS     The following systems were: [] reviewed  [x] unable to be reviewed    Positive ROS include:  Constitutional: fatigue, weakness, in pain, short of breath  Ears/nose/mouth/throat: increased airway secretions  Respiratory:shortness of breath, wheezing  Gastrointestinal:poor appetite, nausea, vomiting, abdominal pain, constipation, diarrhea  Musculoskeletal:pain, deformities, swelling legs  Neurologic:confusion, hallucinations, weakness  Psychiatric:anxiety, feeling depressed, poor sleep  Endocrine:     Adult Non-Verbal Pain Assessment Score: 5    Face  [] 0   No particular expression or smile  [x] 1   Occasional grimace, tearing, frowning, wrinkled forehead  [] 2   Frequent grimace, tearing, frowning, wrinkled forehead    Activity (movement)  [] 0   Lying quietly, normal position  [x] 1   Seeking attention through movement or slow, cautious movement  [] 2   Restless, excessive activity and/or withdrawal reflexes    Guarding  [x] 0   Lying quietly, no positioning of hands over areas of body  [] 1   Splinting areas of the body, tense  [] 2   Rigid, stiff    Physiology (vital signs)  [] 0   Stable vital signs  [x] 1   Change in any of the following: SBP > 20mm Hg; HR > 20/minute  [] 2   Change in any of the following: SBP > 30mm Hg; HR > 25/minute    Respiratory  [] 0   Baseline RR/SpO2, compliant with ventilator  [] 1   RR > 10 above baseline, or 5% drop SpO2, mild asynchrony with ventilator  [x] 2   RR > 20 above baseline, or 10% drop SpO2, asynchrony with ventilator     FUNCTIONAL ASSESSMENT     Palliative Performance Scale (PPS):20%       PSYCHOSOCIAL/SPIRITUAL ASSESSMENT     Active Problems:    * No active hospital problems.  *    Past Medical History:   Diagnosis Date    Arthritis     Breast lump 06/13/2018    right breast lump x a few days    HTN (hypertension)     Hypercholesteremia     Hyperlipidemia     Osteoporosis     Stroke ([facility] [address]     CVA, TIA    TIA (transient ischemic attack)     Vertebral fracture       Past Surgical History:   Procedure Laterality Date    HX HIP REPLACEMENT  2009    left    HX ORTHOPAEDIC      left hip replacement      Social History     Tobacco Use    Smoking status: Former Smoker     Years: 40.00     Last attempt to quit: 1986     Years since quittin.3    Smokeless tobacco: Never Used   Substance Use Topics    Alcohol use: Yes     No family history on file. Allergies   Allergen Reactions    Hydrochlorothiazide Nausea and Vomiting      Current Facility-Administered Medications   Medication Dose Route Frequency    LORazepam (ATIVAN) injection 1 mg  1 mg IntraVENous Q15MIN PRN    morphine injection 1 mg  1 mg IntraVENous Q15MIN PRN    glycopyrrolate (ROBINUL) injection 0.2 mg  0.2 mg IntraVENous Q4H    morphine injection 1 mg  1 mg IntraVENous Q4H    LORazepam (ATIVAN) injection 0.5 mg  0.5 mg IntraVENous Q4H    senna-docusate (PERICOLACE) 8.6-50 mg per tablet 2 Tab  2 Tab Oral BID PRN    bisacodyL (DULCOLAX) suppository 10 mg  10 mg Rectal DAILY PRN        PHYSICAL EXAM     Wt Readings from Last 3 Encounters:   20 53.4 kg (117 lb 11.6 oz)   19 57.3 kg (126 lb 6.4 oz)   18 56.7 kg (125 lb)       Visit Vitals  /50   Pulse 77   Temp 98.3 °F (36.8 °C)   Resp 22   SpO2 (!) 83%       Supplemental O2  [x] Yes  [] NO  Last bowel movement:     Currently this patient has:  [] Peripheral IV [] PICC  [] PORT [] ICD    [] Horn Catheter [] NG Tube   [] PEG Tube    [] Rectal Tube [] Drain  [] Other:     Constitutional:lethargic, minimally responsive, appears to be very short of breath  Eyes: pallor  ENMT: some secretions  Cardiovascular: tachycardic  Respiratory: tachypnea, labored breathing +++, using accessory muscles  Gastrointestinal: soft, slightly protuberant, BS +  Musculoskeletal: no deformities, area of redness and swelling right lateral part of foot/4&5th toes.    Skin:sacral decubitis  Neurologic:minimally responsive, restless  Psychiatric: anxious  Other:       Pertinent Lab and or Imaging Tests:  Lab Results   Component Value Date/Time    Sodium 141 2020 06:29 AM    Potassium 4.6 2020 06:29 AM    Chloride 103 2020 06:29 AM    CO2 33 (H) 2020 06:29 AM Anion gap 5 03/17/2020 06:29 AM    Glucose 98 03/17/2020 06:29 AM    BUN 18 03/17/2020 06:29 AM    Creatinine 0.80 03/17/2020 06:29 AM    BUN/Creatinine ratio 23 (H) 03/17/2020 06:29 AM    GFR est AA >60 03/17/2020 06:29 AM    GFR est non-AA >60 03/17/2020 06:29 AM    Calcium 9.4 03/17/2020 06:29 AM     Lab Results   Component Value Date/Time    Protein, total 6.2 (L) 03/17/2020 06:29 AM    Albumin 2.9 (L) 03/17/2020 06:29 AM           Total time: 70mts  Counseling / coordination time: 40mts  > 50% counseling / coordination?: yes

## 2020-03-18 NOTE — PROGRESS NOTES
[facility]   Good Help to Those in Need  [phone]    Patient Name: Zak Osborne  YOB: 1925    Date of Provider Hospice Visit: 03/18/20    Level of Care:   [x] General Inpatient (GIP)    [] Routine   [] Respite    Current Location of Care:  [] [facility] [] [facility] [] [address] [] [facility] [x] Hospice House [facility]    IF [facility], patient referred from:  [x] [facility] [] [facility] [] [address] [facility] [] [facility] [] Home [] Other:     Date of [address] [facility] [address] Admission: 3/17/2020  Hospice Medical Director at time of admission: Rossana Pineda    Principle Hospice Diagnosis: Chronic obstructive pulmonary disease, unspecified COPD, Acute bronchitis, hypoxia   Diagnoses RELATED to the terminal prognosis:  Chronic vertebral fracture due to osteoporosis ([address]       Jose Armando Olmos is a 80y.o. year old who was admitted to [facility]. The patient's principle diagnosis has resulted in weakness, debility, fatigue, shortness of breath, pain, anxiety, agitation admitted yesterday routine level of care changed today to General  Inpt level of care due to overwhelming symptoms of acute respiratory failure, Shortness of breath, airway secretions, agitation. Requiring IV medications for comfort, frequent assessments by the medical team, this care cannot be provided in the home at this time. Functionally, the patient's Karnofsky and/or Palliative Performance Scale has declined over a period of weeks and is estimated at 10% The patient is dependent on the following ADLs: pt is completely dependent for all ADLs. Objective information that support this patients limited prognosis includes:  CT SCAN if the chest 3/11/2020  IMPRESSION: Moderate bilateral pleural effusions and underlying consolidation  have not changed significantly compared to the prior examination. No evidence of  pulmonary embolism. PCO2 74.6  The patient/family chose comfort measures with the support of Hospice.      HOSPICE DIAGNOSES   Active Symptoms:  1. shortness of breath  2. Excessive airway secretions  3. Agitation  4. Non verbal pain indicators     PLAN   1. Admit to GIP/Routine level of care for overwhelming symptoms, transitions of care, high risk for decline, this care cannot be provided in the home, the need for IV medications, frequent assessments are required by the medical team.Overwhelming symptoms of acute respiratory failure, Shortness of breath, airway secretions, agitation. 2. Medications include the following:   3. Robinul IV changed to q 4 hrs scheduled  4. Ativan IV changed to q 4 hrs scheduled and every 15 min as needed  5. Morphine IV changed to Q 4 hrs and every 15 min as needed  6. Continue comfort care    7.  and SW to support family needs  8. Disposition: to routine level of care once symptoms resolve    Prognosis estimated based on 03/18/20 clinical assessment is:   [x] Hours to Days    [] Days to Weeks    [] Other:    Communicated plan of care with: Hospice Case Manager; Hospice IDT; Care Team     GOALS OF CARE     Patient/Medical POA stated Goal of Care: comfort care    [x] I have reviewed and/or updated ACP information in the Advance Care Planning Navigator. This information is available in the [address] [place] [address] Drive link in the patient's chart header.     Primary Decision Maker (Health Care Agent):     Resuscitation Status: DNR  If DNR is there a Durable DNR on file? : [x] Yes [] No (If no, complete Durable DNR)    HISTORY     History obtained from: chart, SN, CM, family, patient    CHIEF COMPLAINT:    The patient is:   [] Verbal  [] Nonverbal  [x] Unresponsive    HPI/SUBJECTIVE:  80year old female with hx of Chronic obstructive pulmonary disease, unspecified COPD, Acute bronchitis, hypoxia who has developed overwhelming symptoms today and level of care changed to [facility]       REVIEW OF SYSTEMS     The following systems were: [] reviewed  [x] unable to be reviewed    Positive ROS include:  Constitutional: fatigue, weakness, in pain, short of breath  Ears/nose/mouth/throat: increased airway secretions  Respiratory:shortness of breath, wheezing  Gastrointestinal:poor appetite, nausea, vomiting, abdominal pain, constipation, diarrhea  Musculoskeletal:pain, deformities, swelling legs  Neurologic:confusion, hallucinations, weakness  Psychiatric:anxiety, feeling depressed, poor sleep  Endocrine:     Adult Non-Verbal Pain Assessment Score: 6/10    Face  [] 0   No particular expression or smile  [] 1   Occasional grimace, tearing, frowning, wrinkled forehead  [x] 2   Frequent grimace, tearing, frowning, wrinkled forehead    Activity (movement)  [] 0   Lying quietly, normal position  [] 1   Seeking attention through movement or slow, cautious movement  [x] 2   Restless, excessive activity and/or withdrawal reflexes    Guarding  [] 0   Lying quietly, no positioning of hands over areas of body  [] 1   Splinting areas of the body, tense  [] 2   Rigid, stiff    Physiology (vital signs)  [x] 0   Stable vital signs  [] 1   Change in any of the following: SBP > 20mm Hg; HR > 20/minute  [] 2   Change in any of the following: SBP > 30mm Hg; HR > 25/minute    Respiratory  [] 0   Baseline RR/SpO2, compliant with ventilator  [] 1   RR > 10 above baseline, or 5% drop SpO2, mild asynchrony with ventilator  [x] 2   RR > 20 above baseline, or 10% drop SpO2, asynchrony with ventilator     FUNCTIONAL ASSESSMENT     Palliative Performance Scale (PPS): 10%       PSYCHOSOCIAL/SPIRITUAL ASSESSMENT     Active Problems:    * No active hospital problems.  *    Past Medical History:   Diagnosis Date    Arthritis     Breast lump 06/13/2018    right breast lump x a few days    HTN (hypertension)     Hypercholesteremia     Hyperlipidemia     Osteoporosis     Stroke ([facility] [address]     CVA, TIA    TIA (transient ischemic attack)     Vertebral fracture       Past Surgical History:   Procedure Laterality Date    HX HIP REPLACEMENT  2009    left    HX ORTHOPAEDIC      left hip replacement      Social History     Tobacco Use    Smoking status: Former Smoker     Years: 40.00     Last attempt to quit: 1986     Years since quittin.3    Smokeless tobacco: Never Used   Substance Use Topics    Alcohol use: Yes     No family history on file.    Allergies   Allergen Reactions    Hydrochlorothiazide Nausea and Vomiting      Current Facility-Administered Medications   Medication Dose Route Frequency    LORazepam (ATIVAN) injection 1 mg  1 mg IntraVENous Q15MIN PRN    morphine injection 1 mg  1 mg IntraVENous Q15MIN PRN    glycopyrrolate (ROBINUL) injection 0.2 mg  0.2 mg IntraVENous Q4H    morphine injection 1 mg  1 mg IntraVENous Q4H    LORazepam (ATIVAN) injection 0.5 mg  0.5 mg IntraVENous Q4H    senna-docusate (PERICOLACE) 8.6-50 mg per tablet 2 Tab  2 Tab Oral BID PRN    bisacodyL (DULCOLAX) suppository 10 mg  10 mg Rectal DAILY PRN        PHYSICAL EXAM     Wt Readings from Last 3 Encounters:   20 53.4 kg (117 lb 11.6 oz)   19 57.3 kg (126 lb 6.4 oz)   18 56.7 kg (125 lb)       Visit Vitals  /50   Pulse 77   Temp 98.3 °F (36.8 °C)   Resp 22   SpO2 (!) 83%       Supplemental O2  [x] Yes  [] NO  Last bowel movement: PTA    Currently this patient has:  [x] Peripheral IV [] PICC  [] PORT [] ICD    [x] Horn Catheter [] NG Tube   [] PEG Tube    [] Rectal Tube [] Drain  [] Other:     Constitutional: unresponsive  Eyes: pupils equal, anicteric  ENMT: no nasal discharge, moist mucous membranes  Cardiovascular: regular rhythm, distal pulses intact  Respiratory: breathing labored, with excessive secretions  Gastrointestinal: soft non-tender, +bowel sounds  Musculoskeletal: no deformity, no tenderness to palpation  Skin: warm, dry  Neurologic:pt is/ not able to follow commands, pt is/ not moving all extremities  Psychiatric:  NA        Pertinent Lab and or Imaging Tests:  Lab Results   Component Value Date/Time    Sodium 141 2020 06:29 AM    Potassium 4.6 03/17/2020 06:29 AM    Chloride 103 03/17/2020 06:29 AM    CO2 33 (H) 03/17/2020 06:29 AM    Anion gap 5 03/17/2020 06:29 AM    Glucose 98 03/17/2020 06:29 AM    BUN 18 03/17/2020 06:29 AM    Creatinine 0.80 03/17/2020 06:29 AM    BUN/Creatinine ratio 23 (H) 03/17/2020 06:29 AM    GFR est AA >60 03/17/2020 06:29 AM    GFR est non-AA >60 03/17/2020 06:29 AM    Calcium 9.4 03/17/2020 06:29 AM   Rossy Berry NP    Lab Results   Component Value Date/Time    Protein, total 6.2 (L) 03/17/2020 06:29 AM    Albumin 2.9 (L) 03/17/2020 06:29 AM              Lorna Pfeiffer NP

## 2020-03-18 NOTE — PROGRESS NOTES
..  0700:Report received from [facility] using SBAR I/O and Kardex. Pt is minimally responding to verba, and tactile stimuli, slightly grimacing with resp 26.  07:53:Pt medicated with morphine 1 mg IV, by Nesha Francisco. 1000:New orders for IV ativan administer by Nesha Francisco. 1100:Pt made GIP by Everett Angel NP for increase resp pain and anxiety. 12:30:New subcutaneous line to R arm inserted by María zhou adm Left arm IV morphine and ativan SEE MAR.  13:20:Pt's family is visiting, remains comfortable from schedule meds. 14:38:Mouth care done daughters and mignon at bedside. 15:08:Schedule Robinul and ativan adm by María Ramos RN SEE MAR.  16:06:Remains comfortable from schedule meds, daughters at bedside turn and repositioned for comfort mouth care done  17:30:Mouth care done, no discomfort noted. 18:30:Remains comfortable, no changes. 1900:Rport given to [facility].               NAME OF PATIENT:Joellen Worley     LEVEL OF CARE: GIP  REASON FOR GIP:Acute Resp distress     *PATIENT REMAINS ELIGIBLE FOR GIP LEVEL OF CARE AS EVIDENCED BY:Anxiety Increase Secretions and pain   REASON FOR RESPITE:N/A     O2 SAFETY:  Concentrator positioning (6\" from furniture/drapes), Tanks stored in mondragon , No petroleum based products on face while oxygen in use and Oxygen sign on the door     FALL INTERVENTIONS PROVIDED:   Implemented/recommended use of fall risk identification flag to all team members, Implemented/recommended assistive devices and encouraged their use, Implemented/recommended resources for alarm system (personal alarm, bed alarm, call bell, etc.)  and Implemented/recommended environmental changes (remove hazards, lower bed, improve lighting, etc.)     INTERDISPLINARY COMMUNICATION/COLLABORATION:  Physician, MSW, Nancy and RN, CNA     NEW MEDICATION INITIATION DOCUMENTATION:N/A      Reason medication is being initiated:  N/A     MD / Provider name consulted re: change in status / initiation of new medication:  N/A     New Symptom(s):  Pain SOB and increase secretions   New Order(s):  SEE MAR     Name of the person notified of the changes: daughters     Name of person being taught:  daughters     Instructions given: yes     Side Effects taught:  yes     Response to teaching:  good        COMFORTABLE DYING MEASURE:  Is Patient/family satisfied with symptom level?  yes     DISCHARGE PLAN:MD and MSW working on discharge.

## 2020-03-19 NOTE — PROGRESS NOTES
0700 Report received from [facility].  5049 Patient given scheduled medications, see MAR. Patient resting in bed quietly, respirations are shallow and unlabored, neutral facial expression noted. Assessment performed, see flow sheet. 0086 Patient turned and repositioned in bed with CNGOPI Vanegas August, patient tolerated activity well. 0830 Patient passed away peacefully with staff at the bedside. 200 Patient's daughter notified, they plan on using [facility]. 1100 Patient's family at the bedside.

## 2020-03-19 NOTE — HOSPICE
This was an initial visit to assess need and to offer support. Ms Lucien Arce was in bed and appeared to be sleeping. She did not respond to this  or any of the conversation that took place around her. Her two daughter's were at bedside. Offered support as they engaged in some life review. It was their assessment that it has been a good life for them and their mother. They promised their father that they would take good care of their mother when he . Now they are trying to honor her wishes. It was her desire that they keep her comfortable and let her go at the end. They note that they fully understand and are accepting. 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 Her granddaughter is having a hard time according to them. Her mother notes this is in part because this is her last living grandparent. I affirmed her grieve and the normalcy of grief. Ms Lucien Arce is [address] and her azeb has been a large part of her life.

## 2020-03-19 NOTE — PROGRESS NOTES
Problem: Pressure Injury - Risk of  Goal: *Prevention of pressure injury  Description: Document Mohit Scale and appropriate interventions in the flowsheet. Outcome: Progressing Towards Goal  Note: Pressure Injury Interventions:  Sensory Interventions: Assess changes in LOC    Moisture Interventions: Absorbent underpads, Apply protective barrier, creams and emollients    Activity Interventions: Pressure redistribution bed/mattress(bed type)    Mobility Interventions: Float heels, HOB 30 degrees or less    Nutrition Interventions: Document food/fluid/supplement intake, Offer support with meals,snacks and hydration    Friction and Shear Interventions: Apply protective barrier, creams and emollients, HOB 30 degrees or less                Problem: Patient Education: Go to Patient Education Activity  Goal: Patient/Family Education  Outcome: Progressing Towards Goal     Problem: Falls - Risk of  Goal: *Absence of Falls  Description: Document Nasra Fall Risk and appropriate interventions in the flowsheet.   Outcome: Progressing Towards Goal  Note: Fall Risk Interventions:  Mobility Interventions: Bed/chair exit alarm    Mentation Interventions: Adequate sleep, hydration, pain control, Bed/chair exit alarm, Door open when patient unattended, Evaluate medications/consider consulting pharmacy    Medication Interventions: Bed/chair exit alarm, Evaluate medications/consider consulting pharmacy    Elimination Interventions: Bed/chair exit alarm, Call light in reach    History of Falls Interventions: Bed/chair exit alarm, Door open when patient unattended, Evaluate medications/consider consulting pharmacy         Problem: Patient Education: Go to Patient Education Activity  Goal: Patient/Family Education  Outcome: Progressing Towards Goal

## 2020-03-19 NOTE — PROGRESS NOTES
Pharmacy Hospice Monitoring   Attending: Mliss Hodgkin  Pharmacist monitoring provided for this 80year old female at [facility]. Principle Hospice Diagnosis: Advanced COPD  Diagnoses RELATED to the terminal prognosis: Chronic vertebral fracture due to osteoporosis, RLE acute DVT  Other Diagnoses:  history of breast lump, arthritis, hypertension, PVD, decubitus sores, hypercholesteremia, hyperlipidemia, TIA  Admitted for:  Close monitoring and frequent medication administration and titration  Level of care: General Inpatient (GIP)  Length of stay:  Day 2    Active Problem List:  1. Labored breathing  2. Delirium/decreased responsiveness  3. Restlessness/agitation  4. Pain; non verbal  5. Increased oropharyngeal secretions    Treatment Goal/Medication assessment:   Admit to [facility] LOC as pt is requiring close monitoring and frequent medication administration and titration  Robinul 0.2mg IV every 4 hours as needed  Lorazepam 0.5mg IV every 15mts as needed  Morphine 1mg IV every 15mts as needed    Treatment Goal Check List    Admitting dianosis treated appropriately: Yes   Nausea/Vomiting medication(s) ordered: No   Pain medication(s) ordered:  Yes  Agitation/Anxiety/ Delirium medication(s) ordered: Yes  Depression medication(s) ordered: No   Secretions medication(s) ordered: Yes  Constipation/Bowel routine medication(s) ordered: Yes  SOB/ Dyspnea medication(s) ordered: No   Insomnia medication(s) ordered: No      Medications ordered as \"patient supplied\" requiring identification and appropriate Connect Care labelling:  No     Medication Supply Assessment:  -Adequate Pyxis supply     LORazepam (ATIVAN) injection 1 mg, 1 mg, IntraVENous, Q15MIN PRN, Carlie Berry NP, 1 mg at 03/18/20 0955    morphine injection 1 mg, 1 mg, IntraVENous, Q15MIN PRN, Carlie Berry NP, 1 mg at 03/18/20 1000    glycopyrrolate (ROBINUL) injection 0.2 mg, 0.2 mg, IntraVENous, Q4H, Carlie Berry NP, 0.2 mg at 03/19/20 9789 morphine injection 1 mg, 1 mg, IntraVENous, Q4H, Carlie Berry, NP, 1 mg at 03/19/20 0744    LORazepam (ATIVAN) injection 0.5 mg, 0.5 mg, IntraVENous, Q4H, Carlie Berry, NP, 0.5 mg at 03/19/20 0743    senna-docusate (PERICOLACE) 8.6-50 mg per tablet 2 Tab, 2 Tab, Oral, BID PRN, Elo Tobias MD    bisacodyL (DULCOLAX) suppository 10 mg, 10 mg, Rectal, DAILY PRN, Starlett Gambler, MD Lethaniel Castleman, Pharm. D.   [address]

## 2020-03-20 NOTE — HOSPICE
LCSW placed bereavement call to daughter, Hayes Figueroa. She is doing well and able to laugh and joke. LCSW offered condolences and reminded her of bereavement support.     ARNULFO Frey, [facility]   [phone]